# Patient Record
Sex: FEMALE | Race: WHITE | Employment: OTHER | URBAN - METROPOLITAN AREA
[De-identification: names, ages, dates, MRNs, and addresses within clinical notes are randomized per-mention and may not be internally consistent; named-entity substitution may affect disease eponyms.]

---

## 2017-03-02 ENCOUNTER — OP HISTORICAL/CONVERTED ENCOUNTER (OUTPATIENT)
Dept: OTHER | Age: 67
End: 2017-03-02

## 2017-07-17 ENCOUNTER — OP HISTORICAL/CONVERTED ENCOUNTER (OUTPATIENT)
Dept: OTHER | Age: 67
End: 2017-07-17

## 2017-07-31 ENCOUNTER — OP HISTORICAL/CONVERTED ENCOUNTER (OUTPATIENT)
Dept: OTHER | Age: 67
End: 2017-07-31

## 2017-07-31 LAB — MAMMOGRAPHY, EXTERNAL: NORMAL

## 2017-11-20 ENCOUNTER — OP HISTORICAL/CONVERTED ENCOUNTER (OUTPATIENT)
Dept: OTHER | Age: 67
End: 2017-11-20

## 2018-02-28 ENCOUNTER — OP HISTORICAL/CONVERTED ENCOUNTER (OUTPATIENT)
Dept: OTHER | Age: 68
End: 2018-02-28

## 2018-07-02 ENCOUNTER — OP HISTORICAL/CONVERTED ENCOUNTER (OUTPATIENT)
Dept: OTHER | Age: 68
End: 2018-07-02

## 2018-09-01 ENCOUNTER — ED HISTORICAL/CONVERTED ENCOUNTER (OUTPATIENT)
Dept: OTHER | Age: 68
End: 2018-09-01

## 2018-09-18 ENCOUNTER — OP HISTORICAL/CONVERTED ENCOUNTER (OUTPATIENT)
Dept: OTHER | Age: 68
End: 2018-09-18

## 2018-11-06 ENCOUNTER — OP HISTORICAL/CONVERTED ENCOUNTER (OUTPATIENT)
Dept: OTHER | Age: 68
End: 2018-11-06

## 2018-11-23 ENCOUNTER — OP HISTORICAL/CONVERTED ENCOUNTER (OUTPATIENT)
Dept: OTHER | Age: 68
End: 2018-11-23

## 2020-02-18 LAB
A/G RATIO, EXTERNAL: 1.7
ALBUMIN, EXTERNAL: 4.1
ALK PHOS, EXTERNAL: 86
ANION GAP BLD CALC-SCNC: NORMAL MMOL/L
BILI DIRECT, EXTERNAL: NORMAL
BILI TOTAL, EXTERNAL: NORMAL
BUN BLD-MCNC: 12 MG/DL
BUN/CREATININE RATIO, EXTERNAL: 13
CALCIUM, EXTERNAL: 9.6
CALCIUM, ION, EXTERNAL: NORMAL
CHLORIDE, EXTERNAL: 100
CO2, EXTERNAL: 23
CREATININE SER, EXTERNAL: 0.93
EGFR IF AFA, EXTERNAL: 73
EGFR NOT AFA, EXTERNAL: 63
GLOBULIN, EXTERNAL: 2.4
GLUCOSE SER, EXTERNAL: 114
HBA1C MFR BLD HPLC: 8.8 %
POTASSIUM, EXTERNAL: 3.9
PROTEIN TOT, EXTERNAL: 6.5
SGOT (AST), EXTERNAL: 15
SGPT (ALT), EXTERNAL: 18
SODIUM, EXTERNAL: 142

## 2020-02-24 ENCOUNTER — OP HISTORICAL/CONVERTED ENCOUNTER (OUTPATIENT)
Dept: OTHER | Age: 70
End: 2020-02-24

## 2020-07-21 VITALS
HEART RATE: 99 BPM | WEIGHT: 204 LBS | DIASTOLIC BLOOD PRESSURE: 91 MMHG | HEIGHT: 66 IN | OXYGEN SATURATION: 98 % | BODY MASS INDEX: 32.78 KG/M2 | SYSTOLIC BLOOD PRESSURE: 141 MMHG | TEMPERATURE: 98.6 F

## 2020-07-21 PROBLEM — I10 ESSENTIAL HYPERTENSION: Status: ACTIVE | Noted: 2020-07-21

## 2020-07-21 PROBLEM — K76.0 NON-ALCOHOLIC FATTY LIVER DISEASE: Status: ACTIVE | Noted: 2020-07-21

## 2020-07-21 PROBLEM — E10.9 TYPE 1 DIABETES MELLITUS WITHOUT COMPLICATION (HCC): Status: ACTIVE | Noted: 2020-07-21

## 2020-07-21 RX ORDER — LEVOTHYROXINE SODIUM 25 UG/1
TABLET ORAL
COMMUNITY
End: 2021-04-12

## 2020-07-21 RX ORDER — AMITRIPTYLINE HYDROCHLORIDE 25 MG/1
50 TABLET, FILM COATED ORAL
COMMUNITY
End: 2021-01-27

## 2020-07-21 RX ORDER — METFORMIN HYDROCHLORIDE 1000 MG/1
1000 TABLET ORAL 2 TIMES DAILY WITH MEALS
COMMUNITY
End: 2022-03-22 | Stop reason: SDUPTHER

## 2020-07-21 RX ORDER — GLIMEPIRIDE 4 MG/1
TABLET ORAL 2 TIMES DAILY
COMMUNITY
End: 2021-09-24

## 2020-07-21 RX ORDER — BLOOD SUGAR DIAGNOSTIC
STRIP MISCELLANEOUS 3 TIMES DAILY
COMMUNITY
End: 2022-03-22 | Stop reason: SDUPTHER

## 2020-07-21 RX ORDER — ASPIRIN 81 MG/1
TABLET ORAL DAILY
COMMUNITY
End: 2022-03-22 | Stop reason: SDUPTHER

## 2020-07-21 RX ORDER — FAMOTIDINE 20 MG/1
20 TABLET, FILM COATED ORAL 2 TIMES DAILY
COMMUNITY
End: 2022-03-22 | Stop reason: SDUPTHER

## 2020-07-21 RX ORDER — AMLODIPINE BESYLATE 5 MG/1
5 TABLET ORAL DAILY
COMMUNITY
End: 2022-03-22 | Stop reason: SDUPTHER

## 2020-07-24 LAB
ALBUMIN SERPL-MCNC: 4.1 G/DL (ref 3.8–4.8)
ALBUMIN/GLOB SERPL: 1.6 {RATIO} (ref 1.2–2.2)
ALP SERPL-CCNC: 86 IU/L (ref 39–117)
ALT SERPL-CCNC: 11 IU/L (ref 0–32)
APPEARANCE UR: ABNORMAL
AST SERPL-CCNC: 10 IU/L (ref 0–40)
BACTERIA #/AREA URNS HPF: ABNORMAL /[HPF]
BACTERIA UR CULT: ABNORMAL
BASOPHILS # BLD AUTO: 0 X10E3/UL (ref 0–0.2)
BASOPHILS NFR BLD AUTO: 0 %
BILIRUB SERPL-MCNC: 0.2 MG/DL (ref 0–1.2)
BILIRUB UR QL STRIP: NEGATIVE
BUN SERPL-MCNC: 18 MG/DL (ref 8–27)
BUN/CREAT SERPL: 18 (ref 12–28)
CALCIUM SERPL-MCNC: 9.6 MG/DL (ref 8.7–10.3)
CASTS URNS QL MICRO: ABNORMAL /LPF
CHLORIDE SERPL-SCNC: 103 MMOL/L (ref 96–106)
CHOLEST SERPL-MCNC: 196 MG/DL (ref 100–199)
CO2 SERPL-SCNC: 20 MMOL/L (ref 20–29)
COLOR UR: ABNORMAL
CREAT SERPL-MCNC: 1.01 MG/DL (ref 0.57–1)
EOSINOPHIL # BLD AUTO: 0.2 X10E3/UL (ref 0–0.4)
EOSINOPHIL NFR BLD AUTO: 2 %
EPI CELLS #/AREA URNS HPF: ABNORMAL /HPF (ref 0–10)
ERYTHROCYTE [DISTWIDTH] IN BLOOD BY AUTOMATED COUNT: 14.4 % (ref 11.7–15.4)
GLOBULIN SER CALC-MCNC: 2.5 G/DL (ref 1.5–4.5)
GLUCOSE SERPL-MCNC: 129 MG/DL (ref 65–99)
GLUCOSE UR QL: NEGATIVE
HBA1C MFR BLD: 7.6 % (ref 4.8–5.6)
HCT VFR BLD AUTO: 34.9 % (ref 34–46.6)
HDLC SERPL-MCNC: 54 MG/DL
HGB BLD-MCNC: 11.2 G/DL (ref 11.1–15.9)
HGB UR QL STRIP: ABNORMAL
IMM GRANULOCYTES # BLD AUTO: 0.1 X10E3/UL (ref 0–0.1)
IMM GRANULOCYTES NFR BLD AUTO: 1 %
KETONES UR QL STRIP: NEGATIVE
LDLC SERPL CALC-MCNC: 105 MG/DL (ref 0–99)
LEUKOCYTE ESTERASE UR QL STRIP: ABNORMAL
LYMPHOCYTES # BLD AUTO: 2.9 X10E3/UL (ref 0.7–3.1)
LYMPHOCYTES NFR BLD AUTO: 29 %
MCH RBC QN AUTO: 26.4 PG (ref 26.6–33)
MCHC RBC AUTO-ENTMCNC: 32.1 G/DL (ref 31.5–35.7)
MCV RBC AUTO: 82 FL (ref 79–97)
MICRO URNS: ABNORMAL
MONOCYTES # BLD AUTO: 0.6 X10E3/UL (ref 0.1–0.9)
MONOCYTES NFR BLD AUTO: 6 %
MUCOUS THREADS URNS QL MICRO: PRESENT
NEUTROPHILS # BLD AUTO: 6.3 X10E3/UL (ref 1.4–7)
NEUTROPHILS NFR BLD AUTO: 62 %
NITRITE UR QL STRIP: POSITIVE
PH UR STRIP: 6 [PH] (ref 5–7.5)
PLATELET # BLD AUTO: 295 X10E3/UL (ref 150–450)
POTASSIUM SERPL-SCNC: 4.3 MMOL/L (ref 3.5–5.2)
PROT SERPL-MCNC: 6.6 G/DL (ref 6–8.5)
PROT UR QL STRIP: ABNORMAL
RBC # BLD AUTO: 4.24 X10E6/UL (ref 3.77–5.28)
RBC #/AREA URNS HPF: >30 /HPF (ref 0–2)
SODIUM SERPL-SCNC: 140 MMOL/L (ref 134–144)
SP GR UR: 1.02 (ref 1–1.03)
TRIGL SERPL-MCNC: 183 MG/DL (ref 0–149)
URINALYSIS REFLEX, 377202: ABNORMAL
UROBILINOGEN UR STRIP-MCNC: 1 MG/DL (ref 0.2–1)
VLDLC SERPL CALC-MCNC: 37 MG/DL (ref 5–40)
WBC # BLD AUTO: 10 X10E3/UL (ref 3.4–10.8)
WBC #/AREA URNS HPF: >30 /HPF (ref 0–5)

## 2020-07-27 NOTE — PROGRESS NOTES
Looks like uti, ciprofloxacin 500 mg BI (I may have to send it in if you cannot do order) .  A1c is stable liver and kidneys are normal

## 2020-07-28 DIAGNOSIS — N30.00 ACUTE CYSTITIS WITHOUT HEMATURIA: Primary | ICD-10-CM

## 2020-07-28 RX ORDER — CIPROFLOXACIN 500 MG/1
500 TABLET ORAL 2 TIMES DAILY
Qty: 14 TAB | Refills: 0 | Status: SHIPPED | OUTPATIENT
Start: 2020-07-28 | End: 2020-08-04

## 2020-07-29 ENCOUNTER — TELEPHONE (OUTPATIENT)
Dept: FAMILY MEDICINE CLINIC | Age: 70
End: 2020-07-29

## 2020-07-29 NOTE — TELEPHONE ENCOUNTER
----- Message from Richard Rodriguez MD sent at 7/28/2020  9:02 AM EDT -----  Regarding: FW: Virtual Visit  Rebecca So  Let liss know I am sending in med for UTI and if not better we can check urine and culture.    ----- Message -----  From: Fercho Gallo  Sent: 7/28/2020   8:48 AM EDT  To: Richard Rodriguez MD  Subject: RE: Virtual Visit                                It seems to take a while for these messages to send or the other person to receive them. I get like 2 or 3 from you at once [facility]. But I talked to , she stated she just needs meds for a UTI sent to [address] [facility] in [facility] and if you want her to do a urine she can. Or she said you can just call her.  ----- Message -----  From: Noah Spicer MD  Sent: 7/28/2020   8:35 AM EDT  To: Diane Tenorio  Subject: RE: Virtual Visit                                [facility], she was not able to allow access to her camera for some reason> I did not get to do visit. Please call her and let her know we need access to her camera.  If she needs labs I can go ahead and order them and refill meds if needed but my [address] [facility] not checked in apparently.   ----- Message -----  From: Corrina Ramirezer: 7/28/2020   7:46 AM EDT  To: Richard Rodriguez MD  Subject: Virtual Visit                                    Patient Fara Chung is checked in, requesting link be sent to [phone]

## 2020-08-10 ENCOUNTER — TELEPHONE (OUTPATIENT)
Dept: FAMILY MEDICINE CLINIC | Age: 70
End: 2020-08-10

## 2020-08-10 DIAGNOSIS — R31.0 GROSS HEMATURIA: Primary | ICD-10-CM

## 2020-08-10 NOTE — TELEPHONE ENCOUNTER
Pts  left message. States that pt has finished the antibiotics you sent in. States that she is still having blood clots in urine. Wanting to know what to do?

## 2020-08-20 ENCOUNTER — OP HISTORICAL/CONVERTED ENCOUNTER (OUTPATIENT)
Dept: OTHER | Age: 70
End: 2020-08-20

## 2020-11-04 DIAGNOSIS — R31.0 GROSS HEMATURIA: ICD-10-CM

## 2021-01-27 RX ORDER — AMITRIPTYLINE HYDROCHLORIDE 25 MG/1
TABLET, FILM COATED ORAL
Qty: 180 TAB | Refills: 0 | Status: SHIPPED | OUTPATIENT
Start: 2021-01-27 | End: 2021-08-16

## 2021-04-12 RX ORDER — LEVOTHYROXINE SODIUM 25 UG/1
TABLET ORAL
Qty: 30 TAB | Refills: 0 | Status: SHIPPED | OUTPATIENT
Start: 2021-04-12 | End: 2022-03-22 | Stop reason: ALTCHOICE

## 2021-06-09 ENCOUNTER — HOSPITAL ENCOUNTER (EMERGENCY)
Age: 71
Discharge: HOME | End: 2021-06-09
Attending: EMERGENCY MEDICINE
Payer: MEDICARE

## 2021-06-09 ENCOUNTER — APPOINTMENT (OUTPATIENT)
Dept: GENERAL RADIOLOGY | Age: 71
End: 2021-06-09
Attending: EMERGENCY MEDICINE
Payer: MEDICARE

## 2021-06-09 VITALS
WEIGHT: 208 LBS | HEART RATE: 95 BPM | TEMPERATURE: 98.2 F | OXYGEN SATURATION: 99 % | SYSTOLIC BLOOD PRESSURE: 157 MMHG | HEIGHT: 66 IN | DIASTOLIC BLOOD PRESSURE: 72 MMHG | BODY MASS INDEX: 33.43 KG/M2 | RESPIRATION RATE: 16 BRPM

## 2021-06-09 DIAGNOSIS — M23.41 LOOSE BODY OF RIGHT KNEE: ICD-10-CM

## 2021-06-09 DIAGNOSIS — M25.561 ACUTE PAIN OF RIGHT KNEE: Primary | ICD-10-CM

## 2021-06-09 DIAGNOSIS — M19.90 ARTHRITIS: ICD-10-CM

## 2021-06-09 PROCEDURE — 74011250636 HC RX REV CODE- 250/636: Performed by: EMERGENCY MEDICINE

## 2021-06-09 PROCEDURE — 96372 THER/PROPH/DIAG INJ SC/IM: CPT

## 2021-06-09 PROCEDURE — 99282 EMERGENCY DEPT VISIT SF MDM: CPT

## 2021-06-09 PROCEDURE — 73562 X-RAY EXAM OF KNEE 3: CPT

## 2021-06-09 RX ORDER — KETOROLAC TROMETHAMINE 30 MG/ML
30 INJECTION, SOLUTION INTRAMUSCULAR; INTRAVENOUS
Status: COMPLETED | OUTPATIENT
Start: 2021-06-09 | End: 2021-06-09

## 2021-06-09 RX ORDER — NAPROXEN 500 MG/1
500 TABLET ORAL 2 TIMES DAILY WITH MEALS
Qty: 20 TABLET | Refills: 0 | Status: CANCELLED | OUTPATIENT
Start: 2021-06-09

## 2021-06-09 RX ADMIN — KETOROLAC TROMETHAMINE 30 MG: 30 INJECTION, SOLUTION INTRAMUSCULAR; INTRAVENOUS at 13:22

## 2021-06-09 NOTE — ED PROVIDER NOTES
EMERGENCY DEPARTMENT HISTORY AND PHYSICAL EXAM      Date: 6/9/2021  Patient Name: Anastasiya Smith    History of Presenting Illness     Chief Complaint   Patient presents with    Knee Pain       History Provided By: Patient    HPI: Anastasiya Smith, 72 y.o. female presents to the ED with cc of   Patient complain of right knee pain started last week worsened today unable to walk without a cane history of arthritis in the past denies any fever nausea vomiting abdominal pain or chest pain, pain gets worse with movement and weightbearing   Moderate severity, no known exacerbating or relieving factors, no other associated signs and symptoms. There are no other complaints, changes, or physical findings at this time. PCP: Anita Moseley DO    No current facility-administered medications on file prior to encounter. Current Outpatient Medications on File Prior to Encounter   Medication Sig Dispense Refill    Euthyrox 25 mcg tablet Take 1 tablet by mouth once daily 30 Tab 0    amitriptyline (ELAVIL) 25 mg tablet TAKE 1 TABLET BY MOUTH IN THE EVENING FOR 5 DAYS THEN  TAKE  2  TABLETS  DAILY  THEREAFTER 180 Tab 0    glucose blood VI test strips (Accu-Chek Adeline Plus test strp) strip by Does Not Apply route three (3) times daily.  amLODIPine (NORVASC) 5 mg tablet Take 5 mg by mouth daily.  aspirin delayed-release 81 mg tablet Take  by mouth daily.  glimepiride (AMARYL) 4 mg tablet Take  by mouth two (2) times a day.  metFORMIN (GLUCOPHAGE) 1,000 mg tablet Take 1,000 mg by mouth two (2) times daily (with meals).  metoprolol succinate 50 mg CSpX Take  by mouth.  insulin NPH/insulin regular (NovoLIN 70/30 U-100 Insulin) 100 unit/mL (70-30) injection by SubCUTAneous route. 22 units in the AM and 42 units at PM      famotidine (Pepcid) 20 mg tablet Take 20 mg by mouth two (2) times a day.          Past History     Past Medical History:  Past Medical History:   Diagnosis Date    [facility]  Essential hypertension 7/79/2439    Non-alcoholic fatty liver disease 7/21/2020       Past Surgical History:  Past Surgical History:   Procedure Laterality Date    HX CATARACT REMOVAL Left 07/23/2019    HX CHOLECYSTECTOMY      HX COLONOSCOPY  2002    HX HYSTERECTOMY      partial  20 yrs ago    HX TUBAL LIGATION         Family History:  Family History   Problem Relation Age of Onset    Diabetes Mother     Diabetes Maternal Grandmother     Other Maternal Grandmother         CHF    Diabetes Paternal Grandmother        Social History:  Social History     Tobacco Use    Smoking status: Former Smoker    Smokeless tobacco: Never Used   Substance Use Topics    Alcohol use: Not Currently    Drug use: Not on file       Allergies: Allergies   Allergen Reactions    Adhesive Unknown (comments)    Penicillins Other (comments)    Sulfa (Sulfonamide Antibiotics) Unknown (comments)         Review of Systems   Review of Systems   Constitutional: Negative. HENT: Negative for congestion, facial swelling, rhinorrhea and sore throat. Eyes: Negative. Negative for photophobia and pain. Respiratory: Negative for cough, shortness of breath and wheezing. Cardiovascular: Negative. Negative for chest pain. Gastrointestinal: Negative for abdominal distention and abdominal pain. Genitourinary: Negative. Musculoskeletal: Positive for joint swelling. Allergic/Immunologic: Negative for immunocompromised state. Neurological: Negative. Negative for syncope and weakness. Hematological: Negative. Psychiatric/Behavioral: Negative. Physical Exam   Physical Exam  Vitals and nursing note reviewed. Constitutional:       Appearance: Normal appearance. She is normal weight. HENT:      Head: Normocephalic and atraumatic. Jaw: There is normal jaw occlusion.       Right Ear: Tympanic membrane and external ear normal.      Left Ear: Tympanic membrane and external ear normal.      Nose: Nose normal. Mouth/Throat:      Mouth: Mucous membranes are moist.      Pharynx: Oropharynx is clear. Eyes:      General: Lids are normal.         Right eye: No foreign body or hordeolum. Left eye: No foreign body or hordeolum. Neck:      Thyroid: No thyroid mass. Vascular: No carotid bruit. Trachea: No tracheal tenderness. Cardiovascular:      Rate and Rhythm: Normal rate and regular rhythm. Pulses: Normal pulses. Heart sounds: Normal heart sounds. Pulmonary:      Effort: Pulmonary effort is normal.      Breath sounds: Normal breath sounds. Abdominal:      General: Abdomen is flat. Bowel sounds are normal.      Palpations: Abdomen is soft. There is no mass. Tenderness: There is no abdominal tenderness. There is no right CVA tenderness, left CVA tenderness, guarding or rebound. Hernia: No hernia is present. Musculoskeletal:         General: Normal range of motion. Cervical back: Normal range of motion and neck supple. No rigidity. No muscular tenderness. Normal range of motion. Legs:       Comments: Swelling tenderness decreased range of motion due to pain   Skin:     General: Skin is warm. Findings: No lesion or rash. Neurological:      General: No focal deficit present. Mental Status: She is alert and oriented to person, place, and time. GCS: GCS eye subscore is 4. GCS verbal subscore is 5. GCS motor subscore is 6. Sensory: Sensation is intact. Motor: Motor function is intact. Deep Tendon Reflexes: Reflexes are normal and symmetric. Psychiatric:         Attention and Perception: Attention and perception normal.         Mood and Affect: Mood is anxious. Diagnostic Study Results     Labs -   No results found for this or any previous visit (from the past 12 hour(s)).     Labs reviewed by me    Radiologic Studies -   XR KNEE RT 3 V   Final Result        CT Results  (Last 48 hours)    None        CXR Results  (Last 48 hours) None            Medical Decision Making     I am the first provider for this patient. I reviewed the vital signs, available nursing notes, past medical history, past surgical history, family history and social history. RADIOLOGY report and LABS reviewed by me    Vital Signs-Reviewed the patient's vital signs. Patient Vitals for the past 12 hrs:   Temp Pulse Resp BP SpO2   06/09/21 1136 98.2 °F (36.8 °C) 95 16 (!) 157/72 99 %       EKG interpretation: (Preliminary)      Records Reviewed: Nurse's note. Provider Notes (Medical Decision Making):    Patient presents with DIFF DX : Knee pain, arthritis, loose body any      ED Course:   Initial assessment performed. The patients presenting problems have been discussed, and they are in agreement with the care plan formulated and outlined with them. I have encouraged them to ask questions as they arise throughout their visit. TREATMENT RESPONSE -Stable          Maritza Ontiveros MD      Disposition:  Discharged   Diagnostic tests were reviewed and questions answered. Diagnosis, care plan and treatment options were discussed. The patient understand instructions and will follow up as directed. Condition stable    Admitting Provider:  No admitting provider for patient encounter. Consulting Provider:  No ref. provider found       DISCHARGE PLAN:  1. Current Discharge Medication List        2. Follow-up Information     Follow up With Specialties Details Why Contact Info    Brandan Bowen 23  [address] [facility]      Albino Yost MD Orthopedic Surgery In 2 days  [facility] [address] [place] [address] Way [address]  [phone]          3. Return to ED if worse     Diagnosis     Clinical Impression:     ICD-10-CM ICD-9-CM    1. Acute pain of right knee  M25.561 719.46    2. Arthritis  M19.90 716.90    3.  Loose body of right knee  M23.41 717.6 Attestations:    Margarito Boswell MD    Please note that this dictation was completed with [email or web address], the computer voice recognition software. Quite often unanticipated grammatical, syntax, homophones, and other interpretive errors are inadvertently transcribed by the computer software. Please disregard these errors. Please excuse any errors that have escaped final proofreading. Thank you.

## 2021-06-09 NOTE — DISCHARGE INSTRUCTIONS
Thank you! Thank you for allowing me to care for you in the emergency department. I sincerely hope that you are satisfied with your visit today. It is my goal to provide you with excellent care. Below you will find a list of your labs and imaging from your visit today. Should you have any questions regarding these results please do not hesitate to call the emergency department. Labs -   No results found for this or any previous visit (from the past 12 hour(s)). Radiologic Studies -   XR KNEE RT 3 V   Final Result        CT Results  (Last 48 hours)      None          CXR Results  (Last 48 hours)      None               If you feel that you have not received excellent quality care or timely care, please ask to speak to the nurse manager. Please choose us in the future for your continued health care needs. ------------------------------------------------------------------------------------------------------------  The exam and treatment you received in the Emergency Department were for an urgent problem and are not intended as complete care. It is important that you follow-up with a doctor, nurse practitioner, or physician assistant to:  (1) confirm your diagnosis,  (2) re-evaluation of changes in your illness and treatment, and  (3) for ongoing care. If your symptoms become worse or you do not improve as expected and you are unable to reach your usual health care provider, you should return to the Emergency Department. We are available 24 hours a day. Please take your discharge instructions with you when you go to your follow-up appointment. If you have any problem arranging a follow-up appointment, contact the Emergency Department immediately. If a prescription has been provided, please have it filled as soon as possible to prevent a delay in treatment. Read the entire medication instruction sheet provided to you by the pharmacy.  If you have any questions or reservations about taking the medication due to side effects or interactions with other medications, please call your primary care physician or contact the ER to speak with the charge nurse. Make an appointment with your family doctor or the physician you were referred to for follow-up of this visit as instructed on your discharge paperwork, as this is a mandatory follow-up. Return to the ER if you are unable to be seen or if you are unable to be seen in a timely manner. If you have any problem arranging the follow-up visit, contact the Emergency Department immediately.

## 2021-08-16 RX ORDER — AMITRIPTYLINE HYDROCHLORIDE 25 MG/1
TABLET, FILM COATED ORAL
Qty: 180 TABLET | Refills: 0 | Status: SHIPPED | OUTPATIENT
Start: 2021-08-16 | End: 2022-03-22 | Stop reason: DRUGHIGH

## 2021-09-24 RX ORDER — GLIMEPIRIDE 4 MG/1
TABLET ORAL
Qty: 180 TABLET | Refills: 0 | Status: SHIPPED | OUTPATIENT
Start: 2021-09-24 | End: 2022-03-22 | Stop reason: DRUGHIGH

## 2022-03-17 ENCOUNTER — HOSPITAL ENCOUNTER (EMERGENCY)
Age: 72
Discharge: HOME | End: 2022-03-17
Attending: STUDENT IN AN ORGANIZED HEALTH CARE EDUCATION/TRAINING PROGRAM
Payer: MEDICARE

## 2022-03-17 VITALS
HEART RATE: 98 BPM | DIASTOLIC BLOOD PRESSURE: 78 MMHG | BODY MASS INDEX: 25.71 KG/M2 | SYSTOLIC BLOOD PRESSURE: 145 MMHG | TEMPERATURE: 98.4 F | OXYGEN SATURATION: 97 % | WEIGHT: 160 LBS | HEIGHT: 66 IN | RESPIRATION RATE: 18 BRPM

## 2022-03-17 DIAGNOSIS — R73.9 HYPERGLYCEMIA: Primary | ICD-10-CM

## 2022-03-17 LAB
ALBUMIN SERPL-MCNC: 3.4 G/DL (ref 3.5–5)
ALBUMIN/GLOB SERPL: 0.9 {RATIO} (ref 1.1–2.2)
ALP SERPL-CCNC: 119 U/L (ref 45–117)
ALT SERPL-CCNC: 49 U/L (ref 12–78)
ANION GAP SERPL CALC-SCNC: 9 MMOL/L (ref 5–15)
APPEARANCE UR: CLEAR
AST SERPL W P-5'-P-CCNC: 20 U/L (ref 15–37)
BACTERIA URNS QL MICRO: NEGATIVE /HPF
BASOPHILS # BLD: 0 K/UL (ref 0–0.1)
BASOPHILS NFR BLD: 1 % (ref 0–1)
BILIRUB SERPL-MCNC: 0.5 MG/DL (ref 0.2–1)
BILIRUB UR QL: NEGATIVE
BUN SERPL-MCNC: 20 MG/DL (ref 6–20)
BUN/CREAT SERPL: 16 (ref 12–20)
CA-I BLD-MCNC: 9.4 MG/DL (ref 8.5–10.1)
CHLORIDE SERPL-SCNC: 97 MMOL/L (ref 97–108)
CO2 SERPL-SCNC: 25 MMOL/L (ref 21–32)
COLOR UR: ABNORMAL
CREAT SERPL-MCNC: 1.23 MG/DL (ref 0.55–1.02)
DIFFERENTIAL METHOD BLD: ABNORMAL
EOSINOPHIL # BLD: 0.2 K/UL (ref 0–0.4)
EOSINOPHIL NFR BLD: 2 % (ref 0–7)
ERYTHROCYTE [DISTWIDTH] IN BLOOD BY AUTOMATED COUNT: 14.6 % (ref 11.5–14.5)
GLOBULIN SER CALC-MCNC: 4 G/DL (ref 2–4)
GLUCOSE BLD STRIP.AUTO-MCNC: 361 MG/DL (ref 65–117)
GLUCOSE BLD STRIP.AUTO-MCNC: 527 MG/DL (ref 65–117)
GLUCOSE SERPL-MCNC: 550 MG/DL (ref 65–100)
GLUCOSE UR STRIP.AUTO-MCNC: >300 MG/DL
HCT VFR BLD AUTO: 44.7 % (ref 35–47)
HGB BLD-MCNC: 14 G/DL (ref 11.5–16)
HGB UR QL STRIP: ABNORMAL
IMM GRANULOCYTES # BLD AUTO: 0.1 K/UL (ref 0–0.04)
IMM GRANULOCYTES NFR BLD AUTO: 1 % (ref 0–0.5)
KETONES UR QL STRIP.AUTO: 80 MG/DL
LEUKOCYTE ESTERASE UR QL STRIP.AUTO: NEGATIVE
LYMPHOCYTES # BLD: 1.6 K/UL (ref 0.8–3.5)
LYMPHOCYTES NFR BLD: 22 % (ref 12–49)
MCH RBC QN AUTO: 25.2 PG (ref 26–34)
MCHC RBC AUTO-ENTMCNC: 31.3 G/DL (ref 30–36.5)
MCV RBC AUTO: 80.5 FL (ref 80–99)
MONOCYTES # BLD: 0.5 K/UL (ref 0–1)
MONOCYTES NFR BLD: 7 % (ref 5–13)
MUCOUS THREADS URNS QL MICRO: ABNORMAL /LPF
NEUTS SEG # BLD: 4.9 K/UL (ref 1.8–8)
NEUTS SEG NFR BLD: 67 % (ref 32–75)
NITRITE UR QL STRIP.AUTO: NEGATIVE
NRBC # BLD: 0 K/UL (ref 0–0.01)
NRBC BLD-RTO: 0 PER 100 WBC
PERFORMED BY, TECHID: ABNORMAL
PERFORMED BY, TECHID: ABNORMAL
PH UR STRIP: 5 [PH] (ref 5–8)
PLATELET # BLD AUTO: 236 K/UL (ref 150–400)
PMV BLD AUTO: 9.3 FL (ref 8.9–12.9)
POTASSIUM SERPL-SCNC: 4.4 MMOL/L (ref 3.5–5.1)
PROT SERPL-MCNC: 7.4 G/DL (ref 6.4–8.2)
PROT UR STRIP-MCNC: 100 MG/DL
RBC # BLD AUTO: 5.55 M/UL (ref 3.8–5.2)
RBC #/AREA URNS HPF: ABNORMAL /HPF (ref 0–5)
SODIUM SERPL-SCNC: 131 MMOL/L (ref 136–145)
SP GR UR REFRACTOMETRY: 1.02 (ref 1–1.03)
UA: UC IF INDICATED,UAUC: ABNORMAL
UROBILINOGEN UR QL STRIP.AUTO: 0.1 EU/DL (ref 0.1–1)
WBC # BLD AUTO: 7.3 K/UL (ref 3.6–11)
WBC URNS QL MICRO: ABNORMAL /HPF (ref 0–4)

## 2022-03-17 PROCEDURE — 80053 COMPREHEN METABOLIC PANEL: CPT

## 2022-03-17 PROCEDURE — 74011636637 HC RX REV CODE- 636/637: Performed by: STUDENT IN AN ORGANIZED HEALTH CARE EDUCATION/TRAINING PROGRAM

## 2022-03-17 PROCEDURE — 85025 COMPLETE CBC W/AUTO DIFF WBC: CPT

## 2022-03-17 PROCEDURE — 36415 COLL VENOUS BLD VENIPUNCTURE: CPT

## 2022-03-17 PROCEDURE — 96374 THER/PROPH/DIAG INJ IV PUSH: CPT

## 2022-03-17 PROCEDURE — 82962 GLUCOSE BLOOD TEST: CPT

## 2022-03-17 PROCEDURE — 99284 EMERGENCY DEPT VISIT MOD MDM: CPT

## 2022-03-17 PROCEDURE — 74011250636 HC RX REV CODE- 250/636: Performed by: STUDENT IN AN ORGANIZED HEALTH CARE EDUCATION/TRAINING PROGRAM

## 2022-03-17 PROCEDURE — 81001 URINALYSIS AUTO W/SCOPE: CPT

## 2022-03-17 RX ADMIN — INSULIN HUMAN 6 UNITS: 100 INJECTION, SOLUTION PARENTERAL at 17:38

## 2022-03-17 RX ADMIN — SODIUM CHLORIDE 1000 ML: 9 INJECTION, SOLUTION INTRAVENOUS at 17:08

## 2022-03-17 NOTE — ED PROVIDER NOTES
EMERGENCY DEPARTMENT HISTORY AND PHYSICAL EXAM      Date: 3/17/2022  Patient Name: Chitra Oliveira    History of Presenting Illness     Chief Complaint   Patient presents with    Abdominal Pain       History Provided By: Patient and triage nurse    HPI: Chitra Oliveira, 80 y.o. female with a past medical history significant diabetes, hypertension and stroke presents to the ED with cc of abdominal pain, thirst.  Patient presented from urgent care center for evaluation of abdominal pain high blood sugar. Patient has a history of diabetes, prior stroke, expressive aphasia, difficult to clearly understand patient. There are no other complaints, changes, or physical findings at this time. PCP: None    Current Outpatient Medications   Medication Sig Dispense Refill    glimepiride (AMARYL) 4 mg tablet Take 1 tablet by mouth twice daily 180 Tablet 0    amitriptyline (ELAVIL) 25 mg tablet TAKE 1 TABLET BY MOUTH ONCE DAILY IN THE EVENING FOR 5 DAYS, THEN 2 ONCE DAILY THEREAFTER 180 Tablet 0    Euthyrox 25 mcg tablet Take 1 tablet by mouth once daily 30 Tab 0    glucose blood VI test strips (Accu-Chek Adeline Plus test strp) strip by Does Not Apply route three (3) times daily.  amLODIPine (NORVASC) 5 mg tablet Take 5 mg by mouth daily.  aspirin delayed-release 81 mg tablet Take  by mouth daily.  metFORMIN (GLUCOPHAGE) 1,000 mg tablet Take 1,000 mg by mouth two (2) times daily (with meals).  metoprolol succinate 50 mg CSpX Take  by mouth.  insulin NPH/insulin regular (NovoLIN 70/30 U-100 Insulin) 100 unit/mL (70-30) injection by SubCUTAneous route. 22 units in the AM and 42 units at PM      famotidine (Pepcid) 20 mg tablet Take 20 mg by mouth two (2) times a day.          Past History     Past Medical History:  Past Medical History:   Diagnosis Date    Diabetes ([address]     Essential hypertension 2/13/1438    Non-alcoholic fatty liver disease 7/21/2020       Past Surgical History:  Past Surgical History:   Procedure Laterality Date    HX CATARACT REMOVAL Left 07/23/2019    HX CHOLECYSTECTOMY      HX COLONOSCOPY  2002    HX HYSTERECTOMY      partial  20 yrs ago    HX TUBAL LIGATION         Family History:  Family History   Problem Relation Age of Onset    Diabetes Mother     Diabetes Maternal Grandmother     Other Maternal Grandmother         CHF    Diabetes Paternal Grandmother        Social History:  Social History     Tobacco Use    Smoking status: Former Smoker    Smokeless tobacco: Never Used   Substance Use Topics    Alcohol use: Not Currently    Drug use: Not on file       Allergies: Allergies   Allergen Reactions    Adhesive Unknown (comments)    Penicillins Other (comments)    Sulfa (Sulfonamide Antibiotics) Unknown (comments)         Review of Systems     Review of Systems   Unable to perform ROS: Other   aphasia    Physical Exam     Physical Exam  Vitals and nursing note reviewed. Constitutional:       General: She is not in acute distress. Appearance: Normal appearance. She is normal weight. She is not ill-appearing. HENT:      Head: Normocephalic and atraumatic. Nose: Nose normal.      Mouth/Throat:      Mouth: Mucous membranes are moist.   Eyes:      Extraocular Movements: Extraocular movements intact. Pupils: Pupils are equal, round, and reactive to light. Cardiovascular:      Rate and Rhythm: Normal rate and regular rhythm. Pulses: Normal pulses. Pulmonary:      Effort: Pulmonary effort is normal.   Abdominal:      General: Abdomen is flat. Bowel sounds are normal.      Palpations: Abdomen is soft. Tenderness: There is no abdominal tenderness. There is no rebound. Musculoskeletal:         General: No tenderness. Normal range of motion. Cervical back: Normal range of motion and neck supple. No muscular tenderness. Skin:     General: Skin is warm and dry. Neurological:      General: No focal deficit present.       Mental Status: She is alert.      Cranial Nerves: No cranial nerve deficit. Sensory: No sensory deficit. Motor: No weakness. Diagnostic Study Results   No results found for this or any previous visit (from the past 24 hour(s)). Labs -     No results found for this or any previous visit (from the past 12 hour(s)). Radiologic Studies -   [unfilled]  CT Results  (Last 48 hours)    None        CXR Results  (Last 48 hours)    None          Medical Decision Making and ED Course   I am the first provider for this patient. I reviewed the vital signs, available nursing notes, past medical history, past surgical history, family history and social history. Vital Signs-Reviewed the patient's vital signs. No data found. EKG interpretation: (Preliminary)  . Records Reviewed: Nursing Notes and Old Medical Records    The patient presents with abdominal pain with a differential diagnosis of abdominal pain, appendicitis, cholecystitis, gastritis, gastroenteritis, GERD and hyperglycemia      Provider Notes (Medical Decision Making):     MDM   10year-old female, presents emergency department for high blood sugar, abdominal pain at urgent care today, fingerstick was noted to be 500. Physical exam shows well-appearing female no distress mild abdominal tenderness palpation no rebound or guarding    Basic lab work drawn, shows hyperglycemia 550 anion gap 9, normal bicarb normal potassium. UA does not show any signs of UTI. Patient given IV fluids, insulin, glucose trending down. ED Course:   Initial assessment performed. The patients presenting problems have been discussed, and they are in agreement with the care plan formulated and outlined with them. I have encouraged them to ask questions as they arise throughout their visit.     ED Course as of 03/17/22 2124   Thu Mar 17, 2022   1944 Discussed with patient's daughter, has reported that over the last several months patient has had a cognitive decline, concern for possible early dementia and/or organic cause. At this time do not see any signs of acute infection, no gross electrolyte abnormalities, patient's blood glucose is trending down. Will discharge patient home, will provide follow-up with local primary care physician and neurologist.  Samy Samaniego daughter to return to patient to emergency department if any worsening confusion weakness dizziness or abdominal pain [PZ]      ED Course User Index  [PZ] Gabbi Bonilla MD         Procedures       Chula Puckett MD  Procedures                   Disposition       Discharged    Remove if not discharged  DISCHARGE PLAN:  1. Current Discharge Medication List      CONTINUE these medications which have NOT CHANGED    Details   glimepiride (AMARYL) 4 mg tablet Take 1 tablet by mouth twice daily  Qty: 180 Tablet, Refills: 0      amitriptyline (ELAVIL) 25 mg tablet TAKE 1 TABLET BY MOUTH ONCE DAILY IN THE EVENING FOR 5 DAYS, THEN 2 ONCE DAILY THEREAFTER  Qty: 180 Tablet, Refills: 0      Euthyrox 25 mcg tablet Take 1 tablet by mouth once daily  Qty: 30 Tab, Refills: 0      glucose blood VI test strips (Accu-Chek Adeline Plus test strp) strip by Does Not Apply route three (3) times daily. amLODIPine (NORVASC) 5 mg tablet Take 5 mg by mouth daily. aspirin delayed-release 81 mg tablet Take  by mouth daily. metFORMIN (GLUCOPHAGE) 1,000 mg tablet Take 1,000 mg by mouth two (2) times daily (with meals). metoprolol succinate 50 mg CSpX Take  by mouth. insulin NPH/insulin regular (NovoLIN 70/30 U-100 Insulin) 100 unit/mL (70-30) injection by SubCUTAneous route. 22 units in the AM and 42 units at PM      famotidine (Pepcid) 20 mg tablet Take 20 mg by mouth two (2) times a day.            2.   Follow-up Information     Follow up With Specialties Details Why Contact Info    Zaheer Hogue MD Internal Medicine In 1 week  [address] [facility] [address]      Kylah Troncoso MD Neurology In 1 week  [address] [facility]  Maikel Melendez [address]   [phone]          3. Return to ED if worse     Diagnosis     Clinical Impression:   1. Hyperglycemia        Attestations:    Michelle Hook MD    Please note that this dictation was completed with [employer], the computer voice recognition software. Quite often unanticipated grammatical, syntax, homophones, and other interpretive errors are inadvertently transcribed by the computer software. Please disregard these errors. Please excuse any errors that have escaped final proofreading. Thank you.

## 2022-03-17 NOTE — ED NOTES
Pt reports she need to urinate. Pt assisted onto bedpan. Pt unable to uriinate. Pt wanting to get off str and walk. Pt encouraged to stay on str for risk of fall.

## 2022-03-17 NOTE — ED NOTES
Bedside shift change report given to Paige Mendez RN  (oncoming nurse) by Gissell Wilkinson RN  (offgoing nurse). Report included the following information SBAR, Kardex, ED Summary, [facility] and Recent Results.

## 2022-03-17 NOTE — ED TRIAGE NOTES
Pt presenting from urgent care c/o abd pain 5/10. PMHx bladder cancer, CVA w/ slurred speech, DM II. Pt endorses polydipsia, polyuria.  in triage.

## 2022-03-19 PROBLEM — I10 ESSENTIAL HYPERTENSION: Status: ACTIVE | Noted: 2020-07-21

## 2022-03-19 PROBLEM — E10.9 TYPE 1 DIABETES MELLITUS WITHOUT COMPLICATION (HCC): Status: ACTIVE | Noted: 2020-07-21

## 2022-03-20 PROBLEM — K76.0 NON-ALCOHOLIC FATTY LIVER DISEASE: Status: ACTIVE | Noted: 2020-07-21

## 2022-03-22 ENCOUNTER — OFFICE VISIT (OUTPATIENT)
Dept: FAMILY MEDICINE CLINIC | Age: 72
End: 2022-03-22
Payer: MEDICARE

## 2022-03-22 VITALS
WEIGHT: 198 LBS | SYSTOLIC BLOOD PRESSURE: 145 MMHG | DIASTOLIC BLOOD PRESSURE: 91 MMHG | RESPIRATION RATE: 18 BRPM | HEART RATE: 115 BPM | TEMPERATURE: 98.3 F | HEIGHT: 66 IN | OXYGEN SATURATION: 97 % | BODY MASS INDEX: 31.82 KG/M2

## 2022-03-22 DIAGNOSIS — E78.00 PURE HYPERCHOLESTEROLEMIA: ICD-10-CM

## 2022-03-22 DIAGNOSIS — Z91.81 AT HIGH RISK FOR FALLS: ICD-10-CM

## 2022-03-22 DIAGNOSIS — F41.8 ANXIETY WITH DEPRESSION: ICD-10-CM

## 2022-03-22 DIAGNOSIS — E55.9 VITAMIN D DEFICIENCY: ICD-10-CM

## 2022-03-22 DIAGNOSIS — E61.1 IRON DEFICIENCY: ICD-10-CM

## 2022-03-22 DIAGNOSIS — I10 ESSENTIAL HYPERTENSION: ICD-10-CM

## 2022-03-22 DIAGNOSIS — K21.9 GASTROESOPHAGEAL REFLUX DISEASE WITHOUT ESOPHAGITIS: ICD-10-CM

## 2022-03-22 DIAGNOSIS — E11.65 TYPE 2 DIABETES MELLITUS WITH HYPERGLYCEMIA, UNSPECIFIED WHETHER LONG TERM INSULIN USE (HCC): Primary | ICD-10-CM

## 2022-03-22 DIAGNOSIS — E03.9 ACQUIRED HYPOTHYROIDISM: ICD-10-CM

## 2022-03-22 PROBLEM — F33.1 MAJOR DEPRESSIVE DISORDER, RECURRENT, MODERATE (HCC): Status: ACTIVE | Noted: 2022-03-22

## 2022-03-22 PROBLEM — F33.9 MAJOR DEPRESSIVE DISORDER, RECURRENT, UNSPECIFIED (HCC): Status: ACTIVE | Noted: 2022-03-22

## 2022-03-22 PROBLEM — E11.9 DIABETES MELLITUS (HCC): Status: ACTIVE | Noted: 2018-08-07

## 2022-03-22 PROBLEM — F33.0 MAJOR DEPRESSIVE DISORDER, RECURRENT, MILD (HCC): Status: ACTIVE | Noted: 2022-03-22

## 2022-03-22 PROCEDURE — 99214 OFFICE O/P EST MOD 30 MIN: CPT | Performed by: NURSE PRACTITIONER

## 2022-03-22 PROCEDURE — 2022F DILAT RTA XM EVC RTNOPTHY: CPT | Performed by: NURSE PRACTITIONER

## 2022-03-22 PROCEDURE — G8432 DEP SCR NOT DOC, RNG: HCPCS | Performed by: NURSE PRACTITIONER

## 2022-03-22 PROCEDURE — G8427 DOCREV CUR MEDS BY ELIG CLIN: HCPCS | Performed by: NURSE PRACTITIONER

## 2022-03-22 PROCEDURE — 3017F COLORECTAL CA SCREEN DOC REV: CPT | Performed by: NURSE PRACTITIONER

## 2022-03-22 PROCEDURE — G8755 DIAS BP > OR = 90: HCPCS | Performed by: NURSE PRACTITIONER

## 2022-03-22 PROCEDURE — 1090F PRES/ABSN URINE INCON ASSESS: CPT | Performed by: NURSE PRACTITIONER

## 2022-03-22 PROCEDURE — G8536 NO DOC ELDER MAL SCRN: HCPCS | Performed by: NURSE PRACTITIONER

## 2022-03-22 PROCEDURE — G8400 PT W/DXA NO RESULTS DOC: HCPCS | Performed by: NURSE PRACTITIONER

## 2022-03-22 PROCEDURE — G8753 SYS BP > OR = 140: HCPCS | Performed by: NURSE PRACTITIONER

## 2022-03-22 PROCEDURE — 3046F HEMOGLOBIN A1C LEVEL >9.0%: CPT | Performed by: NURSE PRACTITIONER

## 2022-03-22 PROCEDURE — G8417 CALC BMI ABV UP PARAM F/U: HCPCS | Performed by: NURSE PRACTITIONER

## 2022-03-22 PROCEDURE — 1101F PT FALLS ASSESS-DOCD LE1/YR: CPT | Performed by: NURSE PRACTITIONER

## 2022-03-22 RX ORDER — LANCING DEVICE/LANCETS
KIT MISCELLANEOUS
Qty: 1 KIT | Refills: 0 | Status: SHIPPED | OUTPATIENT
Start: 2022-03-22 | End: 2022-06-16

## 2022-03-22 RX ORDER — LANCETS
EACH MISCELLANEOUS
Qty: 100 EACH | Refills: 3 | Status: SHIPPED | OUTPATIENT
Start: 2022-03-22 | End: 2022-06-16

## 2022-03-22 RX ORDER — GLIMEPIRIDE 1 MG/1
1 TABLET ORAL
Qty: 90 TABLET | Refills: 1 | Status: SHIPPED | OUTPATIENT
Start: 2022-03-22 | End: 2022-04-12

## 2022-03-22 RX ORDER — ISOPROPYL ALCOHOL 70 ML/100ML
SWAB TOPICAL
Qty: 100 PAD | Refills: 3 | Status: SHIPPED | OUTPATIENT
Start: 2022-03-22

## 2022-03-22 RX ORDER — BLOOD-GLUCOSE METER
EACH MISCELLANEOUS
Qty: 1 EACH | Refills: 0 | Status: SHIPPED | OUTPATIENT
Start: 2022-03-22 | End: 2022-04-05 | Stop reason: ALTCHOICE

## 2022-03-22 RX ORDER — ASPIRIN 81 MG/1
81 TABLET ORAL DAILY
Qty: 90 TABLET | Refills: 3 | Status: SHIPPED | OUTPATIENT
Start: 2022-03-22

## 2022-03-22 RX ORDER — METOPROLOL SUCCINATE 50 MG/1
50 TABLET, EXTENDED RELEASE ORAL DAILY
Qty: 90 TABLET | Refills: 1 | Status: SHIPPED | OUTPATIENT
Start: 2022-03-22 | End: 2022-06-16

## 2022-03-22 RX ORDER — AMLODIPINE BESYLATE 5 MG/1
5 TABLET ORAL DAILY
Qty: 90 TABLET | Refills: 1 | Status: SHIPPED | OUTPATIENT
Start: 2022-03-22 | End: 2022-04-22 | Stop reason: ALTCHOICE

## 2022-03-22 RX ORDER — BLOOD SUGAR DIAGNOSTIC
STRIP MISCELLANEOUS
Qty: 100 STRIP | Refills: 3 | Status: SHIPPED | OUTPATIENT
Start: 2022-03-22 | End: 2022-04-05 | Stop reason: ALTCHOICE

## 2022-03-22 RX ORDER — FAMOTIDINE 20 MG/1
20 TABLET, FILM COATED ORAL 2 TIMES DAILY
Qty: 180 TABLET | Refills: 1 | Status: SHIPPED | OUTPATIENT
Start: 2022-03-22 | End: 2022-04-12

## 2022-03-22 RX ORDER — METFORMIN HYDROCHLORIDE 1000 MG/1
1000 TABLET ORAL 2 TIMES DAILY WITH MEALS
Qty: 180 TABLET | Refills: 1 | Status: SHIPPED | OUTPATIENT
Start: 2022-03-22 | End: 2022-04-22 | Stop reason: ALTCHOICE

## 2022-03-22 RX ORDER — VENLAFAXINE HYDROCHLORIDE 37.5 MG/1
37.5 CAPSULE, EXTENDED RELEASE ORAL DAILY
Qty: 90 CAPSULE | Refills: 1 | Status: SHIPPED | OUTPATIENT
Start: 2022-03-22 | End: 2022-04-22 | Stop reason: ALTCHOICE

## 2022-03-22 NOTE — PATIENT INSTRUCTIONS

## 2022-03-22 NOTE — PROGRESS NOTES
Chief Complaint   Patient presents with    Follow-up     Visit Vitals  BP (!) 145/91 (BP 1 Location: Left upper arm, BP Patient Position: Sitting, BP Cuff Size: Adult)   Pulse (!) 115   Temp 98.3 °F (36.8 °C) (Temporal)   Resp 18   Ht 5' 6\" (1.676 m)   Wt 198 lb (89.8 kg)   SpO2 97%   BMI 31.96 kg/m²     Subjective  Marielnoel Parry is a 80 y.o. female. HPI:  Pt presented for f/u after ER visit on 3/17/2022. Accompanied by daughter. Pt is new to me. Had been under care of Dr. Eric Mcginnis but he has left the practice. Daughter contributed 100% of HPI. Pt needs refills on all of her medication. Review of her record indicated that patient has a history of uncontrolled T2DM, prior CVA (2018), expressive aphasia, HTN, NAFLD, cognitive changes, depression, and hypothyroidism. Pt also had left atrial appendage closure and has an internal cardiac monitor. Her daughter stated that pt has low energy level and her memory is worsening. She had not been checking her BS at home- does not have glucometer or any testing supplies for some reason. HTN- B/P high in office at 145/91. Pt not checking B/P at home so unclear what usual readings are. Hyperlipidemia-  Last lipid panel in record w/ . Currently not taking a statin for unclear reason. T2DM- last A1c in record was July 2020- 7.6. Current med regime is glimeperide and metformin. Needs refills. Had both Covid immunizations and booster.      Patient Active Problem List   Diagnosis Code    Essential hypertension M71    Non-alcoholic fatty liver disease K76.0    Uncontrolled type 2 diabetes mellitus with hyperglycemia ([facility] E11.65    Major depressive disorder, recurrent, unspecified F33.9    At high risk for falls Z91.81    DKA (diabetic ketoacidosis) (HCC) E11.10    Recurrent pain of right knee M25.561    Acquired hypothyroidism E03.9     Past Medical History:   Diagnosis Date    Bladder cancer ([facility] [address]     Essential hypertension 6/67/5193    Non-alcoholic fatty liver disease 7/21/2020     Past Surgical History:   Procedure Laterality Date    HX CATARACT REMOVAL Left 07/23/2019    HX CHOLECYSTECTOMY      HX COLONOSCOPY  2002    HX HYSTERECTOMY      partial  20 yrs ago    HX TUBAL LIGATION       Current Outpatient Medications   Medication Instructions    alcohol swabs (Alcohol Wipes) padm Use to check blood glucose once daily.  amLODIPine (NORVASC) 5 mg, Oral, DAILY    aspirin delayed-release 81 mg, Oral, DAILY    Blood-Glucose Meter (OneTouch Ultra2 Meter) misc Use to check blood glucose levels before meals and at bedtime.  famotidine (PEPCID) 20 mg, Oral, 2 TIMES DAILY    glimepiride (AMARYL) 1 mg, Oral, DAILY BEFORE BREAKFAST    glucose blood VI test strips (OneTouch Ultra Test) strip Use to check blood glucose before meals and at bedtime.  insulin NPH/insulin regular (NovoLIN 70/30 U-100 Insulin) 100 unit/mL (70-30) injection SubCUTAneous, 22 units in the AM and 42 units at PM     lancets (Accu-Chek Multiclix Lancet) misc Use to check blood sugar once daily.  Lancing Device with Lancets (Accu-Chek Multiclix Lancet) kit Use to check blood sugar once daily.     metFORMIN (GLUCOPHAGE) 1,000 mg, Oral, 2 TIMES DAILY WITH MEALS    metoprolol succinate (TOPROL-XL) 50 mg, Oral, DAILY    metoprolol succinate 50 mg CSpX Oral    venlafaxine-SR (EFFEXOR-XR) 37.5 mg, Oral, DAILY     Allergies   Allergen Reactions    Adhesive Unknown (comments)    Penicillins Other (comments)    Sulfa (Sulfonamide Antibiotics) Unknown (comments)     Social History     Tobacco Use    Smoking status: Former Smoker    Smokeless tobacco: Never Used   Vaping Use    Vaping Use: Never used   Substance Use Topics    Alcohol use: Not Currently    Drug use: Never     Family History   Problem Relation Age of Onset    Diabetes Mother     Diabetes Maternal Grandmother     Other Maternal Grandmother         CHF    Diabetes Paternal Grandmother      Review of Systems Constitutional: Positive for malaise/fatigue. Negative for chills and fever. No energy   HENT: Negative for congestion, ear pain and sore throat. Respiratory: Negative for cough, shortness of breath and wheezing. Cardiovascular: Negative for chest pain, palpitations and leg swelling. Gastrointestinal: Positive for heartburn. Negative for abdominal pain, constipation, diarrhea, nausea and vomiting. Genitourinary: Negative for dysuria. Musculoskeletal: Negative for back pain, falls, joint pain, myalgias and neck pain. Neurological: Positive for dizziness, tingling and sensory change. Negative for weakness and headaches. Balance is off. Equilibrium has been off for 2 months and gotten worse. Fingers     Psychiatric/Behavioral: Positive for memory loss. Negative for depression. The patient is not nervous/anxious and does not have insomnia. Objective  Physical Exam  Constitutional:       General: She is not in acute distress. Appearance: Normal appearance. She is obese. HENT:      Head: Normocephalic. Right Ear: External ear normal.      Left Ear: External ear normal.      Nose: Nose normal.   Eyes:      Conjunctiva/sclera: Conjunctivae normal.   Neck:      Vascular: No carotid bruit. Cardiovascular:      Rate and Rhythm: Normal rate and regular rhythm. Heart sounds: Normal heart sounds. No murmur heard. Pulmonary:      Effort: Pulmonary effort is normal. No respiratory distress. Breath sounds: Normal breath sounds. Musculoskeletal:         General: No swelling or tenderness. Cervical back: Neck supple. Right lower leg: No edema. Left lower leg: No edema. Skin:     General: Skin is warm and dry. Coloration: Skin is not jaundiced. Findings: No lesion or rash. Neurological:      Mental Status: She is alert. Mental status is at baseline. Motor: No weakness. Comments: Speech difficult to understand.     Psychiatric: Mood and Affect: Mood normal.         Behavior: Behavior normal.       Assessment & Plan      ICD-10-CM ICD-9-CM    1. Type 2 diabetes mellitus with hyperglycemia, unspecified whether long term insulin use ([facility])  E11.65 250.00 HEMOGLOBIN A1C WITH EAG      MICROALBUMIN, UR, RAND W/ MICROALB/CREAT RATIO      glimepiride (AMARYL) 1 mg tablet      metFORMIN (GLUCOPHAGE) 1,000 mg tablet      aspirin delayed-release 81 mg tablet      Lancing Device with Lancets (Accu-Chek Multiclix Lancet) kit      lancets (Accu-Chek Multiclix Lancet) misc      alcohol swabs (Alcohol Wipes) padm      DISCONTINUED: glucose blood VI test strips (Accu-Chek Adeline Plus test strp) strip      DISCONTINUED: Blood-Glucose Meter (Accu-Chek Adeline Plus Meter) misc   2. Essential hypertension  I10 401.9 metoprolol succinate (TOPROL-XL) 50 mg XL tablet      amLODIPine (NORVASC) 5 mg tablet   3. Pure hypercholesterolemia  E78.00 272.0 LIPID PANEL   4. Acquired hypothyroidism  E03.9 244.9 TSH 3RD GENERATION      T4, FREE   5. Vitamin D deficiency  E55.9 268.9 VITAMIN D, 25 HYDROXY   6. Gastroesophageal reflux disease without esophagitis  K21.9 530.81 famotidine (Pepcid) 20 mg tablet   7. Anxiety with depression  F41.8 300.4 venlafaxine-SR (EFFEXOR-XR) 37.5 mg capsule   8. Iron deficiency  E61.1 280.9 IRON PROFILE      FERRITIN   9. At high risk for falls  Z91.81 V15.88      1. Type 2 diabetes mellitus with hyperglycemia, unspecified whether long term insulin use ([facility] [address]  Appears pt's diabetic status has never been in control since she became a pt at office in 201 based on chart review. There have been issues w/ her insurance paying for medication. There are concerns for non compliance w/ treatment plan. Need baseline labs for current status and then will refer to endo for eval and med management.     - HEMOGLOBIN A1C WITH EAG  - MICROALBUMIN, UR, RAND W/ MICROALB/CREAT RATIO  - glimepiride (AMARYL) 1 mg tablet;  Take 1 Tablet by mouth Daily (before breakfast). Dispense: 90 Tablet; Refill: 1  - metFORMIN (GLUCOPHAGE) 1,000 mg tablet; Take 1 Tablet by mouth two (2) times daily (with meals). Indications: type 2 diabetes mellitus  Dispense: 180 Tablet; Refill: 1  - aspirin delayed-release 81 mg tablet; Take 1 Tablet by mouth daily. Indications: treatment to prevent a heart attack  Dispense: 90 Tablet; Refill: 3  - Lancing Device with Lancets (Accu-Chek Multiclix Lancet) kit; Use to check blood sugar once daily. Dispense: 1 Kit; Refill: 0  - lancets (Accu-Chek Multiclix Lancet) misc; Use to check blood sugar once daily. Dispense: 100 Each; Refill: 3  - alcohol swabs (Alcohol Wipes) padm; Use to check blood glucose once daily. Dispense: 100 Pad; Refill: 3    2. Essential hypertension  B/P elevated in office @ 145/91. Advised to check B/P daily w/ goal < 130/80.    - metoprolol succinate (TOPROL-XL) 50 mg XL tablet; Take 1 Tablet by mouth daily. Indications: high blood pressure  Dispense: 90 Tablet; Refill: 1  - amLODIPine (NORVASC) 5 mg tablet; Take 1 Tablet by mouth daily. Indications: high blood pressure  Dispense: 90 Tablet; Refill: 1    3. Pure hypercholesterolemia  Will need statin added on.  - LIPID PANEL    4. Acquired hypothyroidism    - TSH 3RD GENERATION  - T4, FREE    5. Vitamin D deficiency  Will add on supplement depending on lab results. - VITAMIN D, 25 HYDROXY    6. Gastroesophageal reflux disease without esophagitis  Helpful for symptoms. - famotidine (Pepcid) 20 mg tablet; Take 1 Tablet by mouth two (2) times a day. Indications: gastroesophageal reflux disease  Dispense: 180 Tablet; Refill: 1    7. Anxiety with depression  Unclear status since pt has difficulty articulating   - venlafaxine-SR (EFFEXOR-XR) 37.5 mg capsule; Take 1 Capsule by mouth daily. Indications: anxiousness associated with depression  Dispense: 90 Capsule; Refill: 1    8. Iron deficiency  Will add on supplementation if indicated. - IRON PROFILE  - FERRITIN    9.  At high risk for falls  Written educational material given to pt and daughter. Will need to closely monitor. I have discussed the diagnosis with the patient and the intended plan as seen in the above orders. Pt/caretaker has expressed understanding. Questions were answered concerning future plans. I have discussed medication side effects and warnings as indicated with the patient as well.     Carmen Rueda, HEATH

## 2022-03-24 PROBLEM — F33.0 MAJOR DEPRESSIVE DISORDER, RECURRENT, MILD (HCC): Status: ACTIVE | Noted: 2022-03-22

## 2022-03-24 PROBLEM — Z91.81 AT HIGH RISK FOR FALLS: Status: ACTIVE | Noted: 2022-03-22

## 2022-03-24 PROBLEM — F33.1 MAJOR DEPRESSIVE DISORDER, RECURRENT, MODERATE (HCC): Status: ACTIVE | Noted: 2022-03-22

## 2022-03-24 PROBLEM — F33.9 MAJOR DEPRESSIVE DISORDER, RECURRENT, UNSPECIFIED (HCC): Status: ACTIVE | Noted: 2022-03-22

## 2022-03-24 PROBLEM — E11.9 DIABETES MELLITUS (HCC): Status: ACTIVE | Noted: 2018-08-07

## 2022-03-24 PROBLEM — E11.65 TYPE 2 DIABETES MELLITUS WITH HYPERGLYCEMIA (HCC): Status: ACTIVE | Noted: 2022-03-22

## 2022-03-29 ENCOUNTER — TELEPHONE (OUTPATIENT)
Dept: FAMILY MEDICINE CLINIC | Age: 72
End: 2022-03-29

## 2022-03-29 NOTE — TELEPHONE ENCOUNTER
[facility] sent over request for an alternative for glucose meters. Asking to change to One Touch meter and test strips.

## 2022-04-05 ENCOUNTER — HOSPITAL ENCOUNTER (INPATIENT)
Age: 72
LOS: 6 days | Discharge: NURSING, REHAB OR LONG-TERM CARE | DRG: 871 | End: 2022-04-12
Attending: STUDENT IN AN ORGANIZED HEALTH CARE EDUCATION/TRAINING PROGRAM | Admitting: INTERNAL MEDICINE
Payer: MEDICARE

## 2022-04-05 DIAGNOSIS — B95.7 COAG NEGATIVE STAPHYLOCOCCUS BACTEREMIA: ICD-10-CM

## 2022-04-05 DIAGNOSIS — D72.829 LEUKOCYTOSIS, UNSPECIFIED TYPE: ICD-10-CM

## 2022-04-05 DIAGNOSIS — Z22.322 MRSA CARRIER: ICD-10-CM

## 2022-04-05 DIAGNOSIS — Z79.4 TYPE 2 DIABETES MELLITUS WITH HYPERGLYCEMIA, WITH LONG-TERM CURRENT USE OF INSULIN (HCC): Primary | ICD-10-CM

## 2022-04-05 DIAGNOSIS — N76.6 SKIN ULCER OF LABIA MAJORA: ICD-10-CM

## 2022-04-05 DIAGNOSIS — E10.10 TYPE 1 DIABETES MELLITUS WITH KETOACIDOSIS WITHOUT COMA (HCC): Primary | ICD-10-CM

## 2022-04-05 DIAGNOSIS — E11.10 DIABETIC KETOACIDOSIS WITHOUT COMA ASSOCIATED WITH TYPE 2 DIABETES MELLITUS (HCC): ICD-10-CM

## 2022-04-05 DIAGNOSIS — R41.82 ALTERED MENTAL STATUS, UNSPECIFIED ALTERED MENTAL STATUS TYPE: ICD-10-CM

## 2022-04-05 DIAGNOSIS — N17.9 AKI (ACUTE KIDNEY INJURY) (HCC): ICD-10-CM

## 2022-04-05 DIAGNOSIS — R78.81 COAG NEGATIVE STAPHYLOCOCCUS BACTEREMIA: ICD-10-CM

## 2022-04-05 DIAGNOSIS — E11.65 TYPE 2 DIABETES MELLITUS WITH HYPERGLYCEMIA, WITH LONG-TERM CURRENT USE OF INSULIN (HCC): Primary | ICD-10-CM

## 2022-04-05 LAB
BASE DEFICIT BLDV-SCNC: 18.7 MMOL/L (ref 0–2)
BASOPHILS # BLD: 0 K/UL (ref 0–0.1)
BASOPHILS NFR BLD: 0 % (ref 0–1)
BDY SITE: ABNORMAL
DIFFERENTIAL METHOD BLD: ABNORMAL
EOSINOPHIL # BLD: 0 K/UL (ref 0–0.4)
EOSINOPHIL NFR BLD: 0 % (ref 0–7)
ERYTHROCYTE [DISTWIDTH] IN BLOOD BY AUTOMATED COUNT: 15.2 % (ref 11.5–14.5)
FIO2 ON VENT: 21 %
GLUCOSE BLD STRIP.AUTO-MCNC: >600 MG/DL (ref 65–117)
HCO3 BLDV-SCNC: 11 MMOL/L (ref 22–26)
HCT VFR BLD AUTO: 43.8 % (ref 35–47)
HGB BLD-MCNC: 13.5 G/DL (ref 11.5–16)
IMM GRANULOCYTES # BLD AUTO: 0.3 K/UL (ref 0–0.04)
IMM GRANULOCYTES NFR BLD AUTO: 2 % (ref 0–0.5)
LYMPHOCYTES # BLD: 0.9 K/UL (ref 0.8–3.5)
LYMPHOCYTES NFR BLD: 6 % (ref 12–49)
MCH RBC QN AUTO: 25.9 PG (ref 26–34)
MCHC RBC AUTO-ENTMCNC: 30.8 G/DL (ref 30–36.5)
MCV RBC AUTO: 83.9 FL (ref 80–99)
MONOCYTES # BLD: 0.9 K/UL (ref 0–1)
MONOCYTES NFR BLD: 6 % (ref 5–13)
NEUTS SEG # BLD: 12.5 K/UL (ref 1.8–8)
NEUTS SEG NFR BLD: 86 % (ref 32–75)
NRBC # BLD: 0 K/UL (ref 0–0.01)
NRBC BLD-RTO: 0 PER 100 WBC
PCO2 BLDV: 28.5 MMHG (ref 40–50)
PERFORMED BY, TECHID: ABNORMAL
PH BLDV: 7.12 [PH] (ref 7.31–7.41)
PLATELET # BLD AUTO: 355 K/UL (ref 150–400)
PMV BLD AUTO: 9.8 FL (ref 8.9–12.9)
PO2 BLDV: 61 MMHG (ref 36–42)
RBC # BLD AUTO: 5.22 M/UL (ref 3.8–5.2)
SAO2 % BLDV: 86 % (ref 60–80)
SAO2% DEVICE SAO2% SENSOR NAME: ABNORMAL
SERVICE CMNT-IMP: ABNORMAL
WBC # BLD AUTO: 14.7 K/UL (ref 3.6–11)

## 2022-04-05 PROCEDURE — 36415 COLL VENOUS BLD VENIPUNCTURE: CPT

## 2022-04-05 PROCEDURE — 80053 COMPREHEN METABOLIC PANEL: CPT

## 2022-04-05 PROCEDURE — 85025 COMPLETE CBC W/AUTO DIFF WBC: CPT

## 2022-04-05 PROCEDURE — 83605 ASSAY OF LACTIC ACID: CPT

## 2022-04-05 PROCEDURE — 82962 GLUCOSE BLOOD TEST: CPT

## 2022-04-05 PROCEDURE — 82010 KETONE BODYS QUAN: CPT

## 2022-04-05 PROCEDURE — 99285 EMERGENCY DEPT VISIT HI MDM: CPT

## 2022-04-05 PROCEDURE — 84484 ASSAY OF TROPONIN QUANT: CPT

## 2022-04-05 PROCEDURE — 82803 BLOOD GASES ANY COMBINATION: CPT

## 2022-04-05 RX ORDER — BLOOD SUGAR DIAGNOSTIC
STRIP MISCELLANEOUS
Qty: 120 STRIP | Refills: 11 | Status: SHIPPED | OUTPATIENT
Start: 2022-04-05

## 2022-04-05 RX ORDER — BLOOD-GLUCOSE METER
EACH MISCELLANEOUS
Qty: 1 EACH | Refills: 0 | Status: SHIPPED | OUTPATIENT
Start: 2022-04-05 | End: 2022-06-16

## 2022-04-05 NOTE — PROGRESS NOTES
Pharmacy requested a change in glucometer and test strips that was previously ordered. New orders sent in.

## 2022-04-06 ENCOUNTER — APPOINTMENT (OUTPATIENT)
Dept: GENERAL RADIOLOGY | Age: 72
DRG: 871 | End: 2022-04-06
Attending: NURSE PRACTITIONER
Payer: MEDICARE

## 2022-04-06 ENCOUNTER — APPOINTMENT (OUTPATIENT)
Dept: CT IMAGING | Age: 72
DRG: 871 | End: 2022-04-06
Attending: STUDENT IN AN ORGANIZED HEALTH CARE EDUCATION/TRAINING PROGRAM
Payer: MEDICARE

## 2022-04-06 PROBLEM — E11.10 DKA (DIABETIC KETOACIDOSIS) (HCC): Status: ACTIVE | Noted: 2022-04-06

## 2022-04-06 PROBLEM — M25.561 RECURRENT PAIN OF RIGHT KNEE: Status: ACTIVE | Noted: 2022-04-06

## 2022-04-06 PROBLEM — E03.9 ACQUIRED HYPOTHYROIDISM: Status: ACTIVE | Noted: 2022-04-06

## 2022-04-06 LAB
ADMINISTERED INITIALS, ADMINIT: NORMAL
ALBUMIN SERPL-MCNC: 3.3 G/DL (ref 3.5–5)
ALBUMIN/GLOB SERPL: 1.1 {RATIO} (ref 1.1–2.2)
ALP SERPL-CCNC: 122 U/L (ref 45–117)
ALT SERPL-CCNC: 29 U/L (ref 12–78)
ANION GAP SERPL CALC-SCNC: 10 MMOL/L (ref 5–15)
ANION GAP SERPL CALC-SCNC: 10 MMOL/L (ref 5–15)
ANION GAP SERPL CALC-SCNC: 20 MMOL/L (ref 5–15)
ANION GAP SERPL CALC-SCNC: 24 MMOL/L (ref 5–15)
ANION GAP SERPL CALC-SCNC: 30 MMOL/L (ref 5–15)
ANION GAP SERPL CALC-SCNC: 8 MMOL/L (ref 5–15)
APPEARANCE UR: ABNORMAL
AST SERPL W P-5'-P-CCNC: 11 U/L (ref 15–37)
ATRIAL RATE: 117 BPM
B-OH-BUTYR SERPL-SCNC: >4.42 MMOL/L
BACTERIA URNS QL MICRO: NEGATIVE /HPF
BASE DEFICIT BLDV-SCNC: 15.8 MMOL/L (ref 0–2)
BASOPHILS # BLD: 0.1 K/UL (ref 0–0.1)
BASOPHILS NFR BLD: 0 % (ref 0–1)
BDY SITE: ABNORMAL
BILIRUB SERPL-MCNC: 0.6 MG/DL (ref 0.2–1)
BILIRUB UR QL: NEGATIVE
BUN SERPL-MCNC: 29 MG/DL (ref 6–20)
BUN SERPL-MCNC: 33 MG/DL (ref 6–20)
BUN SERPL-MCNC: 33 MG/DL (ref 6–20)
BUN SERPL-MCNC: 39 MG/DL (ref 6–20)
BUN SERPL-MCNC: 41 MG/DL (ref 6–20)
BUN SERPL-MCNC: 45 MG/DL (ref 6–20)
BUN/CREAT SERPL: 23 (ref 12–20)
BUN/CREAT SERPL: 24 (ref 12–20)
BUN/CREAT SERPL: 25 (ref 12–20)
BUN/CREAT SERPL: 26 (ref 12–20)
BUN/CREAT SERPL: 28 (ref 12–20)
BUN/CREAT SERPL: 28 (ref 12–20)
CA-I BLD-MCNC: 8.1 MG/DL (ref 8.5–10.1)
CA-I BLD-MCNC: 8.2 MG/DL (ref 8.5–10.1)
CA-I BLD-MCNC: 8.4 MG/DL (ref 8.5–10.1)
CA-I BLD-MCNC: 8.5 MG/DL (ref 8.5–10.1)
CA-I BLD-MCNC: 8.8 MG/DL (ref 8.5–10.1)
CA-I BLD-MCNC: 9.2 MG/DL (ref 8.5–10.1)
CALCULATED P AXIS, ECG09: 41 DEGREES
CALCULATED R AXIS, ECG10: -27 DEGREES
CALCULATED T AXIS, ECG11: 76 DEGREES
CHLORIDE SERPL-SCNC: 105 MMOL/L (ref 97–108)
CHLORIDE SERPL-SCNC: 113 MMOL/L (ref 97–108)
CHLORIDE SERPL-SCNC: 119 MMOL/L (ref 97–108)
CHLORIDE SERPL-SCNC: 120 MMOL/L (ref 97–108)
CHLORIDE SERPL-SCNC: 120 MMOL/L (ref 97–108)
CHLORIDE SERPL-SCNC: 93 MMOL/L (ref 97–108)
CO2 SERPL-SCNC: 10 MMOL/L (ref 21–32)
CO2 SERPL-SCNC: 16 MMOL/L (ref 21–32)
CO2 SERPL-SCNC: 17 MMOL/L (ref 21–32)
CO2 SERPL-SCNC: 19 MMOL/L (ref 21–32)
CO2 SERPL-SCNC: 8 MMOL/L (ref 21–32)
CO2 SERPL-SCNC: 9 MMOL/L (ref 21–32)
COLOR UR: ABNORMAL
CREAT SERPL-MCNC: 1.16 MG/DL (ref 0.55–1.02)
CREAT SERPL-MCNC: 1.16 MG/DL (ref 0.55–1.02)
CREAT SERPL-MCNC: 1.42 MG/DL (ref 0.55–1.02)
CREAT SERPL-MCNC: 1.48 MG/DL (ref 0.55–1.02)
CREAT SERPL-MCNC: 1.62 MG/DL (ref 0.55–1.02)
CREAT SERPL-MCNC: 1.76 MG/DL (ref 0.55–1.02)
D50 ADMINISTERED, D50ADM: 0 ML
D50 ORDER, D50ORD: 0 ML
DIAGNOSIS, 93000: NORMAL
DIFFERENTIAL METHOD BLD: ABNORMAL
EOSINOPHIL # BLD: 0 K/UL (ref 0–0.4)
EOSINOPHIL NFR BLD: 0 % (ref 0–7)
ERYTHROCYTE [DISTWIDTH] IN BLOOD BY AUTOMATED COUNT: 15.3 % (ref 11.5–14.5)
EST. AVERAGE GLUCOSE BLD GHB EST-MCNC: 355 MG/DL
FIO2 ON VENT: 21 %
GLOBULIN SER CALC-MCNC: 3.1 G/DL (ref 2–4)
GLSCOM COMMENTS: NORMAL
GLUCOSE BLD STRIP.AUTO-MCNC: 128 MG/DL (ref 65–117)
GLUCOSE BLD STRIP.AUTO-MCNC: 145 MG/DL (ref 65–117)
GLUCOSE BLD STRIP.AUTO-MCNC: 146 MG/DL (ref 65–117)
GLUCOSE BLD STRIP.AUTO-MCNC: 149 MG/DL (ref 65–117)
GLUCOSE BLD STRIP.AUTO-MCNC: 163 MG/DL (ref 65–117)
GLUCOSE BLD STRIP.AUTO-MCNC: 177 MG/DL (ref 65–117)
GLUCOSE BLD STRIP.AUTO-MCNC: 182 MG/DL (ref 65–117)
GLUCOSE BLD STRIP.AUTO-MCNC: 184 MG/DL (ref 65–117)
GLUCOSE BLD STRIP.AUTO-MCNC: 186 MG/DL (ref 65–117)
GLUCOSE BLD STRIP.AUTO-MCNC: 202 MG/DL (ref 65–117)
GLUCOSE BLD STRIP.AUTO-MCNC: 212 MG/DL (ref 65–117)
GLUCOSE BLD STRIP.AUTO-MCNC: 240 MG/DL (ref 65–117)
GLUCOSE BLD STRIP.AUTO-MCNC: 264 MG/DL (ref 65–117)
GLUCOSE BLD STRIP.AUTO-MCNC: 360 MG/DL (ref 65–117)
GLUCOSE BLD STRIP.AUTO-MCNC: 392 MG/DL (ref 65–117)
GLUCOSE BLD STRIP.AUTO-MCNC: 444 MG/DL (ref 65–117)
GLUCOSE BLD STRIP.AUTO-MCNC: 559 MG/DL (ref 65–117)
GLUCOSE BLD STRIP.AUTO-MCNC: 559 MG/DL (ref 65–117)
GLUCOSE BLD STRIP.AUTO-MCNC: >600 MG/DL (ref 65–117)
GLUCOSE BLD STRIP.AUTO-MCNC: >600 MG/DL (ref 65–117)
GLUCOSE SERPL-MCNC: 146 MG/DL (ref 65–100)
GLUCOSE SERPL-MCNC: 183 MG/DL (ref 65–100)
GLUCOSE SERPL-MCNC: 198 MG/DL (ref 65–100)
GLUCOSE SERPL-MCNC: 415 MG/DL (ref 65–100)
GLUCOSE SERPL-MCNC: 583 MG/DL (ref 65–100)
GLUCOSE SERPL-MCNC: 621 MG/DL (ref 65–100)
GLUCOSE SERPL-MCNC: 797 MG/DL (ref 65–100)
GLUCOSE UR STRIP.AUTO-MCNC: >300 MG/DL
GLUCOSE, GLC: 128 MG/DL
GLUCOSE, GLC: 145 MG/DL
GLUCOSE, GLC: 146 MG/DL
GLUCOSE, GLC: 149 MG/DL
GLUCOSE, GLC: 163 MG/DL
GLUCOSE, GLC: 177 MG/DL
GLUCOSE, GLC: 182 MG/DL
GLUCOSE, GLC: 184 MG/DL
GLUCOSE, GLC: 186 MG/DL
GLUCOSE, GLC: 202 MG/DL
GLUCOSE, GLC: 212 MG/DL
GLUCOSE, GLC: 240 MG/DL
GLUCOSE, GLC: 264 MG/DL
GLUCOSE, GLC: 360 MG/DL
GLUCOSE, GLC: 392 MG/DL
GLUCOSE, GLC: 444 MG/DL
GLUCOSE, GLC: 559 MG/DL
GLUCOSE, GLC: 601 MG/DL
GLUCOSE, GLC: 601 MG/DL
HBA1C MFR BLD: 14 % (ref 4–5.6)
HCO3 BLDV-SCNC: 12 MMOL/L (ref 22–26)
HCT VFR BLD AUTO: 40 % (ref 35–47)
HGB BLD-MCNC: 12.6 G/DL (ref 11.5–16)
HGB UR QL STRIP: ABNORMAL
HIGH TARGET, HITG: 180 MG/DL
HIGH TARGET, HITG: 250 MG/DL
IMM GRANULOCYTES # BLD AUTO: 0.4 K/UL (ref 0–0.04)
IMM GRANULOCYTES NFR BLD AUTO: 2 % (ref 0–0.5)
INSULIN ADMINSTERED, INSADM: 1.5 UNITS/HOUR
INSULIN ADMINSTERED, INSADM: 1.9 UNITS/HOUR
INSULIN ADMINSTERED, INSADM: 10.8 UNITS/HOUR
INSULIN ADMINSTERED, INSADM: 10.8 UNITS/HOUR
INSULIN ADMINSTERED, INSADM: 11.5 UNITS/HOUR
INSULIN ADMINSTERED, INSADM: 12.2 UNITS/HOUR
INSULIN ADMINSTERED, INSADM: 13.3 UNITS/HOUR
INSULIN ADMINSTERED, INSADM: 15 UNITS/HOUR
INSULIN ADMINSTERED, INSADM: 15 UNITS/HOUR
INSULIN ADMINSTERED, INSADM: 2.4 UNITS/HOUR
INSULIN ADMINSTERED, INSADM: 2.6 UNITS/HOUR
INSULIN ADMINSTERED, INSADM: 3.3 UNITS/HOUR
INSULIN ADMINSTERED, INSADM: 3.4 UNITS/HOUR
INSULIN ADMINSTERED, INSADM: 3.5 UNITS/HOUR
INSULIN ADMINSTERED, INSADM: 3.5 UNITS/HOUR
INSULIN ADMINSTERED, INSADM: 4 UNITS/HOUR
INSULIN ADMINSTERED, INSADM: 4.3 UNITS/HOUR
INSULIN ADMINSTERED, INSADM: 5 UNITS/HOUR
INSULIN ADMINSTERED, INSADM: 9.1 UNITS/HOUR
INSULIN ORDER, INSORD: 1.5 UNITS/HOUR
INSULIN ORDER, INSORD: 1.9 UNITS/HOUR
INSULIN ORDER, INSORD: 10.8 UNITS/HOUR
INSULIN ORDER, INSORD: 10.8 UNITS/HOUR
INSULIN ORDER, INSORD: 11.5 UNITS/HOUR
INSULIN ORDER, INSORD: 12.2 UNITS/HOUR
INSULIN ORDER, INSORD: 13.3 UNITS/HOUR
INSULIN ORDER, INSORD: 15 UNITS/HOUR
INSULIN ORDER, INSORD: 15 UNITS/HOUR
INSULIN ORDER, INSORD: 2.4 UNITS/HOUR
INSULIN ORDER, INSORD: 2.6 UNITS/HOUR
INSULIN ORDER, INSORD: 3.3 UNITS/HOUR
INSULIN ORDER, INSORD: 3.4 UNITS/HOUR
INSULIN ORDER, INSORD: 3.5 UNITS/HOUR
INSULIN ORDER, INSORD: 3.5 UNITS/HOUR
INSULIN ORDER, INSORD: 4 UNITS/HOUR
INSULIN ORDER, INSORD: 4.3 UNITS/HOUR
INSULIN ORDER, INSORD: 5 UNITS/HOUR
INSULIN ORDER, INSORD: 9.1 UNITS/HOUR
KETONES UR QL STRIP.AUTO: 80 MG/DL
LACTATE SERPL-SCNC: 1.6 MMOL/L (ref 0.4–2)
LEUKOCYTE ESTERASE UR QL STRIP.AUTO: NEGATIVE
LOW TARGET, LOT: 140 MG/DL
LOW TARGET, LOT: 150 MG/DL
LYMPHOCYTES # BLD: 1.4 K/UL (ref 0.8–3.5)
LYMPHOCYTES NFR BLD: 8 % (ref 12–49)
MAGNESIUM SERPL-MCNC: 2.2 MG/DL (ref 1.6–2.4)
MAGNESIUM SERPL-MCNC: 2.5 MG/DL (ref 1.6–2.4)
MCH RBC QN AUTO: 26.1 PG (ref 26–34)
MCHC RBC AUTO-ENTMCNC: 31.5 G/DL (ref 30–36.5)
MCV RBC AUTO: 83 FL (ref 80–99)
MINUTES UNTIL NEXT BG, NBG: 120 MIN
MINUTES UNTIL NEXT BG, NBG: 60 MIN
MONOCYTES # BLD: 1.4 K/UL (ref 0–1)
MONOCYTES NFR BLD: 8 % (ref 5–13)
MRSA DNA SPEC QL NAA+PROBE: DETECTED
MUCOUS THREADS URNS QL MICRO: ABNORMAL /LPF
MULTIPLIER, MUL: 0.02
MULTIPLIER, MUL: 0.03
MULTIPLIER, MUL: 0.04
MULTIPLIER, MUL: 0.04
MULTIPLIER, MUL: 0.05
MULTIPLIER, MUL: 0.05
MULTIPLIER, MUL: 0.06
MULTIPLIER, MUL: 0.06
NEUTS SEG # BLD: 14.1 K/UL (ref 1.8–8)
NEUTS SEG NFR BLD: 82 % (ref 32–75)
NITRITE UR QL STRIP.AUTO: NEGATIVE
NRBC # BLD: 0 K/UL (ref 0–0.01)
NRBC BLD-RTO: 0 PER 100 WBC
ORDER INITIALS, ORDINIT: NORMAL
P-R INTERVAL, ECG05: 144 MS
PCO2 BLDV: 34.6 MMHG (ref 40–50)
PERFORMED BY, TECHID: ABNORMAL
PH BLDV: 7.15 [PH] (ref 7.31–7.41)
PH UR STRIP: 5 [PH] (ref 5–8)
PHOSPHATE SERPL-MCNC: 4.2 MG/DL (ref 2.6–4.7)
PLATELET # BLD AUTO: 332 K/UL (ref 150–400)
PMV BLD AUTO: 9.9 FL (ref 8.9–12.9)
PO2 BLDV: 22 MMHG (ref 36–42)
POTASSIUM SERPL-SCNC: 3.4 MMOL/L (ref 3.5–5.1)
POTASSIUM SERPL-SCNC: 3.6 MMOL/L (ref 3.5–5.1)
POTASSIUM SERPL-SCNC: 3.7 MMOL/L (ref 3.5–5.1)
POTASSIUM SERPL-SCNC: 4 MMOL/L (ref 3.5–5.1)
PROCALCITONIN SERPL-MCNC: 0.28 NG/ML
PROT SERPL-MCNC: 6.4 G/DL (ref 6.4–8.2)
PROT UR STRIP-MCNC: 100 MG/DL
Q-T INTERVAL, ECG07: 370 MS
QRS DURATION, ECG06: 86 MS
QTC CALCULATION (BEZET), ECG08: 516 MS
RBC # BLD AUTO: 4.82 M/UL (ref 3.8–5.2)
RBC #/AREA URNS HPF: ABNORMAL /HPF (ref 0–5)
SAO2 % BLDV: 28 % (ref 60–80)
SAO2% DEVICE SAO2% SENSOR NAME: ABNORMAL
SERVICE CMNT-IMP: ABNORMAL
SODIUM SERPL-SCNC: 132 MMOL/L (ref 136–145)
SODIUM SERPL-SCNC: 137 MMOL/L (ref 136–145)
SODIUM SERPL-SCNC: 143 MMOL/L (ref 136–145)
SODIUM SERPL-SCNC: 146 MMOL/L (ref 136–145)
SODIUM SERPL-SCNC: 146 MMOL/L (ref 136–145)
SODIUM SERPL-SCNC: 147 MMOL/L (ref 136–145)
SP GR UR REFRACTOMETRY: 1.02 (ref 1–1.03)
TROPONIN-HIGH SENSITIVITY: 9 NG/L (ref 0–51)
UA: UC IF INDICATED,UAUC: ABNORMAL
UROBILINOGEN UR QL STRIP.AUTO: 0.1 EU/DL (ref 0.1–1)
VENTRICULAR RATE, ECG03: 117 BPM
WBC # BLD AUTO: 17.3 K/UL (ref 3.6–11)
WBC URNS QL MICRO: ABNORMAL /HPF (ref 0–4)

## 2022-04-06 PROCEDURE — 74011000258 HC RX REV CODE- 258: Performed by: INTERNAL MEDICINE

## 2022-04-06 PROCEDURE — 74011000258 HC RX REV CODE- 258: Performed by: STUDENT IN AN ORGANIZED HEALTH CARE EDUCATION/TRAINING PROGRAM

## 2022-04-06 PROCEDURE — 74011636637 HC RX REV CODE- 636/637: Performed by: NURSE PRACTITIONER

## 2022-04-06 PROCEDURE — 80048 BASIC METABOLIC PNL TOTAL CA: CPT

## 2022-04-06 PROCEDURE — 83036 HEMOGLOBIN GLYCOSYLATED A1C: CPT

## 2022-04-06 PROCEDURE — 71045 X-RAY EXAM CHEST 1 VIEW: CPT

## 2022-04-06 PROCEDURE — 74011000250 HC RX REV CODE- 250: Performed by: HOSPITALIST

## 2022-04-06 PROCEDURE — 82803 BLOOD GASES ANY COMBINATION: CPT

## 2022-04-06 PROCEDURE — 87147 CULTURE TYPE IMMUNOLOGIC: CPT

## 2022-04-06 PROCEDURE — 82962 GLUCOSE BLOOD TEST: CPT

## 2022-04-06 PROCEDURE — 74011250636 HC RX REV CODE- 250/636: Performed by: STUDENT IN AN ORGANIZED HEALTH CARE EDUCATION/TRAINING PROGRAM

## 2022-04-06 PROCEDURE — 74011250636 HC RX REV CODE- 250/636

## 2022-04-06 PROCEDURE — 83735 ASSAY OF MAGNESIUM: CPT

## 2022-04-06 PROCEDURE — 87040 BLOOD CULTURE FOR BACTERIA: CPT

## 2022-04-06 PROCEDURE — 74011636637 HC RX REV CODE- 636/637: Performed by: INTERNAL MEDICINE

## 2022-04-06 PROCEDURE — 84100 ASSAY OF PHOSPHORUS: CPT

## 2022-04-06 PROCEDURE — 70450 CT HEAD/BRAIN W/O DYE: CPT

## 2022-04-06 PROCEDURE — 74011000250 HC RX REV CODE- 250: Performed by: STUDENT IN AN ORGANIZED HEALTH CARE EDUCATION/TRAINING PROGRAM

## 2022-04-06 PROCEDURE — 96365 THER/PROPH/DIAG IV INF INIT: CPT

## 2022-04-06 PROCEDURE — 82947 ASSAY GLUCOSE BLOOD QUANT: CPT

## 2022-04-06 PROCEDURE — 74011250636 HC RX REV CODE- 250/636: Performed by: INTERNAL MEDICINE

## 2022-04-06 PROCEDURE — 74011250636 HC RX REV CODE- 250/636: Performed by: HOSPITALIST

## 2022-04-06 PROCEDURE — 96375 TX/PRO/DX INJ NEW DRUG ADDON: CPT

## 2022-04-06 PROCEDURE — 87205 SMEAR GRAM STAIN: CPT

## 2022-04-06 PROCEDURE — 65610000006 HC RM INTENSIVE CARE

## 2022-04-06 PROCEDURE — 74011250636 HC RX REV CODE- 250/636: Performed by: NURSE PRACTITIONER

## 2022-04-06 PROCEDURE — 74011636637 HC RX REV CODE- 636/637: Performed by: STUDENT IN AN ORGANIZED HEALTH CARE EDUCATION/TRAINING PROGRAM

## 2022-04-06 PROCEDURE — 72192 CT PELVIS W/O DYE: CPT

## 2022-04-06 PROCEDURE — 74011000250 HC RX REV CODE- 250: Performed by: INTERNAL MEDICINE

## 2022-04-06 PROCEDURE — 85025 COMPLETE CBC W/AUTO DIFF WBC: CPT

## 2022-04-06 PROCEDURE — 84145 PROCALCITONIN (PCT): CPT

## 2022-04-06 PROCEDURE — 81001 URINALYSIS AUTO W/SCOPE: CPT

## 2022-04-06 PROCEDURE — 93005 ELECTROCARDIOGRAM TRACING: CPT

## 2022-04-06 PROCEDURE — 87641 MR-STAPH DNA AMP PROBE: CPT

## 2022-04-06 RX ORDER — DEXTROSE MONOHYDRATE AND SODIUM CHLORIDE 5; .45 G/100ML; G/100ML
100 INJECTION, SOLUTION INTRAVENOUS CONTINUOUS
Status: DISCONTINUED | OUTPATIENT
Start: 2022-04-06 | End: 2022-04-08

## 2022-04-06 RX ORDER — LIDOCAINE 4 G/100G
1 PATCH TOPICAL EVERY 24 HOURS
Status: DISCONTINUED | OUTPATIENT
Start: 2022-04-06 | End: 2022-04-13 | Stop reason: HOSPADM

## 2022-04-06 RX ORDER — SODIUM CHLORIDE 0.9 % (FLUSH) 0.9 %
5-40 SYRINGE (ML) INJECTION EVERY 8 HOURS
Status: DISCONTINUED | OUTPATIENT
Start: 2022-04-06 | End: 2022-04-13 | Stop reason: HOSPADM

## 2022-04-06 RX ORDER — FAMOTIDINE 20 MG/1
20 TABLET, FILM COATED ORAL 2 TIMES DAILY
Status: DISCONTINUED | OUTPATIENT
Start: 2022-04-06 | End: 2022-04-06

## 2022-04-06 RX ORDER — ONDANSETRON 4 MG/1
4 TABLET, ORALLY DISINTEGRATING ORAL
Status: DISCONTINUED | OUTPATIENT
Start: 2022-04-06 | End: 2022-04-13 | Stop reason: HOSPADM

## 2022-04-06 RX ORDER — DEXTROSE MONOHYDRATE 100 MG/ML
0-250 INJECTION, SOLUTION INTRAVENOUS AS NEEDED
Status: DISCONTINUED | OUTPATIENT
Start: 2022-04-06 | End: 2022-04-08

## 2022-04-06 RX ORDER — POTASSIUM CHLORIDE 20 MEQ/1
40 TABLET, EXTENDED RELEASE ORAL
Status: DISCONTINUED | OUTPATIENT
Start: 2022-04-06 | End: 2022-04-06

## 2022-04-06 RX ORDER — ACETAMINOPHEN 325 MG/1
650 TABLET ORAL
Status: DISCONTINUED | OUTPATIENT
Start: 2022-04-06 | End: 2022-04-13 | Stop reason: HOSPADM

## 2022-04-06 RX ORDER — SODIUM CHLORIDE AND POTASSIUM CHLORIDE .9; .15 G/100ML; G/100ML
SOLUTION INTRAVENOUS CONTINUOUS
Status: DISCONTINUED | OUTPATIENT
Start: 2022-04-06 | End: 2022-04-06

## 2022-04-06 RX ORDER — ASPIRIN 81 MG/1
81 TABLET ORAL DAILY
Status: DISCONTINUED | OUTPATIENT
Start: 2022-04-06 | End: 2022-04-13 | Stop reason: HOSPADM

## 2022-04-06 RX ORDER — ONDANSETRON 2 MG/ML
4 INJECTION INTRAMUSCULAR; INTRAVENOUS
Status: DISCONTINUED | OUTPATIENT
Start: 2022-04-06 | End: 2022-04-13 | Stop reason: HOSPADM

## 2022-04-06 RX ORDER — PANTOPRAZOLE SODIUM 40 MG/1
40 TABLET, DELAYED RELEASE ORAL
Status: DISCONTINUED | OUTPATIENT
Start: 2022-04-06 | End: 2022-04-13 | Stop reason: HOSPADM

## 2022-04-06 RX ORDER — POTASSIUM CHLORIDE 7.45 MG/ML
10 INJECTION INTRAVENOUS
Status: COMPLETED | OUTPATIENT
Start: 2022-04-06 | End: 2022-04-06

## 2022-04-06 RX ORDER — SODIUM CHLORIDE 0.9 % (FLUSH) 0.9 %
5-40 SYRINGE (ML) INJECTION AS NEEDED
Status: DISCONTINUED | OUTPATIENT
Start: 2022-04-06 | End: 2022-04-13 | Stop reason: HOSPADM

## 2022-04-06 RX ORDER — POLYETHYLENE GLYCOL 3350 17 G/17G
17 POWDER, FOR SOLUTION ORAL DAILY PRN
Status: DISCONTINUED | OUTPATIENT
Start: 2022-04-06 | End: 2022-04-13 | Stop reason: HOSPADM

## 2022-04-06 RX ORDER — VENLAFAXINE HYDROCHLORIDE 37.5 MG/1
37.5 CAPSULE, EXTENDED RELEASE ORAL DAILY
Status: DISCONTINUED | OUTPATIENT
Start: 2022-04-06 | End: 2022-04-13 | Stop reason: HOSPADM

## 2022-04-06 RX ORDER — ENOXAPARIN SODIUM 100 MG/ML
40 INJECTION SUBCUTANEOUS DAILY
Status: DISCONTINUED | OUTPATIENT
Start: 2022-04-06 | End: 2022-04-13 | Stop reason: HOSPADM

## 2022-04-06 RX ORDER — HALOPERIDOL 5 MG/ML
5 INJECTION INTRAMUSCULAR
Status: DISCONTINUED | OUTPATIENT
Start: 2022-04-06 | End: 2022-04-13 | Stop reason: HOSPADM

## 2022-04-06 RX ORDER — CALCIUM GLUCONATE 94 MG/ML
1 INJECTION, SOLUTION INTRAVENOUS
Status: COMPLETED | OUTPATIENT
Start: 2022-04-06 | End: 2022-04-06

## 2022-04-06 RX ORDER — SODIUM BICARBONATE 1 MEQ/ML
50 SYRINGE (ML) INTRAVENOUS ONCE
Status: DISCONTINUED | OUTPATIENT
Start: 2022-04-06 | End: 2022-04-06

## 2022-04-06 RX ORDER — AMLODIPINE BESYLATE 5 MG/1
5 TABLET ORAL DAILY
Status: DISCONTINUED | OUTPATIENT
Start: 2022-04-06 | End: 2022-04-07

## 2022-04-06 RX ORDER — METOPROLOL SUCCINATE 50 MG/1
50 TABLET, EXTENDED RELEASE ORAL DAILY
Status: DISCONTINUED | OUTPATIENT
Start: 2022-04-06 | End: 2022-04-13 | Stop reason: HOSPADM

## 2022-04-06 RX ORDER — MAGNESIUM SULFATE 100 %
4 CRYSTALS MISCELLANEOUS AS NEEDED
Status: DISCONTINUED | OUTPATIENT
Start: 2022-04-06 | End: 2022-04-07 | Stop reason: SDUPTHER

## 2022-04-06 RX ORDER — CLINDAMYCIN PHOSPHATE 600 MG/50ML
600 INJECTION INTRAVENOUS
Status: DISCONTINUED | OUTPATIENT
Start: 2022-04-06 | End: 2022-04-06

## 2022-04-06 RX ORDER — ACETAMINOPHEN 650 MG/1
650 SUPPOSITORY RECTAL
Status: DISCONTINUED | OUTPATIENT
Start: 2022-04-06 | End: 2022-04-13 | Stop reason: HOSPADM

## 2022-04-06 RX ORDER — POTASSIUM CHLORIDE 7.45 MG/ML
INJECTION INTRAVENOUS
Status: COMPLETED
Start: 2022-04-06 | End: 2022-04-06

## 2022-04-06 RX ORDER — FAMOTIDINE 20 MG/1
20 TABLET, FILM COATED ORAL DAILY
Status: DISCONTINUED | OUTPATIENT
Start: 2022-04-06 | End: 2022-04-06

## 2022-04-06 RX ORDER — POTASSIUM CHLORIDE 7.45 MG/ML
10 INJECTION INTRAVENOUS
Status: COMPLETED | OUTPATIENT
Start: 2022-04-06 | End: 2022-04-07

## 2022-04-06 RX ADMIN — POTASSIUM CHLORIDE 10 MEQ: 7.46 INJECTION, SOLUTION INTRAVENOUS at 03:53

## 2022-04-06 RX ADMIN — ENOXAPARIN SODIUM 40 MG: 100 INJECTION SUBCUTANEOUS at 08:14

## 2022-04-06 RX ADMIN — WATER 2 G: 1 INJECTION INTRAMUSCULAR; INTRAVENOUS; SUBCUTANEOUS at 00:22

## 2022-04-06 RX ADMIN — SODIUM CHLORIDE 1000 ML: 9 INJECTION, SOLUTION INTRAVENOUS at 00:09

## 2022-04-06 RX ADMIN — POTASSIUM CHLORIDE 10 MEQ: 7.46 INJECTION, SOLUTION INTRAVENOUS at 12:00

## 2022-04-06 RX ADMIN — VANCOMYCIN HYDROCHLORIDE 1000 MG: 1 INJECTION, POWDER, LYOPHILIZED, FOR SOLUTION INTRAVENOUS at 09:33

## 2022-04-06 RX ADMIN — INSULIN HUMAN 10 UNITS: 100 INJECTION, SOLUTION PARENTERAL at 00:09

## 2022-04-06 RX ADMIN — SODIUM CHLORIDE, PRESERVATIVE FREE 10 ML: 5 INJECTION INTRAVENOUS at 22:00

## 2022-04-06 RX ADMIN — POTASSIUM CHLORIDE AND SODIUM CHLORIDE: 900; 150 INJECTION, SOLUTION INTRAVENOUS at 02:20

## 2022-04-06 RX ADMIN — VANCOMYCIN HYDROCHLORIDE 1000 MG: 1 INJECTION, POWDER, LYOPHILIZED, FOR SOLUTION INTRAVENOUS at 00:28

## 2022-04-06 RX ADMIN — DEXTROSE AND SODIUM CHLORIDE 100 ML/HR: 5; 450 INJECTION, SOLUTION INTRAVENOUS at 13:00

## 2022-04-06 RX ADMIN — SODIUM CHLORIDE, PRESERVATIVE FREE 10 ML: 5 INJECTION INTRAVENOUS at 14:00

## 2022-04-06 RX ADMIN — SODIUM CHLORIDE 10.8 UNITS/HR: 9 INJECTION, SOLUTION INTRAVENOUS at 02:33

## 2022-04-06 RX ADMIN — POTASSIUM CHLORIDE 10 MEQ: 7.46 INJECTION, SOLUTION INTRAVENOUS at 13:25

## 2022-04-06 RX ADMIN — POTASSIUM CHLORIDE AND SODIUM CHLORIDE: 900; 150 INJECTION, SOLUTION INTRAVENOUS at 08:14

## 2022-04-06 RX ADMIN — HALOPERIDOL LACTATE 5 MG: 5 INJECTION, SOLUTION INTRAMUSCULAR at 20:37

## 2022-04-06 RX ADMIN — WATER 1 G: 1 INJECTION INTRAMUSCULAR; INTRAVENOUS; SUBCUTANEOUS at 20:04

## 2022-04-06 RX ADMIN — CALCIUM GLUCONATE 1 G: 98 INJECTION, SOLUTION INTRAVENOUS at 00:41

## 2022-04-06 RX ADMIN — POTASSIUM CHLORIDE 10 MEQ: 7.46 INJECTION, SOLUTION INTRAVENOUS at 02:41

## 2022-04-06 RX ADMIN — SODIUM CHLORIDE 9.1 UNITS/HR: 9 INJECTION, SOLUTION INTRAVENOUS at 11:26

## 2022-04-06 RX ADMIN — DEXTROSE AND SODIUM CHLORIDE 100 ML/HR: 5; 450 INJECTION, SOLUTION INTRAVENOUS at 21:18

## 2022-04-06 RX ADMIN — SODIUM CHLORIDE, PRESERVATIVE FREE 10 ML: 5 INJECTION INTRAVENOUS at 05:04

## 2022-04-06 RX ADMIN — ONDANSETRON 4 MG: 2 INJECTION INTRAMUSCULAR; INTRAVENOUS at 11:58

## 2022-04-06 NOTE — ED NOTES
While this RN on phone with Julian Adame RN in ICU giving report, Dr Mendez Ke to this RN and informs that pt's repeat K is 2.9 and therefore instructs that insulin gtt be paused. Keila Cancino charge RN paused insulin gtt per MD. Julian Adame RN aware. Pt to have CT obtained during transport to ICU.

## 2022-04-06 NOTE — PROGRESS NOTES
Progress Note    Patient: Akbar Sanford MRN: [id number]  SSN: [id number]    YOB: 1950  Age: 80 y.o. Sex: female      Admit Date: 4/5/2022    LOS: 0 days     Subjective:     71F, h/o DMII on inuslin, with acute encephaloapthy s.t DKA in the setting of sepsis    HOSPITAL COURSE:  80 y.o. female with PMH of diabetes on insulin (suspect noncompliance), hypertension and other medical problems as below. She presented to the ED with altered mental status, with blood sugar reportedly over 600 yesterday. No further history was obtained from patient as she appears drowsy and unable to cooperate.      In ED, noted to be tachycardic. WBC 14.7. Urinalysis not indicative of UTI. Chest x-ray unremarkable. CT head showed no acute finding, with remote left frontal infarct. Other lab work indicated of DKA. Review of Systems:  Cannot be assessed due to mental status      Objective:     Vitals:    04/06/22 0414 04/06/22 0500 04/06/22 0600 04/06/22 0728   BP: (!) 166/80 (!) 166/80 (!) 146/81 107/71   Pulse: (!) 123 (!) 120 (!) 123 (!) 118   Resp: 24 18 15 19   Temp:    98.5 °F (36.9 °C)   SpO2:  100% 100% 100%   Weight: 80.8 kg (178 lb 2.1 oz)      Height:            Intake and Output:  Current Shift: No intake/output data recorded. Last three shifts: 04/04 1901 - 04/06 0700  In: 852 [I.V.:852]  Out: -       Physical Exam:   General: Confused, noncooperative. Eye: conjunctivae/corneas clear. PERRL, EOM's intact. Throat and Neck: normal and no erythema or exudates noted. No mass   Lung: clear to auscultation bilaterally  Heart: regular rate and rhythm,   Abdomen: soft. Bowel sounds normal. No masses,  Extremities:  able to move all extremities normal, atraumatic  Skin: Few small scabs noted at left groin. Neurologic: Omitted as patient is unable to cooperate. Psychiatric: Omitted as patient is unable to cooperate.         Lab/Data Review:  Recent Results (from the past 24 hour(s))   CBC WITH AUTOMATED DIFF    Collection Time: 04/05/22 11:29 PM   Result Value Ref Range    WBC 14.7 (H) 3.6 - 11.0 K/uL    RBC 5.22 (H) 3.80 - 5.20 M/uL    HGB 13.5 11.5 - 16.0 g/dL    HCT 43.8 35.0 - 47.0 %    MCV 83.9 80.0 - 99.0 FL    MCH 25.9 (L) 26.0 - 34.0 PG    MCHC 30.8 30.0 - 36.5 g/dL    RDW 15.2 (H) 11.5 - 14.5 %    PLATELET 992 367 - 042 K/uL    MPV 9.8 8.9 - 12.9 FL    NRBC 0.0 0.0  WBC    ABSOLUTE NRBC 0.00 0.00 - 0.01 K/uL    NEUTROPHILS 86 (H) 32 - 75 %    LYMPHOCYTES 6 (L) 12 - 49 %    MONOCYTES 6 5 - 13 %    EOSINOPHILS 0 0 - 7 %    BASOPHILS 0 0 - 1 %    IMMATURE GRANULOCYTES 2 (H) 0 - 0.5 %    ABS. NEUTROPHILS 12.5 (H) 1.8 - 8.0 K/UL    ABS. LYMPHOCYTES 0.9 0.8 - 3.5 K/UL    ABS. MONOCYTES 0.9 0.0 - 1.0 K/UL    ABS. EOSINOPHILS 0.0 0.0 - 0.4 K/UL    ABS. BASOPHILS 0.0 0.0 - 0.1 K/UL    ABS. IMM. GRANS. 0.3 (H) 0.00 - 0.04 K/UL    DF AUTOMATED     METABOLIC PANEL, COMPREHENSIVE    Collection Time: 04/05/22 11:29 PM   Result Value Ref Range    Sodium 132 (L) 136 - 145 mmol/L    Potassium 3.7 3.5 - 5.1 mmol/L    Chloride 93 (L) 97 - 108 mmol/L    CO2 9 (LL) 21 - 32 mmol/L    Anion gap 30 (H) 5 - 15 mmol/L    Glucose 797 (HH) 65 - 100 mg/dL    BUN 45 (H) 6 - 20 mg/dL    Creatinine 1.76 (H) 0.55 - 1.02 mg/dL    BUN/Creatinine ratio 26 (H) 12 - 20      GFR est AA 35 (L) >60 ml/min/1.73m2    GFR est non-AA 29 (L) >60 ml/min/1.73m2    Calcium 9.2 8.5 - 10.1 mg/dL    Bilirubin, total 0.6 0.2 - 1.0 mg/dL    AST (SGOT) 11 (L) 15 - 37 U/L    ALT (SGPT) 29 12 - 78 U/L    Alk.  phosphatase 122 (H) 45 - 117 U/L    Protein, total 6.4 6.4 - 8.2 g/dL    Albumin 3.3 (L) 3.5 - 5.0 g/dL    Globulin 3.1 2.0 - 4.0 g/dL    A-G Ratio 1.1 1.1 - 2.2     BETA-HYDROXYBUTYRATE    Collection Time: 04/05/22 11:29 PM   Result Value Ref Range    B-hydroxybutyrate >4.42 (H) <0.40 mmol/L   LACTIC ACID    Collection Time: 04/05/22 11:29 PM   Result Value Ref Range    Lactic acid 1.6 0.4 - 2.0 mmol/L   VENOUS BLOOD GAS    Collection Time: 04/05/22 11:29 PM   Result Value Ref Range    VENOUS PH 7.12 (LL) 7.31 - 7.41      VENOUS PCO2 28.5 (L) 40 - 50 mmHg    VENOUS PO2 61 (H) 36 - 42 mmHg    VENOUS O2 SATURATION 86 (H) 60 - 80 %    VENOUS BICARBONATE 11 (L) 22 - 26 mmol/L    VENOUS BASE DEFICIT 18.7 (H) 0 - 2 mmol/L    O2 METHOD Room air      FIO2 21.0 %    SITE Right Radial      Critical value read back Gave results to Ella Seth    TROPONIN-HIGH SENSITIVITY    Collection Time: 04/05/22 11:29 PM   Result Value Ref Range    Troponin-High Sensitivity 9 0 - 51 ng/L   GLUCOSE, POC    Collection Time: 04/05/22 11:33 PM   Result Value Ref Range    Glucose (POC) >600 (HH) 65 - 117 mg/dL    Performed by Dedra Holter    URINALYSIS W/ REFLEX CULTURE    Collection Time: 04/06/22 12:05 AM    Specimen: Urine   Result Value Ref Range    Color Yellow/Straw      Appearance Turbid (A) Clear      Specific gravity 1.021 1.003 - 1.030      pH (UA) 5.0 5.0 - 8.0      Protein 100 (A) Negative mg/dL    Glucose >300 (A) Negative mg/dL    Ketone 80 (A) Negative mg/dL    Bilirubin Negative Negative      Blood Small (A) Negative      Urobilinogen 0.1 0.1 - 1.0 EU/dL    Nitrites Negative Negative      Leukocyte Esterase Negative Negative      UA:UC IF INDICATED Culture not indicated by UA result Culture not indicated by UA result      WBC 5-10 0 - 4 /hpf    RBC 0-5 0 - 5 /hpf    Bacteria Negative Negative /hpf    Mucus Trace /lpf   VENOUS BLOOD GAS    Collection Time: 04/06/22  1:30 AM   Result Value Ref Range    VENOUS PH 7.15 (LL) 7.31 - 7.41      VENOUS PCO2 34.6 (L) 40 - 50 mmHg    VENOUS PO2 22 (L) 36 - 42 mmHg    VENOUS O2 SATURATION 28 (L) 60 - 80 %    VENOUS BICARBONATE 12 (L) 22 - 26 mmol/L    VENOUS BASE DEFICIT 15.8 (H) 0 - 2 mmol/L    O2 METHOD Room air      FIO2 21.0 %    SITE Right Radial      Critical value read back Gave copy to Deisy Mclaughlin    GLUCOSE, RANDOM    Collection Time: 04/06/22  1:41 AM   Result Value Ref Range    Glucose 621 (HH) 65 - 100 mg/dL   MAGNESIUM    Collection Time: 04/06/22  1:41 AM   Result Value Ref Range    Magnesium 2.5 (H) 1.6 - 2.4 mg/dL   PHOSPHORUS    Collection Time: 04/06/22  1:41 AM   Result Value Ref Range    Phosphorus 4.2 2.6 - 4.7 mg/dL   HEMOGLOBIN A1C WITH EAG    Collection Time: 04/06/22  1:41 AM   Result Value Ref Range    Hemoglobin A1c 14.0 (H) 4.0 - 5.6 %    Est. average glucose 355 mg/dL   GLUCOSTABILIZER    Collection Time: 04/06/22  2:32 AM   Result Value Ref Range    Glucose 601 mg/dL    Insulin order 10.8 units/hour    Insulin adminstered 10.8 units/hour    Multiplier 0.020     Low target 140 mg/dL    High target 180 mg/dL    D50 order 0.0 ml    D50 administered 0.00 ml    Minutes until next BG 60 min    Order initials EPH     Administered initials AK     GLSCOM Comments     METABOLIC PANEL, BASIC    Collection Time: 04/06/22  3:40 AM   Result Value Ref Range    Sodium 137 136 - 145 mmol/L    Potassium 3.6 3.5 - 5.1 mmol/L    Chloride 105 97 - 108 mmol/L    CO2 8 (LL) 21 - 32 mmol/L    Anion gap 24 (H) 5 - 15 mmol/L    Glucose 583 (H) 65 - 100 mg/dL    BUN 41 (H) 6 - 20 mg/dL    Creatinine 1.48 (H) 0.55 - 1.02 mg/dL    BUN/Creatinine ratio 28 (H) 12 - 20      GFR est AA 42 (L) >60 ml/min/1.73m2    GFR est non-AA 35 (L) >60 ml/min/1.73m2    Calcium 8.5 8.5 - 10.1 mg/dL   CBC WITH AUTOMATED DIFF    Collection Time: 04/06/22  3:40 AM   Result Value Ref Range    WBC 17.3 (H) 3.6 - 11.0 K/uL    RBC 4.82 3.80 - 5.20 M/uL    HGB 12.6 11.5 - 16.0 g/dL    HCT 40.0 35.0 - 47.0 %    MCV 83.0 80.0 - 99.0 FL    MCH 26.1 26.0 - 34.0 PG    MCHC 31.5 30.0 - 36.5 g/dL    RDW 15.3 (H) 11.5 - 14.5 %    PLATELET 582 152 - 075 K/uL    MPV 9.9 8.9 - 12.9 FL    NRBC 0.0 0.0  WBC    ABSOLUTE NRBC 0.00 0.00 - 0.01 K/uL    NEUTROPHILS 82 (H) 32 - 75 %    LYMPHOCYTES 8 (L) 12 - 49 %    MONOCYTES 8 5 - 13 %    EOSINOPHILS 0 0 - 7 %    BASOPHILS 0 0 - 1 %    IMMATURE GRANULOCYTES 2 (H) 0 - 0.5 %    ABS.  NEUTROPHILS 14.1 (H) 1.8 - 8.0 K/UL    ABS. LYMPHOCYTES 1.4 0.8 - 3.5 K/UL    ABS. MONOCYTES 1.4 (H) 0.0 - 1.0 K/UL    ABS. EOSINOPHILS 0.0 0.0 - 0.4 K/UL    ABS. BASOPHILS 0.1 0.0 - 0.1 K/UL    ABS. IMM.  GRANS. 0.4 (H) 0.00 - 0.04 K/UL    DF AUTOMATED     MAGNESIUM    Collection Time: 04/06/22  3:40 AM   Result Value Ref Range    Magnesium 2.2 1.6 - 2.4 mg/dL   PROCALCITONIN    Collection Time: 04/06/22  3:40 AM   Result Value Ref Range    Procalcitonin 0.28 (H) 0 ng/mL   GLUCOSE, POC    Collection Time: 04/06/22  3:44 AM   Result Value Ref Range    Glucose (POC) 559 (H) 65 - 117 mg/dL    Performed by Sharon JOHNSTON)    MRSA SCREEN - PCR (NASAL)    Collection Time: 04/06/22  4:11 AM   Result Value Ref Range    MRSA by PCR, Nasal DETECTED (A) Not Detected     GLUCOSE, POC    Collection Time: 04/06/22  5:23 AM   Result Value Ref Range    Glucose (POC) >600 (HH) 65 - 117 mg/dL    Performed by Roger Townsend    GLUCOSE, POC    Collection Time: 04/06/22  5:26 AM   Result Value Ref Range    Glucose (POC) >600 (HH) 65 - 117 mg/dL    Performed by Tara Roman    Collection Time: 04/06/22  5:28 AM   Result Value Ref Range    Glucose 601 mg/dL    Insulin order 10.8 units/hour    Insulin adminstered 10.8 units/hour    Multiplier 0.020     Low target 150 mg/dL    High target 250 mg/dL    D50 order 0.0 ml    D50 administered 0.00 ml    Minutes until next BG 60 min    Order initials herbert     Administered initials herbert     GLSCOM Comments     GLUCOSE, POC    Collection Time: 04/06/22  6:29 AM   Result Value Ref Range    Glucose (POC) 559 (H) 65 - 117 mg/dL    Performed by Sharon JOHNSTON)    GLUCOSTABILIZER    Collection Time: 04/06/22  6:31 AM   Result Value Ref Range    Glucose 559 mg/dL    Insulin order 15.0 units/hour    Insulin adminstered 15.0 units/hour    Multiplier 0.030     Low target 150 mg/dL    High target 250 mg/dL    D50 order 0.0 ml    D50 administered 0.00 ml    Minutes until next BG 60 min    Order initials herbert     Administered initials herbert     GLSCOM Comments     GLUCOSE, POC    Collection Time: 04/06/22  7:33 AM   Result Value Ref Range    Glucose (POC) 444 (H) 65 - 117 mg/dL    Performed by Lindsey Nation    Collection Time: 04/06/22  7:33 AM   Result Value Ref Range    Glucose 444 mg/dL    Insulin order 11.5 units/hour    Insulin adminstered 11.5 units/hour    Multiplier 0.030     Low target 150 mg/dL    High target 250 mg/dL    D50 order 0.0 ml    D50 administered 0.00 ml    Minutes until next BG 60 min    Order initials CF     Administered initials CF     GLSCOM Comments     METABOLIC PANEL, BASIC    Collection Time: 04/06/22  8:00 AM   Result Value Ref Range    Sodium 143 136 - 145 mmol/L    Potassium 3.4 (L) 3.5 - 5.1 mmol/L    Chloride 113 (H) 97 - 108 mmol/L    CO2 10 (LL) 21 - 32 mmol/L    Anion gap 20 (H) 5 - 15 mmol/L    Glucose 415 (H) 65 - 100 mg/dL    BUN 39 (H) 6 - 20 mg/dL    Creatinine 1.62 (H) 0.55 - 1.02 mg/dL    BUN/Creatinine ratio 24 (H) 12 - 20      GFR est AA 38 (L) >60 ml/min/1.73m2    GFR est non-AA 31 (L) >60 ml/min/1.73m2    Calcium 8.8 8.5 - 10.1 mg/dL   GLUCOSE, POC    Collection Time: 04/06/22  8:21 AM   Result Value Ref Range    Glucose (POC) 392 (H) 65 - 117 mg/dL    Performed by Aga Herrera    GLUCOSTABILIZER    Collection Time: 04/06/22  8:24 AM   Result Value Ref Range    Glucose 392 mg/dL    Insulin order 13.3 units/hour    Insulin adminstered 13.3 units/hour    Multiplier 0.040     Low target 150 mg/dL    High target 250 mg/dL    D50 order 0.0 ml    D50 administered 0.00 ml    Minutes until next BG 60 min    Order initials CF     Administered initials CF     GLSCOM Comments     GLUCOSE, POC    Collection Time: 04/06/22  9:27 AM   Result Value Ref Range    Glucose (POC) 360 (H) 65 - 117 mg/dL    Performed by Lindsey Nation    Collection Time: 04/06/22  9:28 AM   Result Value Ref Range    Glucose 360 mg/dL    Insulin order 15.0 units/hour    Insulin adminstered 15.0 units/hour    Multiplier 0.050     Low target 150 mg/dL    High target 250 mg/dL    D50 order 0.0 ml    D50 administered 0.00 ml    Minutes until next BG 60 min    Order initials CF     Administered initials CF     GLSCOM Comments     GLUCOSE, POC    Collection Time: 04/06/22 10:29 AM   Result Value Ref Range    Glucose (POC) 264 (H) 65 - 117 mg/dL    Performed by Miguel Cheryl    Collection Time: 04/06/22 10:33 AM   Result Value Ref Range    Glucose 264 mg/dL    Insulin order 12.2 units/hour    Insulin adminstered 12.2 units/hour    Multiplier 0.060     Low target 150 mg/dL    High target 250 mg/dL    D50 order 0.0 ml    D50 administered 0.00 ml    Minutes until next BG 60 min    Order initials CF     Administered initials CF     GLSCOM Comments     GLUCOSE, POC    Collection Time: 04/06/22 11:23 AM   Result Value Ref Range    Glucose (POC) 212 (H) 65 - 117 mg/dL    Performed by Miguel Luna    Collection Time: 04/06/22 11:25 AM   Result Value Ref Range    Glucose 212 mg/dL    Insulin order 9.1 units/hour    Insulin adminstered 9.1 units/hour    Multiplier 0.060     Low target 150 mg/dL    High target 250 mg/dL    D50 order 0.0 ml    D50 administered 0.00 ml    Minutes until next BG 60 min    Order initials CF     Administered initials CF     GLSCOM Comments     METABOLIC PANEL, BASIC    Collection Time: 04/06/22 11:45 AM   Result Value Ref Range    Sodium 146 (H) 136 - 145 mmol/L    Potassium 3.7 3.5 - 5.1 mmol/L    Chloride 120 (H) 97 - 108 mmol/L    CO2 16 (L) 21 - 32 mmol/L    Anion gap 10 5 - 15 mmol/L    Glucose 146 (H) 65 - 100 mg/dL    BUN 33 (H) 6 - 20 mg/dL    Creatinine 1.42 (H) 0.55 - 1.02 mg/dL    BUN/Creatinine ratio 23 (H) 12 - 20      GFR est AA 44 (L) >60 ml/min/1.73m2    GFR est non-AA 37 (L) >60 ml/min/1.73m2    Calcium 8.4 (L) 8.5 - 10.1 mg/dL   GLUCOSE, POC    Collection Time: 04/06/22 12:24 PM   Result Value Ref Range    Glucose (POC) 149 (H) 65 - 117 mg/dL    Performed by Wilian Ramsay    Collection Time: 04/06/22 12:33 PM   Result Value Ref Range    Glucose 149 mg/dL    Insulin order 4.3 units/hour    Insulin adminstered 4.3 units/hour    Multiplier 0.048     Low target 150 mg/dL    High target 250 mg/dL    D50 order 0.0 ml    D50 administered 0.00 ml    Minutes until next BG 60 min    Order initials CF     Administered initials CF     GLSCOM Comments           Assessment and plan:        (1) Acute encephalopathy : GCS 13 s/t sepsis/ metabolic    (2) DKA: insulin gtt, Gap/ bicab at almost standard level. In 8 hour should be giving something to eat    (3) increased anion gap metabolic acidosis: s/t #2    (3) sepsis: CT pelvix- non-specific induration. MRSA nasal swab positive, awit BCx continue anc until [facility] negative for MRSA    (4) HTN: on amlodipine    (5) RENZO on CKDIIIb: continue IVF    DVT ppx: heparin subQ    DISPO: pt once stab;le.  Likely need HH    Signed By: Martha Duffy MD     April 6, 2022

## 2022-04-06 NOTE — H&P
History and Physical    Patient: Isaías Estrada MRN: [id number]  SSN: [id number]    YOB: 1950  Age: 80 y.o. Sex: female      Subjective:      Isaías Estrada is a 80 y.o. female with PMH of diabetes on insulin (suspect noncompliance), hypertension and other medical problems as below. She presented to the ED with altered mental status, with blood sugar reportedly over 600 yesterday. No further history was obtained from patient as she appears drowsy and unable to cooperate. In ED, noted to be tachycardic. WBC 14.7. Urinalysis not indicative of UTI. Chest x-ray unremarkable. CT head showed no acute finding, with remote left frontal infarct. Other lab work indicated of DKA. Past Medical History:   Diagnosis Date    [facility] ([facility] [address] [facility]     Diabetes ([facility] [address] [facility]     Essential hypertension 4/99/0964    Non-alcoholic fatty liver disease 7/21/2020    Thyroid disease      Past Surgical History:   Procedure Laterality Date    HX CATARACT REMOVAL Left 07/23/2019    HX CHOLECYSTECTOMY      HX COLONOSCOPY  2002    HX HYSTERECTOMY      partial  20 yrs ago    HX TUBAL LIGATION        Family History   Problem Relation Age of Onset    Diabetes Mother     Diabetes Maternal Grandmother     Other Maternal Grandmother         CHF    Diabetes Paternal Grandmother      Social History     Tobacco Use    Smoking status: Former Smoker    Smokeless tobacco: Never Used   Substance Use Topics    Alcohol use: Not Currently      Prior to Admission medications    Medication Sig Start Date End Date Taking? Authorizing Provider   glucose blood VI test strips (OneTouch Ultra Test) strip Use to check blood glucose before meals and at bedtime. 4/5/22   Loraine Johnson NP   Blood-Glucose Meter (OneTouch Ultra2 Meter) misc Use to check blood glucose levels before meals and at bedtime. 4/5/22   Loraine Johnson NP   glimepiride (AMARYL) 1 mg tablet Take 1 Tablet by mouth Daily (before breakfast).  3/22/22   Loraine Johnson NP metoprolol succinate (TOPROL-XL) 50 mg XL tablet Take 1 Tablet by mouth daily. Indications: high blood pressure 3/22/22   Kristi Cole NP   amLODIPine (NORVASC) 5 mg tablet Take 1 Tablet by mouth daily. Indications: high blood pressure 3/22/22   Kristi Cole NP   famotidine (Pepcid) 20 mg tablet Take 1 Tablet by mouth two (2) times a day. Indications: gastroesophageal reflux disease 3/22/22   Kristi Cole NP   metFORMIN (GLUCOPHAGE) 1,000 mg tablet Take 1 Tablet by mouth two (2) times daily (with meals). Indications: type 2 diabetes mellitus 3/22/22   Kristi Cole NP   aspirin delayed-release 81 mg tablet Take 1 Tablet by mouth daily. Indications: treatment to prevent a heart attack 3/22/22   Kristi Cole NP   Lancing Device with Lancets (Accu-Chek Multiclix Lancet) kit Use to check blood sugar once daily. 3/22/22   Kristi Cole NP   lancets (Accu-Chek Multiclix Lancet) misc Use to check blood sugar once daily. 3/22/22   Kristi Cole NP   alcohol swabs (Alcohol Wipes) padm Use to check blood glucose once daily. 3/22/22   Kristi Cole NP   venlafaxine-SR [facility]..) 37.5 mg capsule Take 1 Capsule by mouth daily. Indications: anxiousness associated with depression 3/22/22   Kristi Cole NP   metoprolol succinate 50 mg CSpX Take  by mouth. Provider, Historical   insulin NPH/insulin regular (NovoLIN 70/30 U-100 Insulin) 100 unit/mL (70-30) injection by SubCUTAneous route. 22 units in the AM and 42 units at PM    Provider, Historical        Allergies   Allergen Reactions    Adhesive Unknown (comments)    Penicillins Other (comments)    Sulfa (Sulfonamide Antibiotics) Unknown (comments)       Review of Systems:   Omitted as patient is unable to cooperate due to altered mental status.         Objective:     Vitals:    04/06/22 0055 04/06/22 0114 04/06/22 0131 04/06/22 0235   BP: (!) 158/73 (!) 171/83 (!) 166/83 (!) 115/96   Pulse: (!) 119 (!) 117 (!) 115 (!) 119   Resp: 22 19 18 18   Temp:       SpO2: 98% 98% 96% 97%   Weight:       Height:            Physical Exam:   General: Confused, noncooperative. Eye: conjunctivae/corneas clear. PERRL, EOM's intact. Throat and Neck: normal and no erythema or exudates noted. No mass   Lung: clear to auscultation bilaterally  Heart: regular rate and rhythm,   Abdomen: soft. Bowel sounds normal. No masses,  Extremities:  able to move all extremities normal, atraumatic  Skin: Few small scabs noted at left groin. Neurologic: Omitted as patient is unable to cooperate. Psychiatric: Omitted as patient is unable to cooperate. Recent Results (from the past 24 hour(s))   CBC WITH AUTOMATED DIFF    Collection Time: 04/05/22 11:29 PM   Result Value Ref Range    WBC 14.7 (H) 3.6 - 11.0 K/uL    RBC 5.22 (H) 3.80 - 5.20 M/uL    HGB 13.5 11.5 - 16.0 g/dL    HCT 43.8 35.0 - 47.0 %    MCV 83.9 80.0 - 99.0 FL    MCH 25.9 (L) 26.0 - 34.0 PG    MCHC 30.8 30.0 - 36.5 g/dL    RDW 15.2 (H) 11.5 - 14.5 %    PLATELET 039 234 - 172 K/uL    MPV 9.8 8.9 - 12.9 FL    NRBC 0.0 0.0  WBC    ABSOLUTE NRBC 0.00 0.00 - 0.01 K/uL    NEUTROPHILS 86 (H) 32 - 75 %    LYMPHOCYTES 6 (L) 12 - 49 %    MONOCYTES 6 5 - 13 %    EOSINOPHILS 0 0 - 7 %    BASOPHILS 0 0 - 1 %    IMMATURE GRANULOCYTES 2 (H) 0 - 0.5 %    ABS. NEUTROPHILS 12.5 (H) 1.8 - 8.0 K/UL    ABS. LYMPHOCYTES 0.9 0.8 - 3.5 K/UL    ABS. MONOCYTES 0.9 0.0 - 1.0 K/UL    ABS. EOSINOPHILS 0.0 0.0 - 0.4 K/UL    ABS. BASOPHILS 0.0 0.0 - 0.1 K/UL    ABS. IMM.  GRANS. 0.3 (H) 0.00 - 0.04 K/UL    DF AUTOMATED     METABOLIC PANEL, COMPREHENSIVE    Collection Time: 04/05/22 11:29 PM   Result Value Ref Range    Sodium 132 (L) 136 - 145 mmol/L    Potassium 3.7 3.5 - 5.1 mmol/L    Chloride 93 (L) 97 - 108 mmol/L    CO2 9 (LL) 21 - 32 mmol/L    Anion gap 30 (H) 5 - 15 mmol/L    Glucose 797 (HH) 65 - 100 mg/dL    BUN 45 (H) 6 - 20 mg/dL    Creatinine 1.76 (H) 0.55 - 1.02 mg/dL    BUN/Creatinine ratio 26 (H) 12 - 20      GFR est AA 35 (L) >60 ml/min/1.73m2    GFR est non-AA 29 (L) >60 ml/min/1.73m2    Calcium 9.2 8.5 - 10.1 mg/dL    Bilirubin, total 0.6 0.2 - 1.0 mg/dL    AST (SGOT) 11 (L) 15 - 37 U/L    ALT (SGPT) 29 12 - 78 U/L    Alk.  phosphatase 122 (H) 45 - 117 U/L    Protein, total 6.4 6.4 - 8.2 g/dL    Albumin 3.3 (L) 3.5 - 5.0 g/dL    Globulin 3.1 2.0 - 4.0 g/dL    A-G Ratio 1.1 1.1 - 2.2     LACTIC ACID    Collection Time: 04/05/22 11:29 PM   Result Value Ref Range    Lactic acid 1.6 0.4 - 2.0 mmol/L   VENOUS BLOOD GAS    Collection Time: 04/05/22 11:29 PM   Result Value Ref Range    VENOUS PH 7.12 (LL) 7.31 - 7.41      VENOUS PCO2 28.5 (L) 40 - 50 mmHg    VENOUS PO2 61 (H) 36 - 42 mmHg    VENOUS O2 SATURATION 86 (H) 60 - 80 %    VENOUS BICARBONATE 11 (L) 22 - 26 mmol/L    VENOUS BASE DEFICIT 18.7 (H) 0 - 2 mmol/L    O2 METHOD Room air      FIO2 21.0 %    SITE Right Radial      Critical value read back Gave results to Anusha Morley    TROPONIN-HIGH SENSITIVITY    Collection Time: 04/05/22 11:29 PM   Result Value Ref Range    Troponin-High Sensitivity 9 0 - 51 ng/L   GLUCOSE, POC    Collection Time: 04/05/22 11:33 PM   Result Value Ref Range    Glucose (POC) >600 (HH) 65 - 117 mg/dL    Performed by Homero Mejia    URINALYSIS W/ REFLEX CULTURE    Collection Time: 04/06/22 12:05 AM    Specimen: Urine   Result Value Ref Range    Color Yellow/Straw      Appearance Turbid (A) Clear      Specific gravity 1.021 1.003 - 1.030      pH (UA) 5.0 5.0 - 8.0      Protein 100 (A) Negative mg/dL    Glucose >300 (A) Negative mg/dL    Ketone 80 (A) Negative mg/dL    Bilirubin Negative Negative      Blood Small (A) Negative      Urobilinogen 0.1 0.1 - 1.0 EU/dL    Nitrites Negative Negative      Leukocyte Esterase Negative Negative      UA:UC IF INDICATED Culture not indicated by UA result Culture not indicated by UA result      WBC 5-10 0 - 4 /hpf    RBC 0-5 0 - 5 /hpf    Bacteria Negative Negative /hpf    Mucus Trace /lpf   VENOUS BLOOD GAS    Collection Time: 04/06/22  1:30 AM Result Value Ref Range    VENOUS PH 7.15 (LL) 7.31 - 7.41      VENOUS PCO2 34.6 (L) 40 - 50 mmHg    VENOUS PO2 22 (L) 36 - 42 mmHg    VENOUS O2 SATURATION 28 (L) 60 - 80 %    VENOUS BICARBONATE 12 (L) 22 - 26 mmol/L    VENOUS BASE DEFICIT 15.8 (H) 0 - 2 mmol/L    O2 METHOD Room air      FIO2 21.0 %    SITE Right Radial      Critical value read back Gave copy to Deisy Mclaughlin    GLUCOSTABILIZER    Collection Time: 04/06/22  2:32 AM   Result Value Ref Range    Glucose 601 mg/dL    Insulin order 10.8 units/hour    Insulin adminstered 10.8 units/hour    Multiplier 0.020     Low target 140 mg/dL    High target 180 mg/dL    D50 order 0.0 ml    D50 administered 0.00 ml    Minutes until next BG 60 min    Order initials EPH     Administered initials AK     GLSCOM Comments         XR Results (maximum last 3): Results from Hospital Encounter encounter on 04/05/22    XR CHEST PORT    Narrative  HISTORY:  Eval for Infiltrate    TECHNIQUE:  XR CHEST PORT    COMPARISON: March 2, 2017  LIMITATIONS: None    TUBES/LINES: Stimulator device projected at the level of the left heart    LUNG PARENCHYMA: Normal  TRACHEA/BRONCHI: Normal  PULMONARY VESSELS: Normal  PLEURA: Normal  HEART: Normal  AORTIC SHADOW:Normal.  MEDIASTINUM: Normal  BONE/SOFT TISSUES: No acute abnormality. OTHER: None    Impression  No acute cardiopulmonary abnormality. .      Results from Abstract encounter on 08/25/21    XR RIBS LT UNI 2 V      XR KNEE RT MIN 4 V      CT Results (maximum last 3): Results from Abstract encounter on 08/25/21    CT HEAD WO CONT      CTA NECK      CTA HEAD      MRI Results (maximum last 3): Results from Abstract encounter on 08/25/21    MRI BRAIN WO CONT      Nuclear Medicine Results (maximum last 3): No results found for this or any previous visit. US Results (maximum last 3):   Results from Abstract encounter on 08/25/21    US HEAD NECK SOFT TISSUE      US DUPLEX CAROTID BILATERAL      Results from Orders Only encounter on 11/04/20    US RETROPERITONEUM COMP      Assessment and plan:   # DKA  - Insulin ggt per protocol  - Fluid: NS with K.  - BNP q4h and POC glucose every hour.   - Potassium 3.7. repletion ordered. - Possible infection: cellulitis on left groin.  - NPO for now. Consider advance diet after BS controlled. #Sepsis/ SIRS  -Positive for SIRS, unclear source of infection. Possibly secondary to DKA  -Chest x-ray unremarkable; UA not suggestive of UTI. -CT pelvis pending  -Blood culture and wound culture ordered.  -Check procalcitonin  -Broad-spectrum antibiotics: Vancomycin and cefepime. #Altered mental status  -Likely secondary to metabolic causes from above. -Management as above, monitor for now. #Hypertension  - Continue home medications. # Diabetes  - need education after improved mentation. # CKD stage IIIb (eGRR 30-45)   - monitor for now.        Signed By: Alise Sims MD     April 6, 2022

## 2022-04-06 NOTE — PROGRESS NOTES
04/06/22 0015  IP CONSULT TO PHARMACY - VANCOMYCIN DOSING  (VANCOMYCIN (ADD ON IF MRSA SUSPECTED))  ONE TIME     Complete     Comments: Pharmacy to dose vancomycin after once dose.    Ordering Provider: Ella Seth NP   Authorizing Provider: Ella Seth NP   Question Answer Comment   Reason for Consult: Pharmacy to dose vancomycin    Antibiotic Indications Skin and Soft Tissue Infection            Vancomycin 1000mg ordered by ED provider - pharmacy to follow

## 2022-04-06 NOTE — ED TRIAGE NOTES
Patient arrives for altered mental status  reports that her glucose was 600 yesterday and he didn't think anything of it unknown last dose of insulin glucose read high on EMS glucometer

## 2022-04-06 NOTE — PROGRESS NOTES
Patient is a high fall risk. Reason for Admission:  DKA                     RUR Score:    14% Low                 Plan for utilizing home health:          PCP: First and Last name:  HEATH Ceballos   Name of Practice:    Are you a current patient: Yes/No: Yes   Approximate date of last visit: March 22, 2022   Can you participate in a virtual visit with your PCP:                     Current Advanced Directive/Advance Care Plan: Full Code      Healthcare Decision Maker:     Spouse Maria Alejandra Killian, @ [phone] is the primary decision maker. Click here to complete [address] including selection of the Healthcare Decision Maker Relationship (ie \"Primary\")                             Transition of Care Plan:                      Spoke w/spouse and informed \"she falls a lot. Just yesterday she fell four time, and two of the times she feel out of her rocking chair. \"  Spouse further voiced concern regarding \"forgetfulness. \"  Patient and spouse reside in a single level home w/a ramp to enter the home. PCP is HEATH Ceballos, @ [phone]. Last seen in PCP office on March 22, 2022. PCP gave patient instructions on March 22, 2022 regarding falling. Telephonic conversation w/PCP on 4/5/2022. [facility]) to p/u all medications. Independent w/all ADL's & IADL's and is the primary caregiver to her . No home O2. DME consists of cane/walker. No prior HH, SNF, and or IRF. Discussed w/spouse orders for PT/OT will be placed for eval regarding discharge disposition. Spouse gave verbal consent for referral to be sent to [facility], if SNF is the recommendation. No referrals sent on patient's behalf, as of yet. Care Management Interventions  PCP Verified by CM: Yes  Last Visit to PCP: 03/22/22  Mode of Transport at Discharge:  Other (see comment) ([place] or family)  Transition of Care Consult (CM Consult): Discharge Planning  Discharge Durable Medical Equipment: No (No home O2.   DME consists of cane/walker.)  Support Systems: Spouse/Significant Other,Child(chapis)  Confirm Follow Up Transport: Other (see comment) (Margot Ortega or family)  Discharge Location  Patient Expects to be Discharged to[de-identified] [address] [facility]

## 2022-04-06 NOTE — PROGRESS NOTES
Spiritual Care Assessment/Progress Note  [facility]      NAME: Jorge Salinas      MRN: [id number]  AGE: 80 y.o. SEX: female  Scientology Affiliation: Orthodox   Language: English     4/6/2022     Total Time (in minutes): 25     Spiritual Assessment begun in [facility] through conversation with:         [x]Patient        [] Family    [] Friend(s)        Reason for Consult: Initial visit     Spiritual beliefs: (Please include comment if needed)     [] Identifies with a soni tradition:         [] Supported by a soni community:            [] Claims no spiritual orientation:           [] Seeking spiritual identity:                [] Adheres to an individual form of spirituality:           [x] Not able to assess: Asleep                         Identified resources for coping:      [] Prayer                               [] Music                  [] Guided Imagery     [] Family/friends                 [] Pet visits     [] Devotional reading                         [x] Unknown     [] Other:                                              Interventions offered during this visit: (See comments for more details)    Patient Interventions: Other (comment),Normalization of emotional/spiritual concerns (Ministry of presence)           Plan of Care:     [x] Support spiritual and/or cultural needs    [] Support AMD and/or advance care planning process      [] Support grieving process   [] Coordinate Rites and/or Rituals    [] Coordination with community clergy   [] No spiritual needs identified at this time   [] Detailed Plan of Care below (See Comments)  [] Make referral to Music Therapy  [] Make referral to Pet Therapy     [] Make referral to Addiction services  [] Make referral to [facility]  [] Make referral to Spiritual Care Partner  [] No future visits requested        [x] Contact Spiritual Care for further referrals     Comments:  was rounding in the ICU, reviewed chart.  At this time patient was asleep.  did not wake patient. No family was present at this time. Advised nurse to contact [facility] for any further referrals.     [address], [facility]    Please [facility]  in order to get in touch with  for any Spiritual Care Needs   [phone]   OR   Reach out to us on [employer] at [facility].

## 2022-04-06 NOTE — ED NOTES
TRANSFER - OUT REPORT:    Verbal report given to Arely Yung on Gene Alvarado  being transferred to (unit) for routine progression of care       Report consisted of patients Situation, Background, Assessment and   Recommendations(SBAR). Information from the following report(s) SBAR, ED Summary, [facility] and Cardiac Rhythm ST was reviewed with the receiving nurse. Lines:   Peripheral IV 04/06/22 Left Antecubital (Active)   Site Assessment Clean, dry, & intact 04/06/22 0009   Phlebitis Assessment 0 04/06/22 0009   Infiltration Assessment 0 04/06/22 0009       Peripheral IV 04/06/22 Left;Posterior Wrist (Active)        Opportunity for questions and clarification was provided.       Patient transported with:   Monitor  Registered Nurse   Personal belongings

## 2022-04-06 NOTE — ED PROVIDER NOTES
EMERGENCY DEPARTMENT HISTORY AND PHYSICAL EXAM      Date: 4/5/2022  Patient Name: Ayla Vance    History of Presenting Illness     Chief Complaint   Patient presents with    High Blood Sugar    Altered mental status       History Provided By: EMS    HPI: Ayla Vance, 80 y.o. female with a past medical history significant diabetes, hypertension, obesity and stroke presents to the ED with cc of hyperglycemia and altered mental status. Spouse called EMS due to altered mental status and reported that yesterday patient's blood sugar was over 600 but \"did not think anything of it\" and that she does not take insulin regularly in fact they do not know the last time she took insulin because they do not trust doctors patient arrives altered and is also noted to have a history of aphasia. She is awake and alert however she is not cooperative and has to be redirected frequently. No evidence of fever, tachycardia or acute distress or illness. Accu-Chek on arrival was greater than 600. There are no other complaints, changes, or physical findings at this time. PCP: Doretha Simms, DO    No current facility-administered medications on file prior to encounter. Current Outpatient Medications on File Prior to Encounter   Medication Sig Dispense Refill    glucose blood VI test strips (OneTouch Ultra Test) strip Use to check blood glucose before meals and at bedtime. 120 Strip 11    Blood-Glucose Meter (OneTouch Ultra2 Meter) misc Use to check blood glucose levels before meals and at bedtime. 1 Each 0    glimepiride (AMARYL) 1 mg tablet Take 1 Tablet by mouth Daily (before breakfast). 90 Tablet 1    metoprolol succinate (TOPROL-XL) 50 mg XL tablet Take 1 Tablet by mouth daily. Indications: high blood pressure 90 Tablet 1    amLODIPine (NORVASC) 5 mg tablet Take 1 Tablet by mouth daily. Indications: high blood pressure 90 Tablet 1    famotidine (Pepcid) 20 mg tablet Take 1 Tablet by mouth two (2) times a day.  Indications: gastroesophageal reflux disease 180 Tablet 1    metFORMIN (GLUCOPHAGE) 1,000 mg tablet Take 1 Tablet by mouth two (2) times daily (with meals). Indications: type 2 diabetes mellitus 180 Tablet 1    aspirin delayed-release 81 mg tablet Take 1 Tablet by mouth daily. Indications: treatment to prevent a heart attack 90 Tablet 3    Lancing Device with Lancets (Accu-Chek Multiclix Lancet) kit Use to check blood sugar once daily. 1 Kit 0    lancets (Accu-Chek Multiclix Lancet) misc Use to check blood sugar once daily. 100 Each 3    alcohol swabs (Alcohol Wipes) padm Use to check blood glucose once daily. 100 Pad 3    venlafaxine-SR (EFFEXOR-XR) 37.5 mg capsule Take 1 Capsule by mouth daily. Indications: anxiousness associated with depression 90 Capsule 1    metoprolol succinate 50 mg CSpX Take  by mouth.  insulin NPH/insulin regular (NovoLIN 70/30 U-100 Insulin) 100 unit/mL (70-30) injection by SubCUTAneous route. 22 units in the AM and 42 units at PM         Past History     Past Medical History:  Past Medical History:   Diagnosis Date    Bladder cancer ([facility] [address]     Diabetes ([facility] [address]     Essential hypertension 4/11/6801    Non-alcoholic fatty liver disease 7/21/2020    Thyroid disease        Past Surgical History:  Past Surgical History:   Procedure Laterality Date    HX CATARACT REMOVAL Left 07/23/2019    HX CHOLECYSTECTOMY      HX COLONOSCOPY  2002    HX HYSTERECTOMY      partial  20 yrs ago    HX TUBAL LIGATION         Family History:  Family History   Problem Relation Age of Onset    Diabetes Mother     Diabetes Maternal Grandmother     Other Maternal Grandmother         CHF    Diabetes Paternal Grandmother        Social History:  Social History     Tobacco Use    Smoking status: Former Smoker    Smokeless tobacco: Never Used   Vaping Use    Vaping Use: Never used   Substance Use Topics    Alcohol use: Not Currently    Drug use: Never       Allergies:   Allergies   Allergen Reactions    Adhesive Unknown (comments)    Penicillins Other (comments)    Sulfa (Sulfonamide Antibiotics) Unknown (comments)         Review of Systems     Review of Systems   Unable to perform ROS: Mental status change   Aphasia history    Physical Exam     Physical Exam  Vitals and nursing note reviewed. Constitutional:       General: She is not in acute distress. Appearance: She is obese. She is ill-appearing. She is not toxic-appearing or diaphoretic. HENT:      Head: Normocephalic and atraumatic. Mouth/Throat:      Mouth: Mucous membranes are moist.   Cardiovascular:      Rate and Rhythm: Normal rate. Heart sounds: Normal heart sounds. Pulmonary:      Effort: Pulmonary effort is normal. No respiratory distress. Breath sounds: Normal breath sounds. Abdominal:      General: There is no distension. Palpations: Abdomen is soft. Musculoskeletal:         General: Normal range of motion. Cervical back: Normal range of motion. Feet:    Skin:     General: Skin is warm and dry. Capillary Refill: Capillary refill takes less than 2 seconds. Findings: No rash. Neurological:      Mental Status: She is alert. She is disoriented and confused. Cranial Nerves: No facial asymmetry.            Lab and Diagnostic Study Results     Labs -     Recent Results (from the past 12 hour(s))   CBC WITH AUTOMATED DIFF    Collection Time: 04/05/22 11:29 PM   Result Value Ref Range    WBC 14.7 (H) 3.6 - 11.0 K/uL    RBC 5.22 (H) 3.80 - 5.20 M/uL    HGB 13.5 11.5 - 16.0 g/dL    HCT 43.8 35.0 - 47.0 %    MCV 83.9 80.0 - 99.0 FL    MCH 25.9 (L) 26.0 - 34.0 PG    MCHC 30.8 30.0 - 36.5 g/dL    RDW 15.2 (H) 11.5 - 14.5 %    PLATELET 032 647 - 668 K/uL    MPV 9.8 8.9 - 12.9 FL    NRBC 0.0 0.0  WBC    ABSOLUTE NRBC 0.00 0.00 - 0.01 K/uL    NEUTROPHILS 86 (H) 32 - 75 %    LYMPHOCYTES 6 (L) 12 - 49 %    MONOCYTES 6 5 - 13 %    EOSINOPHILS 0 0 - 7 %    BASOPHILS 0 0 - 1 %    IMMATURE GRANULOCYTES 2 (H) 0 - 0.5 %    ABS. NEUTROPHILS 12.5 (H) 1.8 - 8.0 K/UL    ABS. LYMPHOCYTES 0.9 0.8 - 3.5 K/UL    ABS. MONOCYTES 0.9 0.0 - 1.0 K/UL    ABS. EOSINOPHILS 0.0 0.0 - 0.4 K/UL    ABS. BASOPHILS 0.0 0.0 - 0.1 K/UL    ABS. IMM. GRANS. 0.3 (H) 0.00 - 0.04 K/UL    DF AUTOMATED     METABOLIC PANEL, COMPREHENSIVE    Collection Time: 04/05/22 11:29 PM   Result Value Ref Range    Sodium 132 (L) 136 - 145 mmol/L    Potassium 3.7 3.5 - 5.1 mmol/L    Chloride 93 (L) 97 - 108 mmol/L    CO2 9 (LL) 21 - 32 mmol/L    Anion gap 30 (H) 5 - 15 mmol/L    Glucose 797 (HH) 65 - 100 mg/dL    BUN 45 (H) 6 - 20 mg/dL    Creatinine 1.76 (H) 0.55 - 1.02 mg/dL    BUN/Creatinine ratio 26 (H) 12 - 20      GFR est AA 35 (L) >60 ml/min/1.73m2    GFR est non-AA 29 (L) >60 ml/min/1.73m2    Calcium 9.2 8.5 - 10.1 mg/dL    Bilirubin, total 0.6 0.2 - 1.0 mg/dL    AST (SGOT) 11 (L) 15 - 37 U/L    ALT (SGPT) 29 12 - 78 U/L    Alk.  phosphatase 122 (H) 45 - 117 U/L    Protein, total 6.4 6.4 - 8.2 g/dL    Albumin 3.3 (L) 3.5 - 5.0 g/dL    Globulin 3.1 2.0 - 4.0 g/dL    A-G Ratio 1.1 1.1 - 2.2     LACTIC ACID    Collection Time: 04/05/22 11:29 PM   Result Value Ref Range    Lactic acid 1.6 0.4 - 2.0 mmol/L   VENOUS BLOOD GAS    Collection Time: 04/05/22 11:29 PM   Result Value Ref Range    VENOUS PH 7.12 (LL) 7.31 - 7.41      VENOUS PCO2 28.5 (L) 40 - 50 mmHg    VENOUS PO2 61 (H) 36 - 42 mmHg    VENOUS O2 SATURATION 86 (H) 60 - 80 %    VENOUS BICARBONATE 11 (L) 22 - 26 mmol/L    VENOUS BASE DEFICIT 18.7 (H) 0 - 2 mmol/L    O2 METHOD Room air      FIO2 21.0 %    SITE Right Radial      Critical value read back Gave results to Anusha Morley    TROPONIN-HIGH SENSITIVITY    Collection Time: 04/05/22 11:29 PM   Result Value Ref Range    Troponin-High Sensitivity 9 0 - 51 ng/L   GLUCOSE, POC    Collection Time: 04/05/22 11:33 PM   Result Value Ref Range    Glucose (POC) >600 () 65 - 117 mg/dL    Performed by Marivel  CULTURE    Collection Time: 04/06/22 12:05 AM    Specimen: Urine   Result Value Ref Range    Color Yellow/Straw      Appearance Turbid (A) Clear      Specific gravity 1.021 1.003 - 1.030      pH (UA) 5.0 5.0 - 8.0      Protein 100 (A) Negative mg/dL    Glucose >300 (A) Negative mg/dL    Ketone 80 (A) Negative mg/dL    Bilirubin Negative Negative      Blood Small (A) Negative      Urobilinogen 0.1 0.1 - 1.0 EU/dL    Nitrites Negative Negative      Leukocyte Esterase Negative Negative      UA:UC IF INDICATED Culture not indicated by UA result Culture not indicated by UA result      WBC 5-10 0 - 4 /hpf    RBC 0-5 0 - 5 /hpf    Bacteria Negative Negative /hpf    Mucus Trace /lpf   VENOUS BLOOD GAS    Collection Time: 04/06/22  1:30 AM   Result Value Ref Range    VENOUS PH 7.15 (LL) 7.31 - 7.41      VENOUS PCO2 34.6 (L) 40 - 50 mmHg    VENOUS PO2 22 (L) 36 - 42 mmHg    VENOUS O2 SATURATION 28 (L) 60 - 80 %    VENOUS BICARBONATE 12 (L) 22 - 26 mmol/L    VENOUS BASE DEFICIT 15.8 (H) 0 - 2 mmol/L    O2 METHOD Room air      FIO2 21.0 %    SITE Right Radial      Critical value read back Gave copy to Deisy Mclaughlin    GLUCOSTABILIZER    Collection Time: 04/06/22  2:32 AM   Result Value Ref Range    Glucose 601 mg/dL    Insulin order 10.8 units/hour    Insulin adminstered 10.8 units/hour    Multiplier 0.020     Low target 140 mg/dL    High target 180 mg/dL    D50 order 0.0 ml    D50 administered 0.00 ml    Minutes until next BG 60 min    Order initials EPH     Administered initials AK     GLSCOM Comments         Radiologic Studies -   @lastxrresult@  CT Results  (Last 48 hours)    None        CXR Results  (Last 48 hours)               04/06/22 0017  XR CHEST PORT Final result    Impression:  No acute cardiopulmonary abnormality. .        Narrative:  HISTORY:  Eval for Infiltrate       TECHNIQUE:  XR CHEST PORT       COMPARISON: March 2, 2017   LIMITATIONS: None       TUBES/LINES: Stimulator device projected at the level of the left heart       LUNG PARENCHYMA: Normal   TRACHEA/BRONCHI: Normal   PULMONARY VESSELS: Normal   PLEURA: Normal   HEART: Normal   AORTIC SHADOW:Normal.     MEDIASTINUM: Normal   BONE/SOFT TISSUES: No acute abnormality. OTHER: None                   Medical Decision Making   - I am the first provider for this patient. - I reviewed the vital signs, available nursing notes, past medical history, past surgical history, family history and social history. - Initial assessment performed. The patients presenting problems have been discussed, and they are in agreement with the care plan formulated and outlined with them. I have encouraged them to ask questions as they arise throughout their visit. Vital Signs-Reviewed the patient's vital signs. Patient Vitals for the past 12 hrs:   Temp Pulse Resp BP SpO2   04/06/22 0235 -- (!) 119 18 (!) 115/96 97 %   04/06/22 0131 -- (!) 115 18 (!) 166/83 96 %   04/06/22 0114 -- (!) 117 19 (!) 171/83 98 %   04/06/22 0055 -- (!) 119 22 (!) 158/73 98 %   04/05/22 2340 99.1 °F (37.3 °C) (!) 117 23 (!) 153/79 98 %       Records Reviewed: Nursing Notes, Old Medical Records, Previous electrocardiograms, Ambulance Run Sheet, Previous Radiology Studies and Previous Laboratory Studies    The patient presents with altered mental status with a differential diagnosis of  ETOH intoxication, chronic dementia, CVA/TIA, encephalopathy, hepatic encephalopathy, hypernatremia, hyponatremia, insulin reaction, UTI, volume depletion and DKA, hyperglycemia, electrolyte derangement. ED Course as of 04/06/22 0242   Wed Apr 06, 2022   0001 Sepsis criteria: Obvious site of infection in abdominal abscess, AMS, tachycardia 117, tachypnea, leukocytosis 14.7, temp 99.1 rectally.  Remaining labs pending [KR]   0008 VENOUS BLOOD GAS(!!):    VENOUS PH 7.12(!!)   VENOUS PCO2 28.5(!)   VENOUS PO2 61(!)   VENOUS O2 SATURATION 86(!)   VENOUS BICARBONATE 11(!)   VENOUS BASE DEFICIT 18.7(!)   O2 METHOD Room air   FIO2 21.0   SITE Right Radial   Critical value read back Gave results to Niko Brown [KR]   9003 I assumed care of this patient from the nurse practitioner. Briefly, patient with DKA likely secondary to medications noncompliance. She does have soft tissue infection in the genital area. Work-up so far revealed leukocytosis with DKA. Pending chemistry, lactate, and urinalysis. [AA]   0125 Potassium is 3.7. Patient does have an RENZO. I will start the patient on insulin infusion as well as intravenous fluid with potassium replacement for management of her DKA. Patient will be admitted to the ICU for further acute care. [AA]   0239 VENOUS PH(!!): 7.15  Note on the blood gas that the potassium 3.3. Will hold off on insulin infusion and replace her potassium before resuming her insulin. [AA]      ED Course User Index  [AA] Gabrielle Flynn MD  [KR] Niko Brown, NP     and Critical Care    and Critical Care  CRITICAL CARE NOTE :  2:45 AM      Amount of Critical Care Time: 44 minutes    Details of critical care provision is documented above. A general summary is listed below:    IMPENDING DETERIORATION -CNS and Metabolic  ASSOCIATED RISK FACTORS - Metabolic changes and Dehydration  MANAGEMENT- Bedside Assessment and Supervision of Care  INTERPRETATION -  Blood Gases, ECG, Blood Pressure and Labs  INTERVENTIONS - Metobolic interventions  CASE REVIEW - Hospitalist/Intensivist and Nursing  TREATMENT RESPONSE -Stable  PERFORMED BY - Self    NOTES   :  During this entire length of time I was immediately available to the patient . Montana Espinoza MD      Disposition     Disposition: Condition critical  Admitted to ICU Medical ICU the case was discussed with the admitting physician Dr. Anika Gambino    Admitted      Diagnosis     Clinical Impression:   1. Type 1 diabetes mellitus with ketoacidosis without coma ([facility] [address]    2. RENZO (acute kidney injury) ([facility] [address]    3.  Altered mental status, unspecified altered mental status type    4. Leukocytosis, unspecified type        Attestations: Morgan Mederos MD    Please note that this dictation was completed with Muse[employer], the computer voice recognition software. Quite often unanticipated grammatical, syntax, homophones, and other interpretive errors are inadvertently transcribed by the computer software. Please disregard these errors. Please excuse any errors that have escaped final proofreading. Thank you.

## 2022-04-06 NOTE — ED NOTES
While assessing patient, this RN notes reddened area to pt's L side of pubis mons with what appears to be a purulent drainage. Provider aware, to bedside, inspected this area and believes it to be cellulitic. Wound cultures ordered.

## 2022-04-06 NOTE — PROGRESS NOTES
14: 30PM Outbound call to Yanet Landeros  [facility] @ [phone]. Spoke to pharmacy tech and inquired about last medications picked up. Pharmacy tech informed writer of the following:    -last picked up on 22 Mar 22 Glimepiride 1mg co-pay $10.00.  -last picked up on 22 Mar 22 Metformin co-pay $10.00.    -ready for  Test Strips. Covered by insurance and $0 co-pay to patient.     -kit is currently being processed for insurance to cover. Cash price for kit is $30.00    -patient didn't  Glimepiride 4mg on 22 Mar 22.            Sonaj Fuentes, MSW

## 2022-04-06 NOTE — PROGRESS NOTES
Vancomycin Dosing Consult  Day #1 of vancomycin therapy  Consult ordered by Dr. Cat Forde for this 80 y.o. female for indication of sepsis.   Antibiotic regimen: Vancomycin + cefepime    Temp (24hrs), Av.6 °F (37 °C), Min:98.3 °F (36.8 °C), Max:99.1 °F (37.3 °C)    Recent Labs     22  0800 22  0340 22  2329   WBC  --  17.3* 14.7*   CREA 1.62* 1.48* 1.76*   BUN 39* 41* 45*     Est CrCl: ~35 ml/min  Concomitant nephrotoxic drugs: None    Cultures:    Wound: Pending   Blood: Pending    MRSA Swab: Detected     Target range: AUC/VIDA 400-600    Assessment/Plan:   Leukocytosis, procal moderately elevated  SCr trending down, CKD; will use AUC-based dosing for now  1000 mg received in ED, will give another 1000 mg now to complete a loading dose, and follow with 1000 mg q24h for a predicted AUC of 462  Will check a level prior to dose  @ 1000  Antimicrobial stop date TBD

## 2022-04-06 NOTE — PROGRESS NOTES
Comprehensive Nutrition Assessment    Type and Reason for Visit: Initial,Positive nutrition screen (MST 2)    Nutrition Recommendations/Plan:   Initiate PO diet once medically able, goal of 4 CHO choices  Consider SLP eval if AMS continues    RD to f/u for ONS as appropriate      Nutrition Assessment:  Admitted for DKA. Currently in ICU on insulin ggt, +SIRS however unclear source, pending BC and WC. Pt not appropriate for interview at this time d/t AMS. Currently NPO, plan to advance once BG more controlled. AMS may be barrier to intake, unsure baseline mental status however noted hx CVA. May require SLP eval. Will continue to follow. Labs: K+ 3.4, BUN 39, Cr 1.62, BG currently 212, >600 at admit,AST 11, Alk Phos 122, (4/6) A1c 14.0%. Meds: IVF with KCl, insulin drip, KCL, Vancomycin, venlafaxine. Malnutrition Assessment:  Malnutrition Status: At risk for malnutrition (specify) (concerning wt loss, recent CVA, poor DM management)    Context:  Chronic illness     Findings of the 6 clinical characteristics of malnutrition:   Energy Intake:  Unable to assess  Weight Loss:  No significant weight loss (13.5kg x10 months (14%, not significant, however still concerning))     Body Fat Loss:  No significant body fat loss,     Muscle Mass Loss:  No significant muscle mass loss,    Fluid Accumulation:  No significant fluid accumulation,        Estimated Daily Nutrient Needs:  Energy (kcal): 2020-2424kcals (25-30kcals/kg); Weight Used for Energy Requirements: Current  Protein (g): 81-97g (1-1.2g/kg); Weight Used for Protein Requirements: Current  Fluid (ml/day): 2020-2424ml; Method Used for Fluid Requirements: 1 ml/kcal      Nutrition Related Findings:  NFPE with no acute findings. Unknown hx dysphagia, noted hx CVA with aphasia. Emesis in ED, none since. Last BM pta. No edema.       Wounds:      (Cellulitis- groin)       Current Nutrition Therapies:  DIET NPO    Anthropometric Measures:  · Height:  5' 5.98\" (167.6 cm)  · Current Body Wt:  80.8 kg (178 lb 2.1 oz) (4/6)   · Admission Body Wt:  178 lb 2.1 oz (4/6)    · Usual Body Wt:   (adali)     · Ideal Body Wt:  130 lbs:  137 %   · BMI Category: Overweight (BMI 25.0-29. 9)     Wt Readings from Last 10 Encounters:   04/06/22 80.8 kg (178 lb 2.1 oz)   03/22/22 89.8 kg (198 lb)   03/17/22 72.6 kg (160 lb)   06/09/21 94.3 kg (208 lb)   02/17/20 92.5 kg (204 lb)   Wt loss of 13.5kg x10 months (14%, not significant, however still concerning). Nutrition Diagnosis:   · Altered nutrition-related lab values related to endocrine dysfunction (T1 vs T2 DM) as evidenced by lab values (BG >600 at admit, HgbA1c 14%)      Nutrition Interventions:   Food and/or Nutrient Delivery: Start oral diet  Nutrition Education and Counseling: Education needed (DM management)  Coordination of Nutrition Care: Continue to monitor while inpatient,Swallow evaluation    Goals:  Initiate means of nutrition capable of meeting >/=75% of EENs within 4 days, Meet >/=50% of EENs in >5 days, Wt maintenance within +/-0.5kg in >5 days       Nutrition Monitoring and Evaluation:   Behavioral-Environmental Outcomes: None identified  Food/Nutrient Intake Outcomes: Diet advancement/tolerance,Food and nutrient intake  Physical Signs/Symptoms Outcomes: Weight,Skin,Biochemical data,Meal time behavior    Discharge Planning:     Too soon to determine,Recommend pursue outpatient diabetes education     Electronically signed by Lakeisha Ball on 4/6/2022 at 11:51 AM    Contact: Ext [phone], or via [employer]

## 2022-04-07 LAB
ADMINISTERED INITIALS, ADMINIT: NORMAL
AMMONIA PLAS-SCNC: <10 UMOL/L
ANION GAP SERPL CALC-SCNC: 14 MMOL/L (ref 5–15)
ANION GAP SERPL CALC-SCNC: 14 MMOL/L (ref 5–15)
ANION GAP SERPL CALC-SCNC: 6 MMOL/L (ref 5–15)
ANION GAP SERPL CALC-SCNC: 7 MMOL/L (ref 5–15)
ANION GAP SERPL CALC-SCNC: 9 MMOL/L (ref 5–15)
ANION GAP SERPL CALC-SCNC: 9 MMOL/L (ref 5–15)
BACTERIA SPEC CULT: NORMAL
BACTERIA SPEC CULT: NORMAL
BASOPHILS # BLD: 0 K/UL (ref 0–0.1)
BASOPHILS NFR BLD: 0 % (ref 0–1)
BUN SERPL-MCNC: 15 MG/DL (ref 6–20)
BUN SERPL-MCNC: 19 MG/DL (ref 6–20)
BUN SERPL-MCNC: 21 MG/DL (ref 6–20)
BUN SERPL-MCNC: 23 MG/DL (ref 6–20)
BUN SERPL-MCNC: 24 MG/DL (ref 6–20)
BUN SERPL-MCNC: 24 MG/DL (ref 6–20)
BUN/CREAT SERPL: 18 (ref 12–20)
BUN/CREAT SERPL: 18 (ref 12–20)
BUN/CREAT SERPL: 19 (ref 12–20)
BUN/CREAT SERPL: 19 (ref 12–20)
BUN/CREAT SERPL: 21 (ref 12–20)
BUN/CREAT SERPL: 21 (ref 12–20)
CA-I BLD-MCNC: 8.4 MG/DL (ref 8.5–10.1)
CA-I BLD-MCNC: 8.5 MG/DL (ref 8.5–10.1)
CA-I BLD-MCNC: 8.6 MG/DL (ref 8.5–10.1)
CA-I BLD-MCNC: 9.1 MG/DL (ref 8.5–10.1)
CHLORIDE SERPL-SCNC: 113 MMOL/L (ref 97–108)
CHLORIDE SERPL-SCNC: 115 MMOL/L (ref 97–108)
CHLORIDE SERPL-SCNC: 115 MMOL/L (ref 97–108)
CHLORIDE SERPL-SCNC: 116 MMOL/L (ref 97–108)
CHLORIDE SERPL-SCNC: 117 MMOL/L (ref 97–108)
CHLORIDE SERPL-SCNC: 117 MMOL/L (ref 97–108)
CO2 SERPL-SCNC: 15 MMOL/L (ref 21–32)
CO2 SERPL-SCNC: 17 MMOL/L (ref 21–32)
CO2 SERPL-SCNC: 21 MMOL/L (ref 21–32)
CO2 SERPL-SCNC: 21 MMOL/L (ref 21–32)
CREAT SERPL-MCNC: 0.85 MG/DL (ref 0.55–1.02)
CREAT SERPL-MCNC: 1.03 MG/DL (ref 0.55–1.02)
CREAT SERPL-MCNC: 1.09 MG/DL (ref 0.55–1.02)
CREAT SERPL-MCNC: 1.16 MG/DL (ref 0.55–1.02)
CREAT SERPL-MCNC: 1.17 MG/DL (ref 0.55–1.02)
CREAT SERPL-MCNC: 1.19 MG/DL (ref 0.55–1.02)
D50 ADMINISTERED, D50ADM: 0 ML
D50 ORDER, D50ORD: 0 ML
DATE LAST DOSE: NORMAL
DIFFERENTIAL METHOD BLD: ABNORMAL
EOSINOPHIL # BLD: 0 K/UL (ref 0–0.4)
EOSINOPHIL NFR BLD: 0 % (ref 0–7)
ERYTHROCYTE [DISTWIDTH] IN BLOOD BY AUTOMATED COUNT: 15.6 % (ref 11.5–14.5)
GLSCOM COMMENTS: NORMAL
GLUCOSE BLD STRIP.AUTO-MCNC: 103 MG/DL (ref 65–117)
GLUCOSE BLD STRIP.AUTO-MCNC: 104 MG/DL (ref 65–117)
GLUCOSE BLD STRIP.AUTO-MCNC: 105 MG/DL (ref 65–117)
GLUCOSE BLD STRIP.AUTO-MCNC: 110 MG/DL (ref 65–117)
GLUCOSE BLD STRIP.AUTO-MCNC: 110 MG/DL (ref 65–117)
GLUCOSE BLD STRIP.AUTO-MCNC: 136 MG/DL (ref 65–117)
GLUCOSE BLD STRIP.AUTO-MCNC: 146 MG/DL (ref 65–117)
GLUCOSE BLD STRIP.AUTO-MCNC: 159 MG/DL (ref 65–117)
GLUCOSE BLD STRIP.AUTO-MCNC: 165 MG/DL (ref 65–117)
GLUCOSE BLD STRIP.AUTO-MCNC: 170 MG/DL (ref 65–117)
GLUCOSE BLD STRIP.AUTO-MCNC: 188 MG/DL (ref 65–117)
GLUCOSE BLD STRIP.AUTO-MCNC: 194 MG/DL (ref 65–117)
GLUCOSE BLD STRIP.AUTO-MCNC: 208 MG/DL (ref 65–117)
GLUCOSE BLD STRIP.AUTO-MCNC: 218 MG/DL (ref 65–117)
GLUCOSE BLD STRIP.AUTO-MCNC: 226 MG/DL (ref 65–117)
GLUCOSE BLD STRIP.AUTO-MCNC: 252 MG/DL (ref 65–117)
GLUCOSE BLD STRIP.AUTO-MCNC: 306 MG/DL (ref 65–117)
GLUCOSE BLD STRIP.AUTO-MCNC: 331 MG/DL (ref 65–117)
GLUCOSE BLD STRIP.AUTO-MCNC: 368 MG/DL (ref 65–117)
GLUCOSE BLD STRIP.AUTO-MCNC: 402 MG/DL (ref 65–117)
GLUCOSE SERPL-MCNC: 157 MG/DL (ref 65–100)
GLUCOSE SERPL-MCNC: 180 MG/DL (ref 65–100)
GLUCOSE SERPL-MCNC: 225 MG/DL (ref 65–100)
GLUCOSE SERPL-MCNC: 269 MG/DL (ref 65–100)
GLUCOSE SERPL-MCNC: 342 MG/DL (ref 65–100)
GLUCOSE SERPL-MCNC: 416 MG/DL (ref 65–100)
GLUCOSE, GLC: 103 MG/DL
GLUCOSE, GLC: 104 MG/DL
GLUCOSE, GLC: 105 MG/DL
GLUCOSE, GLC: 110 MG/DL
GLUCOSE, GLC: 110 MG/DL
GLUCOSE, GLC: 136 MG/DL
GLUCOSE, GLC: 146 MG/DL
GLUCOSE, GLC: 159 MG/DL
GLUCOSE, GLC: 165 MG/DL
GLUCOSE, GLC: 170 MG/DL
GLUCOSE, GLC: 188 MG/DL
GLUCOSE, GLC: 194 MG/DL
GLUCOSE, GLC: 208 MG/DL
GLUCOSE, GLC: 218 MG/DL
GLUCOSE, GLC: 226 MG/DL
GLUCOSE, GLC: 252 MG/DL
GLUCOSE, GLC: 306 MG/DL
GLUCOSE, GLC: 331 MG/DL
GLUCOSE, GLC: 368 MG/DL
GLUCOSE, GLC: 402 MG/DL
GRAM STN SPEC: NORMAL
HCT VFR BLD AUTO: 39.6 % (ref 35–47)
HGB BLD-MCNC: 12.6 G/DL (ref 11.5–16)
HIGH TARGET, HITG: 250 MG/DL
IMM GRANULOCYTES # BLD AUTO: 0.2 K/UL (ref 0–0.04)
IMM GRANULOCYTES NFR BLD AUTO: 2 % (ref 0–0.5)
INSULIN ADMINSTERED, INSADM: 0 UNITS/HOUR
INSULIN ADMINSTERED, INSADM: 0.4 UNITS/HOUR
INSULIN ADMINSTERED, INSADM: 2.3 UNITS/HOUR
INSULIN ADMINSTERED, INSADM: 2.7 UNITS/HOUR
INSULIN ADMINSTERED, INSADM: 3 UNITS/HOUR
INSULIN ADMINSTERED, INSADM: 3.2 UNITS/HOUR
INSULIN ADMINSTERED, INSADM: 3.3 UNITS/HOUR
INSULIN ADMINSTERED, INSADM: 3.4 UNITS/HOUR
INSULIN ADMINSTERED, INSADM: 3.8 UNITS/HOUR
INSULIN ADMINSTERED, INSADM: 6.7 UNITS/HOUR
INSULIN ADMINSTERED, INSADM: 6.8 UNITS/HOUR
INSULIN ADMINSTERED, INSADM: 7.4 UNITS/HOUR
INSULIN ADMINSTERED, INSADM: 7.9 UNITS/HOUR
INSULIN ADMINSTERED, INSADM: 8.3 UNITS/HOUR
INSULIN ADMINSTERED, INSADM: 9.2 UNITS/HOUR
INSULIN ADMINSTERED, INSADM: 9.6 UNITS/HOUR
INSULIN ADMINSTERED, INSADM: 9.8 UNITS/HOUR
INSULIN ORDER, INSORD: 0 UNITS/HOUR
INSULIN ORDER, INSORD: 0.4 UNITS/HOUR
INSULIN ORDER, INSORD: 2.3 UNITS/HOUR
INSULIN ORDER, INSORD: 2.7 UNITS/HOUR
INSULIN ORDER, INSORD: 3 UNITS/HOUR
INSULIN ORDER, INSORD: 3.2 UNITS/HOUR
INSULIN ORDER, INSORD: 3.3 UNITS/HOUR
INSULIN ORDER, INSORD: 3.4 UNITS/HOUR
INSULIN ORDER, INSORD: 3.8 UNITS/HOUR
INSULIN ORDER, INSORD: 6.7 UNITS/HOUR
INSULIN ORDER, INSORD: 6.8 UNITS/HOUR
INSULIN ORDER, INSORD: 7.4 UNITS/HOUR
INSULIN ORDER, INSORD: 7.9 UNITS/HOUR
INSULIN ORDER, INSORD: 8.3 UNITS/HOUR
INSULIN ORDER, INSORD: 9.2 UNITS/HOUR
INSULIN ORDER, INSORD: 9.6 UNITS/HOUR
INSULIN ORDER, INSORD: 9.8 UNITS/HOUR
LOW TARGET, LOT: 150 MG/DL
LYMPHOCYTES # BLD: 0.7 K/UL (ref 0.8–3.5)
LYMPHOCYTES NFR BLD: 7 % (ref 12–49)
MCH RBC QN AUTO: 25.9 PG (ref 26–34)
MCHC RBC AUTO-ENTMCNC: 31.8 G/DL (ref 30–36.5)
MCV RBC AUTO: 81.3 FL (ref 80–99)
MINUTES UNTIL NEXT BG, NBG: 60 MIN
MONOCYTES # BLD: 0.7 K/UL (ref 0–1)
MONOCYTES NFR BLD: 8 % (ref 5–13)
MULTIPLIER, MUL: 0
MULTIPLIER, MUL: 0.01
MULTIPLIER, MUL: 0.01
MULTIPLIER, MUL: 0.02
MULTIPLIER, MUL: 0.03
MULTIPLIER, MUL: 0.04
MULTIPLIER, MUL: 0.04
MULTIPLIER, MUL: 0.05
NEUTS SEG # BLD: 7.7 K/UL (ref 1.8–8)
NEUTS SEG NFR BLD: 83 % (ref 32–75)
NRBC # BLD: 0 K/UL (ref 0–0.01)
NRBC BLD-RTO: 0 PER 100 WBC
ORDER INITIALS, ORDINIT: NORMAL
PERFORMED BY, TECHID: ABNORMAL
PERFORMED BY, TECHID: NORMAL
PLATELET # BLD AUTO: 235 K/UL (ref 150–400)
PMV BLD AUTO: 9 FL (ref 8.9–12.9)
POTASSIUM SERPL-SCNC: 3 MMOL/L (ref 3.5–5.1)
POTASSIUM SERPL-SCNC: 3.2 MMOL/L (ref 3.5–5.1)
POTASSIUM SERPL-SCNC: 3.3 MMOL/L (ref 3.5–5.1)
POTASSIUM SERPL-SCNC: 3.5 MMOL/L (ref 3.5–5.1)
POTASSIUM SERPL-SCNC: 3.6 MMOL/L (ref 3.5–5.1)
POTASSIUM SERPL-SCNC: 3.8 MMOL/L (ref 3.5–5.1)
RBC # BLD AUTO: 4.87 M/UL (ref 3.8–5.2)
REPORTED DOSE,DOSE: NORMAL UNITS
SODIUM SERPL-SCNC: 142 MMOL/L (ref 136–145)
SODIUM SERPL-SCNC: 143 MMOL/L (ref 136–145)
SODIUM SERPL-SCNC: 145 MMOL/L (ref 136–145)
SODIUM SERPL-SCNC: 146 MMOL/L (ref 136–145)
SPECIAL REQUESTS,SREQ: NORMAL
VANCOMYCIN SERPL-MCNC: 8.8 UG/ML
WBC # BLD AUTO: 9.3 K/UL (ref 3.6–11)

## 2022-04-07 PROCEDURE — 82962 GLUCOSE BLOOD TEST: CPT

## 2022-04-07 PROCEDURE — 80202 ASSAY OF VANCOMYCIN: CPT

## 2022-04-07 PROCEDURE — 80048 BASIC METABOLIC PNL TOTAL CA: CPT

## 2022-04-07 PROCEDURE — 74011000250 HC RX REV CODE- 250: Performed by: INTERNAL MEDICINE

## 2022-04-07 PROCEDURE — 36415 COLL VENOUS BLD VENIPUNCTURE: CPT

## 2022-04-07 PROCEDURE — 74011000250 HC RX REV CODE- 250: Performed by: HOSPITALIST

## 2022-04-07 PROCEDURE — 74011636637 HC RX REV CODE- 636/637: Performed by: INTERNAL MEDICINE

## 2022-04-07 PROCEDURE — 74011250636 HC RX REV CODE- 250/636: Performed by: INTERNAL MEDICINE

## 2022-04-07 PROCEDURE — 74011250637 HC RX REV CODE- 250/637: Performed by: HOSPITALIST

## 2022-04-07 PROCEDURE — 85025 COMPLETE CBC W/AUTO DIFF WBC: CPT

## 2022-04-07 PROCEDURE — 65610000006 HC RM INTENSIVE CARE

## 2022-04-07 PROCEDURE — 82140 ASSAY OF AMMONIA: CPT

## 2022-04-07 PROCEDURE — 74011636637 HC RX REV CODE- 636/637: Performed by: HOSPITALIST

## 2022-04-07 PROCEDURE — 74011000258 HC RX REV CODE- 258: Performed by: INTERNAL MEDICINE

## 2022-04-07 PROCEDURE — 74011250636 HC RX REV CODE- 250/636: Performed by: HOSPITALIST

## 2022-04-07 RX ORDER — INSULIN GLARGINE 100 [IU]/ML
20 INJECTION, SOLUTION SUBCUTANEOUS DAILY
Status: DISCONTINUED | OUTPATIENT
Start: 2022-04-07 | End: 2022-04-08

## 2022-04-07 RX ORDER — INSULIN LISPRO 100 [IU]/ML
INJECTION, SOLUTION INTRAVENOUS; SUBCUTANEOUS EVERY 4 HOURS
Status: DISCONTINUED | OUTPATIENT
Start: 2022-04-07 | End: 2022-04-08

## 2022-04-07 RX ORDER — POTASSIUM CHLORIDE 7.45 MG/ML
10 INJECTION INTRAVENOUS
Status: COMPLETED | OUTPATIENT
Start: 2022-04-07 | End: 2022-04-08

## 2022-04-07 RX ORDER — LABETALOL HCL 20 MG/4 ML
10 SYRINGE (ML) INTRAVENOUS
Status: DISCONTINUED | OUTPATIENT
Start: 2022-04-07 | End: 2022-04-13 | Stop reason: HOSPADM

## 2022-04-07 RX ORDER — AMLODIPINE BESYLATE 5 MG/1
10 TABLET ORAL DAILY
Status: DISCONTINUED | OUTPATIENT
Start: 2022-04-08 | End: 2022-04-13 | Stop reason: HOSPADM

## 2022-04-07 RX ORDER — MAGNESIUM SULFATE 100 %
4 CRYSTALS MISCELLANEOUS AS NEEDED
Status: DISCONTINUED | OUTPATIENT
Start: 2022-04-07 | End: 2022-04-08 | Stop reason: SDUPTHER

## 2022-04-07 RX ORDER — HYDRALAZINE HYDROCHLORIDE 50 MG/1
100 TABLET, FILM COATED ORAL 3 TIMES DAILY
Status: DISCONTINUED | OUTPATIENT
Start: 2022-04-07 | End: 2022-04-13 | Stop reason: HOSPADM

## 2022-04-07 RX ORDER — INSULIN LISPRO 100 [IU]/ML
INJECTION, SOLUTION INTRAVENOUS; SUBCUTANEOUS EVERY 4 HOURS
Status: DISCONTINUED | OUTPATIENT
Start: 2022-04-07 | End: 2022-04-07

## 2022-04-07 RX ORDER — MAGNESIUM SULFATE HEPTAHYDRATE 40 MG/ML
2 INJECTION, SOLUTION INTRAVENOUS ONCE
Status: COMPLETED | OUTPATIENT
Start: 2022-04-07 | End: 2022-04-08

## 2022-04-07 RX ADMIN — MAGNESIUM SULFATE HEPTAHYDRATE 2 G: 40 INJECTION, SOLUTION INTRAVENOUS at 10:52

## 2022-04-07 RX ADMIN — SODIUM CHLORIDE, PRESERVATIVE FREE 10 ML: 5 INJECTION INTRAVENOUS at 22:00

## 2022-04-07 RX ADMIN — VANCOMYCIN HYDROCHLORIDE 1000 MG: 1 INJECTION, POWDER, LYOPHILIZED, FOR SOLUTION INTRAVENOUS at 09:09

## 2022-04-07 RX ADMIN — SODIUM CHLORIDE, PRESERVATIVE FREE 10 ML: 5 INJECTION INTRAVENOUS at 06:57

## 2022-04-07 RX ADMIN — POTASSIUM CHLORIDE 10 MEQ: 7.46 INJECTION, SOLUTION INTRAVENOUS at 22:00

## 2022-04-07 RX ADMIN — WATER 20 MG: 1 INJECTION INTRAMUSCULAR; INTRAVENOUS; SUBCUTANEOUS at 09:43

## 2022-04-07 RX ADMIN — DEXTROSE AND SODIUM CHLORIDE 100 ML/HR: 5; 450 INJECTION, SOLUTION INTRAVENOUS at 10:01

## 2022-04-07 RX ADMIN — SODIUM CHLORIDE, PRESERVATIVE FREE 10 ML: 5 INJECTION INTRAVENOUS at 17:34

## 2022-04-07 RX ADMIN — POTASSIUM CHLORIDE 10 MEQ: 7.46 INJECTION, SOLUTION INTRAVENOUS at 17:58

## 2022-04-07 RX ADMIN — INSULIN GLARGINE 20 UNITS: 100 INJECTION, SOLUTION SUBCUTANEOUS at 21:06

## 2022-04-07 RX ADMIN — WATER 1 G: 1 INJECTION INTRAMUSCULAR; INTRAVENOUS; SUBCUTANEOUS at 20:57

## 2022-04-07 RX ADMIN — INSULIN LISPRO 3 UNITS: 100 INJECTION, SOLUTION INTRAVENOUS; SUBCUTANEOUS at 20:54

## 2022-04-07 RX ADMIN — HYDRALAZINE HYDROCHLORIDE 100 MG: 50 TABLET, FILM COATED ORAL at 22:00

## 2022-04-07 RX ADMIN — ENOXAPARIN SODIUM 40 MG: 100 INJECTION SUBCUTANEOUS at 09:08

## 2022-04-07 RX ADMIN — POTASSIUM CHLORIDE 10 MEQ: 7.46 INJECTION, SOLUTION INTRAVENOUS at 09:43

## 2022-04-07 RX ADMIN — SODIUM CHLORIDE 6.7 UNITS/HR: 9 INJECTION, SOLUTION INTRAVENOUS at 13:04

## 2022-04-07 RX ADMIN — SODIUM CHLORIDE 9.6 UNITS/HR: 9 INJECTION, SOLUTION INTRAVENOUS at 09:53

## 2022-04-07 RX ADMIN — POTASSIUM CHLORIDE 10 MEQ: 7.46 INJECTION, SOLUTION INTRAVENOUS at 21:00

## 2022-04-07 RX ADMIN — POTASSIUM CHLORIDE 10 MEQ: 7.46 INJECTION, SOLUTION INTRAVENOUS at 12:40

## 2022-04-07 NOTE — PROGRESS NOTES
Progress Note    Patient: Santana Prescott MRN: [id number]  SSN: [id number]    YOB: 1950  Age: 80 y.o. Sex: female      Admit Date: 4/5/2022    LOS: 1 day     Subjective:     71F, h/o DMII on inuslin, with acute encephaloapthy s.t DKA in the setting of sepsis    HOSPITAL COURSE:  80 y.o. female with PMH of diabetes on insulin (suspect noncompliance), hypertension and other medical problems as below. She presented to the ED with altered mental status, with blood sugar reportedly over 600 yesterday. No further history was obtained from patient as she appears drowsy and unable to cooperate.   [address]:   In ED, noted to be tachycardic. WBC 14.7. Urinalysis not indicative of UTI. Chest x-ray unremarkable. CT head showed no acute finding, with remote left frontal infarct. Other lab work indicated of DKA. Review of Systems:  Cannot be assessed due to mental status      Objective:     Vitals:    04/07/22 0400 04/07/22 0506 04/07/22 0700 04/07/22 0800   BP: (!) 170/87 (!) 154/84     Pulse: (!) 120 (!) 116     Resp: 20 19     Temp:   98.6 °F (37 °C)    SpO2: 97% 97%  96%   Weight:       Height:            Intake and Output:  Current Shift: 04/07 0701 - 04/07 1900  In: 350 [I.V.:350]  Out: -   Last three shifts: 04/05 1901 - 04/07 0700  In: 2652 [I.V.:2652]  Out: -       Physical Exam:   General: Confused, noncooperative. Eye: conjunctivae/corneas clear. PERRL, EOM's intact. Throat and Neck: normal and no erythema or exudates noted. No mass   Lung: clear to auscultation bilaterally  Heart: regular rate and rhythm,   Abdomen: soft. Bowel sounds normal. No masses,  Extremities:  able to move all extremities normal, atraumatic  Skin: Few small scabs noted at left groin. Neurologic: Omitted as patient is unable to cooperate. Psychiatric: Omitted as patient is unable to cooperate.         Lab/Data Review:  Recent Results (from the past 24 hour(s))   GLUCOSTABILIZER    Collection Time: 04/06/22 10:33 AM   Result Value Ref Range    Glucose 264 mg/dL    Insulin order 12.2 units/hour    Insulin adminstered 12.2 units/hour    Multiplier 0.060     Low target 150 mg/dL    High target 250 mg/dL    D50 order 0.0 ml    D50 administered 0.00 ml    Minutes until next BG 60 min    Order initials CF     Administered initials CF     GLSCOM Comments     GLUCOSE, POC    Collection Time: 04/06/22 11:23 AM   Result Value Ref Range    Glucose (POC) 212 (H) 65 - 117 mg/dL    Performed by Adebayo Lamar    Collection Time: 04/06/22 11:25 AM   Result Value Ref Range    Glucose 212 mg/dL    Insulin order 9.1 units/hour    Insulin adminstered 9.1 units/hour    Multiplier 0.060     Low target 150 mg/dL    High target 250 mg/dL    D50 order 0.0 ml    D50 administered 0.00 ml    Minutes until next BG 60 min    Order initials CF     Administered initials CF     GLSCOM Comments     METABOLIC PANEL, BASIC    Collection Time: 04/06/22 11:45 AM   Result Value Ref Range    Sodium 146 (H) 136 - 145 mmol/L    Potassium 3.7 3.5 - 5.1 mmol/L    Chloride 120 (H) 97 - 108 mmol/L    CO2 16 (L) 21 - 32 mmol/L    Anion gap 10 5 - 15 mmol/L    Glucose 146 (H) 65 - 100 mg/dL    BUN 33 (H) 6 - 20 mg/dL    Creatinine 1.42 (H) 0.55 - 1.02 mg/dL    BUN/Creatinine ratio 23 (H) 12 - 20      GFR est AA 44 (L) >60 ml/min/1.73m2    GFR est non-AA 37 (L) >60 ml/min/1.73m2    Calcium 8.4 (L) 8.5 - 10.1 mg/dL   GLUCOSE, POC    Collection Time: 04/06/22 12:24 PM   Result Value Ref Range    Glucose (POC) 149 (H) 65 - 117 mg/dL    Performed by Adebayo Lamar    Collection Time: 04/06/22 12:33 PM   Result Value Ref Range    Glucose 149 mg/dL    Insulin order 4.3 units/hour    Insulin adminstered 4.3 units/hour    Multiplier 0.048     Low target 150 mg/dL    High target 250 mg/dL    D50 order 0.0 ml    D50 administered 0.00 ml    Minutes until next BG 60 min    Order initials CF     Administered initials CF GLSCOM Comments     GLUCOSE, POC    Collection Time: 04/06/22  1:27 PM   Result Value Ref Range    Glucose (POC) 128 (H) 65 - 117 mg/dL    Performed by Mina Bailey    Collection Time: 04/06/22  1:28 PM   Result Value Ref Range    Glucose 128 mg/dL    Insulin order 2.6 units/hour    Insulin adminstered 2.6 units/hour    Multiplier 0.038     Low target 150 mg/dL    High target 250 mg/dL    D50 order 0.0 ml    D50 administered 0.00 ml    Minutes until next BG 60 min    Order initials CF     Administered initials CF     GLSCOM Comments     GLUCOSE, POC    Collection Time: 04/06/22  2:26 PM   Result Value Ref Range    Glucose (POC) 145 (H) 65 - 117 mg/dL    Performed by Mina Bailey    Collection Time: 04/06/22  2:26 PM   Result Value Ref Range    Glucose 145 mg/dL    Insulin order 2.4 units/hour    Insulin adminstered 2.4 units/hour    Multiplier 0.028     Low target 150 mg/dL    High target 250 mg/dL    D50 order 0.0 ml    D50 administered 0.00 ml    Minutes until next BG 60 min    Order initials CF     Administered initials Cf     GLSCOM Comments     METABOLIC PANEL, BASIC    Collection Time: 04/06/22  3:00 PM   Result Value Ref Range    Sodium 147 (H) 136 - 145 mmol/L    Potassium 4.0 3.5 - 5.1 mmol/L    Chloride 120 (H) 97 - 108 mmol/L    CO2 17 (L) 21 - 32 mmol/L    Anion gap 10 5 - 15 mmol/L    Glucose 198 (H) 65 - 100 mg/dL    BUN 33 (H) 6 - 20 mg/dL    Creatinine 1.16 (H) 0.55 - 1.02 mg/dL    BUN/Creatinine ratio 28 (H) 12 - 20      GFR est AA 56 (L) >60 ml/min/1.73m2    GFR est non-AA 46 (L) >60 ml/min/1.73m2    Calcium 8.1 (L) 8.5 - 10.1 mg/dL   GLUCOSE, POC    Collection Time: 04/06/22  3:22 PM   Result Value Ref Range    Glucose (POC) 182 (H) 65 - 117 mg/dL    Performed by Mina Bailey    Collection Time: 04/06/22  3:23 PM   Result Value Ref Range    Glucose 182 mg/dL    Insulin order 3.4 units/hour    Insulin adminstered 3.4 units/hour Multiplier 0.028     Low target 150 mg/dL    High target 250 mg/dL    D50 order 0.0 ml    D50 administered 0.00 ml    Minutes until next BG 60 min    Order initials CF     Administered initials CF     GLSCOM Comments     GLUCOSE, POC    Collection Time: 04/06/22  4:20 PM   Result Value Ref Range    Glucose (POC) 202 (H) 65 - 117 mg/dL    Performed by Jrbashir Robledo    GLUCOSTABILIZER    Collection Time: 04/06/22  4:21 PM   Result Value Ref Range    Glucose 202 mg/dL    Insulin order 4.0 units/hour    Insulin adminstered 4.0 units/hour    Multiplier 0.028     Low target 150 mg/dL    High target 250 mg/dL    D50 order 0.0 ml    D50 administered 0.00 ml    Minutes until next BG 60 min    Order initials CF     Administered initials CF     GLSCOM Comments     GLUCOSE, POC    Collection Time: 04/06/22  5:23 PM   Result Value Ref Range    Glucose (POC) 186 (H) 65 - 117 mg/dL    Performed by Earl Fields    Collection Time: 04/06/22  5:26 PM   Result Value Ref Range    Glucose 186 mg/dL    Insulin order 3.5 units/hour    Insulin adminstered 3.5 units/hour    Multiplier 0.028     Low target 150 mg/dL    High target 250 mg/dL    D50 order 0.0 ml    D50 administered 0.00 ml    Minutes until next BG 60 min    Order initials CF     Administered initials CF     GLSCOM Comments     GLUCOSE, POC    Collection Time: 04/06/22  6:22 PM   Result Value Ref Range    Glucose (POC) 240 (H) 65 - 117 mg/dL    Performed by Earl Fields    Collection Time: 04/06/22  6:22 PM   Result Value Ref Range    Glucose 240 mg/dL    Insulin order 5.0 units/hour    Insulin adminstered 5.0 units/hour    Multiplier 0.028     Low target 150 mg/dL    High target 250 mg/dL    D50 order 0.0 ml    D50 administered 0.00 ml    Minutes until next BG 60 min    Order initials CF     Administered initials CF     GLSCOM Comments     GLUCOSE, POC    Collection Time: 04/06/22  7:25 PM   Result Value Ref Range    Glucose (POC) 177 (H) 65 - 117 mg/dL    Performed by Loren Salas    Collection Time: 04/06/22  7:26 PM   Result Value Ref Range    Glucose 177 mg/dL    Insulin order 3.3 units/hour    Insulin adminstered 3.3 units/hour    Multiplier 0.028     Low target 150 mg/dL    High target 250 mg/dL    D50 order 0.0 ml    D50 administered 0.00 ml    Minutes until next BG 60 min    Order initials tj     Administered initials bong     GLSCOM Comments     METABOLIC PANEL, BASIC    Collection Time: 04/06/22  8:10 PM   Result Value Ref Range    Sodium 146 (H) 136 - 145 mmol/L    Potassium 3.7 3.5 - 5.1 mmol/L    Chloride 119 (H) 97 - 108 mmol/L    CO2 19 (L) 21 - 32 mmol/L    Anion gap 8 5 - 15 mmol/L    Glucose 183 (H) 65 - 100 mg/dL    BUN 29 (H) 6 - 20 mg/dL    Creatinine 1.16 (H) 0.55 - 1.02 mg/dL    BUN/Creatinine ratio 25 (H) 12 - 20      GFR est AA 56 (L) >60 ml/min/1.73m2    GFR est non-AA 46 (L) >60 ml/min/1.73m2    Calcium 8.2 (L) 8.5 - 10.1 mg/dL   GLUCOSE, POC    Collection Time: 04/06/22  8:28 PM   Result Value Ref Range    Glucose (POC) 184 (H) 65 - 117 mg/dL    Performed by Loren Salas    Collection Time: 04/06/22  8:29 PM   Result Value Ref Range    Glucose 184 mg/dL    Insulin order 3.5 units/hour    Insulin adminstered 3.5 units/hour    Multiplier 0.028     Low target 150 mg/dL    High target 250 mg/dL    D50 order 0.0 ml    D50 administered 0.00 ml    Minutes until next  min    Order initials tj     Administered initials tj     GLSCOM Comments MINDY Paul    GLUCOSE, POC    Collection Time: 04/06/22 10:31 PM   Result Value Ref Range    Glucose (POC) 146 (H) 65 - 117 mg/dL    Performed by Loren Salas    Collection Time: 04/06/22 10:32 PM   Result Value Ref Range    Glucose 146 mg/dL    Insulin order 1.5 units/hour    Insulin adminstered 1.5 units/hour    Multiplier 0.018     Low target 150 mg/dL    High target 250 mg/dL    D50 order 0.0 ml    D50 administered 0.00 ml    Minutes until next BG 60 min    Order initials tj     Administered initials tj     GLSCOM Comments     GLUCOSE, POC    Collection Time: 04/06/22 11:34 PM   Result Value Ref Range    Glucose (POC) 163 (H) 65 - 117 mg/dL    Performed by MeUndies    Collection Time: 04/06/22 11:35 PM   Result Value Ref Range    Glucose 163 mg/dL    Insulin order 1.9 units/hour    Insulin adminstered 1.9 units/hour    Multiplier 0.018     Low target 150 mg/dL    High target 250 mg/dL    D50 order 0.0 ml    D50 administered 0.00 ml    Minutes until next BG 60 min    Order initials tj     Administered initials tj     GLSCOM Comments     GLUCOSE, POC    Collection Time: 04/07/22 12:48 AM   Result Value Ref Range    Glucose (POC) 104 65 - 117 mg/dL    Performed by MeUndies    Collection Time: 04/07/22 12:49 AM   Result Value Ref Range    Glucose 104 mg/dL    Insulin order 0.4 units/hour    Insulin adminstered 0.4 units/hour    Multiplier 0.008     Low target 150 mg/dL    High target 250 mg/dL    D50 order 0.0 ml    D50 administered 0.00 ml    Minutes until next BG 60 min    Order initials TJ     Administered initials TJ     GLSCOM Comments     METABOLIC PANEL, BASIC    Collection Time: 04/07/22 12:50 AM   Result Value Ref Range    Sodium 142 136 - 145 mmol/L    Potassium 3.6 3.5 - 5.1 mmol/L    Chloride 116 (H) 97 - 108 mmol/L    CO2 17 (L) 21 - 32 mmol/L    Anion gap 9 5 - 15 mmol/L    Glucose 269 (H) 65 - 100 mg/dL    BUN 24 (H) 6 - 20 mg/dL    Creatinine 1.17 (H) 0.55 - 1.02 mg/dL    BUN/Creatinine ratio 21 (H) 12 - 20      GFR est AA 55 (L) >60 ml/min/1.73m2    GFR est non-AA 46 (L) >60 ml/min/1.73m2    Calcium 8.6 8.5 - 10.1 mg/dL   GLUCOSE, POC    Collection Time: 04/07/22  1:40 AM   Result Value Ref Range    Glucose (POC) 110 65 - 117 mg/dL    Performed by MeUndies    Collection Time: 04/07/22  1:41 AM   Result Value Ref Range Glucose 110 mg/dL    Insulin order 0.0 units/hour    Insulin adminstered 0.0 units/hour    Multiplier 0.000     Low target 150 mg/dL    High target 250 mg/dL    D50 order 0.0 ml    D50 administered 0.00 ml    Minutes until next BG 60 min    Order initials tj     Administered initials tj     GLSCOM Comments     GLUCOSE, POC    Collection Time: 04/07/22  2:32 AM   Result Value Ref Range    Glucose (POC) 105 65 - 117 mg/dL    Performed by Samuel Kwong    Collection Time: 04/07/22  2:33 AM   Result Value Ref Range    Glucose 105 mg/dL    Insulin order 0.0 units/hour    Insulin adminstered 0.0 units/hour    Multiplier 0.000     Low target 150 mg/dL    High target 250 mg/dL    D50 order 0.0 ml    D50 administered 0.00 ml    Minutes until next BG 60 min    Order initials tj     Administered initials bong     GLSCOM Comments     GLUCOSE, POC    Collection Time: 04/07/22  3:32 AM   Result Value Ref Range    Glucose (POC) 103 65 - 117 mg/dL    Performed by Samuel Kwong    Collection Time: 04/07/22  3:33 AM   Result Value Ref Range    Glucose 103 mg/dL    Insulin order 0.0 units/hour    Insulin adminstered 0.0 units/hour    Multiplier 0.000     Low target 150 mg/dL    High target 250 mg/dL    D50 order 0.0 ml    D50 administered 0.00 ml    Minutes until next BG 60 min    Order initials tj     Administered initials bong     GLSCOM Comments     METABOLIC PANEL, BASIC    Collection Time: 04/07/22  4:30 AM   Result Value Ref Range    Sodium 142 136 - 145 mmol/L    Potassium 3.5 3.5 - 5.1 mmol/L    Chloride 113 (H) 97 - 108 mmol/L    CO2 15 (LL) 21 - 32 mmol/L    Anion gap 14 5 - 15 mmol/L    Glucose 416 (H) 65 - 100 mg/dL    BUN 23 (H) 6 - 20 mg/dL    Creatinine 1.19 (H) 0.55 - 1.02 mg/dL    BUN/Creatinine ratio 19 12 - 20      GFR est AA 54 (L) >60 ml/min/1.73m2    GFR est non-AA 45 (L) >60 ml/min/1.73m2    Calcium 8.6 8.5 - 10.1 mg/dL   CBC WITH AUTOMATED DIFF    Collection Time: 04/07/22  4:30 AM   Result Value Ref Range    WBC 9.3 3.6 - 11.0 K/uL    RBC 4.87 3.80 - 5.20 M/uL    HGB 12.6 11.5 - 16.0 g/dL    HCT 39.6 35.0 - 47.0 %    MCV 81.3 80.0 - 99.0 FL    MCH 25.9 (L) 26.0 - 34.0 PG    MCHC 31.8 30.0 - 36.5 g/dL    RDW 15.6 (H) 11.5 - 14.5 %    PLATELET 005 440 - 546 K/uL    MPV 9.0 8.9 - 12.9 FL    NRBC 0.0 0.0  WBC    ABSOLUTE NRBC 0.00 0.00 - 0.01 K/uL    NEUTROPHILS 83 (H) 32 - 75 %    LYMPHOCYTES 7 (L) 12 - 49 %    MONOCYTES 8 5 - 13 %    EOSINOPHILS 0 0 - 7 %    BASOPHILS 0 0 - 1 %    IMMATURE GRANULOCYTES 2 (H) 0 - 0.5 %    ABS. NEUTROPHILS 7.7 1.8 - 8.0 K/UL    ABS. LYMPHOCYTES 0.7 (L) 0.8 - 3.5 K/UL    ABS. MONOCYTES 0.7 0.0 - 1.0 K/UL    ABS. EOSINOPHILS 0.0 0.0 - 0.4 K/UL    ABS. BASOPHILS 0.0 0.0 - 0.1 K/UL    ABS. IMM.  GRANS. 0.2 (H) 0.00 - 0.04 K/UL    DF AUTOMATED     GLUCOSE, POC    Collection Time: 04/07/22  4:38 AM   Result Value Ref Range    Glucose (POC) 110 65 - 117 mg/dL    Performed by Jaxon Patiño    Collection Time: 04/07/22  4:39 AM   Result Value Ref Range    Glucose 110 mg/dL    Insulin order 0.0 units/hour    Insulin adminstered 0.0 units/hour    Multiplier 0.000     Low target 150 mg/dL    High target 250 mg/dL    D50 order 0.0 ml    D50 administered 0.00 ml    Minutes until next BG 60 min    Order initials tj     Administered initials tj     GLSCOM Comments     AMMONIA    Collection Time: 04/07/22  4:40 AM   Result Value Ref Range    Ammonia, plasma <10 <32 umol/L   GLUCOSE, POC    Collection Time: 04/07/22  5:46 AM   Result Value Ref Range    Glucose (POC) 331 (H) 65 - 117 mg/dL    Performed by Jaxon Patiño    Collection Time: 04/07/22  5:56 AM   Result Value Ref Range    Glucose 331 mg/dL    Insulin order 2.7 units/hour    Insulin adminstered 2.7 units/hour    Multiplier 0.010     Low target 150 mg/dL    High target 250 mg/dL    D50 order 0.0 ml    D50 administered 0.00 ml    Minutes until next BG 60 min    Order initials tj     Administered initials tj     GLSCOM Comments     GLUCOSE, POC    Collection Time: 04/07/22  6:47 AM   Result Value Ref Range    Glucose (POC) 402 (H) 65 - 117 mg/dL    Performed by Walter Guerra    Collection Time: 04/07/22  6:49 AM   Result Value Ref Range    Glucose 402 mg/dL    Insulin order 6.8 units/hour    Insulin adminstered 6.8 units/hour    Multiplier 0.020     Low target 150 mg/dL    High target 250 mg/dL    D50 order 0.0 ml    D50 administered 0.00 ml    Minutes until next BG 60 min    Order initials tj     Administered initials tj     GLSCOM Comments     GLUCOSE, POC    Collection Time: 04/07/22  7:44 AM   Result Value Ref Range    Glucose (POC) 368 (H) 65 - 117 mg/dL    Performed by Lillie Porter    Collection Time: 04/07/22  7:50 AM   Result Value Ref Range    Glucose 368 mg/dL    Insulin order 9.2 units/hour    Insulin adminstered 9.2 units/hour    Multiplier 0.030     Low target 150 mg/dL    High target 250 mg/dL    D50 order 0.0 ml    D50 administered 0.00 ml    Minutes until next BG 60 min    Order initials RR     Administered initials RR     GLSCOM Comments     METABOLIC PANEL, BASIC    Collection Time: 04/07/22  8:30 AM   Result Value Ref Range    Sodium 146 (H) 136 - 145 mmol/L    Potassium 3.3 (L) 3.5 - 5.1 mmol/L    Chloride 115 (H) 97 - 108 mmol/L    CO2 17 (L) 21 - 32 mmol/L    Anion gap 14 5 - 15 mmol/L    Glucose 342 (H) 65 - 100 mg/dL    BUN 24 (H) 6 - 20 mg/dL    Creatinine 1.16 (H) 0.55 - 1.02 mg/dL    BUN/Creatinine ratio 21 (H) 12 - 20      GFR est AA 56 (L) >60 ml/min/1.73m2    GFR est non-AA 46 (L) >60 ml/min/1.73m2    Calcium 8.4 (L) 8.5 - 10.1 mg/dL   VANCOMYCIN, RANDOM    Collection Time: 04/07/22  8:30 AM   Result Value Ref Range    Vancomycin, random 8.8 ug/mL    Reported dose date Not provided      Reported dose: Not provided Units   GLUCOSE, POC    Collection Time: 04/07/22  8:49 AM   Result Value Ref Range Glucose (POC) 306 (H) 65 - 117 mg/dL    Performed by Vahna    Collection Time: 04/07/22  8:55 AM   Result Value Ref Range    Glucose 306 mg/dL    Insulin order 9.8 units/hour    Insulin adminstered 9.8 units/hour    Multiplier 0.040     Low target 150 mg/dL    High target 250 mg/dL    D50 order 0.0 ml    D50 administered 0.00 ml    Minutes until next BG 60 min    Order initials RR     Administered initials RR     GLSCOM Comments     GLUCOSE, POC    Collection Time: 04/07/22  9:52 AM   Result Value Ref Range    Glucose (POC) 252 (H) 65 - 117 mg/dL    Performed by Vahna    Collection Time: 04/07/22  9:53 AM   Result Value Ref Range    Glucose 252 mg/dL    Insulin order 9.6 units/hour    Insulin adminstered 9.6 units/hour    Multiplier 0.050     Low target 150 mg/dL    High target 250 mg/dL    D50 order 0.0 ml    D50 administered 0.00 ml    Minutes until next BG 60 min    Order initials RR     Administered initials RR     GLSCOM Comments           Assessment and plan:        (1) Acute encephalopathy : GCS 13 s/t sepsis/ metabolic    (2) DKA: insulin gtt, Gap/ bicab at almost standard level. In 8 hour should be giving something to eat    (3) increased anion gap metabolic acidosis: s/t #2    (3) sepsis: CT pelvix- non-specific induration. MRSA nasal swab positive, awit BCx continue anc until [address] negative for MRSA    (4) HTN: on amlodipine    (5) RENZO on CKDIIIb: continue IVF    DVT ppx: heparin subQ    DISPO: pt once stab;le.  Likely need HH    Signed By: Darin Renteria MD     April 7, 2022

## 2022-04-07 NOTE — PROGRESS NOTES
Vancomycin Dosing Consult  Day #2 of vancomycin therapy  Consult ordered by Dr. Alfonso Ponce for this 80 y.o. female for indication of sepsis. Antibiotic regimen: Vancomycin + cefepime    Temp (24hrs), Av.5 °F (36.9 °C), Min:98.4 °F (36.9 °C), Max:98.6 °F (37 °C)    Recent Labs     22  0830 22  0430 22  0050 22  0800 22  0340 22  2329 22  2329   WBC  --  9.3  --   --  17.3*  --  14.7*   CREA 1.16* 1.19* 1.17*   < > 1.48*   < > 1.76*   BUN 24* 23* 24*   < > 41*   < > 45*    < > = values in this interval not displayed.      Est CrCl: ~35 ml/min  Concomitant nephrotoxic drugs: None    Cultures:    Wound: Heavy beta-hemolytic group B Streptococcus, heavy CoNS   Blood: Pending    MRSA Swab: Detected     Target range: AUC/VIDA 400-600    Recent level history:  Date/Time Dose & Interval Measured Level (mcg/mL) Associated AUC/VIDA    @ 0830 1000 mg q24h 8.8 374        Assessment/Plan:   Afebrile, leukocytosis improved, procal moderately elevated  SCr improved  Extrapolated AUC of 374 is subtherapeutic, will increase to 1250 mg q24h for a predicted AUC of 460  Will check a level prior to dose  @ 1000  Antimicrobial stop date TBD

## 2022-04-08 LAB
ANION GAP SERPL CALC-SCNC: 8 MMOL/L (ref 5–15)
BACTERIA SPEC CULT: ABNORMAL
BACTERIA SPEC CULT: ABNORMAL
BASOPHILS # BLD: 0 K/UL (ref 0–0.1)
BASOPHILS NFR BLD: 0 % (ref 0–1)
BUN SERPL-MCNC: 17 MG/DL (ref 6–20)
BUN/CREAT SERPL: 19 (ref 12–20)
CA-I BLD-MCNC: 8.6 MG/DL (ref 8.5–10.1)
CHLORIDE SERPL-SCNC: 115 MMOL/L (ref 97–108)
CO2 SERPL-SCNC: 19 MMOL/L (ref 21–32)
CREAT SERPL-MCNC: 0.91 MG/DL (ref 0.55–1.02)
DIFFERENTIAL METHOD BLD: ABNORMAL
EOSINOPHIL # BLD: 0 K/UL (ref 0–0.4)
EOSINOPHIL NFR BLD: 0 % (ref 0–7)
ERYTHROCYTE [DISTWIDTH] IN BLOOD BY AUTOMATED COUNT: 15.6 % (ref 11.5–14.5)
GLUCOSE BLD STRIP.AUTO-MCNC: 183 MG/DL (ref 65–117)
GLUCOSE BLD STRIP.AUTO-MCNC: 193 MG/DL (ref 65–117)
GLUCOSE BLD STRIP.AUTO-MCNC: 196 MG/DL (ref 65–117)
GLUCOSE BLD STRIP.AUTO-MCNC: 202 MG/DL (ref 65–117)
GLUCOSE BLD STRIP.AUTO-MCNC: 209 MG/DL (ref 65–117)
GLUCOSE BLD STRIP.AUTO-MCNC: 227 MG/DL (ref 65–117)
GLUCOSE BLD STRIP.AUTO-MCNC: 255 MG/DL (ref 65–117)
GLUCOSE BLD STRIP.AUTO-MCNC: NORMAL MG/DL (ref 65–117)
GLUCOSE SERPL-MCNC: 221 MG/DL (ref 65–100)
HCT VFR BLD AUTO: 38.4 % (ref 35–47)
HGB BLD-MCNC: 12.7 G/DL (ref 11.5–16)
IMM GRANULOCYTES # BLD AUTO: 0.2 K/UL (ref 0–0.04)
IMM GRANULOCYTES NFR BLD AUTO: 2 % (ref 0–0.5)
LYMPHOCYTES # BLD: 1 K/UL (ref 0.8–3.5)
LYMPHOCYTES NFR BLD: 8 % (ref 12–49)
MCH RBC QN AUTO: 26.2 PG (ref 26–34)
MCHC RBC AUTO-ENTMCNC: 33.1 G/DL (ref 30–36.5)
MCV RBC AUTO: 79.2 FL (ref 80–99)
MONOCYTES # BLD: 0.9 K/UL (ref 0–1)
MONOCYTES NFR BLD: 8 % (ref 5–13)
NEUTS SEG # BLD: 9.3 K/UL (ref 1.8–8)
NEUTS SEG NFR BLD: 82 % (ref 32–75)
NRBC # BLD: 0 K/UL (ref 0–0.01)
NRBC BLD-RTO: 0 PER 100 WBC
PERFORMED BY, TECHID: ABNORMAL
PERFORMED BY, TECHID: NORMAL
PLATELET # BLD AUTO: 244 K/UL (ref 150–400)
PMV BLD AUTO: 9.2 FL (ref 8.9–12.9)
POTASSIUM SERPL-SCNC: 3.7 MMOL/L (ref 3.5–5.1)
RBC # BLD AUTO: 4.85 M/UL (ref 3.8–5.2)
SODIUM SERPL-SCNC: 142 MMOL/L (ref 136–145)
SPECIAL REQUESTS,SREQ: ABNORMAL
WBC # BLD AUTO: 11.4 K/UL (ref 3.6–11)

## 2022-04-08 PROCEDURE — 74011250636 HC RX REV CODE- 250/636: Performed by: INTERNAL MEDICINE

## 2022-04-08 PROCEDURE — 92610 EVALUATE SWALLOWING FUNCTION: CPT

## 2022-04-08 PROCEDURE — 36415 COLL VENOUS BLD VENIPUNCTURE: CPT

## 2022-04-08 PROCEDURE — 74011250637 HC RX REV CODE- 250/637: Performed by: INTERNAL MEDICINE

## 2022-04-08 PROCEDURE — 74011250637 HC RX REV CODE- 250/637: Performed by: HOSPITALIST

## 2022-04-08 PROCEDURE — 87205 SMEAR GRAM STAIN: CPT

## 2022-04-08 PROCEDURE — 74011000250 HC RX REV CODE- 250: Performed by: INTERNAL MEDICINE

## 2022-04-08 PROCEDURE — 80048 BASIC METABOLIC PNL TOTAL CA: CPT

## 2022-04-08 PROCEDURE — 65610000006 HC RM INTENSIVE CARE

## 2022-04-08 PROCEDURE — 74011636637 HC RX REV CODE- 636/637: Performed by: HOSPITALIST

## 2022-04-08 PROCEDURE — 85025 COMPLETE CBC W/AUTO DIFF WBC: CPT

## 2022-04-08 PROCEDURE — 87147 CULTURE TYPE IMMUNOLOGIC: CPT

## 2022-04-08 PROCEDURE — 82962 GLUCOSE BLOOD TEST: CPT

## 2022-04-08 PROCEDURE — 99222 1ST HOSP IP/OBS MODERATE 55: CPT | Performed by: INTERNAL MEDICINE

## 2022-04-08 PROCEDURE — 74011000250 HC RX REV CODE- 250: Performed by: HOSPITALIST

## 2022-04-08 PROCEDURE — 74011250636 HC RX REV CODE- 250/636: Performed by: HOSPITALIST

## 2022-04-08 RX ORDER — INSULIN GLARGINE 100 [IU]/ML
42 INJECTION, SOLUTION SUBCUTANEOUS
Status: DISCONTINUED | OUTPATIENT
Start: 2022-04-08 | End: 2022-04-13 | Stop reason: HOSPADM

## 2022-04-08 RX ORDER — INSULIN GLARGINE 100 [IU]/ML
22 INJECTION, SOLUTION SUBCUTANEOUS DAILY
Status: DISCONTINUED | OUTPATIENT
Start: 2022-04-08 | End: 2022-04-13 | Stop reason: HOSPADM

## 2022-04-08 RX ORDER — MAGNESIUM SULFATE 100 %
4 CRYSTALS MISCELLANEOUS AS NEEDED
Status: DISCONTINUED | OUTPATIENT
Start: 2022-04-08 | End: 2022-04-13 | Stop reason: HOSPADM

## 2022-04-08 RX ORDER — MUPIROCIN 20 MG/G
OINTMENT TOPICAL 2 TIMES DAILY
Status: DISCONTINUED | OUTPATIENT
Start: 2022-04-08 | End: 2022-04-13 | Stop reason: HOSPADM

## 2022-04-08 RX ORDER — INSULIN LISPRO 100 [IU]/ML
INJECTION, SOLUTION INTRAVENOUS; SUBCUTANEOUS
Status: DISCONTINUED | OUTPATIENT
Start: 2022-04-08 | End: 2022-04-13 | Stop reason: HOSPADM

## 2022-04-08 RX ORDER — SODIUM CHLORIDE 9 MG/ML
75 INJECTION, SOLUTION INTRAVENOUS CONTINUOUS
Status: DISCONTINUED | OUTPATIENT
Start: 2022-04-08 | End: 2022-04-13 | Stop reason: HOSPADM

## 2022-04-08 RX ORDER — DEXTROSE MONOHYDRATE 100 MG/ML
250 INJECTION, SOLUTION INTRAVENOUS ONCE
Status: DISPENSED | OUTPATIENT
Start: 2022-04-08 | End: 2022-04-08

## 2022-04-08 RX ADMIN — INSULIN LISPRO 4 UNITS: 100 INJECTION, SOLUTION INTRAVENOUS; SUBCUTANEOUS at 15:32

## 2022-04-08 RX ADMIN — HYDRALAZINE HYDROCHLORIDE 100 MG: 50 TABLET, FILM COATED ORAL at 15:32

## 2022-04-08 RX ADMIN — INSULIN GLARGINE 22 UNITS: 100 INJECTION, SOLUTION SUBCUTANEOUS at 08:30

## 2022-04-08 RX ADMIN — SODIUM CHLORIDE, PRESERVATIVE FREE 10 ML: 5 INJECTION INTRAVENOUS at 15:33

## 2022-04-08 RX ADMIN — INSULIN LISPRO 3 UNITS: 100 INJECTION, SOLUTION INTRAVENOUS; SUBCUTANEOUS at 04:15

## 2022-04-08 RX ADMIN — INSULIN GLARGINE 42 UNITS: 100 INJECTION, SOLUTION SUBCUTANEOUS at 20:36

## 2022-04-08 RX ADMIN — VANCOMYCIN HYDROCHLORIDE 1250 MG: 1 INJECTION, POWDER, LYOPHILIZED, FOR SOLUTION INTRAVENOUS at 11:41

## 2022-04-08 RX ADMIN — SODIUM CHLORIDE, PRESERVATIVE FREE 10 ML: 5 INJECTION INTRAVENOUS at 06:00

## 2022-04-08 RX ADMIN — INSULIN LISPRO 3 UNITS: 100 INJECTION, SOLUTION INTRAVENOUS; SUBCUTANEOUS at 00:33

## 2022-04-08 RX ADMIN — POTASSIUM CHLORIDE 10 MEQ: 7.46 INJECTION, SOLUTION INTRAVENOUS at 00:33

## 2022-04-08 RX ADMIN — ENOXAPARIN SODIUM 40 MG: 100 INJECTION SUBCUTANEOUS at 08:13

## 2022-04-08 RX ADMIN — INSULIN LISPRO 4 UNITS: 100 INJECTION, SOLUTION INTRAVENOUS; SUBCUTANEOUS at 20:42

## 2022-04-08 RX ADMIN — SODIUM CHLORIDE, PRESERVATIVE FREE 10 ML: 5 INJECTION INTRAVENOUS at 22:00

## 2022-04-08 RX ADMIN — MUPIROCIN: 20 OINTMENT TOPICAL at 20:43

## 2022-04-08 RX ADMIN — WATER 1 G: 1 INJECTION INTRAMUSCULAR; INTRAVENOUS; SUBCUTANEOUS at 15:44

## 2022-04-08 RX ADMIN — SODIUM CHLORIDE 75 ML/HR: 9 INJECTION, SOLUTION INTRAVENOUS at 08:14

## 2022-04-08 RX ADMIN — HYDRALAZINE HYDROCHLORIDE 100 MG: 50 TABLET, FILM COATED ORAL at 20:36

## 2022-04-08 RX ADMIN — SODIUM CHLORIDE 75 ML/HR: 9 INJECTION, SOLUTION INTRAVENOUS at 21:18

## 2022-04-08 RX ADMIN — INSULIN LISPRO 4 UNITS: 100 INJECTION, SOLUTION INTRAVENOUS; SUBCUTANEOUS at 11:49

## 2022-04-08 NOTE — PROGRESS NOTES
Progress Note    Patient: Jessica Irene MRN: [id number]  SSN: [id number]    YOB: 1950  Age: 80 y.o. Sex: female      Admit Date: 4/5/2022    LOS: 2 days     Subjective:     71F, h/o DMII on inuslin, with acute encephaloapthy s.t DKA in the setting of sepsis    HOSPITAL COURSE:  80 y.o. female with PMH of diabetes on insulin (suspect noncompliance), hypertension and other medical problems as below. She presented to the ED with altered mental status, with blood sugar reportedly over 600 yesterday. No further history was obtained from patient as she appears drowsy and unable to cooperate.   [address]:   In ED, noted to be tachycardic. WBC 14.7. Urinalysis not indicative of UTI. Chest x-ray unremarkable. CT head showed no acute finding, with remote left frontal infarct. Other lab work indicated of DKA. On 4/7 evening her DKA resolved and was switched to lantus. Continue cefepime and vanc until cx negative source is SSTI on left groin        Review of Systems:  Cannot be assessed due to mental status      Objective:     Vitals:    04/08/22 1300 04/08/22 1400 04/08/22 1500 04/08/22 1600   BP: (!) 161/76 (!) 169/85 (!) 156/88 (!) 147/70   Pulse: (!) 105 (!) 105 (!) 103 (!) 106   Resp: 18 17 15 19   Temp:   98.3 °F (36.8 °C)    SpO2: 99% 97% 96% 95%   Weight:       Height:            Intake and Output:  Current Shift: 04/08 0701 - 04/08 1900  In: -   Out: 150 [Urine:150]  Last three shifts: 04/06 1901 - 04/08 0700  In: 3708.6 [I.V.:3708.6]  Out: 350 [Urine:350]      Physical Exam:   General: Confused, noncooperative. Eye: conjunctivae/corneas clear. PERRL, EOM's intact. Throat and Neck: normal and no erythema or exudates noted. No mass   Lung: clear to auscultation bilaterally  Heart: regular rate and rhythm,   Abdomen: soft. Bowel sounds normal. No masses,  Extremities:  able to move all extremities normal, atraumatic  Skin: Few small scabs noted at left groin.    Neurologic: Omitted as patient is unable to cooperate. Psychiatric: Omitted as patient is unable to cooperate.         Lab/Data Review:  Recent Results (from the past 24 hour(s))   GLUCOSE, POC    Collection Time: 04/07/22  6:31 PM   Result Value Ref Range    Glucose (POC) 165 (H) 65 - 117 mg/dL    Performed by Tawanna De La Rosa ([facility])    GLUCOSTABILIZER    Collection Time: 04/07/22  6:31 PM   Result Value Ref Range    Glucose 165 mg/dL    Insulin order 3.2 units/hour    Insulin adminstered 3.2 units/hour    Multiplier 0.030     Low target 150 mg/dL    High target 250 mg/dL    D50 order 0.0 ml    D50 administered 0.00 ml    Minutes until next BG 60 min    Order initials RR     Administered initials RR     GLSCOM Comments     GLUCOSE, POC    Collection Time: 04/07/22  7:36 PM   Result Value Ref Range    Glucose (POC) 170 (H) 65 - 117 mg/dL    Performed by Jaxon Patiño    Collection Time: 04/07/22  7:41 PM   Result Value Ref Range    Glucose 170 mg/dL    Insulin order 3.3 units/hour    Insulin adminstered 3.3 units/hour    Multiplier 0.030     Low target 150 mg/dL    High target 250 mg/dL    D50 order 0.0 ml    D50 administered 0.00 ml    Minutes until next BG 60 min    Order initials sf     Administered initials sf     GLSCOM Comments     GLUCOSE, POC    Collection Time: 04/07/22  8:44 PM   Result Value Ref Range    Glucose (POC) 188 (H) 65 - 117 mg/dL    Performed by Jaxon Patiño    Collection Time: 04/07/22  8:46 PM   Result Value Ref Range    Glucose 188 mg/dL    Insulin order 3.8 units/hour    Insulin adminstered 3.8 units/hour    Multiplier 0.030     Low target 150 mg/dL    High target 250 mg/dL    D50 order 0.0 ml    D50 administered 0.00 ml    Minutes until next BG 60 min    Order initials sf     Administered initials sf     GLSCOM Comments     METABOLIC PANEL, BASIC    Collection Time: 04/07/22  9:40 PM   Result Value Ref Range    Sodium 142 136 - 145 mmol/L    Potassium 3.8 3.5 - 5.1 mmol/L    Chloride 115 (H) 97 - 108 mmol/L    CO2 21 21 - 32 mmol/L    Anion gap 6 5 - 15 mmol/L    Glucose 180 (H) 65 - 100 mg/dL    BUN 15 6 - 20 mg/dL    Creatinine 0.85 0.55 - 1.02 mg/dL    BUN/Creatinine ratio 18 12 - 20      GFR est AA >60 >60 ml/min/1.73m2    GFR est non-AA >60 >60 ml/min/1.73m2    Calcium 8.6 8.5 - 10.1 mg/dL   GLUCOSE, POC    Collection Time: 04/08/22 12:13 AM   Result Value Ref Range    Glucose (POC) 183 (H) 65 - 117 mg/dL    Performed by Kit Southward    GLUCOSE, POC    Collection Time: 04/08/22  3:37 AM   Result Value Ref Range    Glucose (POC) 193 (H) 65 - 117 mg/dL    Performed by Kit Southward    METABOLIC PANEL, BASIC    Collection Time: 04/08/22  3:45 AM   Result Value Ref Range    Sodium 142 136 - 145 mmol/L    Potassium 3.7 3.5 - 5.1 mmol/L    Chloride 115 (H) 97 - 108 mmol/L    CO2 19 (L) 21 - 32 mmol/L    Anion gap 8 5 - 15 mmol/L    Glucose 221 (H) 65 - 100 mg/dL    BUN 17 6 - 20 mg/dL    Creatinine 0.91 0.55 - 1.02 mg/dL    BUN/Creatinine ratio 19 12 - 20      GFR est AA >60 >60 ml/min/1.73m2    GFR est non-AA >60 >60 ml/min/1.73m2    Calcium 8.6 8.5 - 10.1 mg/dL   CBC WITH AUTOMATED DIFF    Collection Time: 04/08/22  3:45 AM   Result Value Ref Range    WBC 11.4 (H) 3.6 - 11.0 K/uL    RBC 4.85 3.80 - 5.20 M/uL    HGB 12.7 11.5 - 16.0 g/dL    HCT 38.4 35.0 - 47.0 %    MCV 79.2 (L) 80.0 - 99.0 FL    MCH 26.2 26.0 - 34.0 PG    MCHC 33.1 30.0 - 36.5 g/dL    RDW 15.6 (H) 11.5 - 14.5 %    PLATELET 395 115 - 625 K/uL    MPV 9.2 8.9 - 12.9 FL    NRBC 0.0 0.0  WBC    ABSOLUTE NRBC 0.00 0.00 - 0.01 K/uL    NEUTROPHILS 82 (H) 32 - 75 %    LYMPHOCYTES 8 (L) 12 - 49 %    MONOCYTES 8 5 - 13 %    EOSINOPHILS 0 0 - 7 %    BASOPHILS 0 0 - 1 %    IMMATURE GRANULOCYTES 2 (H) 0 - 0.5 %    ABS. NEUTROPHILS 9.3 (H) 1.8 - 8.0 K/UL    ABS. LYMPHOCYTES 1.0 0.8 - 3.5 K/UL    ABS. MONOCYTES 0.9 0.0 - 1.0 K/UL    ABS. EOSINOPHILS 0.0 0.0 - 0.4 K/UL    ABS.  BASOPHILS 0.0 0.0 - 0.1 K/UL ABS. IMM. GRANS. 0.2 (H) 0.00 - 0.04 K/UL    DF AUTOMATED     GLUCOSE, POC    Collection Time: 04/08/22  7:58 AM   Result Value Ref Range    Glucose (POC) 227 (H) 65 - 117 mg/dL    Performed by Lonnell Severance, POC    Collection Time: 04/08/22 11:20 AM   Result Value Ref Range    Glucose (POC) 209 (H) 65 - 117 mg/dL    Performed by Lonnell Severance, POC    Collection Time: 04/08/22 11:43 AM   Result Value Ref Range    Glucose (POC) 202 (H) 65 - 117 mg/dL    Performed by Elian Martínez ([facility])    GLUCOSE, POC    Collection Time: 04/08/22  3:04 PM   Result Value Ref Range    Glucose (POC) 196 (H) 65 - 117 mg/dL    Performed by Jillian Mercado and plan:        (1) Acute encephalopathy : GCS 13 s/t sepsis/ metabolic    (2) DKA: resolved, on lantus 22 in am 43 in PM.     (3) increased anion gap metabolic acidosis: s/t #2    (3) sepsis: CT pelvix- non-specific induration.  MRSA nasal swab positive, awit BCx continue anc until [address] negative for MRSA    (4) HTN: on amlodipine    (5) RENZO on CKDIIIb: continue IVF    DVT ppx: heparin subQ    DISPO:transfer to floor tomorrow, and then PT HH/ SNF once bcx negative    Signed By: Mina Barnes MD     April 8, 2022

## 2022-04-08 NOTE — PROGRESS NOTES
Comprehensive Nutrition Assessment    Type and Reason for Visit: NPO/clear liquid    Nutrition Recommendations/Plan:   Initiate PO diet, goal of 4 CHO choices  Advance diet texture/consistency per SLP     RD to f/u for ONS as appropriate    Nutrition Assessment:  Admitted for DKA. Currently in ICU on insulin ggt, +SIRS however unclear source, pending BC and WC. Pt not appropriate for interview d/t AMS. Per MD, plan to start PO diet today and labs normalized, SLP eval for texture. RD to f/u for intakes and need for ONS. AMS continues, ? BL mental status. Labs: -227 (>600 at admit), (4/6) A1c 14.0%. Meds: IVF, Cefepime, haldol, insulin, Vancomycin, venlafaxine. Malnutrition Assessment:  Malnutrition Status: At risk for malnutrition (specify) (concerning wt loss, recent CVA, poor DM management)    Context:  Chronic illness         Estimated Daily Nutrient Needs:  Energy (kcal): 2020-2424kcals (25-30kcals/kg); Weight Used for Energy Requirements: Current  Protein (g): 81-97g (1-1.2g/kg); Weight Used for Protein Requirements: Current  Fluid (ml/day): 2020-2424ml; Method Used for Fluid Requirements: 1 ml/kcal      Nutrition Related Findings:  NFPE with no acute findings. SLP following for dysphagia, pt requires MM5/Mildly thick liquids. Noted hx CVA with aphasia. No N/V, last BM pta (3d ago), consider initiating bowel regimen if no BM s/p PO initiation. No edema. Wounds:     (Cellulitis- groin)       Current Nutrition Therapies:  ADULT DIET Dysphagia - Minced & Moist; 4 carb choices (60 gm/meal); Mildly Thick (Nectar)    Anthropometric Measures:  · Height:  5' 5.98\" (167.6 cm)  · Current Body Wt:  80.8 kg (178 lb 2.1 oz) (4/6)   · Admission Body Wt:  178 lb 2.1 oz (4/6)    · Usual Body Wt:   (adali)     · Ideal Body Wt:  130 lbs:  137 %   · BMI Category: Overweight (BMI 25.0-29. 9)       Nutrition Diagnosis:   · Altered nutrition-related lab values related to endocrine dysfunction (T1 vs T2 DM) as evidenced by lab values (BG >600 at admit, HgbA1c 14%)      Nutrition Interventions:   Food and/or Nutrient Delivery: Start oral diet  Nutrition Education and Counseling: Education initiated (DM management, however ? BL mental status and appropriatness of edu)  Coordination of Nutrition Care: Continue to monitor while inpatient,Speech therapy,Feeding assistance/environmental change    Goals:  Initiate means of nutrition capable of meeting >/=75% of EENs within 4 days, Meet >/=50% of EENs in >5 days, Wt maintenance within +/-0.5kg in >5 days       Nutrition Monitoring and Evaluation:   Behavioral-Environmental Outcomes: None identified  Food/Nutrient Intake Outcomes: Diet advancement/tolerance,Food and nutrient intake  Physical Signs/Symptoms Outcomes: Weight,Biochemical data,Chewing or swallowing,Meal time behavior    Discharge Planning:     Too soon to determine     Electronically signed by [employer] on 4/8/2022 at 11:44 AM    Contact: Ext [phone], or via sifonr

## 2022-04-08 NOTE — PROGRESS NOTES
Problem: Dysphagia (Adult)  Goal: *Acute Goals and Plan of Care (Insert Text)  Description: Speech Therapy Goals  Initiated 4/8/2022  -Patient will participate in modified barium swallow study within 7 day(s), pending pt's progress. [ ] Not met  [ ]  MET   [ ] Progressing  [ ] Carolina Alejandre  -Patient will tolerate minced and moist diet with mildly/nectar thick liquids without signs/symptoms of aspiration given minimal cues within 7 day(s). [ ] Not met  [ ]  MET   [ ] Progressing  [ ] Carolina Alejandre  -Patient will demonstrate understanding of swallow safety precautions and aspiration precautions, diet recs with minimal cues within 7 day(s). [ ] Not met  [ ]  MET   [ ] Progressing  [ ] Discontinue    Outcome: Not Met   SPEECH [address] EVALUATION  Patient: Ariella Lopez [de-identified]80 y.o. female)  Date: 4/8/2022  Primary Diagnosis: DKA (diabetic ketoacidosis) ([address] [E11.10]        Precautions: aspiration, 1:1 sitter present      ASSESSMENT :  Based on the objective data described below, the patient presents with mild-mod oropharyngeal dysphagia with aspiration concerns with thin. Pt seen for bedside sw evaluation. Pt with persistent AMS reported despite resolution of DKA per nsg. Pt seen in ICU with 1:1 sitter present. Pt is alert and oriented to self only. Oral motor function WNL, symmetrical, mild vocal hoarseness and pt is edentulous. Pt given trials of thin via cup/straw, mildly thick via straw, puree, hard solids and moistened solids. Oral phase with mild delay in a-p transit and delayed oral clearance. Reduced efficacy and efficiency of mastication. Pt able to clear oral cavity. Pharyngeal phase with persistent delay, mild weakness to palpation. Throat clear with dry solids, persistent cough suspicious of aspiration with thin via straw. Pt tolerates remainder of trials without overt s/s aspiration. Pt currently on RA with 02 sats 98%.     Patient will benefit from skilled intervention to address the above impairments. Patients rehabilitation potential is considered to be Fair     PLAN :  Recommendations and Planned Interventions: At this time, recommend initiate minced and moist 5 diet with MILDLY THICK liquids with 1:1 assistance with ALL PO intake, STRICT aspiration and GERD precautions, monitor pt closely for s/s aspiration, meds crushed if able in puree, FEED ONLY IF AWAKE AND ALERT. Nsg reports will address diet recs with MD.   Cont SLP tx for dysphagia, complete cog-ling assessment if indicated pending pt's progress, MBS if/as indicated if s/s aspiration persist.   Frequency/Duration: Patient will be followed by speech-language pathology daily to address goals. Discharge Recommendations: cont SLP tx at this time. SUBJECTIVE:   Patient alert, agreeable, confused.     OBJECTIVE:     Past Medical History:   Diagnosis Date    Bladder [facility] [address] [address]     Essential hypertension 2/53/7122    Non-alcoholic fatty liver disease 7/21/2020     Past Surgical History:   Procedure Laterality Date    HX CATARACT REMOVAL Left 07/23/2019    HX CHOLECYSTECTOMY      HX COLONOSCOPY  2002    HX HYSTERECTOMY      partial  20 yrs ago    HX TUBAL LIGATION       Prior Level of Function/Home Situation:   Home Situation  Home Environment: Independent living  One/Two Story Residence: One story  Living Alone: No  Support Systems: Spouse/Significant Other,Child(chapis)  Patient Expects to be Discharged to[de-identified] Home  Current DME Used/Available at Home: None  Diet prior to admission: regular/thin per pt   Current Diet:  NPO   Cognitive and Communication Status:  Neurologic State: Alert,Confused  Orientation Level: Disoriented to place,Disoriented to situation,Disoriented to time,Oriented to person  Cognition: No command following,Poor safety awareness,Decreased attention/concentration,Impaired decision making      Pain:  Pain Scale 1: FLACC  Pain Intensity 1: 0       After treatment:   Patient left in no apparent distress in bed, Call bell within reach, Nursing notified, and 1:1 sitter present    COMMUNICATION/EDUCATION:   Patient was educated regarding purpose of SLP assessment, POC, diet recs and sw safety precautions. Patient demonstrated Ashley Europe understanding as evidenced by verbal responsiveness, AMS. The patient's plan of care including recommendations, planned interventions, and recommended diet changes were discussed with: Registered nurse. Patient/family have participated as able in goal setting and plan of care. Patient/family agree to work toward stated goals and plan of care.     Thank you for this referral.  Yusuf Fields M.S. CCC-SLP  Time Calculation: 12 mins

## 2022-04-08 NOTE — CONSULTS
Infectious Disease Consult Note    Reason for Consult:  Sepsis  Date of Consultation: April 8, 2022  Date of Admission: 4/5/2022  Referring Physician: Hospitalist     HPI: 67-y.o WF admitted on 4/6 with DKA, ID consulted for right groin ulcer and MRSA colonization. Pt was confused during my assessment, history obtained from chart documentations and ICU staff. Her medical history significant for bladder CA, HTN, nonalcoholic liver steatosis, and DM. She has been afebrile for the past 48 hours but tachycardic and hypertensive. Today's labs show WBC of 11.4, mildly elevated from 9.3 on 4/7. Staph species was isolated from blood culture collected on 04/06. Coag neg staph was isolated from her right groin wound. She is on Vanc and Cefepime. ICU staff deny acute events today. Review of Systems: Unobtainable, confused     Past Medical History:  Past Medical History:   Diagnosis Date    Bl[facility] [address] [address]     Essential hypertension 4/16/3508    Non-alcoholic fatty liver disease 7/21/2020       Past surgical history   Past Surgical History:   Procedure Laterality Date    HX CATARACT REMOVAL Left 07/23/2019    HX CHOLECYSTECTOMY      HX COLONOSCOPY  2002    HX HYSTERECTOMY      partial  20 yrs ago    HX TUBAL LIGATION          Social History   Social History     Tobacco Use    Smoking status: Former Smoker    Smokeless tobacco: Never Used   Vaping Use    Vaping Use: Never used   Substance Use Topics    Alcohol use: Not Currently    Drug use: Never        Family history   Family History   Problem Relation Age of Onset    Diabetes Mother     Diabetes Maternal Grandmother     Other Maternal Grandmother         CHF    Diabetes Paternal Grandmother           Allergies:   Allergies   Allergen Reactions    Adhesive Unknown (comments)    Penicillins Other (comments)    Sulfa (Sulfonamide Antibiotics) Unknown (comments)         Medications:  No current facility-administered medications on file prior to encounter. Current Outpatient Medications on File Prior to Encounter   Medication Sig Dispense Refill    glucose blood VI test strips (OneTouch Ultra Test) strip Use to check blood glucose before meals and at bedtime. 120 Strip 11    Blood-Glucose Meter (OneTouch Ultra2 Meter) misc Use to check blood glucose levels before meals and at bedtime. 1 Each 0    glimepiride (AMARYL) 1 mg tablet Take 1 Tablet by mouth Daily (before breakfast). 90 Tablet 1    metoprolol succinate (TOPROL-XL) 50 mg XL tablet Take 1 Tablet by mouth daily. Indications: high blood pressure 90 Tablet 1    amLODIPine (NORVASC) 5 mg tablet Take 1 Tablet by mouth daily. Indications: high blood pressure 90 Tablet 1    famotidine (Pepcid) 20 mg tablet Take 1 Tablet by mouth two (2) times a day. Indications: gastroesophageal reflux disease 180 Tablet 1    metFORMIN (GLUCOPHAGE) 1,000 mg tablet Take 1 Tablet by mouth two (2) times daily (with meals). Indications: type 2 diabetes mellitus 180 Tablet 1    aspirin delayed-release 81 mg tablet Take 1 Tablet by mouth daily. Indications: treatment to prevent a heart attack 90 Tablet 3    Lancing Device with Lancets (Accu-Chek Multiclix Lancet) kit Use to check blood sugar once daily. 1 Kit 0    lancets (Accu-Chek Multiclix Lancet) misc Use to check blood sugar once daily. 100 Each 3    alcohol swabs (Alcohol Wipes) padm Use to check blood glucose once daily. 100 Pad 3    venlafaxine-SR (EFFEXOR-XR) 37.5 mg capsule Take 1 Capsule by mouth daily. Indications: anxiousness associated with depression 90 Capsule 1    metoprolol succinate 50 mg CSpX Take  by mouth.  insulin NPH/insulin regular (NovoLIN 70/30 U-100 Insulin) 100 unit/mL (70-30) injection by SubCUTAneous route.  22 units in the AM and 42 units at PM             Physical Exam:    Vitals:   Patient Vitals for the past 24 hrs:   Temp Pulse Resp BP SpO2   04/08/22 1100 98.5 °F (36.9 °C) (!) 115 20 (!) 145/80 98 %   04/08/22 1000 -- (!) 105 14 112/66 96 %   04/08/22 0900 -- (!) 110 17 (!) 166/74 96 %   04/08/22 0800 -- (!) 110 18 (!) 159/87 98 %   04/08/22 0700 98.6 °F (37 °C) (!) 111 19 (!) 155/82 97 %   04/08/22 0600 -- (!) 113 20 (!) 156/72 95 %   04/08/22 0500 -- (!) 117 21 (!) 147/73 95 %   04/08/22 0400 -- (!) 113 21 (!) 156/73 99 %   04/08/22 0300 -- (!) 113 22 (!) 156/77 98 %   04/08/22 0200 -- (!) 115 22 (!) 140/74 98 %   04/08/22 0100 -- (!) 114 21 (!) 116/93 98 %   04/08/22 0000 -- (!) 113 23 (!) 142/67 99 %   04/07/22 2300 -- (!) 111 21 (!) 153/80 100 %   04/07/22 2200 -- (!) 108 21 (!) 161/75 99 %   04/07/22 2100 -- (!) 107 20 (!) 165/79 99 %   04/07/22 2000 -- (!) 106 20 (!) 155/78 100 %   04/07/22 1900 -- (!) 103 18 (!) 152/67 99 %   04/07/22 1800 -- (!) 102 18 129/86 100 %   04/07/22 1700 -- (!) 103 19 (!) 119/59 100 %   04/07/22 1600 -- (!) 106 17 124/65 100 %   04/07/22 1500 98.2 °F (36.8 °C) (!) 107 16 (!) 116/103 100 %   ·   · GEN: NAD  · HEENT: NCAT, PERRLA  · HEART: S1, S2+, RRR, No murmur   · Lungs: CTA B/l   · Abdomen: soft, ND, NT, +BS   · Genitourinary: no genital discharge, no godoy  · Extremities: no edema  · Skin: no rash      Labs:   Recent Labs     04/08/22  0345 04/07/22  0430 04/06/22  0340   WBC 11.4* 9.3 17.3*   HGB 12.7 12.6 12.6   HCT 38.4 39.6 40.0    235 332     Recent Labs     04/08/22  0345 04/07/22  2140 04/07/22  1645   BUN 17 15 19   CREA 0.91 0.85 1.03*       Microbiology Data:  - Right groin wound Cx 04/06: Coag neg staph   - Blood Cx 04/06: Coag neg staff    Imaging:   CXR 04/06:  No acute cardiopulmonary abnormality. CT of abdo/pel 04/06: Nonspecific skin thickening/induration in the region of clinical  concern without an obvious drainable fluid collection or soft tissue gas. Some  soft tissue infection or bruising could have this appearance. Assessment / Plan:     1.  Left labia ulcer, purulent drainage, no fluctuance or induration       Coag neg staph isolated from Cx, Cx repeated today No soft tissue gas or focal collection on CT abdo/pel 04/056      WBC of 11.4, afebrile, not on pressor support       Continue on Cefepime and Vanc. Routine labs in AM     2. Coag neg staph bacteremia: likely skin contaminant       Repeat Bcx, continue on vanc for now     3. DKA, BS mgt per primary     4. MRSA colonization: Start on Mupirocin     5.  PCN allergy, tolerating Cefepime     Christa Horner MD     4/8/2022

## 2022-04-08 NOTE — PROGRESS NOTES
When medically stable patient will require PT/OT recs for discharge recommendation.          JOSE Plascencia

## 2022-04-09 LAB
BACTERIA SPEC CULT: NORMAL
BACTERIA SPEC CULT: NORMAL
GLUCOSE BLD STRIP.AUTO-MCNC: 160 MG/DL (ref 65–117)
GLUCOSE BLD STRIP.AUTO-MCNC: 210 MG/DL (ref 65–117)
GLUCOSE BLD STRIP.AUTO-MCNC: 281 MG/DL (ref 65–117)
GLUCOSE BLD STRIP.AUTO-MCNC: 289 MG/DL (ref 65–117)
GRAM STN SPEC: NORMAL
GRAM STN SPEC: NORMAL
PERFORMED BY, TECHID: ABNORMAL
SPECIAL REQUESTS,SREQ: NORMAL
VANCOMYCIN SERPL-MCNC: 10.4 UG/ML

## 2022-04-09 PROCEDURE — 74011250637 HC RX REV CODE- 250/637: Performed by: HOSPITALIST

## 2022-04-09 PROCEDURE — 74011250637 HC RX REV CODE- 250/637: Performed by: INTERNAL MEDICINE

## 2022-04-09 PROCEDURE — 74011000250 HC RX REV CODE- 250: Performed by: INTERNAL MEDICINE

## 2022-04-09 PROCEDURE — 36415 COLL VENOUS BLD VENIPUNCTURE: CPT

## 2022-04-09 PROCEDURE — 80202 ASSAY OF VANCOMYCIN: CPT

## 2022-04-09 PROCEDURE — 82962 GLUCOSE BLOOD TEST: CPT

## 2022-04-09 PROCEDURE — 74011250636 HC RX REV CODE- 250/636: Performed by: INTERNAL MEDICINE

## 2022-04-09 PROCEDURE — 74011000250 HC RX REV CODE- 250: Performed by: HOSPITALIST

## 2022-04-09 PROCEDURE — 92526 ORAL FUNCTION THERAPY: CPT

## 2022-04-09 PROCEDURE — 74011250636 HC RX REV CODE- 250/636: Performed by: HOSPITALIST

## 2022-04-09 PROCEDURE — 99232 SBSQ HOSP IP/OBS MODERATE 35: CPT | Performed by: INTERNAL MEDICINE

## 2022-04-09 PROCEDURE — 74011636637 HC RX REV CODE- 636/637: Performed by: HOSPITALIST

## 2022-04-09 PROCEDURE — 65270000029 HC RM PRIVATE

## 2022-04-09 RX ADMIN — SODIUM CHLORIDE, PRESERVATIVE FREE 10 ML: 5 INJECTION INTRAVENOUS at 15:26

## 2022-04-09 RX ADMIN — ASPIRIN 81 MG: 81 TABLET, COATED ORAL at 09:02

## 2022-04-09 RX ADMIN — SODIUM CHLORIDE, PRESERVATIVE FREE 10 ML: 5 INJECTION INTRAVENOUS at 06:00

## 2022-04-09 RX ADMIN — VANCOMYCIN HYDROCHLORIDE 750 MG: 750 INJECTION, POWDER, LYOPHILIZED, FOR SOLUTION INTRAVENOUS at 22:08

## 2022-04-09 RX ADMIN — WATER 1 G: 1 INJECTION INTRAMUSCULAR; INTRAVENOUS; SUBCUTANEOUS at 00:15

## 2022-04-09 RX ADMIN — INSULIN LISPRO 7 UNITS: 100 INJECTION, SOLUTION INTRAVENOUS; SUBCUTANEOUS at 12:19

## 2022-04-09 RX ADMIN — ENOXAPARIN SODIUM 40 MG: 100 INJECTION SUBCUTANEOUS at 09:02

## 2022-04-09 RX ADMIN — MUPIROCIN: 20 OINTMENT TOPICAL at 22:08

## 2022-04-09 RX ADMIN — WATER 1 G: 1 INJECTION INTRAMUSCULAR; INTRAVENOUS; SUBCUTANEOUS at 17:22

## 2022-04-09 RX ADMIN — VANCOMYCIN HYDROCHLORIDE 1250 MG: 1 INJECTION, POWDER, LYOPHILIZED, FOR SOLUTION INTRAVENOUS at 12:19

## 2022-04-09 RX ADMIN — INSULIN GLARGINE 42 UNITS: 100 INJECTION, SOLUTION SUBCUTANEOUS at 22:00

## 2022-04-09 RX ADMIN — INSULIN LISPRO 7 UNITS: 100 INJECTION, SOLUTION INTRAVENOUS; SUBCUTANEOUS at 09:01

## 2022-04-09 RX ADMIN — HYDRALAZINE HYDROCHLORIDE 100 MG: 50 TABLET, FILM COATED ORAL at 17:23

## 2022-04-09 RX ADMIN — PANTOPRAZOLE SODIUM 40 MG: 40 TABLET, DELAYED RELEASE ORAL at 09:09

## 2022-04-09 RX ADMIN — ACETAMINOPHEN 650 MG: 325 TABLET ORAL at 20:06

## 2022-04-09 RX ADMIN — HALOPERIDOL LACTATE 5 MG: 5 INJECTION, SOLUTION INTRAMUSCULAR at 00:52

## 2022-04-09 RX ADMIN — HYDRALAZINE HYDROCHLORIDE 100 MG: 50 TABLET, FILM COATED ORAL at 09:02

## 2022-04-09 RX ADMIN — SODIUM CHLORIDE 75 ML/HR: 9 INJECTION, SOLUTION INTRAVENOUS at 09:04

## 2022-04-09 RX ADMIN — VENLAFAXINE HYDROCHLORIDE 37.5 MG: 37.5 CAPSULE, EXTENDED RELEASE ORAL at 09:02

## 2022-04-09 RX ADMIN — WATER 1 G: 1 INJECTION INTRAMUSCULAR; INTRAVENOUS; SUBCUTANEOUS at 09:01

## 2022-04-09 RX ADMIN — SODIUM CHLORIDE 75 ML/HR: 9 INJECTION, SOLUTION INTRAVENOUS at 22:07

## 2022-04-09 RX ADMIN — INSULIN GLARGINE 22 UNITS: 100 INJECTION, SOLUTION SUBCUTANEOUS at 09:01

## 2022-04-09 RX ADMIN — MUPIROCIN: 20 OINTMENT TOPICAL at 09:02

## 2022-04-09 RX ADMIN — ACETAMINOPHEN 650 MG: 325 TABLET ORAL at 15:26

## 2022-04-09 RX ADMIN — SODIUM CHLORIDE, PRESERVATIVE FREE 10 ML: 5 INJECTION INTRAVENOUS at 22:46

## 2022-04-09 RX ADMIN — AMLODIPINE BESYLATE 10 MG: 5 TABLET ORAL at 09:02

## 2022-04-09 RX ADMIN — INSULIN LISPRO 4 UNITS: 100 INJECTION, SOLUTION INTRAVENOUS; SUBCUTANEOUS at 17:24

## 2022-04-09 RX ADMIN — METOPROLOL SUCCINATE 50 MG: 50 TABLET, FILM COATED, EXTENDED RELEASE ORAL at 09:02

## 2022-04-09 NOTE — PROGRESS NOTES
Infectious Disease Progress Note           Subjective:   Pt seen and examined at bedside, Stable, more alert and following commands, no acute events since last seen  Objective:   Physical Exam:     Visit Vitals  BP (!) 151/83   Pulse (!) 108   Temp 98.5 °F (36.9 °C)   Resp 14   Ht 5' 5.98\" (1.676 m)   Wt 183 lb 6.8 oz (83.2 kg)   SpO2 98%   BMI 29.62 kg/m²      O2 Device: None (Room air)    Temp (24hrs), Av.3 °F (36.8 °C), Min:97.9 °F (36.6 °C), Max:98.5 °F (36.9 °C)    No intake/output data recorded.     1901 -  0700  In: -   Out: 850 [Urine:850]    General: NAD, alert, following commands   HEENT: MARGARETH, Moist mucosa   Lungs: CTA b/l, decreased at the bases, no wheeze/rhonchi   Heart: S1S2+, RRR, no murmur  Abdo: Soft, NT, ND, +BS   Exts: Trace edema, + pulses b/l   Skin: healing left labia/groin ulcer, minimal drainage, induration or fluctuance     Data Review:       Recent Days:  Recent Labs     22  0345 22  0430   WBC 11.4* 9.3   HGB 12.7 12.6   HCT 38.4 39.6    235     Recent Labs     22  0345 22  2140 22  1645   BUN 17 15 19   CREA 0.91 0.85 1.03*       No results found for: CRPQN, CRP, CRPM       Microbiology     Results     Procedure Component Value Units Date/Time    CULTURE, Renee Landsman STAIN [[id number]] Collected: 22 1030    Order Status: Completed Specimen: Wound from Swab Updated: 22 2344     Special Requests: No Special Requests        GRAM STAIN Rare WBCs seen         No organisms seen        Culture result: PENDING    MRSA SCREEN - PCR (NASAL) [[id number]]  (Abnormal) Collected: 22 0411    Order Status: Completed Specimen: Swab Updated: 22 0545     MRSA by PCR, Nasal DETECTED        Comment: CALLED TO AND READ BACK BY Efe Doran RN AT 6187 [place] [facility] [place]       CULTURE, BLOOD, PAIRED [[id number]]  (Abnormal) Collected: 22 0005    Order Status: Completed Specimen: Blood Updated: 22 1227     Special Requests: No Special Requests        Culture result:       Staphylococcus species, coagulase negative growing in 2 of 4 bottles drawn No Site Indicated plates held 3 days. Call microbiology lab if sensitivities are needed. (NOTE) CALLED [facility] [facility] IN 2 OF 4 BOTTLES TO GIOVANI COTTRLEL MT AT 1110 ON 4.7.22 RB    CULTURE, Bhumi Casillas STAIN [[id number]] Collected: 04/06/22 0000    Order Status: Completed Specimen: Wound Drainage Updated: 04/08/22 0933     Special Requests: No Special Requests        GRAM STAIN 2+ WBCs seen         2+ Gram Negative Rods               1+ Gram Positive Cocci IN CHAINS            1+ Yeast        Culture result:       Heavy Streptococci, beta hemolyticgroup B Penicillin and ampicillin are drugs of choice for treatment of Beta-hemolytic streptococcal infections. Susceptibility testing of Penicillins and Beta-Lactams approved by the FDA for treatment of Beta-hemolytic streptococcal infections need not be performed roUTInely, because nonsusceptible isolates are extremely rare. CLSI 2012                  Heavy Staphylococcus species, coagulase negative                   Diagnostics   CXR Results  (Last 48 hours)    None             Assessment/Plan    1. Left labia/groin ulcer, purulent drainage, no fluctuance or induration       Coag neg staph isolated from Cx, Repeat wound Cx from 04/08 is pending       Afebrile, routine labs not done, WBC was 11.4 on 04/08      Continue on Cefepime and Vanc, routine labs in the morning       Agree w murailal by wound care RN for wound care recs      2. Coag neg staph bacteremia: likely skin contaminant       Repeat Bcx if febrile, continue on Vanc for now      3. DKA, BS mgt per primary      4.  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

## 2022-04-09 NOTE — PROGRESS NOTES
TRANSFER - OUT REPORT:    Verbal report given to Mira(name) on Mariel Bansal  being transferred to [facility] (unit) for routine progression of care       Report consisted of patients Situation, Background, Assessment and   Recommendations(SBAR). Information from the following report(s) SBAR, Intake/Output, MAR, Recent Results and Cardiac Rhythm Sinus Rhythm was reviewed with the receiving nurse. Opportunity for questions and clarification was provided.       Patient transported with:   Registered Nurse  Tech

## 2022-04-09 NOTE — PROGRESS NOTES
Vancomycin Dosing Consult  Day #4 of vancomycin therapy  Consult ordered by Dr. Cass Wilson for this 80 y.o. female for indication of SSTI/bacteremia. Antibiotic regimen: Vancomycin + cefepime      Temp (24hrs), Av.2 °F (36.8 °C), Min:97.8 °F (36.6 °C), Max:98.5 °F (36.9 °C)    Recent Labs     22  0345 22  2140 22  1645 22  0830 22  0430   WBC 11.4*  --   --   --  9.3   CREA 0.91 0.85 1.03*   < > 1.19*   BUN 17 15 19   < > 23*    < > = values in this interval not displayed. Estimated Creatinine Clearance: 62.6 mL/min (based on SCr of 0.91 mg/dL). ml/min  Concomitant nephrotoxic drugs: None    Cultures:   /6 Wound: Heavy beta-hemolytic group B Streptococcus, heavy CoNS  / Blood x2: CoNS    MRSA Swab: Detected     Target range: AUC/VIDA 400-600    Recent level history:  Date/Time Dose & Interval Measured Level (mcg/mL) Associated AUC/VIDA   2022 10:43 AM    1250mg IV q24h 10.4 379        Assessment/Plan:   Increase Vancomycin 750mg IV q12h.   Antimicrobial stop date TBD

## 2022-04-09 NOTE — PROGRESS NOTES
Problem: Falls - Risk of  Goal: *Absence of Falls  Description: Document Chapis Benedictdaniel Fall Risk and appropriate interventions in the flowsheet. Outcome: Progressing Towards Goal  Note: Fall Risk Interventions:  Mobility Interventions: Bed/chair exit alarm    Mentation Interventions: Adequate sleep, hydration, pain control,Bed/chair exit alarm,Door open when patient unattended,More frequent rounding,Reorient patient,Family/sitter at bedside    Medication Interventions: Bed/chair exit alarm    Elimination Interventions: Bed/chair exit alarm,Call light in reach,Patient to call for help with toileting needs,Toileting schedule/hourly rounds    History of Falls Interventions: Bed/chair exit alarm,Door open when patient unattended,Room close to nurse's station         Problem: Patient Education: Go to Patient Education Activity  Goal: Patient/Family Education  Outcome: Progressing Towards Goal     Problem: Pressure Injury - Risk of  Goal: *Prevention of pressure injury  Description: Document Rene Scale and appropriate interventions in the flowsheet. Outcome: Progressing Towards Goal  Note: Pressure Injury Interventions:  Sensory Interventions: Assess changes in LOC,Assess need for specialty bed,Float heels,Keep linens dry and wrinkle-free,Minimize linen layers,Turn and reposition approx.  every two hours (pillows and wedges if needed)    Moisture Interventions: Absorbent underpads,Apply protective barrier, creams and emollients,Internal/External urinary devices,Minimize layers    Activity Interventions: Pressure redistribution bed/mattress(bed type)    Mobility Interventions: HOB 30 degrees or less,Pressure redistribution bed/mattress (bed type)    Nutrition Interventions: Document food/fluid/supplement intake    Friction and Shear Interventions: Apply protective barrier, creams and emollients,HOB 30 degrees or less,Minimize layers                Problem: Patient Education: Go to Patient Education Activity  Goal: Patient/Family Education  Outcome: Progressing Towards Goal

## 2022-04-09 NOTE — WOUND CARE
IP WOUND CONSULT    Ritu Bryant  MEDICAL RECORD NUMBER:  [id number]  AGE: 80 y.o. GENDER: female  : 1950  TODAY'S DATE:  2022    GENERAL     [] Follow-up   [x] New Consult    Ritu Bryant is a 80 y.o. female referred by:   [x] Physician  [] Nursing  [] Other:         PAST MEDICAL HISTORY    Past Medical History:   Diagnosis Date    Bladder cancer ([facility] [address]     Essential hypertension     Non-alcoholic fatty liver disease 2020        PAST SURGICAL HISTORY    Past Surgical History:   Procedure Laterality Date    HX CATARACT REMOVAL Left 2019    HX CHOLECYSTECTOMY      HX COLONOSCOPY      HX HYSTERECTOMY      partial  20 yrs ago    HX TUBAL LIGATION         FAMILY HISTORY    Family History   Problem Relation Age of Onset    Diabetes Mother     Diabetes Maternal Grandmother     Other Maternal Grandmother         CHF    Diabetes Paternal Grandmother          ALLERGIES    Allergies   Allergen Reactions    Adhesive Unknown (comments)    Penicillins Other (comments)    Sulfa (Sulfonamide Antibiotics) Unknown (comments)       MEDICATIONS    No current facility-administered medications on file prior to encounter. Current Outpatient Medications on File Prior to Encounter   Medication Sig Dispense Refill    glucose blood VI test strips (OneTouch Ultra Test) strip Use to check blood glucose before meals and at bedtime. 120 Strip 11    Blood-Glucose Meter (OneTouch Ultra2 Meter) misc Use to check blood glucose levels before meals and at bedtime. 1 Each 0    glimepiride (AMARYL) 1 mg tablet Take 1 Tablet by mouth Daily (before breakfast). 90 Tablet 1    metoprolol succinate (TOPROL-XL) 50 mg XL tablet Take 1 Tablet by mouth daily. Indications: high blood pressure 90 Tablet 1    amLODIPine (NORVASC) 5 mg tablet Take 1 Tablet by mouth daily. Indications: high blood pressure 90 Tablet 1    famotidine (Pepcid) 20 mg tablet Take 1 Tablet by mouth two (2) times a day.  Indications: gastroesophageal reflux disease 180 Tablet 1    metFORMIN (GLUCOPHAGE) 1,000 mg tablet Take 1 Tablet by mouth two (2) times daily (with meals). Indications: type 2 diabetes mellitus 180 Tablet 1    aspirin delayed-release 81 mg tablet Take 1 Tablet by mouth daily. Indications: treatment to prevent a heart attack 90 Tablet 3    Lancing Device with Lancets (Accu-Chek Multiclix Lancet) kit Use to check blood sugar once daily. 1 Kit 0    lancets (Accu-Chek Multiclix Lancet) misc Use to check blood sugar once daily. 100 Each 3    alcohol swabs (Alcohol Wipes) padm Use to check blood glucose once daily. 100 Pad 3    venlafaxine-SR (EFFEXOR-XR) 37.5 mg capsule Take 1 Capsule by mouth daily. Indications: anxiousness associated with depression 90 Capsule 1    metoprolol succinate 50 mg CSpX Take  by mouth.  insulin NPH/insulin regular (NovoLIN 70/30 U-100 Insulin) 100 unit/mL (70-30) injection by SubCUTAneous route.  22 units in the AM and 42 units at PM           [unfilled]  Visit Vitals  /72 (BP 1 Location: Right upper arm, BP Patient Position: At rest)   Pulse 98   Temp 97.8 °F (36.6 °C)   Resp 17   Ht 5' 5.98\" (1.676 m)   Wt 83.2 kg (183 lb 6.8 oz)   SpO2 97%   BMI 29.62 kg/m²       ASSESSMENT     Wound Identification & Type: Ulceration to left groin, etiology unclear   Dressing change: Yes, see flow chart  Verbal consent for picture: Yes    Contributing Factors: anticoagulation therapy, diabetes, poor glucose control, decreased mobility, shear force and incontinence of urine    Wound Groin Left wound with purulent drainage 04/05/22 (Active)   Wound Image   04/09/22 1632   Wound Etiology Other (Comment) 04/09/22 1632   Dressing Status New dressing applied 04/09/22 1632   Cleansed Cleansed with saline 04/09/22 1632   Dressing/Treatment ABD pad;Alginate with Ag;Tape/Soft cloth adhesive tape 04/09/22 1632   Dressing Change Due 04/10/22 04/09/22 1632   Wound Length (cm) 2 cm 04/09/22 1632   Wound Width (cm) 0.4 cm 04/09/22 1632   Wound Depth (cm) 0.5 cm 04/09/22 1632   Wound Surface Area (cm^2) 0.8 cm^2 04/09/22 1632   Wound Volume (cm^3) 0.4 cm^3 04/09/22 1632   Wound Assessment [place]/red;Slough 04/09/22 1632   Drainage Amount Scant 04/09/22 1632   Drainage Description Serosanguinous 04/09/22 1632   Wound Odor None 04/09/22 1632   Keyla-Wound/Incision Assessment Blanchable erythema 04/09/22 1632   Edges Defined edges 04/09/22 1632   Wound Thickness Description Full thickness 04/09/22 1632   Number of days: 4          PLAN     Skin Care & Pressure Relief Recommendations  Minimize layers of linen  Turn/reposition approximately every 2 hours  Pillow wedges  Manage incontinence   Promote continence; Skin Protective lotion/cream to buttocks and sacrum daily and as needed with incontinence care  Offload heels pillows    Rene 13  Blood Glucose: 289 on 4/9/22                             Albumin: 3.3 on 4/5/22  WBCs: 11.4 on 4/8/22    Support Surface: Progressa    Physician/Provider notified:   Recommendations: Ulceration to left groin area of unclear etiology. No obvious fluid collection per CT. Does not feel particularly indurated, slight erythema noted. Slough cleaned away with NS and cotton swab. Wound bed is pink/red with a depth of approximately 0.5 cm. Pack daily with Iodoform gauze, see dressing order. Cecelia Natali mole / nevi or skin tag noted at left labia with irregular border. May benefit from follow up with Dermatologist to elevate for possible melanoma due to irregular shape and tag like appearance. Apply Optifoam Border Sacral foam dressing daily to redistribute pressure from bony prominence and prevent pressure and friction injury to skin. Apply Optifoam Border Heel foam to both elbows and heels to redistribute pressure from bony prominence and prevent pressure and friction injury to skin. Maintain the PureWick to manage  incontinence.   Use foam wedge to turn q2h at 30 degree angle or more to offload sacrum. Float heels with 2-3 pillows while in bed for offloading of the heels. Maintain HOB at 30 degrees or less, if not contraindicated, to reduce pressure to buttocks and sacrum. Raise foot of bed to help prevent friction and shear injury from sliding down in the bed. Re-consult [facility] if skin condition changes.        Discharge Wound Care Needs: TBD    Teaching completed with:   [] Patient           [] Family member       [] Caregiver          [] Nursing  [] Other    Patient/Caregiver Teaching:  Level of patient/caregiver understanding able to:   [] Indicates understanding       [] Needs reinforcement  [] Unsuccessful      [] Verbal Understanding  [] Demonstrated understanding       [] No evidence of learning  [] Refused teaching         [] N/A       Electronically signed by Clemente Tabor RN on 4/9/2022 at 4:49 PM

## 2022-04-09 NOTE — PROGRESS NOTES
Problem: Dysphagia (Adult)  Goal: *Acute Goals and Plan of Care (Insert Text)  Description: Speech Therapy Goals  Initiated 4/8/2022  -Patient will participate in modified barium swallow study within 7 day(s), pending pt's progress. [ ] Not met  [ ]  MET   [ ] Progressing  [ ] Luba Erb  -Patient will tolerate minced and moist diet with thin liquids without signs/symptoms of aspiration given minimal cues within 7 day(s). [ ] Not met  [ ]  MET   [ ] Progressing  [ ] Luba Hunter  -Patient will demonstrate understanding of swallow safety precautions and aspiration precautions, diet recs with minimal cues within 7 day(s). [ ] Not met  [ ]  MET   [ ] Progressing  [ ] Discontinue    Outcome: [facility] TREATMENT  Patient: Daphney Upton [de-identified]80 y.o. female)  Date: 4/9/2022  Diagnosis: DKA (diabetic ketoacidosis) ([facility] [address] [E11.10] <principal problem not specified>       Precautions: Aspiration    ASSESSMENT:  Patient positioned upright, alert, and cooperative with nurse 1:1 present. Patient noted to have mittens on both hands. Current diet: minced & moist with mildly thick liquids. Patient was fed breakfast and accepted 25% of breakfast. Patient stated she doesn't care for the food. Patient declined solid trials, but agreed to thin ice water. Oral motor function noted to be within functional limits, symmetrical, mild vocal hoarseness and edentulous. Pt given trials of thin via straw. Oral phase: noted single straw sips, with occasional two consecutive swallows; good AP transit. Pharyngeal phase: good HLE via digital palpation with  with timely swallow; maintained dry vocal quality with no s/sx aspiration. Pt currently on RA with 02 sats 98%-99%.      PLAN:  Recommendations and Planned Interventions:  Continue minced and moist diet and upgrade liquids to thin; staff to provide meal assistance; GERD/aspiration precautions  Patient continues to benefit from skilled intervention to address the above impairments. Continue treatment per established plan of care. Discharge Recommendations: To Be Determined     SUBJECTIVE:   Patient stated this takes so much better. OBJECTIVE:   Cognitive and Communication Status:  Neurologic State: Alert,Confused  Orientation Level: Oriented to person,Disoriented to place,Disoriented to situation,Disoriented to time  Cognition: Follows commands            Pain:  Pain Scale 1: FLACC          After treatment:   Patient left in no apparent distress in bed, Call bell within reach, and Nursing notified    COMMUNICATION/EDUCATION:   Patient was educated regarding her deficit(s) of dysphagia, diet recs and compensatory strategies. She demonstrated Good understanding as evidenced by verbal responses. The patient's plan of care including recommendations, planned interventions, and recommended diet changes were discussed with: Registered nurse and Physician.      Vero Howe  Time Calculation: 12 mins

## 2022-04-09 NOTE — PROGRESS NOTES
Progress Note    Patient: Priyanka Pizarro MRN: [id number]  SSN: [id number]    YOB: 1950  Age: 80 y.o. Sex: female      Admit Date: 4/5/2022    LOS: 3 days     Subjective:     71F, h/o DMII on inuslin, with acute encephaloapthy s.t DKA in the setting of sepsis    HOSPITAL COURSE:  80 y.o. female with PMH of diabetes on insulin (suspect noncompliance), hypertension and other medical problems as below. She presented to the ED with altered mental status, with blood sugar reportedly over 600 yesterday. No further history was obtained from patient as she appears drowsy and unable to cooperate.   [address]:   In ED, noted to be tachycardic. WBC 14.7. Urinalysis not indicative of UTI. Chest x-ray unremarkable. CT head showed no acute finding, with remote left frontal infarct. Other lab work indicated of DKA. On 4/7 evening her DKA resolved and was switched to lantus. Continue cefepime and vanc until cx negative source is SSTI on left groin. Now transferred to floor PT OT and speech        Review of Systems:  Cannot be assessed due to mental status      Objective:     Vitals:    04/09/22 1000 04/09/22 1100 04/09/22 1200 04/09/22 1500   BP: (!) 140/65 106/72 135/72    Pulse: (!) 111 (!) 110 98    Resp: 15 18 17    Temp:  98.4 °F (36.9 °C)  97.8 °F (36.6 °C)   SpO2: 97% 97% 97%    Weight:       Height:            Intake and Output:  Current Shift: 04/09 0701 - 04/09 1900  In: 480 [P.O.:480]  Out: -   Last three shifts: 04/07 1901 - 04/09 0700  In: -   Out: 850 [Urine:850]      Physical Exam:   General: Confused, noncooperative. Eye: conjunctivae/corneas clear. PERRL, EOM's intact. Throat and Neck: normal and no erythema or exudates noted. No mass   Lung: clear to auscultation bilaterally  Heart: regular rate and rhythm,   Abdomen: soft. Bowel sounds normal. No masses,  Extremities:  able to move all extremities normal, atraumatic  Skin: Few small scabs noted at left groin.    Neurologic: Omitted as patient is unable to cooperate. Psychiatric: Omitted as patient is unable to cooperate. Lab/Data Review:  Recent Results (from the past 24 hour(s))   GLUCOSE, POC    Collection Time: 04/08/22  8:33 PM   Result Value Ref Range    Glucose (POC) 255 (H) 65 - 117 mg/dL    Performed by Yamilet Mcdonald    GLUCOSE, POC    Collection Time: 04/09/22  7:17 AM   Result Value Ref Range    Glucose (POC) 281 (H) 65 - 117 mg/dL    Performed by Emma Silver, RANDOM    Collection Time: 04/09/22 10:43 AM   Result Value Ref Range    Vancomycin, random 10.4 ug/mL   GLUCOSE, POC    Collection Time: 04/09/22 11:18 AM   Result Value Ref Range    Glucose (POC) 289 (H) 65 - 117 mg/dL    Performed by [address] [place] [address] Dr Bush, POC    Collection Time: 04/09/22  3:22 PM   Result Value Ref Range    Glucose (POC) 210 (H) 65 - 117 mg/dL    Performed by Kristyn Gallagher          Assessment and plan:        (1) Acute encephalopathy : GCS 13 s/t sepsis/ metabolic    (2) DKA: resolved, on lantus 22 in am 42 in PM.     (3) increased anion gap metabolic acidosis: s/t #2    (3) sepsis: CT pelvix- non-specific induration.  MRSA nasal swab positive, awit BCx continue anc until [address] negative for MRSA    (4) HTN: on amlodipine    (5) RENZO on CKDIIIb: continue IVF    DVT ppx: heparin subQ    DISPO:transfer to floor tnow, and then PT HH/ SNF once bcx negative    Signed By: Bella Louis MD     April 9, 2022

## 2022-04-10 LAB
ALBUMIN SERPL-MCNC: 2.2 G/DL (ref 3.5–5)
ALBUMIN/GLOB SERPL: 0.8 {RATIO} (ref 1.1–2.2)
ALP SERPL-CCNC: 84 U/L (ref 45–117)
ALT SERPL-CCNC: 22 U/L (ref 12–78)
ANION GAP SERPL CALC-SCNC: 6 MMOL/L (ref 5–15)
AST SERPL W P-5'-P-CCNC: 15 U/L (ref 15–37)
BASOPHILS # BLD: 0 K/UL (ref 0–0.1)
BASOPHILS NFR BLD: 0 % (ref 0–1)
BILIRUB SERPL-MCNC: 0.3 MG/DL (ref 0.2–1)
BUN SERPL-MCNC: 21 MG/DL (ref 6–20)
BUN/CREAT SERPL: 28 (ref 12–20)
CA-I BLD-MCNC: 8.1 MG/DL (ref 8.5–10.1)
CHLORIDE SERPL-SCNC: 113 MMOL/L (ref 97–108)
CO2 SERPL-SCNC: 21 MMOL/L (ref 21–32)
CREAT SERPL-MCNC: 0.76 MG/DL (ref 0.55–1.02)
CRP SERPL-MCNC: 3.64 MG/DL (ref 0–0.6)
DIFFERENTIAL METHOD BLD: ABNORMAL
EOSINOPHIL # BLD: 0.1 K/UL (ref 0–0.4)
EOSINOPHIL NFR BLD: 1 % (ref 0–7)
ERYTHROCYTE [DISTWIDTH] IN BLOOD BY AUTOMATED COUNT: 15.5 % (ref 11.5–14.5)
GLOBULIN SER CALC-MCNC: 2.9 G/DL (ref 2–4)
GLUCOSE BLD STRIP.AUTO-MCNC: 68 MG/DL (ref 65–117)
GLUCOSE BLD STRIP.AUTO-MCNC: 75 MG/DL (ref 65–117)
GLUCOSE BLD STRIP.AUTO-MCNC: 91 MG/DL (ref 65–117)
GLUCOSE BLD STRIP.AUTO-MCNC: 95 MG/DL (ref 65–117)
GLUCOSE BLD STRIP.AUTO-MCNC: 96 MG/DL (ref 65–117)
GLUCOSE SERPL-MCNC: 95 MG/DL (ref 65–100)
HCT VFR BLD AUTO: 37.5 % (ref 35–47)
HGB BLD-MCNC: 12 G/DL (ref 11.5–16)
IMM GRANULOCYTES # BLD AUTO: 0 K/UL
IMM GRANULOCYTES NFR BLD AUTO: 0 %
LYMPHOCYTES # BLD: 1.7 K/UL (ref 0.8–3.5)
LYMPHOCYTES NFR BLD: 15 % (ref 12–49)
MCH RBC QN AUTO: 25.3 PG (ref 26–34)
MCHC RBC AUTO-ENTMCNC: 32 G/DL (ref 30–36.5)
MCV RBC AUTO: 79.1 FL (ref 80–99)
MONOCYTES # BLD: 0.6 K/UL (ref 0–1)
MONOCYTES NFR BLD: 5 % (ref 5–13)
NEUTS BAND NFR BLD MANUAL: 4 % (ref 0–6)
NEUTS SEG # BLD: 9 K/UL (ref 1.8–8)
NEUTS SEG NFR BLD: 75 % (ref 32–75)
NRBC # BLD: 0 K/UL (ref 0–0.01)
NRBC BLD-RTO: 0 PER 100 WBC
PERFORMED BY, TECHID: NORMAL
PLATELET # BLD AUTO: 243 K/UL (ref 150–400)
PMV BLD AUTO: 9.4 FL (ref 8.9–12.9)
POTASSIUM SERPL-SCNC: 3 MMOL/L (ref 3.5–5.1)
PROCALCITONIN SERPL-MCNC: 0.3 NG/ML
PROT SERPL-MCNC: 5.1 G/DL (ref 6.4–8.2)
RBC # BLD AUTO: 4.74 M/UL (ref 3.8–5.2)
RBC MORPH BLD: ABNORMAL
SODIUM SERPL-SCNC: 140 MMOL/L (ref 136–145)
WBC # BLD AUTO: 11.4 K/UL (ref 3.6–11)

## 2022-04-10 PROCEDURE — 86140 C-REACTIVE PROTEIN: CPT

## 2022-04-10 PROCEDURE — 99232 SBSQ HOSP IP/OBS MODERATE 35: CPT | Performed by: INTERNAL MEDICINE

## 2022-04-10 PROCEDURE — 80053 COMPREHEN METABOLIC PANEL: CPT

## 2022-04-10 PROCEDURE — 74011000250 HC RX REV CODE- 250: Performed by: HOSPITALIST

## 2022-04-10 PROCEDURE — 82962 GLUCOSE BLOOD TEST: CPT

## 2022-04-10 PROCEDURE — 74011250637 HC RX REV CODE- 250/637: Performed by: HOSPITALIST

## 2022-04-10 PROCEDURE — 74011250637 HC RX REV CODE- 250/637: Performed by: INTERNAL MEDICINE

## 2022-04-10 PROCEDURE — 74011250636 HC RX REV CODE- 250/636: Performed by: INTERNAL MEDICINE

## 2022-04-10 PROCEDURE — 85025 COMPLETE CBC W/AUTO DIFF WBC: CPT

## 2022-04-10 PROCEDURE — 74011000250 HC RX REV CODE- 250: Performed by: INTERNAL MEDICINE

## 2022-04-10 PROCEDURE — 36415 COLL VENOUS BLD VENIPUNCTURE: CPT

## 2022-04-10 PROCEDURE — 65270000029 HC RM PRIVATE

## 2022-04-10 PROCEDURE — 74011250636 HC RX REV CODE- 250/636: Performed by: HOSPITALIST

## 2022-04-10 PROCEDURE — 84145 PROCALCITONIN (PCT): CPT

## 2022-04-10 RX ORDER — FLUCONAZOLE 100 MG/1
100 TABLET ORAL DAILY
Status: DISCONTINUED | OUTPATIENT
Start: 2022-04-11 | End: 2022-04-13 | Stop reason: HOSPADM

## 2022-04-10 RX ORDER — POTASSIUM CHLORIDE 20 MEQ/1
40 TABLET, EXTENDED RELEASE ORAL
Status: COMPLETED | OUTPATIENT
Start: 2022-04-10 | End: 2022-04-10

## 2022-04-10 RX ADMIN — ONDANSETRON 4 MG: 2 INJECTION INTRAMUSCULAR; INTRAVENOUS at 00:44

## 2022-04-10 RX ADMIN — HYDRALAZINE HYDROCHLORIDE 100 MG: 50 TABLET, FILM COATED ORAL at 16:47

## 2022-04-10 RX ADMIN — VANCOMYCIN HYDROCHLORIDE 750 MG: 750 INJECTION, POWDER, LYOPHILIZED, FOR SOLUTION INTRAVENOUS at 09:18

## 2022-04-10 RX ADMIN — ONDANSETRON 4 MG: 2 INJECTION INTRAMUSCULAR; INTRAVENOUS at 22:29

## 2022-04-10 RX ADMIN — METOPROLOL SUCCINATE 50 MG: 50 TABLET, FILM COATED, EXTENDED RELEASE ORAL at 12:13

## 2022-04-10 RX ADMIN — AMLODIPINE BESYLATE 10 MG: 5 TABLET ORAL at 12:14

## 2022-04-10 RX ADMIN — WATER 1 G: 1 INJECTION INTRAMUSCULAR; INTRAVENOUS; SUBCUTANEOUS at 00:44

## 2022-04-10 RX ADMIN — WATER 1 G: 1 INJECTION INTRAMUSCULAR; INTRAVENOUS; SUBCUTANEOUS at 09:19

## 2022-04-10 RX ADMIN — ENOXAPARIN SODIUM 40 MG: 100 INJECTION SUBCUTANEOUS at 09:19

## 2022-04-10 RX ADMIN — WATER 1 G: 1 INJECTION INTRAMUSCULAR; INTRAVENOUS; SUBCUTANEOUS at 16:47

## 2022-04-10 RX ADMIN — ONDANSETRON 4 MG: 2 INJECTION INTRAMUSCULAR; INTRAVENOUS at 09:19

## 2022-04-10 RX ADMIN — MUPIROCIN: 20 OINTMENT TOPICAL at 09:19

## 2022-04-10 RX ADMIN — ASPIRIN 81 MG: 81 TABLET, COATED ORAL at 12:14

## 2022-04-10 RX ADMIN — VENLAFAXINE HYDROCHLORIDE 37.5 MG: 37.5 CAPSULE, EXTENDED RELEASE ORAL at 12:14

## 2022-04-10 RX ADMIN — HALOPERIDOL LACTATE 5 MG: 5 INJECTION, SOLUTION INTRAMUSCULAR at 01:48

## 2022-04-10 RX ADMIN — POTASSIUM CHLORIDE 40 MEQ: 1500 TABLET, EXTENDED RELEASE ORAL at 13:03

## 2022-04-10 NOTE — PROGRESS NOTES
Infectious Disease Progress Note           Subjective:   Pt seen and examined at bedside. Stable, denies new complaints, transferred out of the ICU today. No acute events since last seen   Objective:   Physical Exam:     Visit Vitals  /64 (BP 1 Location: Right upper arm, BP Patient Position: At rest)   Pulse 92   Temp 98.5 °F (36.9 °C)   Resp 18   Ht 5' 5.98\" (1.676 m)   Wt 183 lb 10.3 oz (83.3 kg)   SpO2 97%   BMI 29.66 kg/m²      O2 Device: None (Room air)    Temp (24hrs), Av °F (36.7 °C), Min:96.9 °F (36.1 °C), Max:98.9 °F (37.2 °C)    04/10 0701 - 04/10 1900  In: 960 [P.O.:160; I.V.:800]  Out: -    1901 - 04/10 0700  In: 2965 [P.O.:480; I.V.:2485]  Out: 600 [Urine:600]    General: NAD, alert, following commands   HEENT: MARGARETH, Moist mucosa   Lungs: CTA b/l, decreased at the bases, no wheeze/rhonchi   Heart: S1S2+, RRR, no murmur  Abdo: Soft, NT, ND, +BS   Exts: Trace edema, + pulses b/l   Skin: healing left labia/groin ulcer, minimal drainage, induration or fluctuance     Data Review:       Recent Days:  Recent Labs     04/10/22  0637 22  0345   WBC 11.4* 11.4*   HGB 12.0 12.7   HCT 37.5 38.4    244     Recent Labs     04/10/22  0637 22  0345 22  2140   BUN 21* 17 15   CREA 0.76 0.91 0.85       Lab Results   Component Value Date/Time    C-Reactive protein 3.64 (H) 04/10/2022 06:37 AM          Microbiology     Results     Procedure Component Value Units Date/Time    CULTURE, Aaron Carlisleummer STAIN [[id number]] Collected: 22 1030    Order Status: Completed Specimen: Wound from Swab Updated: 04/10/22 1253     Special Requests: No Special Requests        GRAM STAIN Rare WBCs seen         No organisms seen        Culture result: Light Yeast               Rare Streptococci, beta hemolyticgroup B Penicillin and ampicillin are drugs of choice for treatment of Beta-hemolytic streptococcal infections.  Susceptibility testing of Penicillins and Beta-Lactams approved by the FDA for treatment of Beta-hemolytic streptococcal infections need not be performed roUTInely, because nonsusceptible isolates are extremely rare. CLSI 2012          MRSA SCREEN - PCR (NASAL) [[id number]]  (Abnormal) Collected: 04/06/22 0411    Order Status: Completed Specimen: Swab Updated: 04/06/22 0545     MRSA by PCR, Nasal DETECTED        Comment: CALLED TO AND READ BACK BY Poppy Orlando RN AT [id number] [place] [facility] [place]       CULTURE, BLOOD, PAIRED [[id number]]  (Abnormal) Collected: 04/06/22 0005    Order Status: Completed Specimen: Blood Updated: 04/08/22 1227     Special Requests: No Special Requests        Culture result:       Staphylococcus species, coagulase negative growing in 2 of 4 bottles drawn No Site Indicated plates held 3 days. Call microbiology lab if sensitivities are needed. (NOTE) CALLED [facility] [facility] IN 2 OF 4 BOTTLES TO GIOVANI COTTRELL [place] AT 1110 ON 4.7.22 RB    CULTURE, Mabeline Louis STAIN [[id number]] Collected: 04/06/22 0000    Order Status: Completed Specimen: Wound Drainage Updated: 04/08/22 0933     Special Requests: No Special Requests        GRAM STAIN 2+ WBCs seen         2+ Gram Negative Rods               1+ Gram Positive Cocci IN CHAINS            1+ Yeast        Culture result:       Heavy Streptococci, beta hemolyticgroup B Penicillin and ampicillin are drugs of choice for treatment of Beta-hemolytic streptococcal infections. Susceptibility testing of Penicillins and Beta-Lactams approved by the FDA for treatment of Beta-hemolytic streptococcal infections need not be performed roUTInely, because nonsusceptible isolates are extremely rare. CLSI 2012                  Heavy Staphylococcus species, coagulase negative                   Diagnostics   CXR Results  (Last 48 hours)    None             Assessment/Plan    1.  Left labia/groin ulcer, decreased drainage, no evidence of tissue necrosis or fluctuance        Coag neg staph isolated from Cx, Yeast, GNR and GBS isolated from repeat Cx        Afebrile. WBC Stable at 11.4, hemodynamically stable off pressor support        Will d/c Vanc, continue on Cefepime, add Fluconazole for yeast coverage        Routine labs in the morning. Continue routine wound care        I do not anticipate pt needing IV antibiotics upon d/c      2. Coag neg staph bacteremia: likely skin contaminant       S/p 5 days of Vanc, discontinued      3. DKA, blood BS better controlled      4.  MRSA colonization: On day # 3 of mupirocin     Trevin Becker MD    4/10/2022

## 2022-04-10 NOTE — PROGRESS NOTES
Problem: Falls - Risk of  Goal: *Absence of Falls  Description: Document Clifford Casey Fall Risk and appropriate interventions in the flowsheet. Outcome: Progressing Towards Goal  Note: Fall Risk Interventions:  Mobility Interventions: Bed/chair exit alarm,Patient to call before getting OOB,Strengthening exercises (ROM-active/passive)    Mentation Interventions: Bed/chair exit alarm,Adequate sleep, hydration, pain control,More frequent rounding    Medication Interventions: Evaluate medications/consider consulting pharmacy,Bed/chair exit alarm    Elimination Interventions: Call light in reach,Bed/chair exit alarm    History of Falls Interventions: Bed/chair exit alarm,Door open when patient unattended,Room close to nurse's station         Problem: Patient Education: Go to Patient Education Activity  Goal: Patient/Family Education  Outcome: Progressing Towards Goal     Problem: Patient Education: Go to Patient Education Activity  Goal: Patient/Family Education  Outcome: Progressing Towards Goal     Problem: Risk for Spread of Infection  Goal: Prevent transmission of infectious organism to others  Description: Prevent the transmission of infectious organisms to other patients, staff members, and visitors. Outcome: Progressing Towards Goal     Problem: Patient Education:  Go to Education Activity  Goal: Patient/Family Education  Outcome: Progressing Towards Goal     Problem: Diabetes Self-Management  Goal: *Disease process and treatment process  Description: Define diabetes and identify own type of diabetes; list 3 options for treating diabetes. Outcome: Progressing Towards Goal  Goal: *Incorporating nutritional management into lifestyle  Description: Describe effect of type, amount and timing of food on blood glucose; list 3 methods for planning meals. Outcome: Progressing Towards Goal  Goal: *Incorporating physical activity into lifestyle  Description: State effect of exercise on blood glucose levels.   Outcome: Progressing Towards Goal  Goal: *Developing strategies to promote health/change behavior  Description: Define the ABC's of diabetes; identify appropriate screenings, schedule and personal plan for screenings. Outcome: Progressing Towards Goal  Goal: *Using medications safely  Description: State effect of diabetes medications on diabetes; name diabetes medication taking, action and side effects. Outcome: Progressing Towards Goal  Goal: *Monitoring blood glucose, interpreting and using results  Description: Identify recommended blood glucose targets  and personal targets. Outcome: Progressing Towards Goal  Goal: *Prevention, detection, treatment of acute complications  Description: List symptoms of hyper- and hypoglycemia; describe how to treat low blood sugar and actions for lowering  high blood glucose level. Outcome: Progressing Towards Goal  Goal: *Prevention, detection and treatment of chronic complications  Description: Define the natural course of diabetes and describe the relationship of blood glucose levels to long term complications of diabetes.   Outcome: Progressing Towards Goal  Goal: *Developing strategies to address psychosocial issues  Description: Describe feelings about living with diabetes; identify support needed and support network  Outcome: Progressing Towards Goal  Goal: *Insulin pump training  Outcome: Progressing Towards Goal  Goal: *Sick day guidelines  Outcome: Progressing Towards Goal  Goal: *Patient Specific Goal (EDIT GOAL, INSERT TEXT)  Outcome: Progressing Towards Goal     Problem: Patient Education: Go to Patient Education Activity  Goal: Patient/Family Education  Outcome: Progressing Towards Goal     Problem: Patient Education: Go to Patient Education Activity  Goal: Patient/Family Education  Outcome: Progressing Towards Goal

## 2022-04-10 NOTE — PROGRESS NOTES
Progress Note    Patient: Santana Prescott MRN: [id number]  SSN: [id number]    YOB: 1950  Age: 80 y.o. Sex: female      Admit Date: 4/5/2022    LOS: 4 days     Subjective:     71F, h/o DMII on inuslin, with acute encephaloapthy s.t DKA in the setting of sepsis    HOSPITAL COURSE:  80 y.o. female with PMH of diabetes on insulin (suspect noncompliance), hypertension and other medical problems as below. She presented to the ED with altered mental status, with blood sugar reportedly over 600 yesterday. No further history was obtained from patient as she appears drowsy and unable to cooperate.   [address]:   In ED, noted to be tachycardic. WBC 14.7. Urinalysis not indicative of UTI. Chest x-ray unremarkable. CT head showed no acute finding, with remote left frontal infarct. Other lab work indicated of DKA. On 4/7 evening her DKA resolved and was switched to lantus. Continue cefepime and vanc until cx negative source is SSTI on left groin. Now transferred to floor PT OT and speech. We plan to dc her tomorrow- by tomorrow Bcx finalized and PT evaluated. Review of Systems:  Cannot be assessed due to mental status      Objective:     Vitals:    04/10/22 0149 04/10/22 0247 04/10/22 0731 04/10/22 1138   BP: 126/63 132/65 125/65 136/64   Pulse: 66 80 94 92   Resp: 16 16 16 18   Temp:  96.9 °F (36.1 °C) 98.9 °F (37.2 °C) 98.5 °F (36.9 °C)   SpO2: 100% 99% 97% 97%   Weight:       Height:            Intake and Output:  Current Shift: 04/10 0701 - 04/10 1900  In: 960 [P.O.:160; I.V.:800]  Out: -   Last three shifts: 04/08 1901 - 04/10 0700  In: 2965 [P.O.:480; I.V.:2485]  Out: 600 [Urine:600]      Physical Exam:   General: Confused, noncooperative. Eye: conjunctivae/corneas clear. PERRL, EOM's intact. Throat and Neck: normal and no erythema or exudates noted. No mass   Lung: clear to auscultation bilaterally  Heart: regular rate and rhythm,   Abdomen: soft.  Bowel sounds normal. No masses,  Extremities:  able to move all extremities normal, atraumatic  Skin: Few small scabs noted at left groin. Neurologic: Omitted as patient is unable to cooperate. Psychiatric: Omitted as patient is unable to cooperate. Lab/Data Review:  Recent Results (from the past 24 hour(s))   GLUCOSE, POC    Collection Time: 04/09/22  3:22 PM   Result Value Ref Range    Glucose (POC) 210 (H) 65 - 117 mg/dL    Performed by Cleveland Sherman    GLUCOSE, POC    Collection Time: 04/09/22  7:37 PM   Result Value Ref Range    Glucose (POC) 160 (H) 65 - 117 mg/dL    Performed by MONTY DOWELL    CBC WITH AUTOMATED DIFF    Collection Time: 04/10/22  6:37 AM   Result Value Ref Range    WBC 11.4 (H) 3.6 - 11.0 K/uL    RBC 4.74 3.80 - 5.20 M/uL    HGB 12.0 11.5 - 16.0 g/dL    HCT 37.5 35.0 - 47.0 %    MCV 79.1 (L) 80.0 - 99.0 FL    MCH 25.3 (L) 26.0 - 34.0 PG    MCHC 32.0 30.0 - 36.5 g/dL    RDW 15.5 (H) 11.5 - 14.5 %    PLATELET 093 359 - 670 K/uL    MPV 9.4 8.9 - 12.9 FL    NRBC 0.0 0.0  WBC    ABSOLUTE NRBC 0.00 0.00 - 0.01 K/uL    NEUTROPHILS 75 32 - 75 %    BAND NEUTROPHILS 4 0 - 6 %    LYMPHOCYTES 15 12 - 49 %    MONOCYTES 5 5 - 13 %    EOSINOPHILS 1 0 - 7 %    BASOPHILS 0 0 - 1 %    IMMATURE GRANULOCYTES 0 %    ABS. NEUTROPHILS 9.0 (H) 1.8 - 8.0 K/UL    ABS. LYMPHOCYTES 1.7 0.8 - 3.5 K/UL    ABS. MONOCYTES 0.6 0.0 - 1.0 K/UL    ABS. EOSINOPHILS 0.1 0.0 - 0.4 K/UL    ABS. BASOPHILS 0.0 0.0 - 0.1 K/UL    ABS. IMM.  GRANS. 0.0 K/UL    DF Smear Scanned      RBC COMMENTS Normocytic, Normochromic     METABOLIC PANEL, COMPREHENSIVE    Collection Time: 04/10/22  6:37 AM   Result Value Ref Range    Sodium 140 136 - 145 mmol/L    Potassium 3.0 (L) 3.5 - 5.1 mmol/L    Chloride 113 (H) 97 - 108 mmol/L    CO2 21 21 - 32 mmol/L    Anion gap 6 5 - 15 mmol/L    Glucose 95 65 - 100 mg/dL    BUN 21 (H) 6 - 20 mg/dL    Creatinine 0.76 0.55 - 1.02 mg/dL    BUN/Creatinine ratio 28 (H) 12 - 20      GFR est AA >60 >60 ml/min/1.73m2 GFR est non-AA >60 >60 ml/min/1.73m2    Calcium 8.1 (L) 8.5 - 10.1 mg/dL    Bilirubin, total 0.3 0.2 - 1.0 mg/dL    AST (SGOT) 15 15 - 37 U/L    ALT (SGPT) 22 12 - 78 U/L    Alk. phosphatase 84 45 - 117 U/L    Protein, total 5.1 (L) 6.4 - 8.2 g/dL    Albumin 2.2 (L) 3.5 - 5.0 g/dL    Globulin 2.9 2.0 - 4.0 g/dL    A-G Ratio 0.8 (L) 1.1 - 2.2     C REACTIVE PROTEIN, QT    Collection Time: 04/10/22  6:37 AM   Result Value Ref Range    C-Reactive protein 3.64 (H) 0.00 - 0.60 mg/dL   PROCALCITONIN    Collection Time: 04/10/22  6:37 AM   Result Value Ref Range    Procalcitonin 0.30 (H) 0 ng/mL   GLUCOSE, POC    Collection Time: 04/10/22  7:37 AM   Result Value Ref Range    Glucose (POC) 95 65 - 117 mg/dL    Performed by Ira Huber    GLUCOSE, POC    Collection Time: 04/10/22 11:35 AM   Result Value Ref Range    Glucose (POC) 75 65 - 117 mg/dL    Performed by Niru Malhotra and plan:        (1) Acute encephalopathy : GCS 13 s/t sepsis/ metabolic    (2) DKA: resolved, on lantus 22 in am 43 in PM.     (3) increased anion gap metabolic acidosis: s/t #2    (3) sepsis: CT pelvix- non-specific induration. MRSA nasal swab positive, awit BCx continue anc until [address] negative for MRSA    (4) HTN: on amlodipine    (5) RENZO on CKDIIIb: continue IVF    DVT ppx: heparin subQ    DISPO:transfer to floor tomorrow.      Signed By: Peggy Galindo MD     April 10, 2022

## 2022-04-11 PROBLEM — M25.561 RECURRENT PAIN OF RIGHT KNEE: Status: ACTIVE | Noted: 2022-04-06

## 2022-04-11 PROBLEM — E03.9 ACQUIRED HYPOTHYROIDISM: Status: ACTIVE | Noted: 2022-04-06

## 2022-04-11 PROBLEM — E11.65 UNCONTROLLED TYPE 2 DIABETES MELLITUS WITH HYPERGLYCEMIA (HCC): Status: ACTIVE | Noted: 2022-03-22

## 2022-04-11 LAB
CREAT SERPL-MCNC: 0.85 MG/DL (ref 0.55–1.02)
GLUCOSE BLD STRIP.AUTO-MCNC: 110 MG/DL (ref 65–117)
GLUCOSE BLD STRIP.AUTO-MCNC: 118 MG/DL (ref 65–117)
GLUCOSE BLD STRIP.AUTO-MCNC: 134 MG/DL (ref 65–117)
GLUCOSE BLD STRIP.AUTO-MCNC: 39 MG/DL (ref 65–117)
GLUCOSE BLD STRIP.AUTO-MCNC: 79 MG/DL (ref 65–117)
GLUCOSE BLD STRIP.AUTO-MCNC: 81 MG/DL (ref 65–117)
PERFORMED BY, TECHID: ABNORMAL
PERFORMED BY, TECHID: NORMAL

## 2022-04-11 PROCEDURE — 74011000250 HC RX REV CODE- 250: Performed by: INTERNAL MEDICINE

## 2022-04-11 PROCEDURE — 97165 OT EVAL LOW COMPLEX 30 MIN: CPT

## 2022-04-11 PROCEDURE — 82565 ASSAY OF CREATININE: CPT

## 2022-04-11 PROCEDURE — 74011250636 HC RX REV CODE- 250/636: Performed by: HOSPITALIST

## 2022-04-11 PROCEDURE — 74011250637 HC RX REV CODE- 250/637: Performed by: INTERNAL MEDICINE

## 2022-04-11 PROCEDURE — 74011250636 HC RX REV CODE- 250/636: Performed by: INTERNAL MEDICINE

## 2022-04-11 PROCEDURE — 97530 THERAPEUTIC ACTIVITIES: CPT

## 2022-04-11 PROCEDURE — 65270000029 HC RM PRIVATE

## 2022-04-11 PROCEDURE — 51798 US URINE CAPACITY MEASURE: CPT

## 2022-04-11 PROCEDURE — 74011250637 HC RX REV CODE- 250/637: Performed by: HOSPITALIST

## 2022-04-11 PROCEDURE — 99232 SBSQ HOSP IP/OBS MODERATE 35: CPT | Performed by: INTERNAL MEDICINE

## 2022-04-11 PROCEDURE — 36415 COLL VENOUS BLD VENIPUNCTURE: CPT

## 2022-04-11 PROCEDURE — 74011000250 HC RX REV CODE- 250: Performed by: HOSPITALIST

## 2022-04-11 PROCEDURE — 97161 PT EVAL LOW COMPLEX 20 MIN: CPT

## 2022-04-11 PROCEDURE — 82962 GLUCOSE BLOOD TEST: CPT

## 2022-04-11 RX ORDER — DOXYCYCLINE 100 MG/1
100 CAPSULE ORAL 2 TIMES DAILY
Qty: 20 CAPSULE | Refills: 0 | Status: SHIPPED | OUTPATIENT
Start: 2022-04-11 | End: 2022-04-22 | Stop reason: ALTCHOICE

## 2022-04-11 RX ORDER — PANTOPRAZOLE SODIUM 40 MG/1
40 TABLET, DELAYED RELEASE ORAL DAILY
Qty: 30 TABLET | Refills: 2 | Status: SHIPPED | OUTPATIENT
Start: 2022-04-11 | End: 2022-04-22 | Stop reason: SDUPTHER

## 2022-04-11 RX ORDER — FLUCONAZOLE 100 MG/1
100 TABLET ORAL DAILY
Qty: 7 TABLET | Refills: 0 | Status: SHIPPED | OUTPATIENT
Start: 2022-04-11 | End: 2022-04-18

## 2022-04-11 RX ADMIN — FLUCONAZOLE 100 MG: 100 TABLET ORAL at 09:16

## 2022-04-11 RX ADMIN — AMLODIPINE BESYLATE 10 MG: 5 TABLET ORAL at 09:14

## 2022-04-11 RX ADMIN — SODIUM CHLORIDE, PRESERVATIVE FREE 10 ML: 5 INJECTION INTRAVENOUS at 06:32

## 2022-04-11 RX ADMIN — HYDRALAZINE HYDROCHLORIDE 100 MG: 50 TABLET, FILM COATED ORAL at 09:16

## 2022-04-11 RX ADMIN — WATER 1 G: 1 INJECTION INTRAMUSCULAR; INTRAVENOUS; SUBCUTANEOUS at 16:45

## 2022-04-11 RX ADMIN — SODIUM CHLORIDE 75 ML/HR: 9 INJECTION, SOLUTION INTRAVENOUS at 22:28

## 2022-04-11 RX ADMIN — MUPIROCIN: 20 OINTMENT TOPICAL at 00:16

## 2022-04-11 RX ADMIN — SODIUM CHLORIDE 75 ML/HR: 9 INJECTION, SOLUTION INTRAVENOUS at 06:28

## 2022-04-11 RX ADMIN — WATER 1 G: 1 INJECTION INTRAMUSCULAR; INTRAVENOUS; SUBCUTANEOUS at 08:56

## 2022-04-11 RX ADMIN — MUPIROCIN: 20 OINTMENT TOPICAL at 22:25

## 2022-04-11 RX ADMIN — ASPIRIN 81 MG: 81 TABLET, COATED ORAL at 09:16

## 2022-04-11 RX ADMIN — WATER 1 G: 1 INJECTION INTRAMUSCULAR; INTRAVENOUS; SUBCUTANEOUS at 00:20

## 2022-04-11 RX ADMIN — PANTOPRAZOLE SODIUM 40 MG: 40 TABLET, DELAYED RELEASE ORAL at 09:24

## 2022-04-11 RX ADMIN — HYDRALAZINE HYDROCHLORIDE 100 MG: 50 TABLET, FILM COATED ORAL at 22:25

## 2022-04-11 RX ADMIN — SODIUM CHLORIDE, PRESERVATIVE FREE 10 ML: 5 INJECTION INTRAVENOUS at 13:36

## 2022-04-11 RX ADMIN — METOPROLOL SUCCINATE 50 MG: 50 TABLET, FILM COATED, EXTENDED RELEASE ORAL at 09:16

## 2022-04-11 RX ADMIN — ENOXAPARIN SODIUM 40 MG: 100 INJECTION SUBCUTANEOUS at 09:11

## 2022-04-11 RX ADMIN — SODIUM CHLORIDE, PRESERVATIVE FREE 10 ML: 5 INJECTION INTRAVENOUS at 22:28

## 2022-04-11 RX ADMIN — ONDANSETRON 4 MG: 4 TABLET, ORALLY DISINTEGRATING ORAL at 11:08

## 2022-04-11 RX ADMIN — HYDRALAZINE HYDROCHLORIDE 100 MG: 50 TABLET, FILM COATED ORAL at 16:45

## 2022-04-11 RX ADMIN — MUPIROCIN: 20 OINTMENT TOPICAL at 09:13

## 2022-04-11 RX ADMIN — VENLAFAXINE HYDROCHLORIDE 37.5 MG: 37.5 CAPSULE, EXTENDED RELEASE ORAL at 09:14

## 2022-04-11 NOTE — PROGRESS NOTES
Nutrition Assessment     Type and Reason for Visit: Reassess (interim)    Nutrition Recommendations/Plan:   Continue w/ current diet   Start Low mi/high protein ensure x2/day    Monitor and Record wts, po/supplement intakes & BMs in I/Os    Nutrition Assessment:  Admitted for DKA. Previously in ICU, transferred to floor. Diet advanced to minced & Moist (4/9), patient reports that appetite has been good although po intake has been 25-50%. Noted no complications w/ current diet. Patient agrees to have ensure @B/D but only in chocolate. Will add ONs. Labs: K (3.0), BUN (21), Pro(5.1), alb (2.2). Meds: IVF, amlodipine, lovenox, lantus, Humalog, PPI. Malnutrition Assessment:  Malnutrition Status: At risk for malnutrition (specify) (concerning wt loss, recent CVA, poor DM management)     Estimated Daily Nutrient Needs:  Energy (kcal):  2020-2424kcals (25-30kcals/kg)  Protein (g):  81-97g (1-1.2g/kg)       Fluid (ml/day):  2020-2424ml    Nutrition Related Findings:  NFPE with no acute findings. SLP following for dysphagia, pt requires MM5/Mildly thick liquids. No reported n/v/c/d nor edema.       Current Nutrition Therapies:  ADULT DIET Dysphagia - Minced & Moist; 4 carb choices (60 gm/meal)    Anthropometric Measures:  · Height:  5' 5.98\" (167.6 cm)  · Current Body Wt:  83.3 kg (183 lb 10.3 oz)  · BMI: 29.7    Nutrition Diagnosis:   · Inadequate oral intake related to inadequate protein-energy intake as evidenced by intake 26-50%      Nutrition Intervention:  Food and/or Nutrient Delivery: Continue current diet,Start oral nutrition supplement  Nutrition Education and Counseling: Education not indicated  Coordination of Nutrition Care: Speech therapy,Continue to monitor while inpatient,Feeding assistance/environmental change    Goals:  Initiate means of nutrition capable of meeting >/=75% of EENs within 4 days, Meet >/=50% of EENs in >5 days, Wt maintenance within +/-0.5kg in >5 days       Nutrition Monitoring and Evaluation:   Behavioral-Environmental Outcomes: None identified  Food/Nutrient Intake Outcomes: Diet advancement/tolerance,Food and nutrient intake,Supplement intake  Physical Signs/Symptoms Outcomes: Weight,Biochemical data,Chewing or swallowing,Meal time behavior    Discharge Planning:    Continue oral nutrition supplement,Continue current diet     Electronically signed by Kenyatta Inman on 4/11/2022 at 2:42 PM    Contact Number: [phone]

## 2022-04-11 NOTE — PROGRESS NOTES
The patient is restless with confusion, removing two IVs this shift. IV Replaced. Patient reoriented. Bed alarm on and all fall precautions in place.

## 2022-04-11 NOTE — PROGRESS NOTES
Problem: Falls - Risk of  Goal: *Absence of Falls  Description: Document Jose Wright Fall Risk and appropriate interventions in the flowsheet. Outcome: Progressing Towards Goal  Note: Fall Risk Interventions:  Mobility Interventions: Bed/chair exit alarm    Mentation Interventions: Bed/chair exit alarm    Medication Interventions: Bed/chair exit alarm    Elimination Interventions: Call light in reach    History of Falls Interventions: Bed/chair exit alarm         Problem: Patient Education: Go to Patient Education Activity  Goal: Patient/Family Education  Outcome: Progressing Towards Goal     Problem: Pressure Injury - Risk of  Goal: *Prevention of pressure injury  Description: Document Rene Scale and appropriate interventions in the flowsheet. Outcome: Progressing Towards Goal  Note: Pressure Injury Interventions:  Sensory Interventions: Assess changes in LOC    Moisture Interventions: Absorbent underpads    Activity Interventions: Increase time out of bed    Mobility Interventions: HOB 30 degrees or less    Nutrition Interventions: Document food/fluid/supplement intake    Friction and Shear Interventions: Minimize layers                Problem: Patient Education: Go to Patient Education Activity  Goal: Patient/Family Education  Outcome: Progressing Towards Goal     Problem: Risk for Spread of Infection  Goal: Prevent transmission of infectious organism to others  Description: Prevent the transmission of infectious organisms to other patients, staff members, and visitors. Outcome: Progressing Towards Goal     Problem: Patient Education:  Go to Education Activity  Goal: Patient/Family Education  Outcome: Progressing Towards Goal     Problem: Diabetes Self-Management  Goal: *Disease process and treatment process  Description: Define diabetes and identify own type of diabetes; list 3 options for treating diabetes.   Outcome: Progressing Towards Goal  Goal: *Incorporating nutritional management into lifestyle  Description: Describe effect of type, amount and timing of food on blood glucose; list 3 methods for planning meals. Outcome: Progressing Towards Goal  Goal: *Incorporating physical activity into lifestyle  Description: State effect of exercise on blood glucose levels. Outcome: Progressing Towards Goal  Goal: *Developing strategies to promote health/change behavior  Description: Define the ABC's of diabetes; identify appropriate screenings, schedule and personal plan for screenings. Outcome: Progressing Towards Goal  Goal: *Using medications safely  Description: State effect of diabetes medications on diabetes; name diabetes medication taking, action and side effects. Outcome: Progressing Towards Goal  Goal: *Monitoring blood glucose, interpreting and using results  Description: Identify recommended blood glucose targets  and personal targets. Outcome: Progressing Towards Goal  Goal: *Prevention, detection, treatment of acute complications  Description: List symptoms of hyper- and hypoglycemia; describe how to treat low blood sugar and actions for lowering  high blood glucose level. Outcome: Progressing Towards Goal  Goal: *Prevention, detection and treatment of chronic complications  Description: Define the natural course of diabetes and describe the relationship of blood glucose levels to long term complications of diabetes.   Outcome: Progressing Towards Goal  Goal: *Developing strategies to address psychosocial issues  Description: Describe feelings about living with diabetes; identify support needed and support network  Outcome: Progressing Towards Goal  Goal: *Insulin pump training  Outcome: Progressing Towards Goal  Goal: *Sick day guidelines  Outcome: Progressing Towards Goal  Goal: *Patient Specific Goal (EDIT GOAL, INSERT TEXT)  Outcome: Progressing Towards Goal     Problem: Patient Education: Go to Patient Education Activity  Goal: Patient/Family Education  Outcome: Progressing Towards Goal     Problem: Patient Education: Go to Patient Education Activity  Goal: Patient/Family Education  Outcome: Progressing Towards Goal

## 2022-04-11 NOTE — PROGRESS NOTES
Infectious Disease Progress Note           Subjective:   Assessed pt at bedside, doing well, denies new complaints. No acute events since last seen. Pt states she will like to be discharged home today   Objective:   Physical Exam:     Visit Vitals  /64 (BP 1 Location: Right upper arm, BP Patient Position: Lying;Semi fowlers)   Pulse 85   Temp 99 °F (37.2 °C)   Resp 16   Ht 5' 5.98\" (1.676 m)   Wt 183 lb 10.3 oz (83.3 kg)   SpO2 98%   BMI 29.66 kg/m²      O2 Device: None (Room air)    Temp (24hrs), Av.3 °F (36.8 °C), Min:97.5 °F (36.4 °C), Max:99 °F (37.2 °C)    701 - 1900  In: -   Out: 300 [Urine:300]   1901 - 700  In: 960 [P.O.:160; I.V.:800]  Out: -     General: NAD, alert, following commands   HEENT: MARGARETH, Moist mucosa   Lungs: CTA b/l, decreased at the bases, no wheeze/rhonchi   Heart: S1S2+, RRR, no murmur  Abdo: Soft, NT, ND, +BS   Exts: Trace edema, + pulses b/l   Skin: healing left labia/groin ulcer, minimal drainage, induration or fluctuance     Data Review:       Recent Days:  Recent Labs     04/10/22  0637   WBC 11.4*   HGB 12.0   HCT 37.5        Recent Labs     22  0646 04/10/22  0637   BUN  --  21*   CREA 0.85 0.76       Lab Results   Component Value Date/Time    C-Reactive protein 3.64 (H) 04/10/2022 06:37 AM          Microbiology     Results     Procedure Component Value Units Date/Time    CULTURE, Ashlie Lombardo STAIN [[id number]] Collected: 22 1030    Order Status: Completed Specimen: Wound from Swab Updated: 04/10/22 3039     Special Requests: No Special Requests        GRAM STAIN Rare WBCs seen         No organisms seen        Culture result: Light Yeast               Rare Streptococci, beta hemolyticgroup B Penicillin and ampicillin are drugs of choice for treatment of Beta-hemolytic streptococcal infections.  Susceptibility testing of Penicillins and Beta-Lactams approved by the FDA for treatment of Beta-hemolytic streptococcal infections need not be performed roUTInely, because nonsusceptible isolates are extremely rare. CLSI 2012          MRSA SCREEN - PCR (NASAL) [[id number]]  (Abnormal) Collected: 04/06/22 0411    Order Status: Completed Specimen: Swab Updated: 04/06/22 0545     MRSA by PCR, Nasal DETECTED        Comment: CALLED TO AND READ BACK BY Angel Wilde RN AT 5261 BY [facility]       CULTURE, BLOOD, PAIRED [[id number]]  (Abnormal) Collected: 04/06/22 0005    Order Status: Completed Specimen: Blood Updated: 04/08/22 1227     Special Requests: No Special Requests        Culture result:       Staphylococcus species, coagulase negative growing in 2 of 4 bottles drawn No Site Indicated plates held 3 days. Call microbiology lab if sensitivities are needed. (NOTE) CALLED [facility] IN 2 OF 4 BOTTLES TO GIOVANI COTTRELL MT AT 1110 ON 4.7.22 RB    CULTURE, Cleavon Schofield STAIN [[id number]] Collected: 04/06/22 0000    Order Status: Completed Specimen: Wound Drainage Updated: 04/08/22 0933     Special Requests: No Special Requests        GRAM STAIN 2+ WBCs seen         2+ Gram Negative Rods               1+ Gram Positive Cocci IN CHAINS            1+ Yeast        Culture result:       Heavy Streptococci, beta hemolyticgroup B Penicillin and ampicillin are drugs of choice for treatment of Beta-hemolytic streptococcal infections. Susceptibility testing of Penicillins and Beta-Lactams approved by the FDA for treatment of Beta-hemolytic streptococcal infections need not be performed roUTInely, because nonsusceptible isolates are extremely rare. CLSI 2012                  Heavy Staphylococcus species, coagulase negative                   Diagnostics   CXR Results  (Last 48 hours)    None             Assessment/Plan    1.  Left labia/groin ulcer, decreased drainage, no evidence of tissue necrosis or fluctuance        Coag neg staph isolated from Cx, Yeast, GNR and GBS isolated from repeat Cx        Healing ulcer, minimal drainage, no evidence of tissue necrosis, no tunneling        Continue on Zosyn while hospitalized, may transition to Doxycycline x 10 more days upon d/c, also recommend a 7 day course of Fluconazole for yeast coverage       Will follow in 2 wks post d/c         2. Coag neg staph bacteremia: likely skin contaminant, no need for directed therapy      3. DKA, blood BS better controlled, counseled on glycemic control upon d/c      4.  MRSA colonization: On day # 4 of mupirocin     Dispo: Cleared for d/c from ID stand point, discussed w Primary, Dr Vanessa Gaines MD    4/11/2022

## 2022-04-11 NOTE — DISCHARGE INSTRUCTIONS
INSTRUCTIONS ON DISCHARGE    (1) please take the following antibiotics:             FLUCONAZOLE 100mg daily for 7 days             DOXYCYCLINE 100mg twice a day for 10 days    (2) please continue to take PROTONIX 40 mg daily for reflux    (3) please follow-up with PCP in 1week, she may change your PROTONIX to FAMOTIDINE

## 2022-04-11 NOTE — ROUTINE PROCESS
Bedside and Verbal shift change report given to [id number] [place] [address] (oncoming nurse) by John Bishop RN (offgoing nurse). Report included the following information SBAR.

## 2022-04-11 NOTE — PROGRESS NOTES
OCCUPATIONAL THERAPY EVALUATION  Patient: Kristin Mccabe (10 y.o. female)  Date: 4/11/2022  Primary Diagnosis: DKA (diabetic ketoacidosis) ([facility] [address] [E11.10]        Precautions: fall risk       ASSESSMENT  Pt is a 81 y/o F with PMH of DM, HTN, and stroke presenting to [facility] with cc AMS and blood sugar >600, admitted 4/5/22 and being treated for DKA. CT head showed no acute finding with remote left frontal infarct. Pt received semi-supine in bed upon arrival, AXO to person and place (re-oriented to time) and agreeable to OT/PT evaluations. Per pt report, pt lives with spouse in a one-story home with ramped entrance, was IND with ADLs and ambulatory without AD at [facility]. Pt currently denies any fall hx, however per CM notes spouse reports pt \"falls a lot. \" DME: SPC, RW    Based on current observations, pt presents with deficits in generalized strength/AROM, bed mobility, dynamic sitting balance, static/dynamic standing balance, functional activity tolerance, cognition/confusion, and decreased safety awareness impacting overall performance of ADLs and functional transfers/mobility. Pt currently requires SBA rolling, min A HHA supine>sit and CGA sit><stand transfers to/from EOB and commode with gt belt donned, and cues for hand placement/safety with use of grab bar. Pt noted to be incontinent of urine upon standing and required min A for changing of clean gown, min A donning clean socks, OT assisted with LB bathing with bathing wipes. Pt completed anterior pericare s/p setup while seated and performed hand washing with CGA for balance while standing sink side. Pt left resting comfortably in recliner chair with call bell/needs in reach at end of evaluation today. Overall, pt tolerates session fair; she would benefit from continued skilled OT services to address current impairments and improve IND and safety with self cares and functional transfers/mobility.  Current OT d/c recommendation HHOT with 24/7 family care once medically appropriate; if 24/7 family care unable to be provided, would recommend SNF. Other factors to consider for discharge: family/social support, DME, time since onset, severity of deficits, decline from functional baseline     Patient will benefit from skilled therapy intervention to address the above noted impairments. PLAN :  Recommendations and Planned Interventions: self care training, functional mobility training, therapeutic exercise, balance training, therapeutic activities, cognitive retraining, endurance activities, neuromuscular re-education, patient education, home safety training and family training/education    Frequency/Duration: Patient will be followed by occupational therapy:  3-5x/week to address goals. Recommendation for discharge: (in order for the patient to meet his/her long term goals)  HHOT with 24/7 family care vs. SNF    This discharge recommendation:  Has been made in collaboration with the attending provider and/or case management    IF patient discharges home will need the following DME: patient owns DME required for discharge       SUBJECTIVE:   Patient stated I am ready to go home.     OBJECTIVE DATA SUMMARY:   HISTORY:   Past Medical History:   Diagnosis Date    Bladder cancer ([facility] [address]     Essential hypertension 6/41/0730    Non-alcoholic fatty liver disease 7/21/2020     Past Surgical History:   Procedure Laterality Date    HX CATARACT REMOVAL Left 07/23/2019    HX CHOLECYSTECTOMY      HX COLONOSCOPY  2002    HX HYSTERECTOMY      partial  20 yrs ago    HX TUBAL LIGATION         Expanded or extensive additional review of patient history:     Home Situation  Home Environment: Private residence  Wheelchair Ramp: Yes  One/Two Story Residence: One story  Living Alone: No  Support Systems: Spouse/Significant Other,Child(chapis)  Patient Expects to be Discharged to[de-identified] Skilled nursing facility  Current DME Used/Available at Home: Walker, rolling,Cane, straight      EXAMINATION OF PERFORMANCE DEFICITS:  Cognitive/Behavioral Status:  Neurologic State: Alert;Confused  Orientation Level: Oriented to person;Oriented to place; Disoriented to time  Cognition: Follows commands;Poor safety awareness               Hearing: Auditory  Auditory Impairment: None    Range of Motion:  AROM: Generally decreased, functional                         Strength:  Strength: Generally decreased, functional                Coordination:  Coordination: Generally decreased, functional  Fine Motor Skills-Upper: Left Intact; Right Intact    Gross Motor Skills-Upper: Left Intact; Right Intact      Balance:  Sitting: Impaired; Without support  Sitting - Static: Good (unsupported)  Sitting - Dynamic: Fair (occasional)  Standing: Impaired; Without support  Standing - Static: Fair;Occasional  Standing - Dynamic : Fair;Constant support    Functional Mobility and Transfers for ADLs:  Bed Mobility:  Rolling: Stand-by assistance  Supine to Sit: Minimum assistance  Scooting: Stand-by assistance    Transfers:  Sit to Stand: Contact guard assistance  Stand to Sit: Contact guard assistance  Bed to Chair: Contact guard assistance  Bathroom Mobility: Contact guard assistance  Toilet Transfer : Contact guard assistance      ADL Intervention and task modifications:       Grooming  Grooming Assistance: Contact guard assistance  Position Performed: Standing  Washing Hands: Contact guard assistance              Upper Body [address] Gown: Minimum  assistance    Lower Body Dressing Assistance  Socks: Minimum assistance  Position Performed: Long sitting on bed; Other (comment) (seated on commode)    Toileting  Toileting Assistance: Set-up; Stand-by assistance  Bladder Hygiene: Set-up; Stand-by assistance (Seated on commode)  Cues: Verbal cues provided  Adaptive Equipment: Grab bars         Therapeutic Exercise:  Pt would benefit from UE HEP to improve overall UE AROM/strength and can be further educated in next treatment session. Functional Measure:     [employer] AM-PACTM \"6 Clicks\"                                                       Daily Activity Inpatient Short Form  How much help from another person does the patient currently need. .. Total; A Lot A Little None   1. Putting on and taking off regular lower body clothing? []  1 []  2 [x]  3 []  4   2. Bathing (including washing, rinsing, drying)? []  1 []  2 [x]  3 []  4   3. Toileting, which includes using toilet, bedpan or urinal? [] 1 []  2 [x]  3 []  4   4. Putting on and taking off regular upper body clothing? []  1 []  2 [x]  3 []  4   5. Taking care of personal grooming such as brushing teeth? []  1 []  2 [x]  3 []  4   6. Eating meals? []  1 []  2 []  3 [x]  4   © , Trustees of [facility] [employer] [employer], under license to [employer]. All rights reserved     Score:      Interpretation of Tool:  Represents clinically-significant functional categories (i.e. Activities of daily living). Percentage of Impairment CH    0%   CI    1-19% CJ    20-39% CK    40-59% CL    60-79% CM    80-99% CN     100%   AMPA  Score 6-24 24 23 20-22 15-19 10-14 7-9 6         Occupational Therapy Evaluation Charge Determination   History Examination Decision-Making   LOW Complexity : Brief history review  LOW Complexity : 1-3 performance deficits relating to physical, cognitive , or psychosocial skils that result in activity limitations and / or participation restrictions  MEDIUM Complexity : Patient may present with comorbidities that affect occupational performnce.  Miniml to moderate modification of tasks or assistance (eg, physical or verbal ) with assesment(s) is necessary to enable patient to complete evaluation       Based on the above components, the patient evaluation is determined to be of the following complexity level: LOW   Pain Ratin/10    Activity Tolerance:   Fair    After treatment patient left in no apparent distress:    Sitting in chair, Heels elevated for pressure relief and Call bell within reach    COMMUNICATION/EDUCATION:   The patients plan of care was discussed with: Physical therapist, Registered nurse and Case management. Patient/family have participated as able in goal setting and plan of care. and Patient/family agree to work toward stated goals and plan of care. This patients plan of care is appropriate for delegation to [facility].     OT/PT sessions occurred together for increased patient and clinician safety    Thank you for this referral.  Winsome Wesley  Time Calculation: 25 mins   Problem: Self Care Deficits Care Plan (Adult)  Goal: *Acute Goals and Plan of Care (Insert Text)  Description: Pt will be IND sup <> sit in prep for EOB ADLs  Pt will be Mod I grooming standing sink side LRAD  Pt will be IND UB dressing sitting EOB/long sit   Pt will be IND LE dressing sitting EOB/long sit  Pt will be Mod I sit <>  prep for toileting LRAD  Pt will be Mod I toileting/toilet transfer/cloth mgmt LRAD  Pt will be IND following UE HEP in prep for self care tasks      Outcome: Not Met

## 2022-04-11 NOTE — PROGRESS NOTES
4230: Chart reviewed. Per MD notes, patient on IV ABX. PT/OT evals pending. CM will continue to follow patient and recs of medical team.    Current Dispo: Pending therapy recs    1400: Discharge summary noted. Order pending. OT recs noted. CM met with patient sitting in recliner for DCP. CM shared therapy recs for [facility] with [facility] care or SNF. Patient states she wants to go home and has had [facility] in the past. She does not recall the company name. Patient gave consent for CM to call her . ZACHARY telephoned patient's spouse, Neha Thurman [phone]. VM received. Message left. 1540: At this writing, ZACHARY has not received a return call from patient's spouse. Nurse aware.

## 2022-04-11 NOTE — DISCHARGE SUMMARY
Physician Discharge Summary     Patient ID:    Uvrashi Vásquez  [id number]  6 y.o.  1950    Admit date: 4/5/2022    Discharge date : 4/11/2022    Chronic Diagnoses:    Problem List as of 4/11/2022 Date Reviewed: 3/22/2022          Codes Class Noted - Resolved    DKA (diabetic ketoacidosis) ([facility] ICD-10-CM: E11.10  ICD-9-CM: 250.12  4/6/2022 - Present        Recurrent pain of right knee ICD-10-CM: M25.561  ICD-9-CM: 719.46  4/6/2022 - Present        Acquired hypothyroidism ICD-10-CM: E03.9  ICD-9-CM: 244.9  4/6/2022 - Present        Uncontrolled type 2 diabetes mellitus with hyperglycemia ([facility] ICD-10-CM: E11.65  ICD-9-CM: 250.02  3/22/2022 - Present        Major depressive disorder, recurrent, unspecified ICD-10-CM: F33.9  ICD-9-CM: 296.30  3/22/2022 - Present        At high risk for falls ICD-10-CM: Z91.81  ICD-9-CM: V15.88  3/22/2022 - Present        Essential hypertension ICD-10-CM: I10  ICD-9-CM: 401.9  7/21/2020 - Present        Non-alcoholic fatty liver disease ICD-10-CM: K76.0  ICD-9-CM: 571.8  7/21/2020 - Present          22    Final Diagnoses:   DKA (diabetic ketoacidosis) ([facility] [E11.10]    Reason for Hospitalization:  (1) Acute encephalopathy : GCS 13 s/t sepsis/ metabolic     (2) DKA: resolved,      (3) increased anion gap metabolic acidosis: s/t #2     (3) sepsis: CT pelvix- non-specific induration. MRSA nasal swab positive, awit BCx continue anc until [address] negative for MRSA     (4) HTN: on amlodipine     (5) RENZO on CKDIIIb: continue IVF      Hospital Course:   71F, h/o DMII on inuslin, with acute encephaloapthy s.t DKA in the setting of sepsis     HPI  80 y.o. female with PMH of diabetes on insulin (suspect noncompliance), hypertension and other medical problems as below.  She presented to the ED with altered mental status, with blood sugar reportedly over 600 yesterday.   [address]:   In ED, noted to be tachycardic.  WBC 14.7.  Urinalysis not indicative of UTI.  Chest x-ray unremarkable.  CT head showed no acute finding, with remote left frontal infarct.  Other lab work indicated of DKA. On 4/7 evening her DKA resolved and was switched to lantus. Continue cefepime and vanc until cx negative source is SSTI on left groin. ID consulted. She was then planned for discharge as follows. INSTRUCTIONS ON DISCHARGE    (1) please take the following antibiotics:             FLUCONAZOLE 100mg daily for 7 days             DOXYCYCLINE 100mg twice a day for 10 days    (2) please continue to take PROTONIX 40 mg daily for reflux    (3) please follow-up with PCP in 1week, she may change your PROTONIX to FAMOTIDINE        Discharge Medications:   Current Discharge Medication List      START taking these medications    Details   fluconazole (DIFLUCAN) 100 mg tablet Take 1 Tablet by mouth daily for 7 days. Qty: 7 Tablet, Refills: 0  Start date: 4/11/2022, End date: 4/18/2022      doxycycline (MONODOX) 100 mg capsule Take 1 Capsule by mouth two (2) times a day for 10 days. Qty: 20 Capsule, Refills: 0  Start date: 4/11/2022, End date: 4/21/2022      pantoprazole (Protonix) 40 mg tablet Take 1 Tablet by mouth daily for 30 days. Qty: 30 Tablet, Refills: 2  Start date: 4/11/2022, End date: 5/11/2022         CONTINUE these medications which have NOT CHANGED    Details   glucose blood VI test strips (OneTouch Ultra Test) strip Use to check blood glucose before meals and at bedtime. Qty: 120 Strip, Refills: 11  Start date: 4/5/2022    Associated Diagnoses: Type 2 diabetes mellitus with hyperglycemia, with long-term current use of insulin ([facility])      Blood-Glucose Meter (OneTouch Ultra2 Meter) misc Use to check blood glucose levels before meals and at bedtime. Qty: 1 Each, Refills: 0  Start date: 4/5/2022    Associated Diagnoses: Type 2 diabetes mellitus with hyperglycemia, with long-term current use of insulin ([facility])      metoprolol succinate (TOPROL-XL) 50 mg XL tablet Take 1 Tablet by mouth daily. Indications: high blood pressure  Qty: 90 Tablet, Refills: 1    Associated Diagnoses: Essential hypertension      amLODIPine (NORVASC) 5 mg tablet Take 1 Tablet by mouth daily. Indications: high blood pressure  Qty: 90 Tablet, Refills: 1    Associated Diagnoses: Essential hypertension      metFORMIN (GLUCOPHAGE) 1,000 mg tablet Take 1 Tablet by mouth two (2) times daily (with meals). Indications: type 2 diabetes mellitus  Qty: 180 Tablet, Refills: 1    Associated Diagnoses: Type 2 diabetes mellitus with hyperglycemia, unspecified whether long term insulin use (H[facility])      aspirin delayed-release 81 mg tablet Take 1 Tablet by mouth daily. Indications: treatment to prevent a heart attack  Qty: 90 Tablet, Refills: 3    Associated Diagnoses: Type 2 diabetes mellitus with hyperglycemia, unspecified whether long term insulin use ([facility])      Lancing Device with Lancets (Accu-Chek Multiclix Lancet) kit Use to check blood sugar once daily. Qty: 1 Kit, Refills: 0    Associated Diagnoses: Type 2 diabetes mellitus with hyperglycemia, unspecified whether long term insulin use ([facility])      lancets (Accu-Chek Multiclix Lancet) misc Use to check blood sugar once daily. Qty: 100 Each, Refills: 3    Associated Diagnoses: Type 2 diabetes mellitus with hyperglycemia, unspecified whether long term insulin use (HCC)      alcohol swabs (Alcohol Wipes) padm Use to check blood glucose once daily. Qty: 100 Pad, Refills: 3    Associated Diagnoses: Type 2 diabetes mellitus with hyperglycemia, unspecified whether long term insulin use (HCC)      venlafaxine-SR (EFFEXOR-XR) 37.5 mg capsule Take 1 Capsule by mouth daily. Indications: anxiousness associated with depression  Qty: 90 Capsule, Refills: 1    Associated Diagnoses: Anxiety with depression      metoprolol succinate 50 mg CSpX Take  by mouth. insulin NPH/insulin regular (NovoLIN 70/30 U-100 Insulin) 100 unit/mL (70-30) injection by SubCUTAneous route.  22 units in the AM and 42 units at PM         STOP taking these medications       glimepiride (AMARYL) 1 mg tablet Comments:   Reason for Stopping:         famotidine (Pepcid) 20 mg tablet Comments:   Reason for Stopping: Follow up Care:    1. Kayleigh Hill NP in 1-2 weeks. Please call to set up an appointment shortly after discharge. Diet:  diabetic    Disposition:  SNF/ HH    Advanced Directive:   FULL x   DNR      Discharge Exam:  Physical Exam:   General: alert, and having a converstion  Eye: conjunctivae/corneas clear. PERRL, EOM's intact. Throat and Neck: normal and no erythema or exudates noted. No mass   Lung: clear to auscultation bilaterally  Heart: regular rate and rhythm,   Abdomen: soft. Bowel sounds normal. No masses,  Extremities:  able to move all extremities normal, atraumatic  Skin: Few small scabs noted at left groin.   Neurologic: Omitted as patient is unable to cooperate.    Psychiatric: Omitted as patient is unable to cooperate    CONSULTATIONS:ID    Significant Diagnostic Studies:     Radiologic Studies -   Results from Hospital Encounter encounter on 04/05/22    XR CHEST PORT    Narrative  HISTORY:  Eval for Infiltrate    TECHNIQUE:  XR CHEST PORT    COMPARISON: March 2, 2017  LIMITATIONS: None    TUBES/LINES: Stimulator device projected at the level of the left heart    LUNG PARENCHYMA: Normal  TRACHEA/BRONCHI: Normal  PULMONARY VESSELS: Normal  PLEURA: Normal  HEART: Normal  AORTIC SHADOW:Normal.  MEDIASTINUM: Normal  BONE/SOFT TISSUES: No acute abnormality. OTHER: None    Impression  No acute cardiopulmonary abnormality. .     CT Results  (Last 48 hours)    None              Discharge time spent 35 minutes    Signed:  Mike Dumont MD  4/11/2022  9:48 AM

## 2022-04-11 NOTE — PROGRESS NOTES
PHYSICAL THERAPY EVALUATION  Patient: Jessica Irene (65 y.o. female)  Date: 4/11/2022  Primary Diagnosis: DKA (diabetic ketoacidosis) ([facility] [address] [E11.10]        Precautions: falls, contact       ASSESSMENT  Pt is a 79 yo female admitted on 4/5/2022 for AMS and blood sugar >600; pt currently being treated for DKA. CT head showed no acute finding with remote left frontal infarct. PMH: DM, HTN, and stroke. Pt semi-supine in bed upon arrival, AXO to person and place (re-oriented to time) and agreeable to PT/OT evaluations. Per pt report, pt lives with spouse in a one-story home with ramped entrance, was IND with ADLs and ambulatory without AD at [facility]. Pt currently denies any fall hx, however per CM notes spouse reports pt \"falls a lot. \" DME: SPC, RW. Based on the objective data described below, the patient presents with generalized weakness, impaired functional mobility, impaired amb, impaired balance, and decreased activity tolerance, poor safety awareness, and poor insight into deficits. Pt required SBA for bed mobility, CGA supine <> sit, CGA sit <> stand transfers. Pt amb 15 feet x 2 with gt belt, no AD, and CGA; demonstrating slow, steady, step to gt pattern with no LOB or knee buckling noted. Pt did fair with session today with incontinence of urine noted upon standing and amb to bathroom, PT/OT assisted with room, linen and sock change for pt. Pt will benefit from continued skilled PT to address above deficits and return to PLOF. Current PT DC recommendation SNF due to increased need for assistance and poor insight into deficits, however pt declining SNF at this time, if DC home will require HHPT and 24/7 care.      Current Level of Function Impacting Discharge (mobility/balance): SBA to CGA    Other factors to consider for discharge: severity of deficits      PLAN :  Recommendations and Planned Interventions: bed mobility training, transfer training, gait training, therapeutic exercises, neuromuscular re-education, patient and family training/education and therapeutic activities      Frequency/Duration: Patient will be followed by physical therapy:  3-5x/week to address goals. Recommendation for discharge: (in order for the patient to meet his/her long term goals)  Reji Leal    This discharge recommendation:  Has been made in collaboration with the attending provider and/or case management    IF patient discharges home will need the following DME: none         SUBJECTIVE:   Patient stated [place] just want to go home, my  is there with me.     OBJECTIVE DATA SUMMARY:   HISTORY:    Past Medical History:   Diagnosis Date    Bladder cancer ([facility] [address]     Essential hypertension 8/80/3282    Non-alcoholic fatty liver disease 7/21/2020     Past Surgical History:   Procedure Laterality Date    HX CATARACT REMOVAL Left 07/23/2019    HX CHOLECYSTECTOMY      HX COLONOSCOPY  2002    HX HYSTERECTOMY      partial  20 yrs ago    HX TUBAL LIGATION         Home Situation  Home Environment: Private residence  Wheelchair Ramp: Yes  One/Two Story Residence: One story  Living Alone: No  Support Systems: Spouse/Significant Other,Child(chapis)  Patient Expects to be Discharged to[de-identified] Skilled nursing facility  Current DME Used/Available at Home: Alline Lowe, rolling,Cane, straight    EXAMINATION/PRESENTATION/DECISION MAKING:   Critical Behavior:  Neurologic State: Alert,Confused  Orientation Level: Oriented to person,Oriented to place,Disoriented to time  Cognition: Follows commands,Poor safety awareness     Hearing:   Auditory  Auditory Impairment: None  Skin:  Intact where visible  Edema: none noted   Range Of Motion:  AROM: Generally decreased, functional                       Strength:    Strength: Generally decreased, functional                    Tone & Sensation:                                  Coordination:  Coordination: Generally decreased, functional  Vision:      Functional Mobility:  Bed Mobility:  Rolling: Stand-by assistance  Supine to Sit: Minimum assistance     Scooting: Stand-by assistance  Transfers:  Sit to Stand: Contact guard assistance  Stand to Sit: Contact guard assistance        Bed to Chair: Contact guard assistance              Balance:   Sitting: Impaired; Without support  Sitting - Static: Good (unsupported)  Sitting - Dynamic: Fair (occasional)  Standing: Impaired; Without support  Standing - Static: Fair;Occasional  Standing - Dynamic : Fair;Constant support  Ambulation/Gait Training:  Distance (ft): 30 Feet (ft)  Assistive Device: Gait belt  Ambulation - Level of Assistance: Contact guard assistance     Gait Description (WDL): Exceptions to WDL           Base of Support: Widened     Speed/Jackie: Slow;Shuffled      Functional Measure:  [facility] [address] [address] AM-PAC 6 Clicks         Basic Mobility Inpatient Short Form  How much difficulty does the patient currently have. .. Unable A Lot A Little None   1. Turning over in bed (including adjusting bedclothes, sheets and blankets)? [] 1   [] 2   [x] 3   [] 4   2. Sitting down on and standing up from a chair with arms ( e.g., wheelchair, bedside commode, etc.)   [] 1   [] 2   [x] 3   [] 4   3. Moving from lying on back to sitting on the side of the bed? [] 1   [] 2   [x] 3   [] 4          How much help from another person does the patient currently need. .. Total A Lot A Little None   4. Moving to and from a bed to a chair (including a wheelchair)? [] 1   [] 2   [x] 3   [] 4   5. Need to walk in hospital room? [] 1   [] 2   [x] 3   [] 4   6. Climbing 3-5 steps with a railing? [] 1   [] 2   [x] 3   [] 4   © 2007, Trustees of [facility] [address] [address], under license to [employer]. All rights reserved     Score:  Initial: 18/24 Most Recent: X (Date: 4/11/22 )   Interpretation of Tool:  Represents activities that are increasingly more difficult (i.e. Bed mobility, Transfers, Gait).   Score 24 23 22-20 19-15 14-10 9-7 6   Modifier CH CI CJ CK CL CM CN Physical Therapy Evaluation Charge Determination   History Examination Presentation Decision-Making   HIGH Complexity :3+ comorbidities / personal factors will impact the outcome/ POC  HIGH Complexity : 4+ Standardized tests and measures addressing body structure, function, activity limitation and / or participation in recreation  LOW Complexity : Stable, uncomplicated  Other outcome measures ampac 6  mod      Based on the above components, the patient evaluation is determined to be of the following complexity level: LOW     Pain Ratin/10 reported    Activity Tolerance:   Fair and requires rest breaks    After treatment patient left in no apparent distress:   Sitting in chair and Call bell within reach and nsg updated. GOALS:    Problem: Mobility Impaired (Adult and Pediatric)  Goal: *Acute Goals and Plan of Care (Insert Text)  Description: Pt will be I with LE HEP in 7 days. Pt will perform bed mobility with mod I in 7 days. Pt will perform transfers with mod I in 7 days. Pt will amb  feet with LRAD safely with mod I in 7 days. Outcome: Not Met       COMMUNICATION/EDUCATION:   The patients plan of care was discussed with: Occupational therapist, Registered nurse, and Case management. Fall prevention education was provided and the patient/caregiver indicated understanding., Patient/family have participated as able in goal setting and plan of care. , and Patient/family agree to work toward stated goals and plan of care. PT/OT sessions occurred together for increased safety of pt and clinician.        Thank you for this referral.  Amelia Rothman, PT, DPT   Time Calculation: 21 mins

## 2022-04-12 VITALS
HEIGHT: 66 IN | HEART RATE: 79 BPM | OXYGEN SATURATION: 98 % | TEMPERATURE: 97.8 F | WEIGHT: 183.64 LBS | RESPIRATION RATE: 16 BRPM | DIASTOLIC BLOOD PRESSURE: 62 MMHG | SYSTOLIC BLOOD PRESSURE: 115 MMHG | BODY MASS INDEX: 29.51 KG/M2

## 2022-04-12 LAB
GLUCOSE BLD STRIP.AUTO-MCNC: 138 MG/DL (ref 65–117)
GLUCOSE BLD STRIP.AUTO-MCNC: 217 MG/DL (ref 65–117)
GLUCOSE BLD STRIP.AUTO-MCNC: 323 MG/DL (ref 65–117)
GLUCOSE BLD STRIP.AUTO-MCNC: 403 MG/DL (ref 65–117)
PERFORMED BY, TECHID: ABNORMAL

## 2022-04-12 PROCEDURE — 74011250636 HC RX REV CODE- 250/636: Performed by: INTERNAL MEDICINE

## 2022-04-12 PROCEDURE — 74011000250 HC RX REV CODE- 250: Performed by: HOSPITALIST

## 2022-04-12 PROCEDURE — 74011250637 HC RX REV CODE- 250/637: Performed by: INTERNAL MEDICINE

## 2022-04-12 PROCEDURE — 92526 ORAL FUNCTION THERAPY: CPT

## 2022-04-12 PROCEDURE — 74011250637 HC RX REV CODE- 250/637: Performed by: HOSPITALIST

## 2022-04-12 PROCEDURE — 74011000250 HC RX REV CODE- 250: Performed by: INTERNAL MEDICINE

## 2022-04-12 PROCEDURE — 97116 GAIT TRAINING THERAPY: CPT

## 2022-04-12 PROCEDURE — 82962 GLUCOSE BLOOD TEST: CPT

## 2022-04-12 PROCEDURE — 99232 SBSQ HOSP IP/OBS MODERATE 35: CPT | Performed by: INTERNAL MEDICINE

## 2022-04-12 PROCEDURE — 97110 THERAPEUTIC EXERCISES: CPT

## 2022-04-12 PROCEDURE — 74011636637 HC RX REV CODE- 636/637: Performed by: HOSPITALIST

## 2022-04-12 RX ORDER — LINEZOLID 600 MG/1
600 TABLET, FILM COATED ORAL EVERY 12 HOURS
Status: DISCONTINUED | OUTPATIENT
Start: 2022-04-12 | End: 2022-04-13 | Stop reason: HOSPADM

## 2022-04-12 RX ADMIN — METOPROLOL SUCCINATE 50 MG: 50 TABLET, FILM COATED, EXTENDED RELEASE ORAL at 09:01

## 2022-04-12 RX ADMIN — INSULIN LISPRO 4 UNITS: 100 INJECTION, SOLUTION INTRAVENOUS; SUBCUTANEOUS at 12:32

## 2022-04-12 RX ADMIN — AMLODIPINE BESYLATE 10 MG: 5 TABLET ORAL at 09:01

## 2022-04-12 RX ADMIN — ACETAMINOPHEN 650 MG: 325 TABLET ORAL at 03:11

## 2022-04-12 RX ADMIN — VENLAFAXINE HYDROCHLORIDE 37.5 MG: 37.5 CAPSULE, EXTENDED RELEASE ORAL at 09:01

## 2022-04-12 RX ADMIN — HYDRALAZINE HYDROCHLORIDE 100 MG: 50 TABLET, FILM COATED ORAL at 16:41

## 2022-04-12 RX ADMIN — ASPIRIN 81 MG: 81 TABLET, COATED ORAL at 09:01

## 2022-04-12 RX ADMIN — WATER 1 G: 1 INJECTION INTRAMUSCULAR; INTRAVENOUS; SUBCUTANEOUS at 09:01

## 2022-04-12 RX ADMIN — FLUCONAZOLE 100 MG: 100 TABLET ORAL at 09:01

## 2022-04-12 RX ADMIN — MUPIROCIN: 20 OINTMENT TOPICAL at 09:01

## 2022-04-12 RX ADMIN — SODIUM CHLORIDE, PRESERVATIVE FREE 10 ML: 5 INJECTION INTRAVENOUS at 14:23

## 2022-04-12 RX ADMIN — PANTOPRAZOLE SODIUM 40 MG: 40 TABLET, DELAYED RELEASE ORAL at 09:09

## 2022-04-12 RX ADMIN — WATER 1 G: 1 INJECTION INTRAMUSCULAR; INTRAVENOUS; SUBCUTANEOUS at 00:55

## 2022-04-12 RX ADMIN — WATER 1 G: 1 INJECTION INTRAMUSCULAR; INTRAVENOUS; SUBCUTANEOUS at 16:41

## 2022-04-12 RX ADMIN — INSULIN LISPRO 10 UNITS: 100 INJECTION, SOLUTION INTRAVENOUS; SUBCUTANEOUS at 17:19

## 2022-04-12 RX ADMIN — ACETAMINOPHEN 650 MG: 325 TABLET ORAL at 13:25

## 2022-04-12 RX ADMIN — ENOXAPARIN SODIUM 40 MG: 100 INJECTION SUBCUTANEOUS at 09:01

## 2022-04-12 RX ADMIN — HYDRALAZINE HYDROCHLORIDE 100 MG: 50 TABLET, FILM COATED ORAL at 09:01

## 2022-04-12 RX ADMIN — SODIUM CHLORIDE, PRESERVATIVE FREE 10 ML: 5 INJECTION INTRAVENOUS at 05:09

## 2022-04-12 NOTE — ROUTINE PROCESS
Name and  confirmed with patient. Vitals at patients baseline. BS elevated at this time, but treating with scheduled insulin. Patient discharging to [facility] room 114B. CM informed patients  of discharge. [facility] set to pick patient up around 1900. IV removed from patient. Belonging packed up and at bedside. Report called to Orly GUILLEN at [facility]. Discharge plan of care/case management plan validated with provider discharge order.

## 2022-04-12 NOTE — PROGRESS NOTES
Problem: Falls - Risk of  Goal: *Absence of Falls  Description: Document Aleksandra Bonds Fall Risk and appropriate interventions in the flowsheet. Outcome: Progressing Towards Goal  Note: Fall Risk Interventions:  Mobility Interventions: PT Consult for assist device competence    Mentation Interventions: Door open when patient unattended,More frequent rounding    Medication Interventions: Bed/chair exit alarm    Elimination Interventions: Call light in reach    History of Falls Interventions: Bed/chair exit alarm,Door open when patient unattended         Problem: Patient Education: Go to Patient Education Activity  Goal: Patient/Family Education  Outcome: Progressing Towards Goal     Problem: Pressure Injury - Risk of  Goal: *Prevention of pressure injury  Description: Document Rene Scale and appropriate interventions in the flowsheet. Outcome: Progressing Towards Goal  Note: Pressure Injury Interventions:  Sensory Interventions: Minimize linen layers    Moisture Interventions: Absorbent underpads    Activity Interventions: Increase time out of bed    Mobility Interventions: PT/OT evaluation    Nutrition Interventions: Document food/fluid/supplement intake    Friction and Shear Interventions: HOB 30 degrees or less                Problem: Patient Education: Go to Patient Education Activity  Goal: Patient/Family Education  Outcome: Progressing Towards Goal     Problem: Risk for Spread of Infection  Goal: Prevent transmission of infectious organism to others  Description: Prevent the transmission of infectious organisms to other patients, staff members, and visitors. Outcome: Progressing Towards Goal     Problem: Patient Education:  Go to Education Activity  Goal: Patient/Family Education  Outcome: Progressing Towards Goal     Problem: Diabetes Self-Management  Goal: *Disease process and treatment process  Description: Define diabetes and identify own type of diabetes; list 3 options for treating diabetes.   Outcome: Progressing Towards Goal  Goal: *Incorporating nutritional management into lifestyle  Description: Describe effect of type, amount and timing of food on blood glucose; list 3 methods for planning meals. Outcome: Progressing Towards Goal  Goal: *Incorporating physical activity into lifestyle  Description: State effect of exercise on blood glucose levels. Outcome: Progressing Towards Goal  Goal: *Developing strategies to promote health/change behavior  Description: Define the ABC's of diabetes; identify appropriate screenings, schedule and personal plan for screenings. Outcome: Progressing Towards Goal  Goal: *Using medications safely  Description: State effect of diabetes medications on diabetes; name diabetes medication taking, action and side effects. Outcome: Progressing Towards Goal  Goal: *Monitoring blood glucose, interpreting and using results  Description: Identify recommended blood glucose targets  and personal targets. Outcome: Progressing Towards Goal  Goal: *Prevention, detection, treatment of acute complications  Description: List symptoms of hyper- and hypoglycemia; describe how to treat low blood sugar and actions for lowering  high blood glucose level. Outcome: Progressing Towards Goal  Goal: *Prevention, detection and treatment of chronic complications  Description: Define the natural course of diabetes and describe the relationship of blood glucose levels to long term complications of diabetes.   Outcome: Progressing Towards Goal  Goal: *Developing strategies to address psychosocial issues  Description: Describe feelings about living with diabetes; identify support needed and support network  Outcome: Progressing Towards Goal  Goal: *Insulin pump training  Outcome: Progressing Towards Goal  Goal: *Sick day guidelines  Outcome: Progressing Towards Goal  Goal: *Patient Specific Goal (EDIT GOAL, INSERT TEXT)  Outcome: Progressing Towards Goal     Problem: Patient Education: Go to Patient Education Activity  Goal: Patient/Family Education  Outcome: Progressing Towards Goal     Problem: Patient Education: Go to Patient Education Activity  Goal: Patient/Family Education  Outcome: Progressing Towards Goal     Problem: Patient Education: Go to Patient Education Activity  Goal: Patient/Family Education  Outcome: Progressing Towards Goal     Problem: Patient Education: Go to Patient Education Activity  Goal: Patient/Family Education  Outcome: Progressing Towards Goal

## 2022-04-12 NOTE — PROGRESS NOTES
Infectious Disease Progress Note           Subjective:   Stable, awaiting SNF placement for d/c, no acute events since last seen. Denies new complaints   Objective:   Physical Exam:     Visit Vitals  /62 (BP 1 Location: Right upper arm, BP Patient Position: At rest)   Pulse 79   Temp 97.8 °F (36.6 °C)   Resp 16   Ht 5' 5.98\" (1.676 m)   Wt 183 lb 10.3 oz (83.3 kg)   SpO2 98%   BMI 29.66 kg/m²      O2 Device: None (Room air)    Temp (24hrs), Av.8 °F (36.6 °C), Min:97.7 °F (36.5 °C), Max:98 °F (36.7 °C)    No intake/output data recorded. 04/10 1901 -  0700  In: -   Out: 300 [Urine:300]    General: NAD, alert, following commands   HEENT: MARGARETH, Moist mucosa   Lungs: CTA b/l, decreased at the bases, no wheeze/rhonchi   Heart: S1S2+, RRR, no murmur  Abdo: Soft, NT, ND, +BS   Exts: Trace edema, + pulses b/l   Skin: healing left labia/groin ulcer, minimal drainage, induration or fluctuance     Data Review:       Recent Days:  Recent Labs     04/10/22  0637   WBC 11.4*   HGB 12.0   HCT 37.5        Recent Labs     22  0646 04/10/22  0637   BUN  --  21*   CREA 0.85 0.76       Lab Results   Component Value Date/Time    C-Reactive protein 3.64 (H) 04/10/2022 06:37 AM          Microbiology     Results     Procedure Component Value Units Date/Time    CULTURE, Lonnie Narvaez STAIN [[id number]] Collected: 22 1030    Order Status: Completed Specimen: Wound from Swab Updated: 04/10/22 1253     Special Requests: No Special Requests        GRAM STAIN Rare WBCs seen         No organisms seen        Culture result: Light Yeast               Rare Streptococci, beta hemolyticgroup B Penicillin and ampicillin are drugs of choice for treatment of Beta-hemolytic streptococcal infections.  Susceptibility testing of Penicillins and Beta-Lactams approved by the FDA for treatment of Beta-hemolytic streptococcal infections need not be performed roUTInely, because nonsusceptible isolates are extremely rare. CLSI 2012          MRSA SCREEN - PCR (NASAL) [[id number]]  (Abnormal) Collected: 04/06/22 0411    Order Status: Completed Specimen: Swab Updated: 04/06/22 0545     MRSA by PCR, Nasal DETECTED        Comment: CALLED TO AND READ BACK BY Hollis Au RN AT [phone] BY [facility] [place] [facility]       CULTURE, BLOOD, PAIRED [[id number]]  (Abnormal) Collected: 04/06/22 0005    Order Status: Completed Specimen: Blood Updated: 04/08/22 1227     Special Requests: No Special Requests        Culture result:       Staphylococcus species, coagulase negative growing in 2 of 4 bottles drawn No Site Indicated plates held 3 days. Call microbiology lab if sensitivities are needed. (NOTE) CALLED [facility] IN 2 OF 4 BOTTLES TO GIOVANI COTTRELL MT AT 1110 ON 4.7.22 RB    CULTURE, Kindra Lagos STAIN [[id number]] Collected: 04/06/22 0000    Order Status: Completed Specimen: Wound Drainage Updated: 04/08/22 0933     Special Requests: No Special Requests        GRAM STAIN 2+ WBCs seen         2+ Gram Negative Rods               1+ Gram Positive Cocci IN CHAINS            1+ Yeast        Culture result:       Heavy Streptococci, beta hemolyticgroup B Penicillin and ampicillin are drugs of choice for treatment of Beta-hemolytic streptococcal infections. Susceptibility testing of Penicillins and Beta-Lactams approved by the FDA for treatment of Beta-hemolytic streptococcal infections need not be performed roUTInely, because nonsusceptible isolates are extremely rare. CLSI 2012                  Heavy Staphylococcus species, coagulase negative                   Diagnostics   CXR Results  (Last 48 hours)    None             Assessment/Plan    1.  Left labia/groin ulcer, decreased drainage, no evidence of tissue necrosis or fluctuance        Coag neg staph isolated from Cx, Yeast, GNR and GBS isolated from repeat Cx        Afebrile, routine labs not done today        D/c Cefepime, start on linezolid for 7 more days, continue on Fluconazole May d/c on Linezolid not Doxycycline as previously indicated        Routine labs in the morning          2. Coag neg staph bacteremia: likely skin contaminant, no need for directed therapy      3. DKA, blood BS better controlled, counseled on glycemic control upon d/c      4.  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

## 2022-04-12 NOTE — PROGRESS NOTES
Problem: Falls - Risk of  Goal: *Absence of Falls  Description: Document Roseanna Samayoa Fall Risk and appropriate interventions in the flowsheet. Outcome: Progressing Towards Goal  Note: Fall Risk Interventions:  Mobility Interventions: Bed/chair exit alarm    Mentation Interventions: Bed/chair exit alarm    Medication Interventions: Bed/chair exit alarm    Elimination Interventions: Call light in reach    History of Falls Interventions: Bed/chair exit alarm         Problem: Pressure Injury - Risk of  Goal: *Prevention of pressure injury  Description: Document Rene Scale and appropriate interventions in the flowsheet.   Outcome: Progressing Towards Goal  Note: Pressure Injury Interventions:  Sensory Interventions: Minimize linen layers    Moisture Interventions: Absorbent underpads    Activity Interventions: Increase time out of bed    Mobility Interventions: HOB 30 degrees or less    Nutrition Interventions: Document food/fluid/supplement intake    Friction and Shear Interventions: HOB 30 degrees or less

## 2022-04-12 NOTE — PROGRESS NOTES
PHYSICAL THERAPY TREATMENT  Patient: Kayla Rolle (79 y.o. female)  Date: 4/12/2022  Diagnosis: DKA (diabetic ketoacidosis) ([facility] [address] [E11.10] <principal problem not specified>       Precautions:    Chart, physical therapy assessment, plan of care and goals were reviewed. ASSESSMENT  Patient continues with skilled PT services and is progressing towards goals. Patient seen ambulating out of bathroom without AD at beginning of session and agreed to therapy session today. Patient was AXO times 3 ( person place situation but not time). Patient ambulated in room at [facility] for 60 feet using furniture surfing to maintain balance. . Patient had no LOB or knee buckling with gait but did have increased lateral sway with ambulation and limited step through gait pattern. Patient denied using a RW at home and stated that she did not want to use one in hospital.  Patient also denied using any AD at home. Patient sat EOB at [facility] for stand> sit and performed seated TE ( see details below). Patient had limited participation with TE needing constant VC to perform entire set of exercises and perform full ROM/ control speed of exercises. patient performed SPT from EOB to bedside chair at [facility]. Patient left seated in chair with call bell within reach and all needs meet. Current Level of Function Impacting Discharge (mobility/balance): general weakness, poor activity tolerance    Other factors to consider for discharge: PLOF, assistance at home, reduced desire to partake in therapy. PMH, level of deficits. PLAN :  Patient continues to benefit from skilled intervention to address the above impairments. Continue treatment per established plan of care. to address goals.     Recommendation for discharge: (in order for the patient to meet his/her long term goals)  Home with [address] [place] [address] and 24/7 care vs SNF depending on how patient progresses    This discharge recommendation:  Has been made in collaboration with the attending provider and/or case management    IF patient discharges home will need the following DME: rolling walker       SUBJECTIVE:   Patient stated Toni Richardson want to go home, I tried of being here.     OBJECTIVE DATA SUMMARY:   Critical Behavior:  Neurologic State: Alert,Confused  Orientation Level: Disoriented to time,Oriented to person,Oriented to place,Oriented to situation  Cognition: Follows commands,Poor safety awareness     Functional Mobility Training:  Transfers:  Sit to Stand: Stand-by assistance  Stand to Sit: Stand-by assistance  Bed to Chair: Stand-by assistance  Balance:  Sitting: Intact; Without support  Sitting - Static: Good (unsupported)  Sitting - Dynamic: Good (unsupported)  Standing: Impaired; Without support  Standing - Static: Fair;Occasional  Standing - Dynamic : Fair;Occasional  Ambulation/Gait Training:  Distance (ft): 60 Feet (ft)  Assistive Device: Gait belt  Ambulation - Level of Assistance: Contact guard assistance  Base of Support: Narrowed  Speed/Jackie: Shuffled; Slow    Therapeutic Exercises:   Patient had reduced participation in TE and needed constant VC to keep performing exercises  1x20 AP  1x20 LAQ  1x20 seated marches  1x20 hip ADD/ABD  Pain Rating:  3/10 in low back from sitting all day     Activity Tolerance:   Fair  Please refer to the flowsheet for vital signs taken during this treatment. After treatment patient left in no apparent distress:   Sitting in chair and Call bell within reach    COMMUNICATION/COLLABORATION:   The patients plan of care was discussed with: Registered nurse. Problem: Mobility Impaired (Adult and Pediatric)  Goal: *Acute Goals and Plan of Care (Insert Text)  Description: Pt will be I with LE HEP in 7 days. Pt will perform bed mobility with mod I in 7 days. Pt will perform transfers with mod I in 7 days. Pt will amb  feet with LRAD safely with mod I in 7 days.        Outcome: Progressing Towards Goal       [place], PTA   Time Calculation: 23 mins

## 2022-04-12 NOTE — PROGRESS NOTES
Progress Note    Patient: Claudia Luna MRN: [id number]  SSN: [id number]    YOB: 1950  Age: 80 y.o. Sex: female      Admit Date: 4/5/2022    LOS: 6 days     Subjective:     71F, h/o DMII on inuslin, with acute encephaloapthy s.t DKA in the setting of sepsis       HOSPITAL COURSE:   In ED, noted to be tachycardic.  WBC 14.7. Urinalysis not indicative of UTI.  Chest x-ray unremarkable.  CT head showed no acute finding, with remote left frontal infarct.  Other lab work indicated of DKA. On 4/7 evening her DKA resolved and was switched to lantus. Continue cefepime and vanc until cx negative source is SSTI on left groin. ID consulted. She was then planned for discharge when SNF available    Dc summary on 4/11    Review of Systems:  Cannot be assessed due to mental status      Objective:     Vitals:    04/11/22 1429 04/11/22 1919 04/12/22 0324 04/12/22 0909   BP: 128/60 122/69 133/67 134/75   Pulse: 81 81 89 64   Resp: 18 19 20 16   Temp: 98.6 °F (37 °C) 97.7 °F (36.5 °C) 98 °F (36.7 °C) 97.7 °F (36.5 °C)   SpO2: 96% 98% 96% 99%   Weight:       Height:            Intake and Output:  Current Shift: No intake/output data recorded. Last three shifts: 04/10 1901 - 04/12 0700  In: -   Out: 300 [Urine:300]      Physical Exam:   General: Confused, noncooperative. Eye: conjunctivae/corneas clear. PERRL, EOM's intact. Throat and Neck: normal and no erythema or exudates noted. No mass   Lung: clear to auscultation bilaterally  Heart: regular rate and rhythm,   Abdomen: soft. Bowel sounds normal. No masses,  Extremities:  able to move all extremities normal, atraumatic  Skin: Few small scabs noted at left groin. Neurologic: Omitted as patient is unable to cooperate. Psychiatric: Omitted as patient is unable to cooperate.         Lab/Data Review:  Recent Results (from the past 24 hour(s))   GLUCOSE, POC    Collection Time: 04/11/22  4:29 PM   Result Value Ref Range    Glucose (POC) 39 (LL) 65 - 117 mg/dL Performed by Marino Cruz    GLUCOSE, POC    Collection Time: 04/11/22  4:31 PM   Result Value Ref Range    Glucose (POC) 118 (H) 65 - 117 mg/dL    Performed by Lenny Rodriguez, POC    Collection Time: 04/11/22  9:01 PM   Result Value Ref Range    Glucose (POC) 110 65 - 117 mg/dL    Performed by [address] [place] [address], POC    Collection Time: 04/12/22  8:21 AM   Result Value Ref Range    Glucose (POC) 138 (H) 65 - 117 mg/dL    Performed by Phyllis Perdomo    GLUCOSE, POC    Collection Time: 04/12/22 12:01 PM   Result Value Ref Range    Glucose (POC) 217 (H) 65 - 117 mg/dL    Performed by Phyllis Perdomo          Assessment and plan:        (1) Acute encephalopathy : GCS 13 s/t sepsis/ metabolic     (2) DKA: resolved,      (3) increased anion gap metabolic acidosis: s/t #2     (3) sepsis: CT pelvix- non-specific induration.  MRSA nasal swab positive, awit BCx continue anc until [address] negative for MRSA     (4) HTN: on amlodipine     (5) RENZO on CKDIIIb: continue IVF    DISPO: SNF, discussed with spouse    Signed By: Gregorio Narvaez MD     April 12, 2022

## 2022-04-12 NOTE — PROGRESS NOTES
Problem: Dysphagia (Adult)  Goal: *Acute Goals and Plan of Care (Insert Text)  Description: Speech Therapy Goals  Initiated 4/8/2022  -Patient will participate in modified barium swallow study within 7 day(s), pending pt's progress. [ ] Not met  [ ]  MET   [ ] Progressing  [x ] Discontinue  -Patient will tolerate minced and moist diet with mildly/nectar thick liquids without signs/symptoms of aspiration given minimal cues within 7 day(s). [ ] Not met  [ x]  MET   [ ] Progressing  [ ] Media Camera  -Patient will demonstrate understanding of swallow safety precautions and aspiration precautions, diet recs with minimal cues within 7 day(s). [ ] Not met  [ ]  MET   [x ] Progressing  [ ] Discontinue    Outcome: Progressing Towards Goal   SPEECH LANGUAGE PATHOLOGY DYSPHAGIA TREATMENT  Patient: Karlo Zuleta [de-identified]80 y.o. female)  Date: 4/12/2022  Diagnosis: DKA (diabetic ketoacidosis) ([facility]) [E11.10] <principal problem not specified>       Precautions: aspiration      ASSESSMENT:  Pt seen for follow-up, upright in chair upon clinician entry. Pt reports no difficulty with swallowing aside from sore throat, pain with swallow. Some altered mentation is noted. Pt given trials of thin via cup/straw, puree and hard solids. Pt is edentulous. Mildly prolonged mastication otherwise oral phase WNL. Pharyngeal phase with min delay, WNL once initiated. Pt tolerates trials without overt s/s aspiration and demonstrates independent single sips. PLAN:  Recommendations and Planned Interventions: At this time, recommend diet upgrade to soft and bite size/thin with aspiration and GERD precautions, meds as tolerated. MD made aware of sore throat reported by pt. Pt is pending possible dc to IRF, recommend complete cog-ling assessment in IRF setting. Patient continues to benefit from skilled intervention to address the above impairments. Continue treatment per established plan of care.   Frequency/Duration: Patient will be followed by speech-language pathology 5 times a week to address goals. Discharge Recommendations:   Pt is pending possible d/c to IRF     SUBJECTIVE:   Patient alert, agreeable, pleasant. OBJECTIVE:     Cognitive and Communication Status:  Neurologic State: Alert,Confused  Orientation Level: Disoriented to time,Oriented to person,Oriented to place  Cognition: Follows commands,Poor safety awareness      Pain:  Pain Scale 1: Numeric (0 - 10)  Pain Intensity 1: 4  Pain Location 1: Head    After treatment:   Patient left in no apparent distress sitting up in chair, Call bell within reach and Nursing notified    COMMUNICATION/EDUCATION:   Patient was educated regarding her deficit(s) of dysphagia, swallow safety precautions, diet recs and POC. She demonstrated Fair understanding as evidenced by verbal responsiveness, needs reinforcement. The patient's plan of care including recommendations, planned interventions, and recommended diet changes were discussed with: Registered nurse and Physician.      Ebonie Castellanos M.S. CCC-SLP  Time Calculation: 10 mins

## 2022-04-12 NOTE — PROGRESS NOTES
[address] [facility] returned a call to daughter Lola Martinez @ [phone] r/t writer unable to reach spouse @ [phone] or [phone]. Per Lola Martinez, patient is the primary cg for spouse and may possibly need IRF/SNF prior to going home. Lola Martinez will f/u with patient and spouse and return call to writer with a final dc dispo plan. Jessica 396 received a return call from Spouse who stated he would like for patient to go to [facility] IRF. He is not able to care for her and he need her to be stronger prior to coming home. Choices discussed, choice letter signed and placed on the chart. Referral sent to [facility] IRF via [facility]. Pola Padron spoke [place] with [facility] who stated patient has been accepted and can have a 6pm pickup. Dr. Garret Mueller informed as well call placed to spouse.     [facility] Address:   [address] [facility] [address], [address] [facility] Phone:   [phone]   Fax:   [phone] [address], [address]

## 2022-04-17 ENCOUNTER — HOSPITAL ENCOUNTER (OUTPATIENT)
Dept: LAB | Age: 72
Discharge: HOME | End: 2022-04-17

## 2022-04-17 LAB — C DIFF TOX GENS STL QL NAA+PROBE: NEGATIVE

## 2022-04-17 PROCEDURE — 87493 C DIFF AMPLIFIED PROBE: CPT

## 2022-04-18 ENCOUNTER — APPOINTMENT (OUTPATIENT)
Dept: CT IMAGING | Age: 72
End: 2022-04-18
Attending: EMERGENCY MEDICINE
Payer: COMMERCIAL

## 2022-04-18 ENCOUNTER — HOSPITAL ENCOUNTER (EMERGENCY)
Age: 72
Discharge: NURSING, REHAB OR LONG-TERM CARE | End: 2022-04-18
Attending: EMERGENCY MEDICINE
Payer: COMMERCIAL

## 2022-04-18 VITALS
HEART RATE: 72 BPM | DIASTOLIC BLOOD PRESSURE: 55 MMHG | TEMPERATURE: 98 F | BODY MASS INDEX: 28.93 KG/M2 | HEIGHT: 66 IN | RESPIRATION RATE: 18 BRPM | OXYGEN SATURATION: 97 % | SYSTOLIC BLOOD PRESSURE: 110 MMHG | WEIGHT: 180 LBS

## 2022-04-18 DIAGNOSIS — S00.93XA CONTUSION OF HEAD, UNSPECIFIED PART OF HEAD, INITIAL ENCOUNTER: Primary | ICD-10-CM

## 2022-04-18 LAB
ALBUMIN SERPL-MCNC: 2.2 G/DL (ref 3.5–5)
ALBUMIN/GLOB SERPL: 0.8 {RATIO} (ref 1.1–2.2)
ALP SERPL-CCNC: 95 U/L (ref 45–117)
ALT SERPL-CCNC: 27 U/L (ref 12–78)
ANION GAP SERPL CALC-SCNC: 7 MMOL/L (ref 5–15)
AST SERPL W P-5'-P-CCNC: 16 U/L (ref 15–37)
BASOPHILS # BLD: 0 K/UL (ref 0–0.1)
BASOPHILS NFR BLD: 0 % (ref 0–1)
BILIRUB SERPL-MCNC: 0.2 MG/DL (ref 0.2–1)
BUN SERPL-MCNC: 14 MG/DL (ref 6–20)
BUN/CREAT SERPL: 15 (ref 12–20)
CA-I BLD-MCNC: 7.9 MG/DL (ref 8.5–10.1)
CHLORIDE SERPL-SCNC: 103 MMOL/L (ref 97–108)
CO2 SERPL-SCNC: 26 MMOL/L (ref 21–32)
CREAT SERPL-MCNC: 0.91 MG/DL (ref 0.55–1.02)
DIFFERENTIAL METHOD BLD: ABNORMAL
EOSINOPHIL # BLD: 0.1 K/UL (ref 0–0.4)
EOSINOPHIL NFR BLD: 2 % (ref 0–7)
ERYTHROCYTE [DISTWIDTH] IN BLOOD BY AUTOMATED COUNT: 15.8 % (ref 11.5–14.5)
GLOBULIN SER CALC-MCNC: 2.6 G/DL (ref 2–4)
GLUCOSE SERPL-MCNC: 95 MG/DL (ref 65–100)
HCT VFR BLD AUTO: 31.3 % (ref 35–47)
HGB BLD-MCNC: 10.2 G/DL (ref 11.5–16)
IMM GRANULOCYTES # BLD AUTO: 0.1 K/UL (ref 0–0.04)
IMM GRANULOCYTES NFR BLD AUTO: 1 % (ref 0–0.5)
LYMPHOCYTES # BLD: 1 K/UL (ref 0.8–3.5)
LYMPHOCYTES NFR BLD: 19 % (ref 12–49)
MCH RBC QN AUTO: 26 PG (ref 26–34)
MCHC RBC AUTO-ENTMCNC: 32.6 G/DL (ref 30–36.5)
MCV RBC AUTO: 79.8 FL (ref 80–99)
MONOCYTES # BLD: 0.3 K/UL (ref 0–1)
MONOCYTES NFR BLD: 6 % (ref 5–13)
NEUTS SEG # BLD: 3.8 K/UL (ref 1.8–8)
NEUTS SEG NFR BLD: 72 % (ref 32–75)
NRBC # BLD: 0 K/UL (ref 0–0.01)
NRBC BLD-RTO: 0 PER 100 WBC
PLATELET # BLD AUTO: 199 K/UL (ref 150–400)
PMV BLD AUTO: 9.6 FL (ref 8.9–12.9)
POTASSIUM SERPL-SCNC: 3 MMOL/L (ref 3.5–5.1)
PROT SERPL-MCNC: 4.8 G/DL (ref 6.4–8.2)
RBC # BLD AUTO: 3.92 M/UL (ref 3.8–5.2)
SODIUM SERPL-SCNC: 136 MMOL/L (ref 136–145)
TROPONIN-HIGH SENSITIVITY: 7 NG/L (ref 0–51)
WBC # BLD AUTO: 5.3 K/UL (ref 3.6–11)

## 2022-04-18 PROCEDURE — 36415 COLL VENOUS BLD VENIPUNCTURE: CPT

## 2022-04-18 PROCEDURE — 70450 CT HEAD/BRAIN W/O DYE: CPT

## 2022-04-18 PROCEDURE — 84484 ASSAY OF TROPONIN QUANT: CPT

## 2022-04-18 PROCEDURE — 74011250637 HC RX REV CODE- 250/637: Performed by: EMERGENCY MEDICINE

## 2022-04-18 PROCEDURE — 80053 COMPREHEN METABOLIC PANEL: CPT

## 2022-04-18 PROCEDURE — 72125 CT NECK SPINE W/O DYE: CPT

## 2022-04-18 PROCEDURE — 85025 COMPLETE CBC W/AUTO DIFF WBC: CPT

## 2022-04-18 PROCEDURE — 99284 EMERGENCY DEPT VISIT MOD MDM: CPT

## 2022-04-18 RX ORDER — BUTALBITAL, ACETAMINOPHEN AND CAFFEINE 50; 325; 40 MG/1; MG/1; MG/1
2 TABLET ORAL
Status: COMPLETED | OUTPATIENT
Start: 2022-04-18 | End: 2022-04-18

## 2022-04-18 RX ADMIN — BUTALBITAL, ACETAMINOPHEN, AND CAFFEINE 2 TABLET: 50; 325; 40 TABLET ORAL at 18:00

## 2022-04-18 NOTE — ED TRIAGE NOTES
Patient  From [facility] was sitting on commode when she fell forward hitting her forehead, patient currently on blood thinners. No loc. Hematoma noted to front of head.

## 2022-04-18 NOTE — ED PROVIDER NOTES
EMERGENCY DEPARTMENT HISTORY AND PHYSICAL EXAM      Date: 4/18/2022  Patient Name: Jhonathan Fernandez    History of Presenting Illness     Chief Complaint   Patient presents with   Turner Martinez Fall       History Provided By: Patient    HPI: Jhonathan Fernandez, 80 y.o. female with a past medical history significant For bladder CA presents to the ED with cc of confusion after sitting on the toilet and hitting her head on the floor. Is unclear the mechanism with which this lady fell. She denies any other fever, chills, nausea, vomiting, chest pain, shortness of breath, rash, diarrhea, night sweats. Patient is asking for something for pain at this point time. She denies any loss of consciousness. There are no other complaints, changes, or physical findings at this time. PCP: Hayde Vance NP    No current facility-administered medications on file prior to encounter. Current Outpatient Medications on File Prior to Encounter   Medication Sig Dispense Refill    fluconazole (DIFLUCAN) 100 mg tablet Take 1 Tablet by mouth daily for 7 days. 7 Tablet 0    doxycycline (MONODOX) 100 mg capsule Take 1 Capsule by mouth two (2) times a day for 10 days. 20 Capsule 0    pantoprazole (Protonix) 40 mg tablet Take 1 Tablet by mouth daily for 30 days. 30 Tablet 2    glucose blood VI test strips (OneTouch Ultra Test) strip Use to check blood glucose before meals and at bedtime. 120 Strip 11    Blood-Glucose Meter (OneTouch Ultra2 Meter) misc Use to check blood glucose levels before meals and at bedtime. 1 Each 0    metoprolol succinate (TOPROL-XL) 50 mg XL tablet Take 1 Tablet by mouth daily. Indications: high blood pressure 90 Tablet 1    amLODIPine (NORVASC) 5 mg tablet Take 1 Tablet by mouth daily. Indications: high blood pressure 90 Tablet 1    metFORMIN (GLUCOPHAGE) 1,000 mg tablet Take 1 Tablet by mouth two (2) times daily (with meals).  Indications: type 2 diabetes mellitus 180 Tablet 1    aspirin delayed-release 81 mg tablet Take 1 Tablet by mouth daily. Indications: treatment to prevent a heart attack 90 Tablet 3    Lancing Device with Lancets (Accu-Chek Multiclix Lancet) kit Use to check blood sugar once daily. 1 Kit 0    lancets (Accu-Chek Multiclix Lancet) misc Use to check blood sugar once daily. 100 Each 3    alcohol swabs (Alcohol Wipes) padm Use to check blood glucose once daily. 100 Pad 3    venlafaxine-SR (EFFEXOR-XR) 37.5 mg capsule Take 1 Capsule by mouth daily. Indications: anxiousness associated with depression 90 Capsule 1    metoprolol succinate 50 mg CSpX Take  by mouth.  insulin NPH/insulin regular (NovoLIN 70/30 U-100 Insulin) 100 unit/mL (70-30) injection by SubCUTAneous route. 22 units in the AM and 42 units at PM         Past History     Past Medical History:  Past Medical History:   Diagnosis Date    Bladder cancer ([facility] [address]     Essential hypertension 8/79/5893    Non-alcoholic fatty liver disease 7/21/2020       Past Surgical History:  Past Surgical History:   Procedure Laterality Date    HX CATARACT REMOVAL Left 07/23/2019    HX CHOLECYSTECTOMY      HX COLONOSCOPY  2002    HX HYSTERECTOMY      partial  20 yrs ago    HX TUBAL LIGATION         Family History:  Family History   Problem Relation Age of Onset    Diabetes Mother     Diabetes Maternal Grandmother     Other Maternal Grandmother         CHF    Diabetes Paternal Grandmother        Social History:  Social History     Tobacco Use    Smoking status: Former Smoker    Smokeless tobacco: Never Used   Vaping Use    Vaping Use: Never used   Substance Use Topics    Alcohol use: Not Currently    Drug use: Never       Allergies: Allergies   Allergen Reactions    Adhesive Unknown (comments)    Penicillins Other (comments)    Sulfa (Sulfonamide Antibiotics) Unknown (comments)         Review of Systems   Review of Systems   Constitutional: Negative. Negative for appetite change, chills, fatigue and fever. HENT: Negative.   Negative for congestion and sinus pain. Eyes: Negative. Negative for pain and visual disturbance. Respiratory: Negative. Negative for chest tightness and shortness of breath. Cardiovascular: Negative. Negative for chest pain. Gastrointestinal: Negative. Negative for abdominal pain, diarrhea, nausea and vomiting. Genitourinary: Negative. Negative for difficulty urinating. No discharge   Musculoskeletal: Negative. Negative for arthralgias. Skin: Negative. Negative for rash. Neurological: Positive for headaches. Negative for weakness. Hematological: Negative. Psychiatric/Behavioral: Negative. Negative for agitation. The patient is not nervous/anxious. All other systems reviewed and are negative. Physical Exam     Physical Exam  Vitals and nursing note reviewed. Constitutional:       General: She is not in acute distress. Appearance: She is well-developed. HENT:      Head: Normocephalic. Comments: Contusion to right anterior forehead     Nose: Nose normal.      Mouth/Throat:      Mouth: Mucous membranes are moist.      Pharynx: Oropharynx is clear. No oropharyngeal exudate. Eyes:      General:         Right eye: No discharge. Left eye: No discharge. Conjunctiva/sclera: Conjunctivae normal.      Pupils: Pupils are equal, round, and reactive to light. Cardiovascular:      Rate and Rhythm: Normal rate and regular rhythm. Chest Wall: PMI is not displaced. No thrill. Heart sounds: Normal heart sounds. No murmur heard. No friction rub. No gallop. Pulmonary:      Effort: Pulmonary effort is normal. No respiratory distress. Breath sounds: Normal breath sounds. No wheezing or rales. Chest:      Chest wall: No tenderness. Abdominal:      General: Bowel sounds are normal. There is no distension. Palpations: Abdomen is soft. There is no mass. Tenderness: There is no abdominal tenderness. There is no guarding or rebound.    Musculoskeletal: General: Normal range of motion. Cervical back: Normal range of motion and neck supple. Lymphadenopathy:      Cervical: No cervical adenopathy. Skin:     General: Skin is warm and dry. Capillary Refill: Capillary refill takes less than 2 seconds. Findings: No erythema or rash. Neurological:      Mental Status: She is alert and oriented to person, place, and time. Cranial Nerves: No cranial nerve deficit. Coordination: Coordination normal.   Psychiatric:         Mood and Affect: Mood normal.         Behavior: Behavior normal.         Lab and Diagnostic Study Results     Labs -   No results found for this or any previous visit (from the past 12 hour(s)). Radiologic Studies -   @lastxrresult@  CT Results  (Last 48 hours)    None        CXR Results  (Last 48 hours)    None            Medical Decision Making   - I am the first provider for this patient. - I reviewed the vital signs, available nursing notes, past medical history, past surgical history, family history and social history. - Initial assessment performed. The patients presenting problems have been discussed, and they are in agreement with the care plan formulated and outlined with them. I have encouraged them to ask questions as they arise throughout their visit. Vital Signs-Reviewed the patient's vital signs. No data found. We will assess with basic labs, UA, head CT. Will treat with analgesic    ED Course:          Provider Notes (Medical Decision Making):   64-year-old female fell and hit her head with no appreciable evidence of subdural.  Her headache is improved  MDM       Procedures   Medical Decision Makingedical Decision Making  Performed by: Antony Stroud MD  PROCEDURES:  Procedures       Disposition   Disposition: Condition stable        DISCHARGE PLAN:  1.    Current Discharge Medication List      CONTINUE these medications which have NOT CHANGED    Details   fluconazole (DIFLUCAN) 100 mg tablet Take 1 Tablet by mouth daily for 7 days. Qty: 7 Tablet, Refills: 0      doxycycline (MONODOX) 100 mg capsule Take 1 Capsule by mouth two (2) times a day for 10 days. Qty: 20 Capsule, Refills: 0      pantoprazole (Protonix) 40 mg tablet Take 1 Tablet by mouth daily for 30 days. Qty: 30 Tablet, Refills: 2      glucose blood VI test strips (OneTouch Ultra Test) strip Use to check blood glucose before meals and at bedtime. Qty: 120 Strip, Refills: 11    Associated Diagnoses: Type 2 diabetes mellitus with hyperglycemia, with long-term current use of insulin ([facility])      Blood-Glucose Meter (OneTouch Ultra2 Meter) misc Use to check blood glucose levels before meals and at bedtime. Qty: 1 Each, Refills: 0    Associated Diagnoses: Type 2 diabetes mellitus with hyperglycemia, with long-term current use of insulin ([facility])      metoprolol succinate (TOPROL-XL) 50 mg XL tablet Take 1 Tablet by mouth daily. Indications: high blood pressure  Qty: 90 Tablet, Refills: 1    Associated Diagnoses: Essential hypertension      amLODIPine (NORVASC) 5 mg tablet Take 1 Tablet by mouth daily. Indications: high blood pressure  Qty: 90 Tablet, Refills: 1    Associated Diagnoses: Essential hypertension      metFORMIN (GLUCOPHAGE) 1,000 mg tablet Take 1 Tablet by mouth two (2) times daily (with meals). Indications: type 2 diabetes mellitus  Qty: 180 Tablet, Refills: 1    Associated Diagnoses: Type 2 diabetes mellitus with hyperglycemia, unspecified whether long term insulin use ([facility])      aspirin delayed-release 81 mg tablet Take 1 Tablet by mouth daily. Indications: treatment to prevent a heart attack  Qty: 90 Tablet, Refills: 3    Associated Diagnoses: Type 2 diabetes mellitus with hyperglycemia, unspecified whether long term insulin use ([facility])      Lancing Device with Lancets (Accu-Chek Multiclix Lancet) kit Use to check blood sugar once daily.   Qty: 1 Kit, Refills: 0    Associated Diagnoses: Type 2 diabetes mellitus with hyperglycemia, unspecified whether long term insulin use (HCC)      lancets (Accu-Chek Multiclix Lancet) misc Use to check blood sugar once daily. Qty: 100 Each, Refills: 3    Associated Diagnoses: Type 2 diabetes mellitus with hyperglycemia, unspecified whether long term insulin use (HCC)      alcohol swabs (Alcohol Wipes) padm Use to check blood glucose once daily. Qty: 100 Pad, Refills: 3    Associated Diagnoses: Type 2 diabetes mellitus with hyperglycemia, unspecified whether long term insulin use (HCC)      venlafaxine-SR (EFFEXOR-XR) 37.5 mg capsule Take 1 Capsule by mouth daily. Indications: anxiousness associated with depression  Qty: 90 Capsule, Refills: 1    Associated Diagnoses: Anxiety with depression      metoprolol succinate 50 mg CSpX Take  by mouth. insulin NPH/insulin regular (NovoLIN 70/30 U-100 Insulin) 100 unit/mL (70-30) injection by SubCUTAneous route. 22 units in the AM and 42 units at PM           2. Follow-up Information    None       3. Return to ED if worse   4. Current Discharge Medication List            Diagnosis     Clinical Impression: No diagnosis found. Attestations:    Angel Caruso MD    Please note that this dictation was completed with [employer], the computer voice recognition software. Quite often unanticipated grammatical, syntax, homophones, and other interpretive errors are inadvertently transcribed by the computer software. Please disregard these errors. Please excuse any errors that have escaped final proofreading. Thank you.

## 2022-04-22 ENCOUNTER — HOSPITAL ENCOUNTER (EMERGENCY)
Age: 72
Discharge: HOME | End: 2022-04-22
Payer: MEDICARE

## 2022-04-22 ENCOUNTER — OFFICE VISIT (OUTPATIENT)
Dept: FAMILY MEDICINE CLINIC | Age: 72
End: 2022-04-22
Payer: MEDICARE

## 2022-04-22 VITALS
DIASTOLIC BLOOD PRESSURE: 71 MMHG | HEIGHT: 66 IN | BODY MASS INDEX: 29.73 KG/M2 | RESPIRATION RATE: 18 BRPM | OXYGEN SATURATION: 96 % | SYSTOLIC BLOOD PRESSURE: 105 MMHG | WEIGHT: 185 LBS | TEMPERATURE: 97.5 F | HEART RATE: 59 BPM

## 2022-04-22 VITALS
TEMPERATURE: 98.1 F | RESPIRATION RATE: 18 BRPM | OXYGEN SATURATION: 97 % | BODY MASS INDEX: 29.86 KG/M2 | DIASTOLIC BLOOD PRESSURE: 104 MMHG | SYSTOLIC BLOOD PRESSURE: 124 MMHG | HEART RATE: 109 BPM | HEIGHT: 66 IN

## 2022-04-22 DIAGNOSIS — K21.9 GASTROESOPHAGEAL REFLUX DISEASE WITHOUT ESOPHAGITIS: ICD-10-CM

## 2022-04-22 DIAGNOSIS — E61.1 IRON DEFICIENCY: ICD-10-CM

## 2022-04-22 DIAGNOSIS — E11.65 UNCONTROLLED TYPE 2 DIABETES MELLITUS WITH HYPERGLYCEMIA (HCC): ICD-10-CM

## 2022-04-22 DIAGNOSIS — Z71.0 DISCUSSION ABOUT ADVANCE CARE PLANNING HELD WITH FAMILY MEMBER: ICD-10-CM

## 2022-04-22 DIAGNOSIS — R41.82 ALTERED MENTAL STATUS, UNSPECIFIED ALTERED MENTAL STATUS TYPE: ICD-10-CM

## 2022-04-22 DIAGNOSIS — T83.021A DISPLACEMENT OF FOLEY CATHETER, INITIAL ENCOUNTER (HCC): Primary | ICD-10-CM

## 2022-04-22 DIAGNOSIS — Z91.81 AT HIGH RISK FOR FALLS: ICD-10-CM

## 2022-04-22 DIAGNOSIS — M54.50 CHRONIC LOW BACK PAIN WITHOUT SCIATICA, UNSPECIFIED BACK PAIN LATERALITY: ICD-10-CM

## 2022-04-22 DIAGNOSIS — E78.00 HYPERCHOLESTEROLEMIA: ICD-10-CM

## 2022-04-22 DIAGNOSIS — N39.0 URINARY TRACT INFECTION ASSOCIATED WITH INDWELLING URETHRAL CATHETER, INITIAL ENCOUNTER (HCC): ICD-10-CM

## 2022-04-22 DIAGNOSIS — C67.9 MALIGNANT NEOPLASM OF URINARY BLADDER, UNSPECIFIED SITE (HCC): ICD-10-CM

## 2022-04-22 DIAGNOSIS — R15.9 INCONTINENCE OF FECES, UNSPECIFIED FECAL INCONTINENCE TYPE: ICD-10-CM

## 2022-04-22 DIAGNOSIS — Z09 HOSPITAL DISCHARGE FOLLOW-UP: Primary | ICD-10-CM

## 2022-04-22 DIAGNOSIS — T83.511A URINARY TRACT INFECTION ASSOCIATED WITH INDWELLING URETHRAL CATHETER, INITIAL ENCOUNTER (HCC): ICD-10-CM

## 2022-04-22 DIAGNOSIS — G89.29 CHRONIC LOW BACK PAIN WITHOUT SCIATICA, UNSPECIFIED BACK PAIN LATERALITY: ICD-10-CM

## 2022-04-22 PROBLEM — Z22.322 MRSA (METHICILLIN RESISTANT STAPH AUREUS) CULTURE POSITIVE: Status: RESOLVED | Noted: 2022-04-22 | Resolved: 2022-04-22

## 2022-04-22 PROBLEM — M47.819 MULTILEVEL SPONDYLOSIS: Status: ACTIVE | Noted: 2022-04-22

## 2022-04-22 PROBLEM — L98.499: Status: ACTIVE | Noted: 2022-04-22

## 2022-04-22 PROBLEM — E11.10 DKA (DIABETIC KETOACIDOSIS) (HCC): Status: RESOLVED | Noted: 2022-04-06 | Resolved: 2022-04-22

## 2022-04-22 PROBLEM — D64.9 ANEMIA: Status: ACTIVE | Noted: 2022-04-22

## 2022-04-22 PROBLEM — R13.10 DYSPHAGIA: Status: ACTIVE | Noted: 2022-04-22

## 2022-04-22 PROBLEM — Z91.89 AT RISK FOR UTI RELATED TO INDWELLING CATHETER: Status: ACTIVE | Noted: 2022-04-22

## 2022-04-22 PROBLEM — R71.8 MICROCYTOSIS: Status: ACTIVE | Noted: 2022-04-22

## 2022-04-22 PROBLEM — L98.499: Status: RESOLVED | Noted: 2022-04-22 | Resolved: 2022-04-22

## 2022-04-22 PROBLEM — Z22.322 MRSA (METHICILLIN RESISTANT STAPH AUREUS) CULTURE POSITIVE: Status: ACTIVE | Noted: 2022-04-22

## 2022-04-22 LAB
APPEARANCE UR: ABNORMAL
BACTERIA URNS QL MICRO: ABNORMAL /HPF
BILIRUB UR QL: NEGATIVE
COLOR UR: ABNORMAL
GLUCOSE UR STRIP.AUTO-MCNC: NEGATIVE MG/DL
HGB UR QL STRIP: ABNORMAL
KETONES UR QL STRIP.AUTO: NEGATIVE MG/DL
LEUKOCYTE ESTERASE UR QL STRIP.AUTO: ABNORMAL
MUCOUS THREADS URNS QL MICRO: ABNORMAL /LPF
NITRITE UR QL STRIP.AUTO: NEGATIVE
PH UR STRIP: 5 [PH] (ref 5–8)
PROT UR STRIP-MCNC: 30 MG/DL
RBC #/AREA URNS HPF: ABNORMAL /HPF (ref 0–5)
SP GR UR REFRACTOMETRY: 1.01 (ref 1–1.03)
UA: UC IF INDICATED,UAUC: ABNORMAL
UROBILINOGEN UR QL STRIP.AUTO: 0.1 EU/DL (ref 0.1–1)
WBC URNS QL MICRO: ABNORMAL /HPF (ref 0–4)

## 2022-04-22 PROCEDURE — 87186 SC STD MICRODIL/AGAR DIL: CPT

## 2022-04-22 PROCEDURE — G9717 DOC PT DX DEP/BP F/U NT REQ: HCPCS | Performed by: NURSE PRACTITIONER

## 2022-04-22 PROCEDURE — G8427 DOCREV CUR MEDS BY ELIG CLIN: HCPCS | Performed by: NURSE PRACTITIONER

## 2022-04-22 PROCEDURE — 1101F PT FALLS ASSESS-DOCD LE1/YR: CPT | Performed by: NURSE PRACTITIONER

## 2022-04-22 PROCEDURE — 87077 CULTURE AEROBIC IDENTIFY: CPT

## 2022-04-22 PROCEDURE — G8754 DIAS BP LESS 90: HCPCS | Performed by: NURSE PRACTITIONER

## 2022-04-22 PROCEDURE — 87086 URINE CULTURE/COLONY COUNT: CPT

## 2022-04-22 PROCEDURE — G8752 SYS BP LESS 140: HCPCS | Performed by: NURSE PRACTITIONER

## 2022-04-22 PROCEDURE — G8536 NO DOC ELDER MAL SCRN: HCPCS | Performed by: NURSE PRACTITIONER

## 2022-04-22 PROCEDURE — 3017F COLORECTAL CA SCREEN DOC REV: CPT | Performed by: NURSE PRACTITIONER

## 2022-04-22 PROCEDURE — 74011250637 HC RX REV CODE- 250/637: Performed by: NURSE PRACTITIONER

## 2022-04-22 PROCEDURE — 99281 EMR DPT VST MAYX REQ PHY/QHP: CPT

## 2022-04-22 PROCEDURE — G8417 CALC BMI ABV UP PARAM F/U: HCPCS | Performed by: NURSE PRACTITIONER

## 2022-04-22 PROCEDURE — 2022F DILAT RTA XM EVC RTNOPTHY: CPT | Performed by: NURSE PRACTITIONER

## 2022-04-22 PROCEDURE — 74011000250 HC RX REV CODE- 250: Performed by: NURSE PRACTITIONER

## 2022-04-22 PROCEDURE — 3046F HEMOGLOBIN A1C LEVEL >9.0%: CPT | Performed by: NURSE PRACTITIONER

## 2022-04-22 PROCEDURE — G8400 PT W/DXA NO RESULTS DOC: HCPCS | Performed by: NURSE PRACTITIONER

## 2022-04-22 PROCEDURE — 81001 URINALYSIS AUTO W/SCOPE: CPT

## 2022-04-22 PROCEDURE — 99283 EMERGENCY DEPT VISIT LOW MDM: CPT

## 2022-04-22 PROCEDURE — 1090F PRES/ABSN URINE INCON ASSESS: CPT | Performed by: NURSE PRACTITIONER

## 2022-04-22 PROCEDURE — 1111F DSCHRG MED/CURRENT MED MERGE: CPT | Performed by: NURSE PRACTITIONER

## 2022-04-22 PROCEDURE — 99215 OFFICE O/P EST HI 40 MIN: CPT | Performed by: NURSE PRACTITIONER

## 2022-04-22 RX ORDER — TAMSULOSIN HYDROCHLORIDE 0.4 MG/1
0.4 CAPSULE ORAL DAILY
COMMUNITY
Start: 2022-04-20

## 2022-04-22 RX ORDER — MIDODRINE HYDROCHLORIDE 5 MG/1
TABLET ORAL
COMMUNITY
Start: 2022-04-20 | End: 2022-04-22 | Stop reason: ALTCHOICE

## 2022-04-22 RX ORDER — CEFUROXIME AXETIL 250 MG/1
500 TABLET ORAL
Status: COMPLETED | OUTPATIENT
Start: 2022-04-22 | End: 2022-04-22

## 2022-04-22 RX ORDER — LISINOPRIL 5 MG/1
5 TABLET ORAL DAILY
Qty: 90 TABLET | Refills: 1 | Status: SHIPPED | OUTPATIENT
Start: 2022-04-22 | End: 2022-06-16

## 2022-04-22 RX ORDER — IBUPROFEN 600 MG/1
600 TABLET ORAL
Status: COMPLETED | OUTPATIENT
Start: 2022-04-22 | End: 2022-04-22

## 2022-04-22 RX ORDER — METFORMIN HYDROCHLORIDE 500 MG/1
TABLET, EXTENDED RELEASE ORAL
Qty: 180 TABLET | Refills: 1 | Status: SHIPPED | OUTPATIENT
Start: 2022-04-22 | End: 2022-08-10 | Stop reason: SDUPTHER

## 2022-04-22 RX ORDER — INSULIN GLARGINE 100 [IU]/ML
12 INJECTION, SOLUTION SUBCUTANEOUS
COMMUNITY
End: 2022-05-11

## 2022-04-22 RX ORDER — LANOLIN ALCOHOL/MO/W.PET/CERES
CREAM (GRAM) TOPICAL
COMMUNITY

## 2022-04-22 RX ORDER — FAMOTIDINE 20 MG/1
20 TABLET, FILM COATED ORAL DAILY
COMMUNITY
Start: 2022-04-20 | End: 2022-04-22 | Stop reason: ALTCHOICE

## 2022-04-22 RX ORDER — ROSUVASTATIN CALCIUM 20 MG/1
20 TABLET, COATED ORAL
Qty: 90 TABLET | Refills: 1 | Status: SHIPPED | OUTPATIENT
Start: 2022-04-22 | End: 2022-06-23 | Stop reason: SDUPTHER

## 2022-04-22 RX ORDER — DULOXETIN HYDROCHLORIDE 30 MG/1
30 CAPSULE, DELAYED RELEASE ORAL DAILY
Qty: 90 CAPSULE | Refills: 1 | Status: SHIPPED | OUTPATIENT
Start: 2022-04-22

## 2022-04-22 RX ORDER — PANTOPRAZOLE SODIUM 40 MG/1
TABLET, DELAYED RELEASE ORAL
Qty: 90 TABLET | Refills: 1 | Status: SHIPPED | OUTPATIENT
Start: 2022-04-22

## 2022-04-22 RX ORDER — LIDOCAINE 4 G/100G
1 PATCH TOPICAL
Status: DISCONTINUED | OUTPATIENT
Start: 2022-04-22 | End: 2022-04-22 | Stop reason: HOSPADM

## 2022-04-22 RX ADMIN — IBUPROFEN 600 MG: 600 TABLET ORAL at 01:54

## 2022-04-22 RX ADMIN — CEFUROXIME AXETIL 500 MG: 250 TABLET, FILM COATED ORAL at 03:45

## 2022-04-22 NOTE — ED TRIAGE NOTES
04/20/2022 d/c from [facility] with [facility] cath for retention, catheter got pulled out tonight at home. Pt is also having some watery diarrhea that started today, pt is also having back pain that is chronic.

## 2022-04-22 NOTE — PROGRESS NOTES
1. \"Have you been to the ER, urgent care clinic since your last visit? Hospitalized since your last visit? Yes last night    2. \"Have you seen or consulted any other health care providers outside of the [address] [place] [address] since your last visit? \" yes    3. For patients aged 39-70: Has the patient had a colonoscopy / FIT/ Cologuard? Cant recall     If the patient is female:    4. For patients aged 41-77: Has the patient had a mammogram within the past 2 years? Cant recall       5. For patients aged 21-65: Has the patient had a pap smear?  Doesn't do them         Santana Prescott is a 80 y.o. female is coming in for with the following:     Chief Complaint   Patient presents with   [facility] Follow Up          With the following vitals:    Visit Vitals  BP (!) 124/104 (BP 1 Location: Right upper arm, BP Patient Position: Sitting, BP Cuff Size: Adult)   Pulse (!) 109   Temp 98.1 °F (36.7 °C) (Temporal)   Resp 18   Ht 5' 6\" (1.676 m)   SpO2 97%   BMI 29.86 kg/m²

## 2022-04-22 NOTE — PROGRESS NOTES
Chief Complaint   Patient presents with   [facility] Follow Up     Visit Vitals  BP (!) 124/104 (BP 1 Location: Right upper arm, BP Patient Position: Sitting, BP Cuff Size: Adult)   Pulse (!) 109   Temp 98.1 °F (36.7 °C) (Temporal)   Resp 18   Ht 5' 6\" (1.676 m)   SpO2 97%   BMI 29.86 kg/m²     Subjective  Mariel Farah is a 80 y.o. female. HPI:    Pt presented for follow up after hospital discharge. She was at [facility] from 4/5/2022-4/11/2022. Was admitted for acute encephalopathy, DKA w/ BS over 700, sepsis, HTN, and RENZO. Since then she has had 2 ER visits. Accompanied by her daughter and also a daughter that lives out of state was on telephone during the encounter. Daughter's contributed 100 % of HPI. Needs many medication refills. Per daughter and pt, her BS was 168 mg/dL this am. Last A1c was 14.0 % vs 7.6 % on 7/21/2021. While in hospital, pt was found to have a L labial ulcer which was colonized w MRSA. Seen by ID Dr. Mata Bernard and she was on IV antibiotic in hospital and sent home on fluconazole 100 mg daily x 7 days and doxy 100 mg 2 x day x 10 days. Wound now healed per daughter. Medical hx significant for uncontrolled T2DM, prior CVA (2018), expressive aphasia, HTN, NAFLD, cognitive changes, depression, and hypothyroidism. Pt also had left atrial appendage closure and has an internal cardiac monitor. She was transferred to [facility] after hospitalization where she had a fall on 4/18/2022 supposedly while she was sitting on toilet and fell forward and hit her forehead. She did not have LOC- sustained a contusion to R forehead. She was transported to [facility] ER for eval. Concerns as pt on anticoagulant medication. Head CT did not show evidence of subdural hematoma. According to daughter, pt was supposed to stay in [facility] for 3 weeks but instead was discharged after 3 days. Daughter stated that pt developed edematous feet and had to have a godoy cath placed when she got to Rehab. Initially daughter stated she did not know why pt was discharged but then pt stated she wanted to go home but there were some issues w/ her care. Apparently her pills were found on the floor. Pt was also seen in [facility] ER in early am of today 4/22/2022 for displacement of godoy cath and also UTI associated w/ indwelling cath. Continues to have low back pain. Urine for culture was sent. According to daughter pt pulled out the catheter. It was replaced. Pt's daughter stated that she was told by ER provider at last ER visit for pt to only take 5 medications until she was seen for appt w/ PCP and then PCP would evaluate medications and decide what she should be on. Daughter stated that [facility] came out to do intake on pt and services will start  in a few days- SN, PT/OT, [facility]. Daughter is overwhelmed and stressed by her mother's care and current cognitive status. She stated that she does not know anything about taking care of people. Pt's  also has challenging heealth issues and is also receiving [facility] services for bilateral wounds on both legs. 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 He however does NOT currently have any cognitive issues. Daughter stated that there is no one designated to have power of  for health care.  does not seem able to manage it. Daughters will look into the process. Daughter stated that until a few months ago pt was driving and was independent. Daughter stated that she did not get any help from hospital regarding resources in the community. Daughter stated that pt has too many assets to qualify for help through [facility] such as an aide to help w/ personal care, household chores, and making food for pt. However, out of state daughter stated that a neighbor will be coming by from [phone] to help w/ pt care M-F. Of note, pt was dxed w/ bladder cancer around 4/28/20220. Current urologist is Suzy Keys @ [facility]. She was referred to him for more specialized treatment. Original urologist was Dr. Dolores Dotson from same office. Next appt 4/28/2022. Since surgery, pt has had to have \"scrapings\" of bladder. Getting injections for the cancer- last injection was in Jan 2022  they think. Daughter also stated that pt has an appt to be seen by Zev Qiu for evaluate of any reflux as pt sounds horse. Patient Active Problem List   Diagnosis Code    Essential hypertension B59    Non-alcoholic fatty liver disease K76.0    Uncontrolled type 2 diabetes mellitus with hyperglycemia ([facility] E11.65    Major depressive disorder, recurrent, unspecified F33.9    At high risk for falls Z91.81    Recurrent pain of right knee M25.561    Acquired hypothyroidism E03.9    Multilevel spondylosis M47.819    Chronic low back pain without sciatica M54.50, G89.29    Altered mental status R41.82    Bladder cancer (HCC) C67.9    Anemia D64.9    Microcytosis R71.8    Dysphagia R13.10    At risk for UTI related to indwelling catheter Z91.89     Past Medical History:   Diagnosis Date    Bladder cancer ([facility]     DKA (diabetic ketoacidosis) ([facility] 4/6/2022    Essential hypertension 7/21/2020    MRSA (methicillin resistant staph aureus) culture positive 5/46/7553    Non-alcoholic fatty liver disease 7/21/2020    Right groin ulcer ([facility] 4/22/2022     Past Surgical History:   Procedure Laterality Date    HX CATARACT REMOVAL Left 07/23/2019    HX CHOLECYSTECTOMY      HX COLONOSCOPY  2002    HX HYSTERECTOMY      partial  20 yrs ago    HX TUBAL LIGATION       Current Outpatient Medications   Medication Instructions    alcohol swabs (Alcohol Wipes) padm Use to check blood glucose once daily.  aspirin delayed-release 81 mg, Oral, DAILY    Blood-Glucose Meter (OneTouch Ultra2 Meter) misc Use to check blood glucose levels before meals and at bedtime.     calcium-cholecalciferol, d3, 500 mg-10 mcg (400 unit) chew Calcium 500 + D 500 mg-10 mcg (400 unit) chewable tablet   Take by oral route.  DULoxetine (CYMBALTA) 30 mg, Oral, DAILY    Ferrous Fumarate 325 mg (106 mg iron) tab 1 Tablet, Oral, DAILY    glucose blood VI test strips (OneTouch Ultra Test) strip Use to check blood glucose before meals and at bedtime.  lancets (Accu-Chek Multiclix Lancet) misc Use to check blood sugar once daily.  Lancing Device with Lancets (Accu-Chek Multiclix Lancet) kit Use to check blood sugar once daily.  Lantus Solostar U-100 Insulin 12 Units, SubCUTAneous, EVERY BEDTIME, At bedtime     linaGLIPtin (TRADJENTA) 5 mg, Oral, DAILY    lisinopriL (PRINIVIL, ZESTRIL) 5 mg, Oral, DAILY    metFORMIN ER (GLUCOPHAGE XR) 500 mg tablet Take 1 tab by mouth before breakfast and before evening meal daily.  metoprolol succinate (TOPROL-XL) 50 mg, Oral, DAILY    pantoprazole (Protonix) 40 mg tablet Take 1 tablet by mouth daily in am and wait 30 min before eating or taking other medications.  rosuvastatin (CRESTOR) 20 mg, Oral, EVERY BEDTIME    tamsulosin (FLOMAX) 0.4 mg, Oral, DAILY     Allergies   Allergen Reactions    Adhesive Unknown (comments)    Penicillins Other (comments)    Sulfa (Sulfonamide Antibiotics) Unknown (comments)     Social History     Tobacco Use    Smoking status: Former Smoker    Smokeless tobacco: Never Used   Vaping Use    Vaping Use: Never used   Substance Use Topics    Alcohol use: Not Currently    Drug use: Never     Family History   Problem Relation Age of Onset    Diabetes Mother     Diabetes Maternal Grandmother     Other Maternal Grandmother         CHF    Diabetes Paternal Grandmother        Review of Systems   Constitutional: Negative for chills, fever and malaise/fatigue. HENT: Negative for congestion, ear pain and sore throat. Respiratory: Negative for cough, shortness of breath and wheezing. Cardiovascular: Positive for leg swelling. Negative for chest pain and palpitations. Pt is always cold. Gastrointestinal: Positive for diarrhea. Negative for abdominal pain, constipation, heartburn, nausea and vomiting. No appetite- caretakes giving her Boost which seems to contribute to the diarrhea. Genitourinary: Negative. Musculoskeletal: Positive for back pain and falls. Negative for joint pain, myalgias and neck pain. Neurological: Positive for dizziness and weakness. Negative for tingling, sensory change and headaches. Psychiatric/Behavioral: Positive for depression and memory loss. The patient is not nervous/anxious and does not have insomnia. Objective  Physical Exam  Constitutional:       General: She is not in acute distress. Appearance: Normal appearance. HENT:      Head: Normocephalic. Right Ear: External ear normal.      Left Ear: External ear normal.      Nose: Nose normal.   Eyes:      Conjunctiva/sclera: Conjunctivae normal.   Neck:      Vascular: No carotid bruit. Cardiovascular:      Rate and Rhythm: Normal rate and regular rhythm. Heart sounds: Normal heart sounds. No murmur heard. Pulmonary:      Effort: Pulmonary effort is normal. No respiratory distress. Breath sounds: Normal breath sounds. Musculoskeletal:         General: No swelling or tenderness. Normal range of motion. Cervical back: Neck supple. Right lower leg: No edema. Left lower leg: No edema. Skin:     General: Skin is warm and dry. Coloration: Skin is not jaundiced. Findings: Bruising present. No lesion or rash. Comments: Small bruise under L eye. Neurological:      Mental Status: She is alert and oriented to person, place, and time. Motor: No weakness. Comments: In office wheelchair during appt. Psychiatric:         Behavior: Behavior normal.      Comments: Agitated and cursing a little a few times. Assessment & Plan      ICD-10-CM ICD-9-CM    1.  Hospital discharge follow-up  Z09 V67.59 NM DISCHARGE MEDS RECONCILED W/ CURRENT OUTPATIENT MED LIST   2. Discussion about advance care planning held with family member  Z71.0 V65.19 FULL CODE   3. Uncontrolled type 2 diabetes mellitus with hyperglycemia (HCC)  E11.65 250.02 lisinopriL (PRINIVIL, ZESTRIL) 5 mg tablet      AMB SUPPLY ORDER      metFORMIN ER (GLUCOPHAGE XR) 500 mg tablet      REFERRAL TO ENDOCRINOLOGY      linaGLIPtin (TRADJENTA) 5 mg tablet   4. Altered mental status, unspecified altered mental status type  R41.82 780.97 [facility]   5. Chronic low back pain without sciatica, unspecified back pain laterality  M54.50 724.2 DULoxetine (CYMBALTA) 30 mg capsule    G89.29 338.29    6. Iron deficiency  E61.1 280.9 Ferrous Fumarate 325 mg (106 mg iron) tab   7. Malignant neoplasm of urinary bladder, unspecified site (HCC)  C67.9 188.9    8. Hypercholesterolemia  E78.00 272.0 rosuvastatin (CRESTOR) 20 mg tablet   9. Incontinence of feces, unspecified fecal incontinence type  R15.9 787.60 AMB SUPPLY ORDER   10. Gastroesophageal reflux disease without esophagitis  K21.9 530.81 pantoprazole (Protonix) 40 mg tablet   11. At high risk for falls  Z91.81 V15.88 AMB SUPPLY ORDER       1. Hospital discharge follow-up    - IA DISCHARGE MEDS RECONCILED W/ CURRENT OUTPATIENT MED LIST    2. Discussion about advance care planning held with family member    - FULL CODE    3. Uncontrolled type 2 diabetes mellitus with hyperglycemia (HCC)  Pt needs assessment referral to endo for help w/ medication management as current med regime not adequate. She will continue on same medications pending eval by endo. - lisinopriL (PRINIVIL, ZESTRIL) 5 mg tablet; Take 1 Tablet by mouth daily. Dispense: 90 Tablet; Refill: 1  - AMB SUPPLY ORDER  - metFORMIN ER (GLUCOPHAGE XR) 500 mg tablet; Take 1 tab by mouth before breakfast and before evening meal daily. Dispense: 180 Tablet; Refill: 1  - REFERRAL TO ENDOCRINOLOGY  - linaGLIPtin (TRADJENTA) 5 mg tablet; Take 1 Tablet by mouth daily.   Dispense: 90 Tablet; Refill: 1    4. Altered mental status, unspecified altered mental status type  Spoke to  @ local [facility] regarding pt's mental status changes and care challenges. Advised her a fax con be   - [address]    5. Chronic low back pain without sciatica, unspecified back pain laterality  Discussed duloxetine and rationale for use. Venlafaxine not effective for depression. Hoping that will also help w/ chronic depression substances. - DULoxetine (CYMBALTA) 30 mg capsule; Take 1 Capsule by mouth daily. Dispense: 90 Capsule; Refill: 1    6. Iron deficiency    - Ferrous Fumarate 325 mg (106 mg iron) tab; Take 1 Tablet by mouth daily. Dispense: 90 Tablet; Refill: 3    7. Malignant neoplasm of urinary bladder, unspecified site [facility])  Pt will continue routine f/u with current urology provider. 8. Hypercholesterolemia    - rosuvastatin (CRESTOR) 20 mg tablet; Take 1 Tablet by mouth nightly. Dispense: 90 Tablet; Refill: 1    9. Incontinence of feces, unspecified fecal incontinence type  Order sent in for incontinence supplies - stool.   - AMB SUPPLY ORDER    10. Gastroesophageal reflux disease without esophagitis  If symptoms do not improve, will refer to GI for endoscope. - pantoprazole (Protonix) 40 mg tablet; Take 1 tablet by mouth daily in am and wait 30 min before eating or taking other medications. Dispense: 90 Tablet; Refill: 1    11. At high risk for falls  Order in for rollator walker. Reviewed safety issues. - AMB SUPPLY ORDER    4/22/2022  On this date I have spent 60 minutes reviewing previous notes, test results and face to face with the patient/caretaker discussing the diagnosis and importance of compliance with the treatment plan as well as documenting on the day of the visit. I have discussed the diagnosis with the patient and the intended plan as seen in the above orders. Pt/caretaker has expressed understanding.   Questions were answered concerning future plans.  I have discussed medication side effects and warnings as indicated with the patient as well. Mary Nathan NP         Transitional Care Management Progress Note    Patient: Marco A Valles  : 1950  PCP: Dona Cai NP    Date of office visit: 2022   Date of admission: 22  Date of discharge: 22  Hospital: [facility]    Call initiated w/i 2 business dates of discharge: *No response documented in the last 14 days   Date of the most recent call to the patient: *No documented post hospital discharge outreach found in the last 14 days      Assessment/Plan:   See encounter note. Subjective:     Marco A Valles is a 80 y.o. female presenting today for follow-up after hospital discharge. This encounter and supporting documentation was reviewed if available. Medication reconciliation was performed today. The main problem requiring admission was DKA w/ encephalopathy. Complications during admission: none      Interval history/Current status: stable. Admitting symptoms have: improved      Medications marked \"taking\" at this time:  Prior to Admission medications    Medication Sig Start Date End Date Taking? Authorizing Provider   insulin glargine (Lantus Solostar U-100 Insulin) 100 unit/mL (3 mL) inpn 12 Units by SubCUTAneous route nightly. At bedtime    Yes Provider, Historical   DULoxetine (CYMBALTA) 30 mg capsule Take 1 Capsule by mouth daily. 22  Yes Dona Cai NP   rosuvastatin (CRESTOR) 20 mg tablet Take 1 Tablet by mouth nightly. 22  Yes Dona Cai NP   lisinopriL (PRINIVIL, ZESTRIL) 5 mg tablet Take 1 Tablet by mouth daily. 22  Yes Dona Cai NP   pantoprazole (Protonix) 40 mg tablet Take 1 tablet by mouth daily in am and wait 30 min before eating or taking other medications. 22  Yes Dona Cai NP   metFORMIN ER (GLUCOPHAGE XR) 500 mg tablet Take 1 tab by mouth before breakfast and before evening meal daily.  22  Yes Dona Cai HEATH   linaGLIPtin (TRADJENTA) 5 mg tablet Take 1 Tablet by mouth daily. 4/22/22  Yes Rupa Shahid NP   Ferrous Fumarate 325 mg (106 mg iron) tab Take 1 Tablet by mouth daily. 4/22/22  Yes Rupa Shahid NP   glucose blood VI test strips (OneTouch Ultra Test) strip Use to check blood glucose before meals and at bedtime. 4/5/22  Yes Rupa Shahid NP   Blood-Glucose Meter (OneTouch Ultra2 Meter) misc Use to check blood glucose levels before meals and at bedtime. 4/5/22  Yes Rupa Shahid NP   metoprolol succinate (TOPROL-XL) 50 mg XL tablet Take 1 Tablet by mouth daily. Indications: high blood pressure 3/22/22  Yes Rupa Shahid NP   aspirin delayed-release 81 mg tablet Take 1 Tablet by mouth daily. Indications: treatment to prevent a heart attack 3/22/22  Yes Rupa Shahid NP   Lancing Device with Lancets (Accu-Chek Multiclix Lancet) kit Use to check blood sugar once daily. 3/22/22  Yes Rupa Shahid NP   lancets (Accu-Chek Multiclix Lancet) misc Use to check blood sugar once daily. 3/22/22  Yes Rupa Shahid NP   alcohol swabs (Alcohol Wipes) padm Use to check blood glucose once daily. 3/22/22  Yes Rupa Shahid NP   tamsulosin [facility]) 0.4 mg capsule Take 0.4 mg by mouth daily. 4/20/22   Provider, Historical   calcium-cholecalciferol, d3, 500 mg-10 mcg (400 unit) chew Calcium 500 + D 500 mg-10 mcg (400 unit) chewable tablet   Take by oral route. Provider, Historical   famotidine (PEPCID) 20 mg tablet Take 20 mg by mouth daily. 4/20/22 4/22/22  Provider, Historical   midodrine (PROAMATINE) 5 mg tablet TAKE 1/2 (ONE-HALF) TABLET BY MOUTH ONCE DAILY 4/20/22 4/22/22  Provider, Historical   doxycycline (MONODOX) 100 mg capsule Take 1 Capsule by mouth two (2) times a day for 10 days. 4/11/22 4/22/22  Darshan Spain MD   pantoprazole (Protonix) 40 mg tablet Take 1 Tablet by mouth daily for 30 days. 4/11/22 4/22/22  Darshan Spain MD   amLODIPine (NORVASC) 5 mg tablet Take 1 Tablet by mouth daily.  Indications: high blood pressure  Patient not taking: Reported on 4/22/2022 3/22/22 4/22/22  Lina Gibson NP   metFORMIN (GLUCOPHAGE) 1,000 mg tablet Take 1 Tablet by mouth two (2) times daily (with meals). Indications: type 2 diabetes mellitus 3/22/22 4/22/22  Lina Gibson NP   venlafaxine-SR [facility] 37.5 mg capsule Take 1 Capsule by mouth daily. Indications: anxiousness associated with depression  Patient not taking: Reported on 4/22/2022 3/22/22 4/22/22  Lina Gibson NP   metoprolol succinate 50 mg CSpX Take  by mouth. Patient not taking: Reported on 4/22/2022 4/22/22  Provider, Historical   insulin NPH/insulin regular (NovoLIN 70/30 U-100 Insulin) 100 unit/mL (70-30) injection by SubCUTAneous route. 22 units in the AM and 42 units at PM  Patient not taking: Reported on 4/22/2022 4/22/22  Provider, Historical        ROS:  See encounter note      Objective:     Visit Vitals  BP (!) 124/104 (BP 1 Location: Right upper arm, BP Patient Position: Sitting, BP Cuff Size: Adult)   Pulse (!) 109   Temp 98.1 °F (36.7 °C) (Temporal)   Resp 18   Ht 5' 6\" (1.676 m)   SpO2 97%   BMI 29.86 kg/m²        Physical Exam: (See encounter note). We discussed the expected course, resolution and complications of the diagnosis(es) in detail. Medication risks, benefits, costs, interactions, and alternatives were discussed as indicated. I advised her to contact the office if her condition worsens, changes or fails to improve as anticipated. She expressed understanding with the diagnosis(es) and plan.        Aysha Tang NP

## 2022-04-22 NOTE — DISCHARGE INSTRUCTIONS
Thank you! Thank you for allowing me to care for you in the emergency department. I sincerely hope that you are satisfied with your visit today. It is my goal to provide you with excellent care. Below you will find a list of your labs and imaging from your visit today. Should you have any questions regarding these results please do not hesitate to call the emergency department. Labs -     Recent Results (from the past 12 hour(s))   URINALYSIS W/ REFLEX CULTURE    Collection Time: 04/22/22  2:01 AM    Specimen: Urine   Result Value Ref Range    Color Yellow/Straw      Appearance Turbid (A) Clear      Specific gravity 1.015 1.003 - 1.030      pH (UA) 5.0 5.0 - 8.0      Protein 30 (A) Negative mg/dL    Glucose Negative Negative mg/dL    Ketone Negative Negative mg/dL    Bilirubin Negative Negative      Blood Small (A) Negative      Urobilinogen 0.1 0.1 - 1.0 EU/dL    Nitrites Negative Negative      Leukocyte Esterase Large (A) Negative      UA:UC IF INDICATED Urine Culture Ordered (A) Culture not indicated by UA result      WBC 20-50 0 - 4 /hpf    RBC 5-10 0 - 5 /hpf    Bacteria 2+ (A) Negative /hpf    Mucus Trace /lpf       Radiologic Studies -   No orders to display     CT Results  (Last 48 hours)      None          CXR Results  (Last 48 hours)      None               If you feel that you have not received excellent quality care or timely care, please ask to speak to the nurse manager. Please choose us in the future for your continued health care needs. ------------------------------------------------------------------------------------------------------------  The exam and treatment you received in the Emergency Department were for an urgent problem and are not intended as complete care. It is important that you follow-up with a doctor, nurse practitioner, or physician assistant to:  (1) confirm your diagnosis,  (2) re-evaluation of changes in your illness and treatment, and  (3) for ongoing care.   If your symptoms become worse or you do not improve as expected and you are unable to reach your usual health care provider, you should return to the Emergency Department. We are available 24 hours a day. Please take your discharge instructions with you when you go to your follow-up appointment. If you have any problem arranging a follow-up appointment, contact the Emergency Department immediately. If a prescription has been provided, please have it filled as soon as possible to prevent a delay in treatment. Read the entire medication instruction sheet provided to you by the pharmacy. If you have any questions or reservations about taking the medication due to side effects or interactions with other medications, please call your primary care physician or contact the ER to speak with the charge nurse. Make an appointment with your family doctor or the physician you were referred to for follow-up of this visit as instructed on your discharge paperwork, as this is a mandatory follow-up. Return to the ER if you are unable to be seen or if you are unable to be seen in a timely manner. If you have any problem arranging the follow-up visit, contact the Emergency Department immediately.

## 2022-04-23 DIAGNOSIS — E87.6 HYPOKALEMIA: Primary | ICD-10-CM

## 2022-04-23 RX ORDER — POTASSIUM CHLORIDE 20 MEQ/1
TABLET, EXTENDED RELEASE ORAL
Qty: 90 TABLET | Refills: 0 | Status: SHIPPED | OUTPATIENT
Start: 2022-04-23 | End: 2022-07-14 | Stop reason: SDUPTHER

## 2022-04-24 LAB
BACTERIA SPEC CULT: ABNORMAL
COLONY COUNT,CNT: ABNORMAL
SPECIAL REQUESTS,SREQ: ABNORMAL

## 2022-04-26 ENCOUNTER — TELEPHONE (OUTPATIENT)
Dept: FAMILY MEDICINE CLINIC | Age: 72
End: 2022-04-26

## 2022-04-26 NOTE — TELEPHONE ENCOUNTER
Message was left for me to call Ms Ceci Santiago @ [phone] in regards to pt. I called number after clinic hours when I had a chance. Connected w/ [facility] after hours service. Office was closed. Ms Sb Starr is a manager at office and not available at the time.  I will give her a call in am.

## 2022-04-27 RX ORDER — CIPROFLOXACIN 500 MG/1
500 TABLET ORAL 2 TIMES DAILY
Qty: 20 TABLET | Refills: 0 | Status: SHIPPED | OUTPATIENT
Start: 2022-04-27 | End: 2022-05-07

## 2022-04-27 NOTE — ED PROVIDER NOTES
EMERGENCY DEPARTMENT HISTORY AND PHYSICAL EXAM      Date: 4/22/2022  Patient Name: Terry Bowman    History of Presenting Illness     Chief Complaint   Patient presents with    Urinary Catheter Problem       History Provided By: Patient and Patient's Daughter    HPI: Terry Bowman, 80 y.o. female with a past medical history as noted below presents to the ED accompanied by daughter with cc of dislodged Monroe catheter. Daughter states patient was discharged from [facility] two days ago with Monroe catheter secondary to urinary retention. She states somehow the catheter was pulled out tonight. Patient has pending follow-up with urology 4/28/2022 and with her PCP in the a.m. She also reports a few episodes of diarrhea today, denies noting any blood. Patient denies abdominal pain or any other associated symptoms. There are no other complaints, changes, or physical findings at this time. PCP: Wayne Sandhoff, NP    No current facility-administered medications on file prior to encounter. Current Outpatient Medications on File Prior to Encounter   Medication Sig Dispense Refill    ERGOCALCIFEROL, VITAMIN D2, PO Take 500 Units by mouth two (2) times a day.  insulin glargine (Lantus Solostar U-100 Insulin) 100 unit/mL (3 mL) inpn 12 Units by SubCUTAneous route nightly. At bedtime       tamsulosin (FLOMAX) 0.4 mg capsule Take 0.4 mg by mouth daily.  calcium-cholecalciferol, d3, 500 mg-10 mcg (400 unit) chew Calcium 500 + D 500 mg-10 mcg (400 unit) chewable tablet   Take by oral route.  DULoxetine (CYMBALTA) 30 mg capsule Take 1 Capsule by mouth daily. 90 Capsule 1    rosuvastatin (CRESTOR) 20 mg tablet Take 1 Tablet by mouth nightly. 90 Tablet 1    lisinopriL (PRINIVIL, ZESTRIL) 5 mg tablet Take 1 Tablet by mouth daily. 90 Tablet 1    pantoprazole (Protonix) 40 mg tablet Take 1 tablet by mouth daily in am and wait 30 min before eating or taking other medications.  90 Tablet 1    metFORMIN ER (GLUCOPHAGE XR) 500 mg tablet Take 1 tab by mouth before breakfast and before evening meal daily. 180 Tablet 1    linaGLIPtin (TRADJENTA) 5 mg tablet Take 1 Tablet by mouth daily. 90 Tablet 1    Ferrous Fumarate 325 mg (106 mg iron) tab Take 1 Tablet by mouth daily. 90 Tablet 3    glucose blood VI test strips (OneTouch Ultra Test) strip Use to check blood glucose before meals and at bedtime. 120 Strip 11    Blood-Glucose Meter (OneTouch Ultra2 Meter) misc Use to check blood glucose levels before meals and at bedtime. 1 Each 0    metoprolol succinate (TOPROL-XL) 50 mg XL tablet Take 1 Tablet by mouth daily. Indications: high blood pressure 90 Tablet 1    aspirin delayed-release 81 mg tablet Take 1 Tablet by mouth daily. Indications: treatment to prevent a heart attack 90 Tablet 3    Lancing Device with Lancets (Accu-Chek Multiclix Lancet) kit Use to check blood sugar once daily. 1 Kit 0    lancets (Accu-Chek Multiclix Lancet) misc Use to check blood sugar once daily. 100 Each 3    alcohol swabs (Alcohol Wipes) padm Use to check blood glucose once daily.  100 Pad 3       Past History     Past Medical History:  Past Medical History:   Diagnosis Date    Bladder cancer ([address]     DKA (diabetic ketoacidosis) ([address] 4/6/2022    Essential hypertension 7/21/2020    MRSA (methicillin resistant staph aureus) culture positive 5/03/8603    Non-alcoholic fatty liver disease 7/21/2020    Right groin ulcer ([address] 4/22/2022       Past Surgical History:  Past Surgical History:   Procedure Laterality Date    HX CATARACT REMOVAL Left 07/23/2019    HX CHOLECYSTECTOMY      HX COLONOSCOPY  2002    HX HYSTERECTOMY      partial  20 yrs ago    HX TUBAL LIGATION         Family History:  Family History   Problem Relation Age of Onset    Diabetes Mother     Diabetes Maternal Grandmother     Other Maternal Grandmother         CHF    Diabetes Paternal Grandmother        Social History:  Social History     Tobacco Use    Smoking status: Former Smoker    Smokeless tobacco: Never Used   Vaping Use    Vaping Use: Never used   Substance Use Topics    Alcohol use: Not Currently    Drug use: Never       Allergies: Allergies   Allergen Reactions    Adhesive Unknown (comments)    Penicillins Other (comments)    Sulfa (Sulfonamide Antibiotics) Unknown (comments)         Review of Systems     Review of Systems   Unable to perform ROS: Dementia       Physical Exam     Physical Exam  Vitals and nursing note reviewed. Constitutional:       General: She is not in acute distress. Appearance: Normal appearance. She is overweight. She is not toxic-appearing. HENT:      Head: Normocephalic and atraumatic. Comments: Left eye bruising   Eyes:      Extraocular Movements: Extraocular movements intact. Conjunctiva/sclera: Conjunctivae normal.   Cardiovascular:      Rate and Rhythm: Normal rate and regular rhythm. Heart sounds: Normal heart sounds. Pulmonary:      Effort: Pulmonary effort is normal.      Breath sounds: Normal breath sounds. No wheezing or rales. Abdominal:      General: Bowel sounds are normal.      Palpations: Abdomen is soft. Tenderness: There is no abdominal tenderness. Musculoskeletal:         General: Normal range of motion. Cervical back: Normal range of motion and neck supple. Skin:     General: Skin is warm and dry. Neurological:      General: No focal deficit present. Mental Status: She is alert. Mental status is at baseline.          Lab and Diagnostic Study Results     Labs -     Admission on 04/22/2022, Discharged on 04/22/2022   Component Date Value    Color 04/22/2022 Yellow/Straw     Appearance 04/22/2022 Turbid*    Specific gravity 04/22/2022 1.015     pH (UA) 04/22/2022 5.0     Protein 04/22/2022 30*    Glucose 04/22/2022 Negative     Ketone 04/22/2022 Negative     Bilirubin 04/22/2022 Negative     Blood 04/22/2022 Small*    Urobilinogen 04/22/2022 0.1     Nitrites 04/22/2022 Negative     Leukocyte Esterase 04/22/2022 Large*    UA:UC IF INDICATED 04/22/2022 Urine Culture Ordered*    WBC 04/22/2022 20-50     RBC 04/22/2022 5-10     Bacteria 04/22/2022 2+*    Mucus 04/22/2022 Trace     Special Requests: 04/22/2022                      Value:No Special Requests  Reflexed from [id number]      Crystal Count 04/22/2022                      Value:>100,000  colonies/ml      Culture result: 04/22/2022 Klebsiella pneumoniae*         Radiologic Studies -   @lastxrresult@  CT Results  (Last 48 hours)    None        CXR Results  (Last 48 hours)    None            Medical Decision Making   - I am the first provider for this patient. - I reviewed the vital signs, available nursing notes, past medical history, past surgical history, family history and social history. - Initial assessment performed. The patients presenting problems have been discussed, and they are in agreement with the care plan formulated and outlined with them. I have encouraged them to ask questions as they arise throughout their visit. Vital Signs-Reviewed the patient's vital signs. See chart    Records Reviewed: Nursing Notes and Old Medical Records    ED Course:   Patient with recent discharge from [facility] presents with dislodged Monroe catheter. Patient has catheter secondary to urinary retention, pending urology follow-up 4/28. Patient is nontoxic-appearing, vital signs stable-afebrile. Abdominal exam benign. Monroe catheter inserted without difficulty by nursing. UA + leuk esterase and +2 bacteria, urine culture pending. Will discharge with ceftin with urology/PCP f/u as scheduled. Daughter states she is familiar with catheter care.        Provider Notes (Medical Decision Making):     MDM  Number of Diagnoses or Management Options  Displacement of Monroe catheter, initial encounter [facility]): new, no workup  Urinary tract infection associated with indwelling urethral catheter, initial encounter [facility]): new, no workup     Amount and/or Complexity of Data Reviewed  Clinical lab tests: ordered and reviewed    Risk of Complications, Morbidity, and/or Mortality  Presenting problems: low  Management options: low    Patient Progress  Patient progress: stable         Disposition   Disposition: Condition stable  DC- Adult Discharges: All of the diagnostic tests were reviewed and questions answered. Diagnosis, care plan and treatment options were discussed. The patient understands the instructions and will follow up as directed. The patients results have been reviewed with them. They have been counseled regarding their diagnosis. The patient and family member patient and daughter verbally convey understanding and agreement of the signs, symptoms, diagnosis, treatment and prognosis and additionally agrees to follow up as recommended with their PCP in 24 - 48 hours. They also agree with the care-plan and convey that all of their questions have been answered. I have also put together some discharge instructions for them that include: 1) educational information regarding their diagnosis, 2) how to care for their diagnosis at home, as well a 3) list of reasons why they would want to return to the ED prior to their follow-up appointment, should their condition change. DC-The patient and daughter was given verbal abdominal pain warning signs and and follow-up instructions  DC- Pain Control DC Home plan: Referral Family Medicine/PCP and Urology    Discharged    DISCHARGE PLAN:  1. Cannot display discharge medications since this patient is not currently admitted. 2.   Follow-up Information     Follow up With Specialties Details Why Contact Info    Your urologist  Go to  as scheduled     Milton Gaitan NP Nurse Practitioner Go today as scheduled [address] [facility]  [phone]          3. Return to ED if worse   4.    Discharge Medication List as of 4/22/2022  3:41 AM Diagnosis     Clinical Impression:   1. Displacement of Monroe catheter, initial encounter ([facility] [address]    2. Urinary tract infection associated with indwelling urethral catheter, initial encounter [facility])        Attestations:    Inés Quan NP    Please note that this dictation was completed with [employer], the computer voice recognition software. Quite often unanticipated grammatical, syntax, homophones, and other interpretive errors are inadvertently transcribed by the computer software. Please disregard these errors. Please excuse any errors that have escaped final proofreading. Thank you.

## 2022-04-27 NOTE — CALL BACK NOTE
Attempt to contact patient regarding urine culture results, VM left to return call. UA culture + klebsiella pneumonia, susceptible to cipro. RX cipro sent to pharmacy on file.

## 2022-04-28 ENCOUNTER — TELEPHONE (OUTPATIENT)
Dept: FAMILY MEDICINE CLINIC | Age: 72
End: 2022-04-28

## 2022-04-28 NOTE — TELEPHONE ENCOUNTER
Our office received a fax from the pharmacy requesting prior authorization for Levemir Flextouch with sig: inject 12 units subcutaneously once daily, I do not see this medication listed on the patient's current medication list and it appears that this was initially prescribed by Dr. Marcello Stern. Is patient supposed to be taking this medication? Please advise. Thanks!

## 2022-05-02 ENCOUNTER — TELEPHONE (OUTPATIENT)
Dept: FAMILY MEDICINE CLINIC | Age: 72
End: 2022-05-02

## 2022-05-02 NOTE — TELEPHONE ENCOUNTER
Zinc cream foam dressing 2 times a week needed verbal order needed for that I agreed to them completing that for Mrs. Sheriff Gaetano

## 2022-05-11 DIAGNOSIS — E11.65 UNCONTROLLED TYPE 2 DIABETES MELLITUS WITH HYPERGLYCEMIA (HCC): Primary | ICD-10-CM

## 2022-05-11 RX ORDER — LINEZOLID 600 MG/1
600 TABLET, FILM COATED ORAL EVERY 12 HOURS
COMMUNITY
Start: 2022-04-24 | End: 2022-06-16

## 2022-05-11 RX ORDER — PEN NEEDLE, DIABETIC 30 GX3/16"
NEEDLE, DISPOSABLE MISCELLANEOUS
Qty: 90 EACH | Refills: 3 | Status: SHIPPED | OUTPATIENT
Start: 2022-05-11 | End: 2022-06-23 | Stop reason: SDUPTHER

## 2022-05-21 ENCOUNTER — HOSPITAL ENCOUNTER (EMERGENCY)
Age: 72
Discharge: HOME | End: 2022-05-21
Attending: FAMILY MEDICINE
Payer: MEDICARE

## 2022-05-21 VITALS
TEMPERATURE: 98.5 F | SYSTOLIC BLOOD PRESSURE: 147 MMHG | BODY MASS INDEX: 29.73 KG/M2 | HEART RATE: 65 BPM | WEIGHT: 185 LBS | HEIGHT: 66 IN | DIASTOLIC BLOOD PRESSURE: 75 MMHG | RESPIRATION RATE: 18 BRPM | OXYGEN SATURATION: 100 %

## 2022-05-21 DIAGNOSIS — T83.9XXA URINARY CATHETER COMPLICATION, INITIAL ENCOUNTER (HCC): Primary | ICD-10-CM

## 2022-05-21 PROCEDURE — 99283 EMERGENCY DEPT VISIT LOW MDM: CPT

## 2022-05-21 NOTE — ED TRIAGE NOTES
PT. TO ED VIA EMS WITH C/O ACCIDENTALLY PULLED OTT CATHETER OUT THIS MORNING, TUBING CAUGHT ON CHAIR.

## 2022-05-21 NOTE — ED PROVIDER NOTES
EMERGENCY DEPARTMENT HISTORY AND PHYSICAL EXAM      Date: 5/21/2022  Patient Name: Ariella Lopez    History of Presenting Illness     Chief Complaint   Patient presents with    Urinary Catheter Problem       History Provided By:     HPI: Ariella Lopez, is an extremely pleasant 80 y.o. female presenting to the ED with a chief complaint of urinary catheter problem. Patient has indwelling Monroe catheter. She states she got up and it got hung up on a piece of furniture. It was pulled out. She denies any abdominal pain no vaginal pain. No bleeding. She is requesting the Monroe to be replaced. Denies other complaints. There are no other complaints, changes, or physical findings at this time. PCP: Ya Turner NP    No current facility-administered medications on file prior to encounter. Current Outpatient Medications on File Prior to Encounter   Medication Sig Dispense Refill    linezolid (ZYVOX) 600 mg tablet Take 600 mg by mouth every twelve (12) hours.  insulin detemir U-100 (LEVEMIR FLEXTOUCH) 100 unit/mL (3 mL) inpn Inject 12 units subcutaneously under skin at bedtime daily. 5 Pen 1    Insulin Needles, Disposable, 31 gauge x 5/16\" ndle Use one needle daily to administer Levemir dose at bedtime. 90 Each 3    ERGOCALCIFEROL, VITAMIN D2, PO Take 500 Units by mouth two (2) times a day.  potassium chloride (K-DUR, KLOR-CON M20) 20 mEq tablet Take 1 tab by mouth daily for low potassium. 90 Tablet 0    tamsulosin (FLOMAX) 0.4 mg capsule Take 0.4 mg by mouth daily.  calcium-cholecalciferol, d3, 500 mg-10 mcg (400 unit) chew Calcium 500 + D 500 mg-10 mcg (400 unit) chewable tablet   Take by oral route.  DULoxetine (CYMBALTA) 30 mg capsule Take 1 Capsule by mouth daily. 90 Capsule 1    rosuvastatin (CRESTOR) 20 mg tablet Take 1 Tablet by mouth nightly. 90 Tablet 1    lisinopriL (PRINIVIL, ZESTRIL) 5 mg tablet Take 1 Tablet by mouth daily.  90 Tablet 1    pantoprazole (Protonix) 40 mg tablet Take 1 tablet by mouth daily in am and wait 30 min before eating or taking other medications. 90 Tablet 1    metFORMIN ER (GLUCOPHAGE XR) 500 mg tablet Take 1 tab by mouth before breakfast and before evening meal daily. 180 Tablet 1    linaGLIPtin (TRADJENTA) 5 mg tablet Take 1 Tablet by mouth daily. 90 Tablet 1    Ferrous Fumarate 325 mg (106 mg iron) tab Take 1 Tablet by mouth daily. 90 Tablet 3    glucose blood VI test strips (OneTouch Ultra Test) strip Use to check blood glucose before meals and at bedtime. 120 Strip 11    Blood-Glucose Meter (OneTouch Ultra2 Meter) misc Use to check blood glucose levels before meals and at bedtime. 1 Each 0    metoprolol succinate (TOPROL-XL) 50 mg XL tablet Take 1 Tablet by mouth daily. Indications: high blood pressure 90 Tablet 1    aspirin delayed-release 81 mg tablet Take 1 Tablet by mouth daily. Indications: treatment to prevent a heart attack 90 Tablet 3    Lancing Device with Lancets (Accu-Chek Multiclix Lancet) kit Use to check blood sugar once daily. 1 Kit 0    lancets (Accu-Chek Multiclix Lancet) misc Use to check blood sugar once daily. 100 Each 3    alcohol swabs (Alcohol Wipes) padm Use to check blood glucose once daily.  100 Pad 3       Past History     Past Medical History:  Past Medical History:   Diagnosis Date    Bl[facility] ([facility]     Dementia ([facility]     DKA (diabetic ketoacidosis) ([facility] [address] 4/6/2022    Essential hypertension 7/21/2020    MRSA (methicillin resistant staph aureus) culture positive 5/75/4725    Non-alcoholic fatty liver disease 7/21/2020    Right groin ulcer ([address] 4/22/2022       Past Surgical History:  Past Surgical History:   Procedure Laterality Date    HX CATARACT REMOVAL Left 07/23/2019    HX CHOLECYSTECTOMY      HX COLONOSCOPY  2002    HX HYSTERECTOMY      partial  20 yrs ago    HX TUBAL LIGATION         Family History:  Family History   Problem Relation Age of Onset    Diabetes Mother     Diabetes Maternal Grandmother     Other Maternal Grandmother         CHF    Diabetes Paternal Grandmother        Social History:  Social History     Tobacco Use    Smoking status: Former Smoker    Smokeless tobacco: Never Used   Vaping Use    Vaping Use: Never used   Substance Use Topics    Alcohol use: Not Currently    Drug use: Never       Allergies: Allergies   Allergen Reactions    Adhesive Unknown (comments)    Penicillins Other (comments)    Sulfa (Sulfonamide Antibiotics) Unknown (comments)         Review of Systems     Review of Systems   Constitutional: Negative for activity change, appetite change, chills, fatigue and fever. HENT: Negative for congestion and sore throat. Eyes: Negative for photophobia and visual disturbance. Respiratory: Negative for cough, shortness of breath and wheezing. Cardiovascular: Negative for chest pain, palpitations and leg swelling. Gastrointestinal: Negative for abdominal pain, diarrhea, nausea and vomiting. Endocrine: Negative for cold intolerance and heat intolerance. Genitourinary: Negative for decreased urine volume, dysuria, flank pain, pelvic pain, urgency, vaginal bleeding, vaginal discharge and vaginal pain. Musculoskeletal: Negative for gait problem and joint swelling. Skin: Negative for color change and rash. Neurological: Negative for dizziness and headaches. Physical Exam     Physical Exam  Constitutional:       Appearance: She is well-developed. HENT:      Head: Normocephalic and atraumatic. Mouth/Throat:      Mouth: Mucous membranes are moist.      Pharynx: Oropharynx is clear. Eyes:      Conjunctiva/sclera: Conjunctivae normal.      Pupils: Pupils are equal, round, and reactive to light. Cardiovascular:      Rate and Rhythm: Normal rate and regular rhythm. Heart sounds: No murmur heard. Pulmonary:      Effort: No respiratory distress. Breath sounds: No stridor. No wheezing, rhonchi or rales.    Abdominal: General: There is no distension. Tenderness: There is no abdominal tenderness. There is no rebound. Genitourinary:     Comments: Dislodged Monroe catheter. No vaginal bleeding. Musculoskeletal:      Cervical back: Normal range of motion and neck supple. Skin:     General: Skin is warm and dry. Neurological:      General: No focal deficit present. Mental Status: She is alert and oriented to person, place, and time. Psychiatric:         Mood and Affect: Mood normal.         Behavior: Behavior normal.         Lab and Diagnostic Study Results     Labs -   No results found for this or any previous visit (from the past 12 hour(s)). Radiologic Studies -   @lastxrresult@  CT Results  (Last 48 hours)    None        CXR Results  (Last 48 hours)    None            Medical Decision Making   - I am the first provider for this patient. - I reviewed the vital signs, available nursing notes, past medical history, past surgical history, family history and social history. - Initial assessment performed. The patients presenting problems have been discussed, and they are in agreement with the care plan formulated and outlined with them. I have encouraged them to ask questions as they arise throughout their visit. Vital Signs-Reviewed the patient's vital signs. Patient Vitals for the past 12 hrs:   Temp Pulse Resp BP SpO2   05/21/22 1057 98.5 °F (36.9 °C) 65 18 (!) 147/75 100 %         ED Course/ Provider Notes (Medical Decision Making):     Patient presented to the emergency department with a chief complaint of urinary catheter problem. On examination the patient is nontoxic and well-appearing. Vitals were reviewed per above. No bleeding. Patient is pain-free. Bedside nurse easily inserted new Monroe catheter. Positive return of urine.   Patient to follow-up with her urologist.    Kya Duffyrosibel was given a thorough list of signs and symptoms that would warrant an immediate return to the emergency department. Otherwise Karlo Zuleta will follow up with PCP. Procedures   Medical Decision Makingedical Decision Making  Performed by: Haleigh Varela DO  Procedures  None       Disposition   Disposition:     Home     All of the diagnostic tests were reviewed and questions answered. Diagnosis, care plan and treatment options were discussed. The patient understands the instructions and will follow up as directed. The patients results have been reviewed with them. They have been counseled regarding their diagnosis. The patient verbally convey understanding and agreement of the signs, symptoms, diagnosis, treatment and prognosis and additionally agrees to follow up as recommended with their PCP in 24 - 48 hours. They also agree with the care-plan and convey that all of their questions have been answered. I have also put together some discharge instructions for them that include: 1) educational information regarding their diagnosis, 2) how to care for their diagnosis at home, as well a 3) list of reasons why they would want to return to the ED prior to their follow-up appointment, should their condition change. DISCHARGE PLAN:    1. Cannot display discharge medications since this patient is not currently admitted. 2.   Follow-up Information     Follow up With Specialties Details Why Contact Info    Your primary care doctor  Schedule an appointment as soon as possible for a visit in 2 days              3.  Return to ED if worse       4. Discharge Medication List as of 5/21/2022 11:55 AM            Diagnosis     Clinical Impression:    1. Urinary catheter complication, initial encounter [facility])        Attestations:    Haleigh Varela DO    Please note that this dictation was completed with [employer], the computer voice recognition software. Quite often unanticipated grammatical, syntax, homophones, and other interpretive errors are inadvertently transcribed by the computer software.   Please disregard these errors. Please excuse any errors that have escaped final proofreading. Thank you.

## 2022-05-26 ENCOUNTER — HOSPITAL ENCOUNTER (EMERGENCY)
Age: 72
Discharge: HOME | End: 2022-05-26
Attending: EMERGENCY MEDICINE
Payer: MEDICARE

## 2022-05-26 ENCOUNTER — APPOINTMENT (OUTPATIENT)
Dept: CT IMAGING | Age: 72
End: 2022-05-26
Attending: EMERGENCY MEDICINE
Payer: MEDICARE

## 2022-05-26 VITALS
HEIGHT: 66 IN | OXYGEN SATURATION: 100 % | TEMPERATURE: 98.6 F | HEART RATE: 75 BPM | WEIGHT: 180 LBS | SYSTOLIC BLOOD PRESSURE: 122 MMHG | BODY MASS INDEX: 28.93 KG/M2 | DIASTOLIC BLOOD PRESSURE: 76 MMHG | RESPIRATION RATE: 16 BRPM

## 2022-05-26 DIAGNOSIS — T83.9XXA FOLEY CATHETER PROBLEM, INITIAL ENCOUNTER (HCC): Primary | ICD-10-CM

## 2022-05-26 PROCEDURE — 99284 EMERGENCY DEPT VISIT MOD MDM: CPT

## 2022-05-26 PROCEDURE — 81003 URINALYSIS AUTO W/O SCOPE: CPT

## 2022-05-26 PROCEDURE — 74011000250 HC RX REV CODE- 250: Performed by: EMERGENCY MEDICINE

## 2022-05-26 PROCEDURE — 74011250637 HC RX REV CODE- 250/637: Performed by: EMERGENCY MEDICINE

## 2022-05-26 PROCEDURE — 70450 CT HEAD/BRAIN W/O DYE: CPT

## 2022-05-26 RX ORDER — LIDOCAINE HYDROCHLORIDE 20 MG/ML
JELLY TOPICAL
Status: COMPLETED | OUTPATIENT
Start: 2022-05-26 | End: 2022-05-26

## 2022-05-26 RX ORDER — ACETAMINOPHEN 500 MG
1000 TABLET ORAL ONCE
Status: COMPLETED | OUTPATIENT
Start: 2022-05-26 | End: 2022-05-26

## 2022-05-26 RX ORDER — LIDOCAINE HYDROCHLORIDE 20 MG/ML
JELLY TOPICAL
Status: DISCONTINUED | OUTPATIENT
Start: 2022-05-26 | End: 2022-05-26 | Stop reason: SDUPTHER

## 2022-05-26 RX ADMIN — LIDOCAINE HYDROCHLORIDE 1 ML: 20 JELLY TOPICAL at 03:41

## 2022-05-26 RX ADMIN — ACETAMINOPHEN 1000 MG: 500 TABLET ORAL at 03:58

## 2022-05-26 NOTE — ED TRIAGE NOTES
BIBA from home, pt states she stood up and pulled indwelling godoy out accidentally. Pt reports history of bladder cancer with retention.      Past Medical History:   Diagnosis Date    Bl[facility] ([facility] [address]     Dementia ([facility] [address]     DKA (diabetic ketoacidosis) ([facility] [address] 4/6/2022    Essential hypertension 7/21/2020    MRSA (methicillin resistant staph aureus) culture positive 1/52/0858    Non-alcoholic fatty liver disease 7/21/2020    Right groin ulcer ([facility] [address] 4/22/2022       Past Surgical History:   Procedure Laterality Date    HX CATARACT REMOVAL Left 07/23/2019    HX CHOLECYSTECTOMY      HX COLONOSCOPY  2002    HX HYSTERECTOMY      partial  20 yrs ago    HX TUBAL LIGATION

## 2022-05-26 NOTE — ED PROVIDER NOTES
EMERGENCY DEPARTMENT HISTORY AND PHYSICAL EXAM      Date: 5/26/2022  Patient Name: Ritu Bryant    History of Presenting Illness     Chief Complaint   Patient presents with    Urinary Catheter Problem       History Provided By: Patient    HPI: Ritu Bryant, 80 y.o. female with a past medical history significant Bladder Cancer, dementia, diabetes presents to the ED with cc of Monroe dislodgment. Patient has a chronic Monroe secondary to her bladder cancer. She is followed by urology in [place]. She does not recall the name of her urologist.  States today she was standing from sitting when she accidentally ripped the catheter out. No other complaints. There are no other complaints, changes, or physical findings at this time. PCP: Kristi Cole NP    No current facility-administered medications on file prior to encounter. Current Outpatient Medications on File Prior to Encounter   Medication Sig Dispense Refill    linezolid (ZYVOX) 600 mg tablet Take 600 mg by mouth every twelve (12) hours.  insulin detemir U-100 (LEVEMIR FLEXTOUCH) 100 unit/mL (3 mL) inpn Inject 12 units subcutaneously under skin at bedtime daily. 5 Pen 1    Insulin Needles, Disposable, 31 gauge x 5/16\" ndle Use one needle daily to administer Levemir dose at bedtime. 90 Each 3    ERGOCALCIFEROL, VITAMIN D2, PO Take 500 Units by mouth two (2) times a day.  potassium chloride (K-DUR, KLOR-CON M20) 20 mEq tablet Take 1 tab by mouth daily for low potassium. 90 Tablet 0    tamsulosin (FLOMAX) 0.4 mg capsule Take 0.4 mg by mouth daily.  calcium-cholecalciferol, d3, 500 mg-10 mcg (400 unit) chew Calcium 500 + D 500 mg-10 mcg (400 unit) chewable tablet   Take by oral route.  DULoxetine (CYMBALTA) 30 mg capsule Take 1 Capsule by mouth daily. 90 Capsule 1    rosuvastatin (CRESTOR) 20 mg tablet Take 1 Tablet by mouth nightly. 90 Tablet 1    lisinopriL (PRINIVIL, ZESTRIL) 5 mg tablet Take 1 Tablet by mouth daily.  90 Tablet 1  pantoprazole (Protonix) 40 mg tablet Take 1 tablet by mouth daily in am and wait 30 min before eating or taking other medications. 90 Tablet 1    metFORMIN ER (GLUCOPHAGE XR) 500 mg tablet Take 1 tab by mouth before breakfast and before evening meal daily. 180 Tablet 1    linaGLIPtin (TRADJENTA) 5 mg tablet Take 1 Tablet by mouth daily. 90 Tablet 1    Ferrous Fumarate 325 mg (106 mg iron) tab Take 1 Tablet by mouth daily. 90 Tablet 3    glucose blood VI test strips (OneTouch Ultra Test) strip Use to check blood glucose before meals and at bedtime. 120 Strip 11    Blood-Glucose Meter (OneTouch Ultra2 Meter) misc Use to check blood glucose levels before meals and at bedtime. 1 Each 0    metoprolol succinate (TOPROL-XL) 50 mg XL tablet Take 1 Tablet by mouth daily. Indications: high blood pressure 90 Tablet 1    aspirin delayed-release 81 mg tablet Take 1 Tablet by mouth daily. Indications: treatment to prevent a heart attack 90 Tablet 3    Lancing Device with Lancets (Accu-Chek Multiclix Lancet) kit Use to check blood sugar once daily. 1 Kit 0    lancets (Accu-Chek Multiclix Lancet) misc Use to check blood sugar once daily. 100 Each 3    alcohol swabs (Alcohol Wipes) padm Use to check blood glucose once daily.  100 Pad 3       Past History     Past Medical History:  Past Medical History:   Diagnosis Date    Bladder cancer ([address]     Dementia ([address]     DKA (diabetic ketoacidosis) ([address] 4/6/2022    Essential hypertension 7/21/2020    MRSA (methicillin resistant staph aureus) culture positive 5/00/1104    Non-alcoholic fatty liver disease 7/21/2020    Right groin ulcer ([address] 4/22/2022       Past Surgical History:  Past Surgical History:   Procedure Laterality Date    HX CATARACT REMOVAL Left 07/23/2019    HX CHOLECYSTECTOMY      HX COLONOSCOPY  2002    HX HYSTERECTOMY      partial  20 yrs ago    HX TUBAL LIGATION         Family History:  Family History   Problem Relation Age of Onset    Diabetes Mother     Diabetes Maternal Grandmother     Other Maternal Grandmother         CHF    Diabetes Paternal Grandmother        Social History:  Social History     Tobacco Use    Smoking status: Former Smoker    Smokeless tobacco: Never Used   Vaping Use    Vaping Use: Never used   Substance Use Topics    Alcohol use: Not Currently    Drug use: Never       Allergies: Allergies   Allergen Reactions    Adhesive Unknown (comments)    Penicillins Other (comments)    Sulfa (Sulfonamide Antibiotics) Unknown (comments)         Review of Systems     Review of Systems   Constitutional: Negative for activity change and fever. HENT: Negative for rhinorrhea and sore throat. Eyes: Negative for visual disturbance. Respiratory: Negative for cough. Cardiovascular: Negative for chest pain. Gastrointestinal: Negative for abdominal pain, diarrhea, nausea and vomiting. Genitourinary: Positive for difficulty urinating. Negative for dysuria. Musculoskeletal: Negative for arthralgias and myalgias. Skin: Negative for rash and wound. Neurological: Negative for syncope and headaches. Psychiatric/Behavioral: Negative for confusion. All other systems reviewed and are negative. Physical Exam     Physical Exam  Vitals and nursing note reviewed. Constitutional:       Appearance: Normal appearance. She is normal weight. HENT:      Head: Normocephalic and atraumatic. Nose: Nose normal.      Mouth/Throat:      Mouth: Mucous membranes are moist.   Eyes:      Conjunctiva/sclera: Conjunctivae normal.   Cardiovascular:      Rate and Rhythm: Normal rate. Pulses: Normal pulses. Pulmonary:      Effort: Pulmonary effort is normal. No respiratory distress. Musculoskeletal:         General: No swelling or deformity. Normal range of motion. Skin:     General: Skin is warm and dry. Findings: No rash. Neurological:      General: No focal deficit present.       Mental Status: She is alert and oriented to person, place, and time. Psychiatric:         Mood and Affect: Mood normal.         Behavior: Behavior normal.         Lab and Diagnostic Study Results     Labs -   No results found for this or any previous visit (from the past 12 hour(s)). Radiologic Studies -   @lastxrresult@  CT Results  (Last 48 hours)               05/26/22 0435  CT HEAD WO CONT Final result    Impression:      1. No acute intracranial hemorrhage or mass effect. 2. Chronic findings as detailed above. Narrative:  Exam: CT HEAD WO CONT       TECHNIQUE: Multiple transaxial CT images of the head were obtained without   contrast. Coronal and sagittal reformatted images were provided. Dose reduction: All CT scans at this facility are performed using dose reduction   optimization techniques as appropriate to a performed exam including the   following: Automated exposure control, adjustments of the mA and/or kV according   to patient size, or use of iterative reconstruction technique. COMPARISON: 4/18/2022       HISTORY: glf       FINDINGS:       No acute intracranial hemorrhage or mass effect. No acute large territory   infarction. Chronic left frontal lobe cortical infarct and lacunar infarct   within the left centrum semiovale. No extra-axial or intra-axial fluid   collections are identified. Generalized parenchymal volume loss with ex vacuo   enlargement of the CSF spaces. No midline shift. Basal cisterns are patent. Paranasal sinuses and tympanomastoid cavities are well-aerated. Orbits and   orbital contents are unremarkable. Skull base and calvarium are intact. CXR Results  (Last 48 hours)    None            Medical Decision Making   - I am the first provider for this patient. - I reviewed the vital signs, available nursing notes, past medical history, past surgical history, family history and social history. - Initial assessment performed.  The patients presenting problems have been discussed, and they are in agreement with the care plan formulated and outlined with them. I have encouraged them to ask questions as they arise throughout their visit. Vital Signs-Reviewed the patient's vital signs. Patient Vitals for the past 12 hrs:   Temp Pulse Resp BP SpO2   05/26/22 0405 -- 72 15 (!) 148/79 100 %   05/26/22 0256 -- 70 16 120/72 100 %   05/26/22 0238 -- -- -- -- 97 %   05/26/22 0233 98.6 °F (37 °C) 90 16 132/77 97 %       Records Reviewed: Nursing Notes        ED Course:     ED Course as of 05/26/22 0528   Thu May 26, 2022   650 73-year-old female presents for a Godoy catheter being dislodged. Patient has a chronic Godoy secondary to bladder cancer. She stood up and accidentally pulled the Godoy out. This has happened one other time previously. Will replace godoy. Patient will follow up with her Urologist in [facility]. [LW]   8524 Was replaced. On discharge patient states she had a mild headache and did have a fall when her Godoy was ripped out. Head CT is obtained which is negative. Patient discharged home. She will follow-up with outpatient urology. [LW]      ED Course User Index  [LW] Nola Triana MD         Disposition   Disposition: DC- Adult Discharges: All of the diagnostic tests were reviewed and questions answered. Diagnosis, care plan and treatment options were discussed. The patient understands the instructions and will follow up as directed. The patients results have been reviewed with them. They have been counseled regarding their diagnosis. The patient verbally convey understanding and agreement of the signs, symptoms, diagnosis, treatment and prognosis and additionally agrees to follow up as recommended with their PCP in 24 - 48 hours. They also agree with the care-plan and convey that all of their questions have been answered.   I have also put together some discharge instructions for them that include: 1) educational information regarding their diagnosis, 2) how to care for their diagnosis at home, as well a 3) list of reasons why they would want to return to the ED prior to their follow-up appointment, should their condition change. Discharged    DISCHARGE PLAN:  1. Current Discharge Medication List      CONTINUE these medications which have NOT CHANGED    Details   linezolid (ZYVOX) 600 mg tablet Take 600 mg by mouth every twelve (12) hours. insulin detemir U-100 (LEVEMIR FLEXTOUCH) 100 unit/mL (3 mL) inpn Inject 12 units subcutaneously under skin at bedtime daily. Qty: 5 Pen, Refills: 1    Associated Diagnoses: Uncontrolled type 2 diabetes mellitus with hyperglycemia (HCC)      Insulin Needles, Disposable, 31 gauge x 5/16\" ndle Use one needle daily to administer Levemir dose at bedtime. Qty: 90 Each, Refills: 3    Associated Diagnoses: Uncontrolled type 2 diabetes mellitus with hyperglycemia (HCC)      ERGOCALCIFEROL, VITAMIN D2, PO Take 500 Units by mouth two (2) times a day. potassium chloride (K-DUR, KLOR-CON M20) 20 mEq tablet Take 1 tab by mouth daily for low potassium. Qty: 90 Tablet, Refills: 0    Associated Diagnoses: Hypokalemia      tamsulosin (FLOMAX) 0.4 mg capsule Take 0.4 mg by mouth daily. calcium-cholecalciferol, d3, 500 mg-10 mcg (400 unit) chew Calcium 500 + D 500 mg-10 mcg (400 unit) chewable tablet   Take by oral route. DULoxetine (CYMBALTA) 30 mg capsule Take 1 Capsule by mouth daily. Qty: 90 Capsule, Refills: 1    Associated Diagnoses: Chronic low back pain without sciatica, unspecified back pain laterality      rosuvastatin (CRESTOR) 20 mg tablet Take 1 Tablet by mouth nightly. Qty: 90 Tablet, Refills: 1    Associated Diagnoses: Hypercholesterolemia      lisinopriL (PRINIVIL, ZESTRIL) 5 mg tablet Take 1 Tablet by mouth daily.   Qty: 90 Tablet, Refills: 1    Associated Diagnoses: Uncontrolled type 2 diabetes mellitus with hyperglycemia (HCC)      pantoprazole (Protonix) 40 mg tablet Take 1 tablet by mouth daily in am and wait 30 min before eating or taking other medications. Qty: 90 Tablet, Refills: 1    Associated Diagnoses: Gastroesophageal reflux disease without esophagitis      metFORMIN ER (GLUCOPHAGE XR) 500 mg tablet Take 1 tab by mouth before breakfast and before evening meal daily. Qty: 180 Tablet, Refills: 1    Associated Diagnoses: Uncontrolled type 2 diabetes mellitus with hyperglycemia ([facility])      linaGLIPtin (TRADJENTA) 5 mg tablet Take 1 Tablet by mouth daily. Qty: 90 Tablet, Refills: 1    Associated Diagnoses: Uncontrolled type 2 diabetes mellitus with hyperglycemia ([facility])      Ferrous Fumarate 325 mg (106 mg iron) tab Take 1 Tablet by mouth daily. Qty: 90 Tablet, Refills: 3    Associated Diagnoses: Iron deficiency      glucose blood VI test strips (OneTouch Ultra Test) strip Use to check blood glucose before meals and at bedtime. Qty: 120 Strip, Refills: 11    Associated Diagnoses: Type 2 diabetes mellitus with hyperglycemia, with long-term current use of insulin ([facility])      Blood-Glucose Meter (OneTouch Ultra2 Meter) misc Use to check blood glucose levels before meals and at bedtime. Qty: 1 Each, Refills: 0    Associated Diagnoses: Type 2 diabetes mellitus with hyperglycemia, with long-term current use of insulin ([facility])      metoprolol succinate (TOPROL-XL) 50 mg XL tablet Take 1 Tablet by mouth daily. Indications: high blood pressure  Qty: 90 Tablet, Refills: 1    Associated Diagnoses: Essential hypertension      aspirin delayed-release 81 mg tablet Take 1 Tablet by mouth daily. Indications: treatment to prevent a heart attack  Qty: 90 Tablet, Refills: 3    Associated Diagnoses: Type 2 diabetes mellitus with hyperglycemia, unspecified whether long term insulin use ([facility])      Lancing Device with Lancets (Accu-Chek Multiclix Lancet) kit Use to check blood sugar once daily.   Qty: 1 Kit, Refills: 0    Associated Diagnoses: Type 2 diabetes mellitus with hyperglycemia, unspecified whether long term insulin use ([address]      lancets (Accu-Chek Multiclix Lancet) misc Use to check blood sugar once daily. Qty: 100 Each, Refills: 3    Associated Diagnoses: Type 2 diabetes mellitus with hyperglycemia, unspecified whether long term insulin use (HCC)      alcohol swabs (Alcohol Wipes) padm Use to check blood glucose once daily. Qty: 100 Pad, Refills: 3    Associated Diagnoses: Type 2 diabetes mellitus with hyperglycemia, unspecified whether long term insulin use ([address]           2. Follow-up Information     Follow up With Specialties Details Why Contact Info    Kristi Cole NP Nurse Practitioner In 3 days  [facility] [address] [facility]  [phone]      Urology    Follow up with urology        3. Return to ED if worse   4. Current Discharge Medication List            Diagnosis     Clinical Impression:   1. Monroe catheter problem, initial encounter [facility])        Attestations:    Reena Michelle MD    Please note that this dictation was completed with [employer], the computer voice recognition software. Quite often unanticipated grammatical, syntax, homophones, and other interpretive errors are inadvertently transcribed by the computer software. Please disregard these errors. Please excuse any errors that have escaped final proofreading. Thank you.

## 2022-05-26 NOTE — ED NOTES
Pt discharged with godoy intact, pt and daughter verbalized understanding of education given on godoy care.

## 2022-05-26 NOTE — ED NOTES
Pt seen in ED for    Chief Complaint   Patient presents with    Urinary Catheter Problem         1. Monroe catheter problem, initial encounter [facility])           Reviewed discharge instructions with pt to include medications, cautions/side effects, follow-up with PCP or specialist as advised, and access to [address] Pt alert, oriented to baseline and able to make decisions and be discharged via their choice of transportation. Pt and daughter Merry verbalized understanding of discharge instructions. Pt ambulatory with unsteady gait which is baseline; accompanied by daughter; discharged with (0) e-scripts/prescriptions and a copy of discharge instructions.

## 2022-06-16 ENCOUNTER — OFFICE VISIT (OUTPATIENT)
Dept: FAMILY MEDICINE CLINIC | Age: 72
End: 2022-06-16
Payer: MEDICARE

## 2022-06-16 VITALS
HEIGHT: 66 IN | BODY MASS INDEX: 27.8 KG/M2 | DIASTOLIC BLOOD PRESSURE: 66 MMHG | SYSTOLIC BLOOD PRESSURE: 115 MMHG | HEART RATE: 100 BPM | OXYGEN SATURATION: 98 % | RESPIRATION RATE: 18 BRPM | TEMPERATURE: 97.8 F | WEIGHT: 173 LBS

## 2022-06-16 DIAGNOSIS — I10 ESSENTIAL HYPERTENSION: ICD-10-CM

## 2022-06-16 DIAGNOSIS — R13.10 DYSPHAGIA, UNSPECIFIED TYPE: ICD-10-CM

## 2022-06-16 DIAGNOSIS — Z51.81 MEDICATION MONITORING ENCOUNTER: Primary | ICD-10-CM

## 2022-06-16 DIAGNOSIS — R33.9 URINARY RETENTION: ICD-10-CM

## 2022-06-16 DIAGNOSIS — E11.65 UNCONTROLLED TYPE 2 DIABETES MELLITUS WITH HYPERGLYCEMIA (HCC): ICD-10-CM

## 2022-06-16 DIAGNOSIS — E87.6 HYPOKALEMIA: ICD-10-CM

## 2022-06-16 DIAGNOSIS — K21.9 GASTROESOPHAGEAL REFLUX DISEASE WITHOUT ESOPHAGITIS: ICD-10-CM

## 2022-06-16 DIAGNOSIS — C67.9 MALIGNANT NEOPLASM OF URINARY BLADDER, UNSPECIFIED SITE (HCC): ICD-10-CM

## 2022-06-16 PROCEDURE — G8536 NO DOC ELDER MAL SCRN: HCPCS | Performed by: FAMILY MEDICINE

## 2022-06-16 PROCEDURE — 1101F PT FALLS ASSESS-DOCD LE1/YR: CPT | Performed by: FAMILY MEDICINE

## 2022-06-16 PROCEDURE — 3046F HEMOGLOBIN A1C LEVEL >9.0%: CPT | Performed by: FAMILY MEDICINE

## 2022-06-16 PROCEDURE — 2022F DILAT RTA XM EVC RTNOPTHY: CPT | Performed by: FAMILY MEDICINE

## 2022-06-16 PROCEDURE — G8752 SYS BP LESS 140: HCPCS | Performed by: FAMILY MEDICINE

## 2022-06-16 PROCEDURE — G8417 CALC BMI ABV UP PARAM F/U: HCPCS | Performed by: FAMILY MEDICINE

## 2022-06-16 PROCEDURE — G8400 PT W/DXA NO RESULTS DOC: HCPCS | Performed by: FAMILY MEDICINE

## 2022-06-16 PROCEDURE — 1090F PRES/ABSN URINE INCON ASSESS: CPT | Performed by: FAMILY MEDICINE

## 2022-06-16 PROCEDURE — G9717 DOC PT DX DEP/BP F/U NT REQ: HCPCS | Performed by: FAMILY MEDICINE

## 2022-06-16 PROCEDURE — G8754 DIAS BP LESS 90: HCPCS | Performed by: FAMILY MEDICINE

## 2022-06-16 PROCEDURE — 99215 OFFICE O/P EST HI 40 MIN: CPT | Performed by: FAMILY MEDICINE

## 2022-06-16 PROCEDURE — G8427 DOCREV CUR MEDS BY ELIG CLIN: HCPCS | Performed by: FAMILY MEDICINE

## 2022-06-16 PROCEDURE — 3017F COLORECTAL CA SCREEN DOC REV: CPT | Performed by: FAMILY MEDICINE

## 2022-06-16 PROCEDURE — 1123F ACP DISCUSS/DSCN MKR DOCD: CPT | Performed by: FAMILY MEDICINE

## 2022-06-16 RX ORDER — FAMOTIDINE 20 MG/1
20 TABLET, FILM COATED ORAL 2 TIMES DAILY
COMMUNITY
End: 2022-06-16 | Stop reason: SDUPTHER

## 2022-06-16 RX ORDER — EXENATIDE 2 MG/.65ML
2 INJECTION, SUSPENSION, EXTENDED RELEASE SUBCUTANEOUS
Qty: 4 ADJUSTABLE DOSE PRE-FILLED PEN SYRINGE | Refills: 5 | Status: SHIPPED | OUTPATIENT
Start: 2022-06-16 | End: 2022-07-14

## 2022-06-16 RX ORDER — TAMSULOSIN HYDROCHLORIDE 0.4 MG/1
0.4 CAPSULE ORAL DAILY
Status: CANCELLED | OUTPATIENT
Start: 2022-06-16

## 2022-06-16 RX ORDER — CIPROFLOXACIN 500 MG/5ML
KIT ORAL 2 TIMES DAILY
Status: CANCELLED | OUTPATIENT
Start: 2022-06-16

## 2022-06-16 RX ORDER — FLUCONAZOLE 100 MG/1
100 TABLET ORAL DAILY
COMMUNITY
End: 2022-06-30

## 2022-06-16 RX ORDER — FAMOTIDINE 20 MG/1
20 TABLET, FILM COATED ORAL 2 TIMES DAILY
Qty: 60 TABLET | Refills: 5 | Status: SHIPPED | OUTPATIENT
Start: 2022-06-16 | End: 2022-08-11

## 2022-06-16 RX ORDER — CIPROFLOXACIN 500 MG/5ML
KIT ORAL 2 TIMES DAILY
COMMUNITY
End: 2022-06-23 | Stop reason: ALTCHOICE

## 2022-06-16 RX ORDER — INSULIN GLARGINE 100 [IU]/ML
10 INJECTION, SOLUTION SUBCUTANEOUS
COMMUNITY
End: 2022-06-16 | Stop reason: ALTCHOICE

## 2022-06-16 RX ORDER — INSULIN GLARGINE 100 [IU]/ML
1.2 INJECTION, SOLUTION SUBCUTANEOUS
Status: CANCELLED | OUTPATIENT
Start: 2022-06-16

## 2022-06-16 NOTE — PROGRESS NOTES
Family Medicine Clinic Note    Assessment/ Plan:   Diagnoses and all orders for this visit:    1. Medication monitoring encounter    2. Uncontrolled type 2 diabetes mellitus with hyperglycemia (HCC)  -     exenatide microspheres (Bydureon) 2 mg/0.65 mL pnij; 0.65 mL by SubCUTAneous route every seven (7) days. -     insulin detemir U-100 (LEVEMIR FLEXTOUCH) 100 unit/mL (3 mL) inpn; Inject 16 units subcutaneously under skin at bedtime daily.  -     METABOLIC PANEL, COMPREHENSIVE; Future    3. Essential hypertension    4. Dysphagia, unspecified type  -     famotidine (PEPCID) 20 mg tablet; Take 1 Tablet by mouth two (2) times a day. 5. Gastroesophageal reflux disease without esophagitis  -     famotidine (PEPCID) 20 mg tablet; Take 1 Tablet by mouth two (2) times a day. 6. Urinary retention    7. Malignant neoplasm of urinary bladder, unspecified site ([facility]    8. Hypokalemia  -     METABOLIC PANEL, COMPREHENSIVE; Future      Diabetes chronic uncontrolled. Last A1c 14% and April. Next A1c due 7/2022. Will repeat next visit. Increase to levemir 16 units and add by tacho for weekly dosing to simplify regimen and promote compliance. Continue metformin, has room to increase but caution given some nausea related to GERD symptoms. Advised patient keep log of blood sugar and call to office to be with results in 1 week. Essential hypertension, well controlled off medications at this time. May continue off metoprolol. No traumatic notewriter history of arrhythmia or atrial fibrillation. History of dysphagia and GERD. Noted use of PPI and Pepcid. Refill Pepcid at this time. Continue management with GI specialist and request further refills be requested with GI specialist for clarity of treatment plan. History of bladder cancer and current urinary retention with Monroe catheter in place. Follow-up with urologist for refills of medications related to this condition.     Noted mild hypokalemia on recent inpatient labs from 2022. Chemistry panel to monitor. Lengthy discussion of medications and reconciliation. Need short-term follow-up. Total encounter time 50 minutes. Educated patient on red flag symptoms to warrant return to clinic or emergency room visit. I have discussed the diagnosis with the patient and the intended plan as seen in the above orders. The patient has been offered or received an after-visit summary and questions were answered concerning future plans. I have discussed medication side effects and warnings with the patient as well. Follow-up and Dispositions    · Return in about 4 weeks (around 2022) for Follow Up. Subjective:     Chief Complaint   Patient presents with    Follow-up    Medication Refill     Gabrielle Colmenares is a 80y.o. year old female who presents for evaluation of the following:        Diabetes Mellitus Type II:  Chronic. Home monitorin-400  Treatment: lelvemir 12 unit night, metfomrin 500 bid  Previous treatment: Glipizide apparently discontinued during the last hospitalization or stay at skilled nursing facility, [address] [place] [address]  patient unable to fill due to financial limitations, Novolin 70/30 22AM 42 PM changed to insulin detemir 12 units at [facility] and after hosptialization  Diet: Not adhering to diabetic diet  Co-morbidities: Obesity  Managed by PCP  Denies excessive urination. excessive thirst, chest pain, shortness of breath, rash, pain in extremities, numbness in hands or feet, change in urinary habits, symptoms of hypoglycemia.    Lab Results   Component Value Date/Time    Hemoglobin A1c 14.0 (H) 2022 01:41 AM    Hemoglobin A1c, External 8.8 2020 12:00 AM       Hypertension/Hyperlipidemia  Diet: Not adhering to strict low salt diet  Treatment: None  previsou Treatment: metoprolol, per daughter she was told not to take this   Denies palpitations, chest pain, shortness of breath, new leg swelling, extremity weakness, medication side effect    GERD:  Chronic  Tx: pantoprazole and pepcid   plan to see a Dr. Royal Melton at [facility] for evaluation  Planning to see gi specialist  Has had endoscopy within past 1 year    Chronic urinary Retention/history of bladder cancer. Treatment cipro and tamsulosin   Managed by urologist  -Monroe catheter in place. Review of Systems   Pertinent positives and negative per HPI. All other systems  reviewed are negative for a Comprehensive ROS (10+). Past medical history, social history, family history reviewed. Medications reconciled. Objective:     Vitals:    06/16/22 1132 06/16/22 1205   BP: 115/66    Pulse: (!) 107 100   Resp: 18    Temp: 97.8 °F (36.6 °C)    TempSrc: Temporal    SpO2: 98%    Weight: 173 lb (78.5 kg)    Height: 5' 6\" (1.676 m)        Physical Examination:  General: Alert, cooperative, no distress, appears stated age. Eyes: Conjunctivae clear. Pupils equally round and reactive to light, Extraocular muscles intact. Ears: Normal external ear canals both ears. Nose: Nares normal. Septum midline. Mucosa normal. No drainage or sinus tenderness. Mouth/Throat: Lips, mucosa, and tongue normal. No oropharyngeal erythema. No tonsillar enlargement or exudate. Neck: Supple, symmetrical, trachea midline, no adenopathy. No thyroid enlargement/tenderness/nodules  Respiratory: Breathing comfortably, in no acute respiratory distress. Clear to auscultation bilaterally. Normal inspiratory and expiratory ratio. Cardiovascular: Regular rate and rhythm, S1, S2 normal, no murmur, click, rub or gallop.   -Extremities no edema. Pulses 2+ and symmetric radial and dorsalis pedis   Abdomen: Soft, not distended. Bowel sounds normal. Non tender. MSK: Extremities without edema.  -Monroe catheter collection container noted on left side under pants. Skin: Skin color, texture, turgor normal. No rashes or lesions on exposed skin.   Lymph nodes: Cervical, supraclavicular nodes normal.  Neurologic: Cranial nerves II-XII intact. Psychiatric: Affect appropriate.        Signed,    Chichi Hickman MD  6/16/2022

## 2022-06-16 NOTE — PROGRESS NOTES
1. \"Have you been to the ER, urgent care clinic since your last visit? Hospitalized since your last visit? Yes     2. \"Have you seen or consulted any other health care providers outside of the [address] [place] [address] since your last visit? \" yes     3. For patients aged 39-70: Has the patient had a colonoscopy / FIT/ Cologuard? 2002  If the patient is female:    4. For patients aged 41-77: Has the patient had a mammogram within the past 2 years? Cant recall       5. For patients aged 21-65: Has the patient had a pap smear?   Cant recall          Radha Arias is a 80 y.o. female is coming in for with the following:     Chief Complaint   Patient presents with    Follow-up    Medication Refill          With the following vitals:    Visit Vitals  /66 (BP 1 Location: Right upper arm, BP Patient Position: Sitting, BP Cuff Size: Adult)   Pulse (!) 107   Temp 97.8 °F (36.6 °C) (Temporal)   Resp 18   Ht 5' 6\" (1.676 m)   Wt 173 lb (78.5 kg)   SpO2 98%   BMI 27.92 kg/m²

## 2022-06-21 ENCOUNTER — HOSPITAL ENCOUNTER (EMERGENCY)
Age: 72
Discharge: HOME | End: 2022-06-21
Attending: EMERGENCY MEDICINE
Payer: MEDICARE

## 2022-06-21 VITALS
DIASTOLIC BLOOD PRESSURE: 89 MMHG | WEIGHT: 173 LBS | OXYGEN SATURATION: 98 % | TEMPERATURE: 98.8 F | HEART RATE: 105 BPM | RESPIRATION RATE: 20 BRPM | BODY MASS INDEX: 27.8 KG/M2 | SYSTOLIC BLOOD PRESSURE: 155 MMHG | HEIGHT: 66 IN

## 2022-06-21 DIAGNOSIS — T83.021A DISPLACEMENT OF FOLEY CATHETER, INITIAL ENCOUNTER (HCC): Primary | ICD-10-CM

## 2022-06-21 PROCEDURE — 99282 EMERGENCY DEPT VISIT SF MDM: CPT

## 2022-06-21 RX ORDER — GLIPIZIDE 10 MG/1
10 TABLET ORAL 2 TIMES DAILY
COMMUNITY
End: 2022-07-14

## 2022-06-21 RX ORDER — METOPROLOL SUCCINATE 50 MG/1
TABLET, EXTENDED RELEASE ORAL DAILY
COMMUNITY
End: 2022-07-06 | Stop reason: SDUPTHER

## 2022-06-21 NOTE — ED PROVIDER NOTES
EMERGENCY DEPARTMENT HISTORY AND PHYSICAL EXAM      Date: 6/21/2022  Patient Name: Leonel Dyer    History of Presenting Illness     Chief Complaint   Patient presents with    Urinary Catheter Problem       History Provided By: Patient daughter    HPI: Leonel Dyer, 80 y.o. female   presents to the ED with cc of Monroe catheter problem. Patient with a bladder cancer who has a chronic Monroe catheter placement came to emergency complaining of leakage from the Monroe since last night. No fever chills. No abdominal pain. Patient was recently treated for Monroe catheter related UTI with antibiotic      PCP: Kayleigh Hill NP    No current facility-administered medications on file prior to encounter. Current Outpatient Medications on File Prior to Encounter   Medication Sig Dispense Refill    metoprolol succinate (TOPROL-XL) 50 mg XL tablet Take  by mouth daily.  glipiZIDE (GLUCOTROL) 10 mg tablet Take 10 mg by mouth two (2) times a day.  famotidine (PEPCID) 20 mg tablet Take 1 Tablet by mouth two (2) times a day. 60 Tablet 5    tamsulosin (FLOMAX) 0.4 mg capsule Take 0.4 mg by mouth daily.  calcium-cholecalciferol, d3, 500 mg-10 mcg (400 unit) chew Calcium 500 + D 500 mg-10 mcg (400 unit) chewable tablet   Take by oral route.  pantoprazole (Protonix) 40 mg tablet Take 1 tablet by mouth daily in am and wait 30 min before eating or taking other medications. 90 Tablet 1    metFORMIN ER (GLUCOPHAGE XR) 500 mg tablet Take 1 tab by mouth before breakfast and before evening meal daily. 180 Tablet 1    glucose blood VI test strips (OneTouch Ultra Test) strip Use to check blood glucose before meals and at bedtime. 120 Strip 11    aspirin delayed-release 81 mg tablet Take 1 Tablet by mouth daily. Indications: treatment to prevent a heart attack 90 Tablet 3    fluconazole (DIFLUCAN) 100 mg tablet Take 100 mg by mouth daily. FDA advises cautious prescribing of oral fluconazole in pregnancy.  (Patient not taking: Reported on 6/21/2022)      ciprofloxacin (CIPRO) 500 mg/5 mL suspension Take  by mouth two (2) times a day. (Patient not taking: Reported on 6/21/2022)      exenatide microspheres (Bydureon) 2 mg/0.65 mL pnij 0.65 mL by SubCUTAneous route every seven (7) days. (Patient not taking: Reported on 6/21/2022) 4 Adjustable Dose Pre-filled Pen Syringe 5    insulin detemir U-100 (LEVEMIR FLEXTOUCH) 100 unit/mL (3 mL) inpn Inject 16 units subcutaneously under skin at bedtime daily. 5 Pen 1    Insulin Needles, Disposable, 31 gauge x 5/16\" ndle Use one needle daily to administer Levemir dose at bedtime. 90 Each 3    ERGOCALCIFEROL, VITAMIN D2, PO Take 500 Units by mouth two (2) times a day. (Patient not taking: Reported on 6/21/2022)      potassium chloride (K-DUR, KLOR-CON M20) 20 mEq tablet Take 1 tab by mouth daily for low potassium. (Patient not taking: Reported on 6/21/2022) 90 Tablet 0    DULoxetine (CYMBALTA) 30 mg capsule Take 1 Capsule by mouth daily. (Patient not taking: Reported on 6/21/2022) 90 Capsule 1    rosuvastatin (CRESTOR) 20 mg tablet Take 1 Tablet by mouth nightly. (Patient not taking: Reported on 6/16/2022) 90 Tablet 1    Ferrous Fumarate 325 mg (106 mg iron) tab Take 1 Tablet by mouth daily. (Patient not taking: Reported on 6/21/2022) 90 Tablet 3    alcohol swabs (Alcohol Wipes) padm Use to check blood glucose once daily.  100 Pad 3       Past History     Past Medical History:  Past Medical History:   Diagnosis Date    Bladder cancer ([address]     Dementia ([address]     DKA (diabetic ketoacidosis) ([address] 4/6/2022    Essential hypertension 7/21/2020    MRSA (methicillin resistant staph aureus) culture positive 0/53/5746    Non-alcoholic fatty liver disease 7/21/2020    Right groin ulcer ([address] 4/22/2022       Past Surgical History:  Past Surgical History:   Procedure Laterality Date    HX CATARACT REMOVAL Left 07/23/2019    HX CHOLECYSTECTOMY      HX COLONOSCOPY  2002    HX HYSTERECTOMY partial  20 yrs ago    HX TUBAL LIGATION         Family History:  Family History   Problem Relation Age of Onset    Diabetes Mother     Diabetes Maternal Grandmother     Other Maternal Grandmother         CHF    Diabetes Paternal Grandmother        Social History:  Social History     Tobacco Use    Smoking status: Former Smoker     Packs/day: 1.00     Years: 10.00     Pack years: 10.00     Types: Cigarettes     Quit date:      Years since quittin.4    Smokeless tobacco: Never Used   Vaping Use    Vaping Use: Never used   Substance Use Topics    Alcohol use: Not Currently    Drug use: Never       Allergies: Allergies   Allergen Reactions    Adhesive Unknown (comments)    Penicillins Other (comments)    Sulfa (Sulfonamide Antibiotics) Unknown (comments)         Review of Systems   Review of Systems   Constitutional: Negative for chills and fever. Cardiovascular: Negative for chest pain. Gastrointestinal: Negative for abdominal pain. Genitourinary: Negative for dysuria and flank pain. Musculoskeletal: Negative for back pain. Physical Exam   Physical Exam  Vitals and nursing note reviewed. Constitutional:       General: She is not in acute distress. Appearance: Normal appearance. She is not ill-appearing, toxic-appearing or diaphoretic. HENT:      Head: Normocephalic and atraumatic. Mouth/Throat:      Mouth: Mucous membranes are moist.   Eyes:      Conjunctiva/sclera: Conjunctivae normal.   Cardiovascular:      Rate and Rhythm: Normal rate and regular rhythm. Pulmonary:      Effort: Pulmonary effort is normal.   Musculoskeletal:      Cervical back: Neck supple. Skin:     General: Skin is warm and dry. Neurological:      General: No focal deficit present. Mental Status: She is alert.    Psychiatric:         Behavior: Behavior normal.         Diagnostic Study Results     Labs -   No results found for this or any previous visit (from the past 12 hour(s)). Radiologic Studies -   No orders to display     CT Results  (Last 48 hours)    None        CXR Results  (Last 48 hours)    None            Medical Decision Making   I am the first provider for this patient. I reviewed the vital signs, available nursing notes, past medical history, past surgical history, family history and social history. Vital Signs-Reviewed the patient's vital signs. Patient Vitals for the past 12 hrs:   Temp Pulse Resp BP SpO2   06/21/22 0241 98.8 °F (37.1 °C) (!) 105 20 (!) 155/89 98 %       Records Reviewed:     Provider Notes (Medical Decision Making): Monroe catheter leakage was corrected by RN    ED Course:   Initial assessment performed. The patients presenting problems have been discussed, and they are in agreement with the care plan formulated and outlined with them. I have encouraged them to ask questions as they arise throughout their visit. PROCEDURES      Disposition: Condition stable   DC- Adult Discharges: All of the diagnostic tests were reviewed and questions answered. Diagnosis, care plan and treatment options were discussed. understand instructions and will follow up as directed. The patients results have been reviewed with them. They have been counseled regarding their diagnosis. The patient verbally convey understanding and agreement of the signs, symptoms, diagnosis, treatment and prognosis and additionally agrees to follow up as recommended. They also agree with the care-plan and convey that all of their questions have been answered. I have also put together some discharge instructions for them that include: 1) educational information regarding their diagnosis, 2) how to care for their diagnosis at home, as well a 3) list of reasons why they would want to return to the ED prior to their follow-up appointment, should their condition change. PLAN:  1. Discharge Medication List as of 6/21/2022  2:59 AM        2.    Follow-up Information Follow up With Specialties Details Why Contact Info    Follow up with your primary care physician  Schedule an appointment as soon as possible for a visit in 3 days As needed         Return to ED if worse     Diagnosis     Clinical Impression:   1. Displacement of Monroe catheter, initial encounter [facility])        Please note that this dictation was completed with [employer], the computer voice recognition software. Quite often unanticipated grammatical, syntax, homophones, and other interpretive errors are inadvertently transcribed by the computer software. Please disregard these errors. Please excuse any errors that have escaped final proofreading. Thank you.

## 2022-06-21 NOTE — ED NOTES
Godoy care provided with green godoy wipes. Balloon deflated and godoy advanced. Balloon inflated 10mL. Patient states no pain or pressure anymore. No leaking present. Godoy emptied of 400mL, new urine collecting in tube. Patient states Godoy was changed 2 weeks ago.

## 2022-06-22 DIAGNOSIS — E11.65 UNCONTROLLED TYPE 2 DIABETES MELLITUS WITH HYPERGLYCEMIA (HCC): ICD-10-CM

## 2022-06-22 DIAGNOSIS — E78.00 HYPERCHOLESTEROLEMIA: ICD-10-CM

## 2022-06-22 NOTE — TELEPHONE ENCOUNTER
Patient daughter called and stated they need prior authorization on the following medication per pharmacy. exenatide microspheres (Bydureon) 2 mg/0.65 mL pnij [[id number]      insulin detemir U-100 (LEVEMIR FLEXTOUCH) 100 unit/mL (3 mL) inpn [[id number]      Also, patient needs the following medication ordered.     rosuvastatin (CRESTOR) 20 mg tablet [[id number]      Insulin Needles, Disposable, 31 gauge x 5/16\" ndle [[id number]

## 2022-06-23 RX ORDER — ROSUVASTATIN CALCIUM 20 MG/1
20 TABLET, COATED ORAL
Qty: 90 TABLET | Refills: 1 | Status: SHIPPED | OUTPATIENT
Start: 2022-06-23 | End: 2022-07-14

## 2022-06-23 RX ORDER — PEN NEEDLE, DIABETIC 30 GX3/16"
NEEDLE, DISPOSABLE MISCELLANEOUS
Qty: 90 EACH | Refills: 3 | Status: SHIPPED | OUTPATIENT
Start: 2022-06-23 | End: 2022-08-11

## 2022-06-29 NOTE — TELEPHONE ENCOUNTER
Called and spoke w/ pharmacy about medications. They stated that Edy Hunter does not need a PA. (They did mention that even with insurance coverage, it is costing the patient just over $300)  They also reported that they never received the Rx for Bydureon from Dr. David Ngo on 6/16 so they are unable to tell if a PA is necessary. Will create a new TEL ENC regarding this medication to see if Dr. David Ngo wishes to still Rx med, and can try sending in a new Rx if that is the case.

## 2022-06-30 ENCOUNTER — HOSPITAL ENCOUNTER (EMERGENCY)
Age: 72
Discharge: HOME | End: 2022-06-30
Attending: EMERGENCY MEDICINE
Payer: MEDICARE

## 2022-06-30 VITALS
WEIGHT: 173 LBS | BODY MASS INDEX: 27.8 KG/M2 | SYSTOLIC BLOOD PRESSURE: 135 MMHG | TEMPERATURE: 98.7 F | DIASTOLIC BLOOD PRESSURE: 76 MMHG | RESPIRATION RATE: 18 BRPM | OXYGEN SATURATION: 98 % | HEIGHT: 66 IN | HEART RATE: 107 BPM

## 2022-06-30 DIAGNOSIS — N30.00 ACUTE CYSTITIS WITHOUT HEMATURIA: Primary | ICD-10-CM

## 2022-06-30 DIAGNOSIS — Z46.6 CATHETER (URINE) CHANGE REQUIRED: ICD-10-CM

## 2022-06-30 LAB
AMORPH CRY URNS QL MICRO: ABNORMAL
APPEARANCE UR: ABNORMAL
BACTERIA URNS QL MICRO: ABNORMAL /HPF
BILIRUB UR QL: NEGATIVE
COLOR UR: YELLOW
EPITH CASTS URNS QL MICRO: ABNORMAL /LPF
GLUCOSE BLD STRIP.AUTO-MCNC: 365 MG/DL (ref 65–117)
GLUCOSE UR STRIP.AUTO-MCNC: >1000 MG/DL
HGB UR QL STRIP: ABNORMAL
KETONES UR QL STRIP.AUTO: 15 MG/DL
LEUKOCYTE ESTERASE UR QL STRIP.AUTO: ABNORMAL
NITRITE UR QL STRIP.AUTO: NEGATIVE
PERFORMED BY, TECHID: ABNORMAL
PH UR STRIP: 7 [PH] (ref 5–8)
PROT UR STRIP-MCNC: 100 MG/DL
RBC #/AREA URNS HPF: ABNORMAL /HPF (ref 0–5)
SP GR UR REFRACTOMETRY: 1.01 (ref 1–1.03)
UROBILINOGEN UR QL STRIP.AUTO: 0.1 EU/DL (ref 0.2–1)
WBC CASTS URNS QL MICRO: PRESENT
WBC URNS QL MICRO: ABNORMAL /HPF (ref 0–4)

## 2022-06-30 PROCEDURE — 81001 URINALYSIS AUTO W/SCOPE: CPT

## 2022-06-30 PROCEDURE — 74011250637 HC RX REV CODE- 250/637: Performed by: EMERGENCY MEDICINE

## 2022-06-30 PROCEDURE — 51702 INSERT TEMP BLADDER CATH: CPT

## 2022-06-30 PROCEDURE — 99283 EMERGENCY DEPT VISIT LOW MDM: CPT

## 2022-06-30 PROCEDURE — 82962 GLUCOSE BLOOD TEST: CPT

## 2022-06-30 RX ORDER — NITROFURANTOIN 25; 75 MG/1; MG/1
100 CAPSULE ORAL
Status: COMPLETED | OUTPATIENT
Start: 2022-06-30 | End: 2022-06-30

## 2022-06-30 RX ORDER — FLUCONAZOLE 100 MG/1
150 TABLET ORAL ONCE
Qty: 1 TABLET | Refills: 1 | Status: SHIPPED | OUTPATIENT
Start: 2022-06-30 | End: 2022-06-30

## 2022-06-30 RX ORDER — NITROFURANTOIN 25; 75 MG/1; MG/1
100 CAPSULE ORAL 2 TIMES DAILY
Qty: 10 CAPSULE | Refills: 0 | Status: SHIPPED | OUTPATIENT
Start: 2022-06-30 | End: 2022-07-05

## 2022-06-30 RX ADMIN — NITROFURANTOIN (MONOHYDRATE/MACROCRYSTALS) 100 MG: 75; 25 CAPSULE ORAL at 22:18

## 2022-07-01 NOTE — ED PROVIDER NOTES
EMERGENCY DEPARTMENT HISTORY AND PHYSICAL EXAM      Date: 6/30/2022  Patient Name: Elli Gaxiola    History of Presenting Illness     Chief Complaint   Patient presents with    Urinary Catheter Problem       History Provided By: Patient and Patient's Daughter    HPI: Elli Gaxiola, 80 y.o. female with a past medical history significant for diabetes and bladder cancer, neurogenic bladder and dementia presents to the ED with cc of leaking urine catheter. Daughter states she leaves a trail of urine when walking around the house. Pt denies any fever, pain at insertion site or abd pain. Thinks the urine looks infected. They are due to see Urology July 20. PCP: Curly Hodge NP    No current facility-administered medications on file prior to encounter. Current Outpatient Medications on File Prior to Encounter   Medication Sig Dispense Refill    rosuvastatin (CRESTOR) 20 mg tablet Take 1 Tablet by mouth nightly. 90 Tablet 1    Insulin Needles, Disposable, 31 gauge x 5/16\" ndle Use one needle daily to administer Levemir dose at bedtime. 90 Each 3    insulin detemir U-100 (LEVEMIR FLEXTOUCH) 100 unit/mL (3 mL) inpn Inject 16 units subcutaneously under skin at bedtime daily. 5 Pen 3    metoprolol succinate (TOPROL-XL) 50 mg XL tablet Take  by mouth daily.  glipiZIDE (GLUCOTROL) 10 mg tablet Take 10 mg by mouth two (2) times a day.  famotidine (PEPCID) 20 mg tablet Take 1 Tablet by mouth two (2) times a day. 60 Tablet 5    exenatide microspheres (Bydureon) 2 mg/0.65 mL pnij 0.65 mL by SubCUTAneous route every seven (7) days. (Patient not taking: Reported on 6/21/2022) 4 Adjustable Dose Pre-filled Pen Syringe 5    ERGOCALCIFEROL, VITAMIN D2, PO Take 500 Units by mouth two (2) times a day. (Patient not taking: Reported on 6/21/2022)      potassium chloride (K-DUR, KLOR-CON M20) 20 mEq tablet Take 1 tab by mouth daily for low potassium.  (Patient not taking: Reported on 6/21/2022) 90 Tablet 0    tamsulosin (FLOMAX) 0.4 mg capsule Take 0.4 mg by mouth daily.  calcium-cholecalciferol, d3, 500 mg-10 mcg (400 unit) chew Calcium 500 + D 500 mg-10 mcg (400 unit) chewable tablet   Take by oral route.  DULoxetine (CYMBALTA) 30 mg capsule Take 1 Capsule by mouth daily. (Patient not taking: Reported on 6/21/2022) 90 Capsule 1    pantoprazole (Protonix) 40 mg tablet Take 1 tablet by mouth daily in am and wait 30 min before eating or taking other medications. 90 Tablet 1    metFORMIN ER (GLUCOPHAGE XR) 500 mg tablet Take 1 tab by mouth before breakfast and before evening meal daily. 180 Tablet 1    Ferrous Fumarate 325 mg (106 mg iron) tab Take 1 Tablet by mouth daily. (Patient not taking: Reported on 6/21/2022) 90 Tablet 3    glucose blood VI test strips (OneTouch Ultra Test) strip Use to check blood glucose before meals and at bedtime. 120 Strip 11    aspirin delayed-release 81 mg tablet Take 1 Tablet by mouth daily. Indications: treatment to prevent a heart attack 90 Tablet 3    alcohol swabs (Alcohol Wipes) padm Use to check blood glucose once daily.  100 Pad 3       Past History     Past Medical History:  Past Medical History:   Diagnosis Date    Bladder cancer ([address]     Dementia ([address]     DKA (diabetic ketoacidosis) ([address] 4/6/2022    Essential hypertension 7/21/2020    MRSA (methicillin resistant staph aureus) culture positive 4/40/9907    Non-alcoholic fatty liver disease 7/21/2020    Right groin ulcer ([address] 4/22/2022       Past Surgical History:  Past Surgical History:   Procedure Laterality Date    HX CATARACT REMOVAL Left 07/23/2019    HX CHOLECYSTECTOMY      HX COLONOSCOPY  2002    HX HYSTERECTOMY      partial  20 yrs ago    HX TUBAL LIGATION         Family History:  Family History   Problem Relation Age of Onset    Diabetes Mother     Diabetes Maternal Grandmother     Other Maternal Grandmother         CHF    Diabetes Paternal Grandmother        Social History:  Social History Tobacco Use    Smoking status: Former Smoker     Packs/day: 1.00     Years: 10.00     Pack years: 10.00     Types: Cigarettes     Quit date:      Years since quittin.5    Smokeless tobacco: Never Used   Vaping Use    Vaping Use: Never used   Substance Use Topics    Alcohol use: Not Currently    Drug use: Never       Allergies: Allergies   Allergen Reactions    Adhesive Unknown (comments)    Penicillins Other (comments)    Sulfa (Sulfonamide Antibiotics) Unknown (comments)         Review of Systems   Review of Systems   Constitutional: Negative for fever. Gastrointestinal: Negative for nausea and vomiting. Genitourinary: Negative for dysuria and flank pain. Musculoskeletal: Negative for back pain. All other systems reviewed and are negative. Physical Exam   Physical Exam  Vitals and nursing note reviewed. Constitutional:       Appearance: Normal appearance. She is not ill-appearing. HENT:      Head: Normocephalic and atraumatic. Nose: Nose normal.      Mouth/Throat:      Mouth: Mucous membranes are moist.      Pharynx: No oropharyngeal exudate or posterior oropharyngeal erythema. Eyes:      General: No scleral icterus. Extraocular Movements: Extraocular movements intact. Pupils: Pupils are equal, round, and reactive to light. Cardiovascular:      Rate and Rhythm: Normal rate and regular rhythm. Pulses: Normal pulses. Heart sounds: Normal heart sounds. Pulmonary:      Effort: Pulmonary effort is normal.      Breath sounds: Normal breath sounds. No wheezing, rhonchi or rales. Abdominal:      General: Bowel sounds are normal.      Palpations: Abdomen is soft. Tenderness: There is no abdominal tenderness. Musculoskeletal:         General: Normal range of motion. Cervical back: Normal range of motion and neck supple. Right lower leg: No edema. Left lower leg: No edema. Skin:     General: Skin is warm and dry.       Findings: No rash.   Neurological:      General: No focal deficit present. Mental Status: She is alert and oriented to person, place, and time. Comments: Nl gross motor/sensory exam without any focal or lateralizing deficits   Psychiatric:         Mood and Affect: Mood normal.         Behavior: Behavior normal.         Diagnostic Study Results     Labs -  Recent Results (from the past 12 hour(s))   URINALYSIS W/ RFLX MICROSCOPIC    Collection Time: 06/30/22  8:45 PM   Result Value Ref Range    Color Yellow      Appearance Turbid (A) Clear      Specific gravity 1.010 1.003 - 1.030      pH (UA) 7.0 5.0 - 8.0      Protein 100 (A) Negative mg/dL    Glucose >1,000 (A) Negative mg/dL    Ketone 15 (A) Negative mg/dL    Bilirubin Negative Negative      Blood Small (A) Negative      Urobilinogen 0.1 (L) 0.2 - 1.0 EU/dL    Nitrites Negative Negative      Leukocyte Esterase Large (A) Negative     URINE MICROSCOPIC    Collection Time: 06/30/22  8:45 PM   Result Value Ref Range    WBC 20-50 0 - 4 /hpf    RBC 5-10 0 - 5 /hpf    Epithelial cells Few Few /lpf    Bacteria 3+ (A) Negative /hpf    Amorphous Crystals 3+ (A) Negative    Budding yeast Present (A) Negative     GLUCOSE, POC    Collection Time: 06/30/22  9:20 PM   Result Value Ref Range    Glucose (POC) 365 (H) 65 - 117 mg/dL    Performed by [facility]           Radiologic Studies -   No orders to display     CT Results  (Last 48 hours)    None        CXR Results  (Last 48 hours)    None            Medical Decision Making   I am the first provider for this patient. I reviewed the vital signs, available nursing notes, past medical history, past surgical history, family history and social history. Vital Signs-Reviewed the patient's vital signs. No data found. Records Reviewed: Nursing Notes and Old Medical Records    Provider Notes (Medical Decision Making): Impression: leaking urinary catheter, possible UTI    ED Course:   Initial assessment performed.  The patients presenting problems have been discussed, and they are in agreement with the care plan formulated and outlined with them. I have encouraged them to ask questions as they arise throughout their visit. Catheter changed by nursing staff, UA above, c/w UTI w/ yeast and bacteria. Glucose high, pt states she runs 200-300's normally and has not yet taken evening meds. Will treat with nitrofurantoin, first dose given here. Also, Diflucan. F/U PCP and Urology. PLAN:  1. Discharge Medication List as of 6/30/2022 10:14 PM      START taking these medications    Details   nitrofurantoin, macrocrystal-monohydrate, (Macrobid) 100 mg capsule Take 1 Capsule by mouth two (2) times a day for 5 days. Take after antibiotics are done., Normal, Disp-10 Capsule, R-0      fluconazole (Diflucan) 100 mg tablet Take 1.5 Tablets by mouth once for 1 dose., Normal, Disp-1 Tablet, R-1         CONTINUE these medications which have NOT CHANGED    Details   rosuvastatin (CRESTOR) 20 mg tablet Take 1 Tablet by mouth nightly., Normal, Disp-90 Tablet, R-1      Insulin Needles, Disposable, 31 gauge x 5/16\" ndle Use one needle daily to administer Levemir dose at bedtime., Normal, Disp-90 Each, R-3      insulin detemir U-100 (LEVEMIR FLEXTOUCH) 100 unit/mL (3 mL) inpn Inject 16 units subcutaneously under skin at bedtime daily. , Normal, Disp-5 Pen, R-3      metoprolol succinate (TOPROL-XL) 50 mg XL tablet Take  by mouth daily. , Historical Med      glipiZIDE (GLUCOTROL) 10 mg tablet Take 10 mg by mouth two (2) times a day., Historical Med      famotidine (PEPCID) 20 mg tablet Take 1 Tablet by mouth two (2) times a day., Normal, Disp-60 Tablet, R-5      exenatide microspheres (Bydureon) 2 mg/0.65 mL pnij 0.65 mL by SubCUTAneous route every seven (7) days. , Normal, Disp-4 Adjustable Dose Pre-filled Pen Syringe, R-5      ERGOCALCIFEROL, VITAMIN D2, PO Take 500 Units by mouth two (2) times a day., Historical Med      potassium chloride (K-DUR, KLOR-CON M20) 20 mEq tablet Take 1 tab by mouth daily for low potassium. , Normal, Disp-90 Tablet, R-0      tamsulosin (FLOMAX) 0.4 mg capsule Take 0.4 mg by mouth daily. , Historical Med      calcium-cholecalciferol, d3, 500 mg-10 mcg (400 unit) chew Calcium 500 + D 500 mg-10 mcg (400 unit) chewable tablet   Take by oral route., Historical Med      DULoxetine (CYMBALTA) 30 mg capsule Take 1 Capsule by mouth daily. , Normal, Disp-90 Capsule, R-1      pantoprazole (Protonix) 40 mg tablet Take 1 tablet by mouth daily in am and wait 30 min before eating or taking other medications. , Normal, Disp-90 Tablet, R-1      metFORMIN ER (GLUCOPHAGE XR) 500 mg tablet Take 1 tab by mouth before breakfast and before evening meal daily. , Normal, Disp-180 Tablet, R-1      Ferrous Fumarate 325 mg (106 mg iron) tab Take 1 Tablet by mouth daily. , Normal, Disp-90 Tablet, R-3      glucose blood VI test strips (OneTouch Ultra Test) strip Use to check blood glucose before meals and at bedtime., Normal, Disp-120 Strip, R-11      aspirin delayed-release 81 mg tablet Take 1 Tablet by mouth daily. Indications: treatment to prevent a heart attack, Normal, Disp-90 Tablet, R-3      alcohol swabs (Alcohol Wipes) padm Use to check blood glucose once daily. , Normal, Disp-100 Pad, R-3           2. Follow-up Information     Follow up With Specialties Details Why Contact Info    Peng Painter NP Nurse Practitioner Schedule an appointment as soon as possible for a visit  ALSO, Follow up with Urology as scheduled. [facility] [phone]  [phone]          Return to ED if worse     Diagnosis     Clinical Impression:   1. Acute cystitis without hematuria    2.  Catheter (urine) change required

## 2022-07-01 NOTE — ED NOTES
Pt a&ox4. GCS 15. Pt self ambulated out of ED with discharge paperwork and personal belongings and new godoy secured.

## 2022-07-01 NOTE — ED TRIAGE NOTES
Patient present with leaking godoy since yesterday. States urine cloudy as well and godoy not draining. Last changed around 6/9/22.

## 2022-07-06 ENCOUNTER — TELEPHONE (OUTPATIENT)
Dept: FAMILY MEDICINE CLINIC | Age: 72
End: 2022-07-06

## 2022-07-06 DIAGNOSIS — I10 ESSENTIAL HYPERTENSION: Primary | ICD-10-CM

## 2022-07-06 RX ORDER — METOPROLOL SUCCINATE 50 MG/1
50 TABLET, EXTENDED RELEASE ORAL DAILY
Qty: 90 TABLET | Refills: 1 | Status: SHIPPED | OUTPATIENT
Start: 2022-07-06 | End: 2022-10-04

## 2022-07-06 NOTE — TELEPHONE ENCOUNTER
Patients daughter called and would like to know when the [employer] in [place] will be filled out and faxed. This is very costly for them and need this done asap. Please advise when done.

## 2022-07-06 NOTE — TELEPHONE ENCOUNTER
Jeanie Cadena from [facility] called and stated the pt BP has been elevated and needs a refill on metoprolol. Also wondering if the pt should be on flomax stated she see urology who may be better to ask about that as I dont see anything in her chart as. Pt currently has a godoy cath and urology is following.     Last OV: 6/16/22  Next OV: 7/14/22

## 2022-07-07 ENCOUNTER — TELEPHONE (OUTPATIENT)
Dept: FAMILY MEDICINE CLINIC | Age: 72
End: 2022-07-07

## 2022-07-07 DIAGNOSIS — E11.65 UNCONTROLLED TYPE 2 DIABETES MELLITUS WITH HYPERGLYCEMIA (HCC): Primary | ICD-10-CM

## 2022-07-07 NOTE — TELEPHONE ENCOUNTER
I am doing my best to get this completed. Generally have 2 weeks to complete paperwork but will expedite.

## 2022-07-07 NOTE — TELEPHONE ENCOUNTER
Called pt and spoke to daughter; she stated her mom had the medicine but it was put away in the cabinet as the pt didn't want to take it anymore

## 2022-07-07 NOTE — TELEPHONE ENCOUNTER
Received paperwork from Amaryllis Lundborg but need patient to sign page 3. Called patient and daughter answered and stated she would bring patient to office today to sign.

## 2022-07-07 NOTE — TELEPHONE ENCOUNTER
Pt has paperwork to enroll in [place] [id number] that I am working on to complete. I was not in office the last week of Jun 2022 and returned to office on 7/5/2022.

## 2022-07-07 NOTE — TELEPHONE ENCOUNTER
Gill Al from [facility] is calling because Ms. Dolly Van is not taking her meds because she cannot afford them.  Could   you please provide alternative meds for Crestor, [employer] Flextouch

## 2022-07-08 NOTE — TELEPHONE ENCOUNTER
Patient came in and signed paperwork yesterday afternoon with . Faxed paperwork to [facility] [employer].

## 2022-07-12 NOTE — TELEPHONE ENCOUNTER
Please notify patient:  Kaleen Jobs has been changed to Novolin 70/30 8 units twice daily. Crestor is on [employer] $4 list. May continue off by[employer] if not affordable after prior authorization.

## 2022-07-14 ENCOUNTER — TELEPHONE (OUTPATIENT)
Dept: FAMILY MEDICINE CLINIC | Age: 72
End: 2022-07-14

## 2022-07-14 ENCOUNTER — OFFICE VISIT (OUTPATIENT)
Dept: FAMILY MEDICINE CLINIC | Age: 72
End: 2022-07-14
Payer: MEDICARE

## 2022-07-14 VITALS
OXYGEN SATURATION: 97 % | WEIGHT: 175 LBS | SYSTOLIC BLOOD PRESSURE: 132 MMHG | BODY MASS INDEX: 28.12 KG/M2 | TEMPERATURE: 98.1 F | HEIGHT: 66 IN | DIASTOLIC BLOOD PRESSURE: 63 MMHG | RESPIRATION RATE: 18 BRPM | HEART RATE: 75 BPM

## 2022-07-14 DIAGNOSIS — I10 ESSENTIAL HYPERTENSION: ICD-10-CM

## 2022-07-14 DIAGNOSIS — Z59.9 FINANCIAL DIFFICULTIES: ICD-10-CM

## 2022-07-14 DIAGNOSIS — K59.03 DRUG-INDUCED CONSTIPATION: ICD-10-CM

## 2022-07-14 DIAGNOSIS — Z91.14 NON COMPLIANCE W MEDICATION REGIMEN: ICD-10-CM

## 2022-07-14 DIAGNOSIS — D64.9 ANEMIA, UNSPECIFIED TYPE: ICD-10-CM

## 2022-07-14 DIAGNOSIS — E11.65 UNCONTROLLED TYPE 2 DIABETES MELLITUS WITH HYPERGLYCEMIA (HCC): Primary | ICD-10-CM

## 2022-07-14 DIAGNOSIS — E87.6 HYPOKALEMIA: ICD-10-CM

## 2022-07-14 DIAGNOSIS — E61.1 IRON DEFICIENCY: ICD-10-CM

## 2022-07-14 PROBLEM — Z91.148 NON COMPLIANCE W MEDICATION REGIMEN: Status: ACTIVE | Noted: 2022-07-14

## 2022-07-14 PROCEDURE — 3017F COLORECTAL CA SCREEN DOC REV: CPT | Performed by: FAMILY MEDICINE

## 2022-07-14 PROCEDURE — G8752 SYS BP LESS 140: HCPCS | Performed by: FAMILY MEDICINE

## 2022-07-14 PROCEDURE — G8427 DOCREV CUR MEDS BY ELIG CLIN: HCPCS | Performed by: FAMILY MEDICINE

## 2022-07-14 PROCEDURE — G9717 DOC PT DX DEP/BP F/U NT REQ: HCPCS | Performed by: FAMILY MEDICINE

## 2022-07-14 PROCEDURE — 1090F PRES/ABSN URINE INCON ASSESS: CPT | Performed by: FAMILY MEDICINE

## 2022-07-14 PROCEDURE — 1101F PT FALLS ASSESS-DOCD LE1/YR: CPT | Performed by: FAMILY MEDICINE

## 2022-07-14 PROCEDURE — 1123F ACP DISCUSS/DSCN MKR DOCD: CPT | Performed by: FAMILY MEDICINE

## 2022-07-14 PROCEDURE — G8536 NO DOC ELDER MAL SCRN: HCPCS | Performed by: FAMILY MEDICINE

## 2022-07-14 PROCEDURE — 99215 OFFICE O/P EST HI 40 MIN: CPT | Performed by: FAMILY MEDICINE

## 2022-07-14 PROCEDURE — G8417 CALC BMI ABV UP PARAM F/U: HCPCS | Performed by: FAMILY MEDICINE

## 2022-07-14 PROCEDURE — G8754 DIAS BP LESS 90: HCPCS | Performed by: FAMILY MEDICINE

## 2022-07-14 PROCEDURE — 2022F DILAT RTA XM EVC RTNOPTHY: CPT | Performed by: FAMILY MEDICINE

## 2022-07-14 PROCEDURE — 3046F HEMOGLOBIN A1C LEVEL >9.0%: CPT | Performed by: FAMILY MEDICINE

## 2022-07-14 PROCEDURE — G8400 PT W/DXA NO RESULTS DOC: HCPCS | Performed by: FAMILY MEDICINE

## 2022-07-14 RX ORDER — ATORVASTATIN CALCIUM 20 MG/1
20 TABLET, FILM COATED ORAL DAILY
Qty: 90 TABLET | Refills: 1 | Status: SHIPPED | OUTPATIENT
Start: 2022-07-14

## 2022-07-14 RX ORDER — POTASSIUM CHLORIDE 750 MG/1
10 TABLET, EXTENDED RELEASE ORAL DAILY
Qty: 90 TABLET | Refills: 1 | Status: SHIPPED | OUTPATIENT
Start: 2022-07-14

## 2022-07-14 RX ORDER — DOCUSATE SODIUM 100 MG/1
100 CAPSULE, LIQUID FILLED ORAL
Qty: 90 CAPSULE | Refills: 1 | Status: SHIPPED | OUTPATIENT
Start: 2022-07-14 | End: 2022-10-12

## 2022-07-14 SDOH — ECONOMIC STABILITY - INCOME SECURITY: PROBLEM RELATED TO HOUSING AND ECONOMIC CIRCUMSTANCES, UNSPECIFIED: Z59.9

## 2022-07-14 NOTE — TELEPHONE ENCOUNTER
Returned call to the home health nurse. She provided list of medications. She states she did not go through every medication will consider patient have rosuvastatin. Requested more thorough medication reconciliation at next nursing visit.

## 2022-07-14 NOTE — PROGRESS NOTES
1. \"Have you been to the ER, urgent care clinic since your last visit? Hospitalized since your last visit? Yes     2. \"Have you seen or consulted any other health care providers outside of the [address] [place] [address] since your last visit? \" yes     3. For patients aged 39-70: Has the patient had a colonoscopy / FIT/ Cologuard? 2002    If the patient is female:    4. For patients aged 41-77: Has the patient had a mammogram within the past 2 years? Cant recall      5. For patients aged 21-65: Has the patient had a pap smear?   Cant recall         Lei Lopez is a 80 y.o. female is coming in for with the following:     Chief Complaint   Patient presents with    Follow-up          With the following vitals:    Visit Vitals  /63 (BP 1 Location: Right arm, BP Patient Position: Sitting, BP Cuff Size: Adult)   Pulse 75   Temp 98.1 °F (36.7 °C) (Temporal)   Resp 18   Ht 5' 6\" (1.676 m)   Wt 175 lb (79.4 kg)   SpO2 97%   BMI 28.25 kg/m²

## 2022-07-14 NOTE — PATIENT INSTRUCTIONS
Learning About Meal Planning for Diabetes  Why plan your meals? Meal planning can be a key part of managing diabetes. Planning meals and snacks with the right balance of carbohydrate, protein, and fat can help you keep your blood sugar at the target level you set with your doctor. You don't have to eat special foods. You can eat what your family eats, including sweets once in a while. But you do have to pay attention to how often you eat and how much you eat of certain foods. You may want to work with a dietitian or a diabetes educator. They can give you tips and meal ideas and can answer your questions about meal planning. This health professional can also help you reach a healthy weight if that is one of your goals. What plan is right for you? Your dietitian or diabetes educator may suggest that you start with the plate format or carbohydrate counting. The plate format  The plate format is a simple way to help you manage how you eat. You plan meals by learning how much space each food should take on a plate. Using the plate format helps you manage the amount of carbohydrate you eat. It can make it easier to keep your blood sugar level within your target range. It also helps you see if you're eating healthy portion sizes. To use the plate format, you put non-starchy vegetables on half your plate. Add lean protein foods, such as fish, lean meats and poultry, or soy products, on one-quarter of the plate. Put a grain or starchy vegetable (such as brown rice or a potato) on the final quarter of the plate. You can add a small piece of fruit and some low-fat or fat-free milk or yogurt, depending on your carbohydrate goal for each meal.  Here are some tips for using the plate format:  · Make sure that you are not using an oversized plate. A 9-inch plate is best. Many restaurants use larger plates. · Get used to using the plate format at home. Then you can use it when you eat out.   · Write down your questions about using the plate format. Talk to your doctor, a dietitian, or a diabetes educator about your concerns. Carbohydrate counting  With carbohydrate counting, you plan meals based on the amount of carbohydrate in each food. Carbohydrate raises blood sugar higher and more quickly than any other nutrient. It is found in desserts, breads and cereals, and fruit. It's also found in starchy vegetables such as potatoes and corn, grains such as rice and pasta, and milk and yogurt. You can help keep your blood sugar levels within your target range by planning how much carbohydrate to have at meals and snacks. The amount you need depends on several things. These include your weight, how active you are, which diabetes medicines you take, and what your goals are for your blood sugar levels. A registered dietitian or diabetes educator can help you plan how much carbohydrate to include in each meal and snack. An example of a carbohydrate counting plan is:  · 45 to 60 grams at each meal. That's about the same as 3 to 4 carbohydrate servings. · 15 to 20 grams at each snack. That's about the same as 1 carbohydrate serving. The Nutrition Facts label on packaged foods tells you how much carbohydrate is in a serving of the food. First, look at the serving size on the food label. Is that the amount you eat in a serving? All of the nutrition information on a food label is based on that serving size. So if you eat more or less than that, you'll need to adjust the other numbers. Total carbohydrate is the next thing you need to look for on the label. If you count carbohydrate servings, one serving of carbohydrate is 15 grams. For foods that don't come with labels, such as fresh fruits and vegetables, you'll need a guide that lists carbohydrate in these foods. Ask your doctor, dietitian, or diabetes educator about books or other nutrition guides you can use.   If you take insulin, you need to know how many grams of carbohydrate are in a meal. This lets you know how much rapid-acting insulin to take before you eat. If you use an insulin pump, you get a constant rate of insulin during the day. So the pump must be programmed at meals to give you extra insulin to cover the rise in blood sugar after meals. When you know how much carbohydrate you will eat, you can take the right amount of insulin. Or, if you always use the same amount of insulin, you need to make sure that you eat the same amount of carbohydrate at meals. If you need more help to understand carbohydrate counting and food labels, ask your doctor, dietitian, or diabetes educator. How can you plan healthy meals? Here are some tips to get started:  · Plan your meals a week at a time. Don't forget to include snacks too. · Use cookbooks or online recipes to plan several main meals. Plan some quick meals for busy nights. You also can double some recipes that freeze well. Then you can save half for other busy nights when you don't have time to cook. · Make sure you have the ingredients you need for your recipes. If you're running low on basic items, put these items on your shopping list too. · List foods that you use to make breakfasts, lunches, and snacks. List plenty of fruits and vegetables. · Post this list on the refrigerator. Add to it as you think of more things you need. · Take the list to the store to do your weekly shopping. Follow-up care is a key part of your treatment and safety. Be sure to make and go to all appointments, and call your doctor if you are having problems. It's also a good idea to know your test results and keep a list of the medicines you take. Where can you learn more? Go to http://www.gray.com/  Enter N000 in the search box to learn more about \"Learning About Meal Planning for Diabetes. \"  Current as of: September 8, 2021               Content Version: 13.2  © 8512-1402 [employer]wise, [facility].    Care instructions adapted under license by [employer] (which disclaims liability or warranty for this information). If you have questions about a medical condition or this instruction, always ask your healthcare professional. [address] any warranty or liability for your use of this information.

## 2022-07-14 NOTE — PROGRESS NOTES
Family Medicine Clinic Note    Assessment/ Plan:   Diagnoses and all orders for this visit:    1. Uncontrolled type 2 diabetes mellitus with hyperglycemia (HCC)  -     REFERRAL TO ENDOCRINOLOGY  -     atorvastatin (LIPITOR) 20 mg tablet; Take 1 Tablet by mouth daily. -     insulin detemir U-100 (LEVEMIR FLEXTOUCH) 100 unit/mL (3 mL) inpn; Inject 20 units subcutaneously under skin at bedtime daily for 1 week then inject 24 units at bedtime daily.  -     HEMOGLOBIN A1C WITH EAG    2. Essential hypertension    3. Iron deficiency  -     Ferrous Fumarate 325 mg (106 mg iron) tab; Take 1 Tablet by mouth every other day. -     docusate sodium (COLACE) 100 mg capsule; Take 1 Capsule by mouth two (2) times daily as needed for Constipation for up to 90 days.  -     IRON PROFILE  -     FERRITIN    4. Non compliance w medication regimen    5. Hypokalemia  -     potassium chloride (KLOR-CON M10) 10 mEq tablet; Take 1 Tablet by mouth daily. Take 1 tab by mouth daily for low potassium. 6. Drug-induced constipation  -     docusate sodium (COLACE) 100 mg capsule; Take 1 Capsule by mouth two (2) times daily as needed for Constipation for up to 90 days. 7. Financial difficulties    8. Anemia, unspecified type  -     CBC WITH AUTOMATED DIFF      Diabetes chronic uncontrolled. Last A1c 14% and April. Repeat A1C next visit as anticipate this is still high based on home readings >300. Increase to levemir 20units for 1 week then 24 units and call office with blood sugars. Will continue to titrate levemir which is an affordable medication at at this time since she has the  coupon. Continue metformin, has room to increase but patient cannot tolerate size of whole pill and has been cutting in half. Advised she take whole pill only on metformin since it is extended release.  Referral placed to endocrinology as recommedned by PCP previously and as requested by patient family.   - Advised family call me for any blood sugars >350.   - noted finaincal difficulty,  mediation non compliance, amd diabeteic diet non compliance is cotributing to poor control  - Freestyle rosalia monitor attached by nursing staff at visit. - Noted family prefers insulin in pen form given ample supply at home of pen needles and ease of use   - Noted patient unable to afford bydureon    Essential hypertension, well controlled off medications at this time. Has restarted metoprolol 50mg daily. Heart rate is improved, continue this medication. Which she may or may not be cutting in half. Will attempt med rec on next phone call. Noted pharmacy has only dispensed metoprolol ER 50mg tab. History of dysphagia and GERD. Noted use of PPI and Pepcid. Refill Pepcid at this time. Continue management with GI specialist and request further refills be requested with GI specialist for clarity of treatment plan. History of bladder cancer and current urinary retention with Monroe catheter in place. Follow-up with urologist for refills of medications related to this condition. Noted mild hypokalemia on recent inpatient labs from April 2022. Chemistry panel to monitor. Reminded patient to complete labs ordered last visit. Restart potassium at lower dose for smaller pill to promote compliance. Anemia, restart iron supplementation every other day to reduce constipation. May add colace to improve this side effect as well. Lengthy medication reconciliation attempt. Total encounter time 45 minutes    Educated patient on red flag symptoms to warrant return to clinic or emergency room visit. I have discussed the diagnosis with the patient and the intended plan as seen in the above orders. The patient has been offered or received an after-visit summary and questions were answered concerning future plans. I have discussed medication side effects and warnings with the patient as well.     Follow-up and Dispositions    · Return in about 4 weeks (around 2022) for Follow up with Dr. Chantale Swift. Subjective:     Chief Complaint   Patient presents with   Victor Hugo Waddell is a 80y.o. year old female who presents for evaluation of the following:      Prefers pen for insulin    Diabetes Mellitus Type II:  Chronic. Home monitorins  Treatment:   Prescribed lelvemir 16 unit night, metfomrin 500 bid,, bydrueon  - Had been taking Lantus since sh had that at home  - Has been able to fill levemir 30 day supply and has been taking this since this Tuesday  Previous treatment: Glipizide apparently discontinued during the last hospitalization or stay at skilled nursing facility, [facility] patient unable to fill due to financial limitations, Novolin 70/30 22AM 42 PM changed to insulin detemir 12 units at [facility] and after hosptialization  Diet: Dinner last night was hotdog on a bun, switched to sugar free sweets. Does eat fast food  Co-morbidities: Obesity  Managed by PCP  Denies excessive urination. excessive thirst, chest pain, shortness of breath, rash, pain in extremities, numbness in hands or feet, change in urinary habits, symptoms of hypoglycemia. Lab Results   Component Value Date/Time    Hemoglobin A1c 14.0 (H) 2022 01:41 AM    Hemoglobin A1c, External 8.8 2020 12:00 AM     Hypokalemia:   Not complaint with potassium supplement    Hypertension/Hyperlipidemia  Diet: Not adhering to strict low salt diet  Treatment: None  previsou Treatment: metoprolol, per daughter she was told not to take this   Denies palpitations, chest pain, shortness of breath, new leg swelling, extremity weakness, medication side effect    GERD:  Chronic  Tx: pantoprazole and pepcid  Plan to see a Dr. Kassandra Solano at [facility] for evaluation  Has had endoscopy within past 1 year    Chronic urinary Retention/history of bladder cancer. Treatment cipro and tamsulosin   Managed by urologist, Dr. Carly Pedro  -Monroe catheter in place.         Review of Systems   Pertinent positives and negative per HPI. All other systems  reviewed are negative for a Comprehensive ROS (10+). Past medical history, social history, family history reviewed. Medications reconciled. Objective:     Vitals:    07/14/22 1101   BP: 132/63   Pulse: 75   Resp: 18   Temp: 98.1 °F (36.7 °C)   TempSrc: Temporal   SpO2: 97%   Weight: 175 lb (79.4 kg)   Height: 5' 6\" (1.676 m)       Physical Examination:  General: Alert, cooperative, no distress, appears stated age. Eyes: Conjunctivae clear. Pupils equally round and reactive to light, Extraocular muscles intact. Ears: Normal external ear canals both ears. Nose: Nares normal. Septum midline. Mucosa normal. No drainage or sinus tenderness. Mouth/Throat: Lips, mucosa, and tongue normal. No oropharyngeal erythema. No tonsillar enlargement or exudate. Neck: Supple, symmetrical, trachea midline, no adenopathy. No thyroid enlargement/tenderness/nodules  Respiratory: Breathing comfortably, in no acute respiratory distress. Clear to auscultation bilaterally. Normal inspiratory and expiratory ratio. Cardiovascular: Regular rate and rhythm, S1, S2 normal, no murmur, click, rub or gallop.   -Extremities no edema. Pulses 2+ and symmetric radial and dorsalis pedis   Abdomen: Soft, not distended. Bowel sounds normal. Non tender. MSK: Extremities without edema.  -Monroe catheter collection container noted under pants. Skin: Skin color, texture, turgor normal. No rashes or lesions on exposed skin. Lymph nodes: Cervical, supraclavicular nodes normal.  Neurologic: Cranial nerves II-XII intact. Psychiatric: Affect appropriate.  Paucity of speech daughter provides most of his try      Signed,    Mallory Barajas MD  7/14/2022

## 2022-07-14 NOTE — TELEPHONE ENCOUNTER
RN from [facility] called to let us know that blood sugars are running high yesterday was  379 and ranging from     Her range is  [phone]

## 2022-07-16 ENCOUNTER — HOSPITAL ENCOUNTER (EMERGENCY)
Age: 72
Discharge: HOME | End: 2022-07-16
Attending: FAMILY MEDICINE
Payer: MEDICARE

## 2022-07-16 VITALS
OXYGEN SATURATION: 100 % | DIASTOLIC BLOOD PRESSURE: 72 MMHG | SYSTOLIC BLOOD PRESSURE: 135 MMHG | HEIGHT: 66 IN | RESPIRATION RATE: 17 BRPM | HEART RATE: 69 BPM | WEIGHT: 172 LBS | TEMPERATURE: 98.9 F | BODY MASS INDEX: 27.64 KG/M2

## 2022-07-16 DIAGNOSIS — T83.9XXA PROBLEM WITH URINARY CATHETER (HCC): Primary | ICD-10-CM

## 2022-07-16 PROCEDURE — 51702 INSERT TEMP BLADDER CATH: CPT

## 2022-07-16 PROCEDURE — 99283 EMERGENCY DEPT VISIT LOW MDM: CPT

## 2022-07-17 NOTE — ED PROVIDER NOTES
EMERGENCY DEPARTMENT HISTORY AND PHYSICAL EXAM      Date: 7/16/2022  Patient Name: Nba Barrera    History of Presenting Illness     Chief Complaint   Patient presents with    Urinary Catheter Problem       History Provided By:     HPI: Nba Barrera, is a very pleasant 80 y.o. female presenting to the ED with a chief complaint of urinary catheter problem. Patient has chronic indwelling Monroe catheter. Patient states most recently she noticed urine is slightly leaking at the insertion site of the Monroe. No abdominal pain. No fevers chills or rigors. No flank pain. Feeling well. The patient denies any other symptoms at this time. PCP: Aydin Isabel NP    No current facility-administered medications on file prior to encounter. Current Outpatient Medications on File Prior to Encounter   Medication Sig Dispense Refill    atorvastatin (LIPITOR) 20 mg tablet Take 1 Tablet by mouth daily. 90 Tablet 1    insulin detemir U-100 (LEVEMIR FLEXTOUCH) 100 unit/mL (3 mL) inpn Inject 20 units subcutaneously under skin at bedtime daily for 1 week then inject 24 units at bedtime daily. 5 Pen 3    Ferrous Fumarate 325 mg (106 mg iron) tab Take 1 Tablet by mouth every other day. 90 Tablet 1    potassium chloride (KLOR-CON M10) 10 mEq tablet Take 1 Tablet by mouth daily. Take 1 tab by mouth daily for low potassium. 90 Tablet 1    docusate sodium (COLACE) 100 mg capsule Take 1 Capsule by mouth two (2) times daily as needed for Constipation for up to 90 days. 90 Capsule 1    metoprolol succinate (TOPROL-XL) 50 mg XL tablet Take 1 Tablet by mouth daily for 90 days. 90 Tablet 1    Insulin Needles, Disposable, 31 gauge x 5/16\" ndle Use one needle daily to administer Levemir dose at bedtime. 90 Each 3    famotidine (PEPCID) 20 mg tablet Take 1 Tablet by mouth two (2) times a day. 60 Tablet 5    ERGOCALCIFEROL, VITAMIN D2, PO Take 500 Units by mouth two (2) times a day.  (Patient not taking: Reported on 6/21/2022)      tamsulosin (FLOMAX) 0.4 mg capsule Take 0.4 mg by mouth daily.  calcium-cholecalciferol, d3, 500 mg-10 mcg (400 unit) chew Calcium 500 + D 500 mg-10 mcg (400 unit) chewable tablet   Take by oral route.  DULoxetine (CYMBALTA) 30 mg capsule Take 1 Capsule by mouth daily. 90 Capsule 1    pantoprazole (Protonix) 40 mg tablet Take 1 tablet by mouth daily in am and wait 30 min before eating or taking other medications. 90 Tablet 1    metFORMIN ER (GLUCOPHAGE XR) 500 mg tablet Take 1 tab by mouth before breakfast and before evening meal daily. 180 Tablet 1    glucose blood VI test strips (OneTouch Ultra Test) strip Use to check blood glucose before meals and at bedtime. 120 Strip 11    aspirin delayed-release 81 mg tablet Take 1 Tablet by mouth daily. Indications: treatment to prevent a heart attack 90 Tablet 3    alcohol swabs (Alcohol Wipes) padm Use to check blood glucose once daily.  100 Pad 3       Past History     Past Medical History:  Past Medical History:   Diagnosis Date    [facility] [address]     Dementia [facility] [address]     DKA (diabetic ketoacidosis) ([facility] [address] 4/6/2022    Essential hypertension 7/21/2020    MRSA (methicillin resistant staph aureus) culture positive 8/58/3957    Non-alcoholic fatty liver disease 7/21/2020    Right groin ulcer ([facility] [address] 4/22/2022       Past Surgical History:  Past Surgical History:   Procedure Laterality Date    HX CATARACT REMOVAL Left 07/23/2019    HX CHOLECYSTECTOMY      HX COLONOSCOPY  2002    HX HYSTERECTOMY      partial  20 yrs ago    HX TUBAL LIGATION         Family History:  Family History   Problem Relation Age of Onset    Diabetes Mother     Diabetes Maternal Grandmother     Other Maternal Grandmother         CHF    Diabetes Paternal Grandmother        Social History:  Social History     Tobacco Use    Smoking status: Former Smoker     Packs/day: 1.00     Years: 10.00     Pack years: 10.00     Types: Cigarettes     Quit date: 1980     Years since quittin.5    Smokeless tobacco: Never Used   Vaping Use    Vaping Use: Never used   Substance Use Topics    Alcohol use: Not Currently    Drug use: Never       Allergies: Allergies   Allergen Reactions    Adhesive Unknown (comments)    Penicillins Other (comments)    Sulfa (Sulfonamide Antibiotics) Unknown (comments)         Review of Systems     Review of Systems   Constitutional: Negative for activity change, appetite change, chills, fatigue and fever. HENT: Negative for congestion and sore throat. Eyes: Negative for photophobia and visual disturbance. Respiratory: Negative for cough, shortness of breath and wheezing. Cardiovascular: Negative for chest pain, palpitations and leg swelling. Gastrointestinal: Negative for abdominal pain, diarrhea, nausea and vomiting. Endocrine: Negative for cold intolerance and heat intolerance. Genitourinary:        See HPI   Musculoskeletal: Negative for gait problem and joint swelling. Skin: Negative for color change and rash. Neurological: Negative for dizziness and headaches. Physical Exam     Physical Exam  Constitutional:       Appearance: She is well-developed. HENT:      Head: Normocephalic and atraumatic. Mouth/Throat:      Mouth: Mucous membranes are moist.      Pharynx: Oropharynx is clear. Eyes:      Conjunctiva/sclera: Conjunctivae normal.      Pupils: Pupils are equal, round, and reactive to light. Cardiovascular:      Rate and Rhythm: Normal rate and regular rhythm. Heart sounds: No murmur heard. Pulmonary:      Effort: No respiratory distress. Breath sounds: No stridor. No wheezing, rhonchi or rales. Abdominal:      General: There is no distension. Tenderness: There is no abdominal tenderness. There is no rebound. Genitourinary:     Comments: Monroe catheter in place. Small amount of clear urine draining at urethra  Musculoskeletal:      Cervical back: Normal range of motion and neck supple. Skin:     General: Skin is warm and dry. Neurological:      General: No focal deficit present. Mental Status: She is alert and oriented to person, place, and time. Psychiatric:         Mood and Affect: Mood normal.         Behavior: Behavior normal.         Lab and Diagnostic Study Results     Labs -   No results found for this or any previous visit (from the past 12 hour(s)). Radiologic Studies -   @lastxrresult@  CT Results  (Last 48 hours)    None        CXR Results  (Last 48 hours)    None            Medical Decision Making   - I am the first provider for this patient. - I reviewed the vital signs, available nursing notes, past medical history, past surgical history, family history and social history. - Initial assessment performed. The patients presenting problems have been discussed, and they are in agreement with the care plan formulated and outlined with them. I have encouraged them to ask questions as they arise throughout their visit. Vital Signs-Reviewed the patient's vital signs. Patient Vitals for the past 12 hrs:   Temp Pulse Resp BP SpO2   07/16/22 2013 98.9 °F (37.2 °C) 69 17 135/72 100 %         ED Course/ Provider Notes (Medical Decision Making):     Patient presented to the emergency department with the aforementioned chief complaint. On examination the patient is nontoxic. Vitals were reviewed per above. Monroe catheter replaced. No further leaking. Information follow-up with urology. Marilyn Sandhu was given a thorough list of signs and symptoms that would warrant an immediate return to the emergency department. Otherwise Marilyn Sandhu will follow up with PCP. Procedures   Medical Decision Makingedical Decision Making  Performed by: Erica Davis DO  Procedures  None       Disposition   Disposition:     Home     All of the diagnostic tests were reviewed and questions answered. Diagnosis, care plan and treatment options were discussed.   The patient understands the instructions and will follow up as directed. The patients results have been reviewed with them. They have been counseled regarding their diagnosis. The patient verbally convey understanding and agreement of the signs, symptoms, diagnosis, treatment and prognosis and additionally agrees to follow up as recommended with their PCP in 24 - 48 hours. They also agree with the care-plan and convey that all of their questions have been answered. I have also put together some discharge instructions for them that include: 1) educational information regarding their diagnosis, 2) how to care for their diagnosis at home, as well a 3) list of reasons why they would want to return to the ED prior to their follow-up appointment, should their condition change. DISCHARGE PLAN:    1. Cannot display discharge medications since this patient is not currently admitted. 2.   Follow-up Information     Follow up With Specialties Details Why Contact Info    Please follow-up with your urologist  Call               3.  Return to ED if worse       4. Discharge Medication List as of 7/16/2022  8:42 PM            Diagnosis     Clinical Impression:    1. Problem with urinary catheter [facility])        Attestations:    Nicole Ortega, DO    Please note that this dictation was completed with [employer], the computer voice recognition software. Quite often unanticipated grammatical, syntax, homophones, and other interpretive errors are inadvertently transcribed by the computer software. Please disregard these errors. Please excuse any errors that have escaped final proofreading. Thank you.

## 2022-07-23 LAB
BASOPHILS # BLD AUTO: 0 X10E3/UL (ref 0–0.2)
BASOPHILS NFR BLD AUTO: 1 %
EOSINOPHIL # BLD AUTO: 0.3 X10E3/UL (ref 0–0.4)
EOSINOPHIL NFR BLD AUTO: 4 %
ERYTHROCYTE [DISTWIDTH] IN BLOOD BY AUTOMATED COUNT: 13.1 % (ref 11.7–15.4)
EST. AVERAGE GLUCOSE BLD GHB EST-MCNC: 240 MG/DL
FERRITIN SERPL-MCNC: 38 NG/ML (ref 15–150)
HBA1C MFR BLD: 10 % (ref 4.8–5.6)
HCT VFR BLD AUTO: 37.1 % (ref 34–46.6)
HGB BLD-MCNC: 11.5 G/DL (ref 11.1–15.9)
IMM GRANULOCYTES # BLD AUTO: 0 X10E3/UL (ref 0–0.1)
IMM GRANULOCYTES NFR BLD AUTO: 0 %
IRON SATN MFR SERPL: 15 % (ref 15–55)
IRON SERPL-MCNC: 47 UG/DL (ref 27–139)
LYMPHOCYTES # BLD AUTO: 2 X10E3/UL (ref 0.7–3.1)
LYMPHOCYTES NFR BLD AUTO: 26 %
MCH RBC QN AUTO: 25.3 PG (ref 26.6–33)
MCHC RBC AUTO-ENTMCNC: 31 G/DL (ref 31.5–35.7)
MCV RBC AUTO: 82 FL (ref 79–97)
MONOCYTES # BLD AUTO: 0.6 X10E3/UL (ref 0.1–0.9)
MONOCYTES NFR BLD AUTO: 7 %
NEUTROPHILS # BLD AUTO: 4.7 X10E3/UL (ref 1.4–7)
NEUTROPHILS NFR BLD AUTO: 62 %
PLATELET # BLD AUTO: 309 X10E3/UL (ref 150–450)
RBC # BLD AUTO: 4.54 X10E6/UL (ref 3.77–5.28)
TIBC SERPL-MCNC: 312 UG/DL (ref 250–450)
UIBC SERPL-MCNC: 265 UG/DL (ref 118–369)
WBC # BLD AUTO: 7.5 X10E3/UL (ref 3.4–10.8)

## 2022-07-26 ENCOUNTER — TELEPHONE (OUTPATIENT)
Dept: FAMILY MEDICINE CLINIC | Age: 72
End: 2022-07-26

## 2022-07-26 NOTE — PROGRESS NOTES
Called patient on mobile number and called spouse with no response. Left voicemail on patient's mobile number with instructions for call back. -Need discussion of Persistently uncontrolled diabetes. Consider increase to insulin 28 units depending on patient's home readings recently.

## 2022-07-26 NOTE — TELEPHONE ENCOUNTER
Patient daughter called to say that someone from this office needs to call [address] [facility] to give them the office address because when the patient tells them they say it is not active so please call [phone] and give them our business address

## 2022-07-27 ENCOUNTER — TELEPHONE (OUTPATIENT)
Dept: FAMILY MEDICINE CLINIC | Age: 72
End: 2022-07-27

## 2022-07-27 NOTE — PROGRESS NOTES
Discussed with patient's daughter who accompanied her to last in person office visit. Anemia resolved. A1C improved but remains uncontrolled. Increase to Levemir 24 units at bedtime. Will consider increase to 28 units at next appointment in 1 month based on home reading. Noted Home health reports Glucose 278 on 7/21/22. Patient's daughter expressed understanding and agreement with plan.

## 2022-07-27 NOTE — TELEPHONE ENCOUNTER
[facility]  is calling to say that she did not change the patient catheter and her sugar is running high the nurse is Ernesto Evans [phone] please give her a call if you have any questions

## 2022-07-28 NOTE — TELEPHONE ENCOUNTER
[address] [address] and spoke to Corry Bertrand and she stated there was no issue with address and the application is approved.

## 2022-08-04 ENCOUNTER — APPOINTMENT (OUTPATIENT)
Dept: CT IMAGING | Age: 72
End: 2022-08-04
Attending: FAMILY MEDICINE
Payer: MEDICARE

## 2022-08-04 ENCOUNTER — HOSPITAL ENCOUNTER (EMERGENCY)
Age: 72
Discharge: HOME | End: 2022-08-05
Attending: FAMILY MEDICINE
Payer: MEDICARE

## 2022-08-04 ENCOUNTER — APPOINTMENT (OUTPATIENT)
Dept: GENERAL RADIOLOGY | Age: 72
End: 2022-08-04
Attending: FAMILY MEDICINE
Payer: MEDICARE

## 2022-08-04 VITALS
OXYGEN SATURATION: 97 % | SYSTOLIC BLOOD PRESSURE: 158 MMHG | RESPIRATION RATE: 18 BRPM | DIASTOLIC BLOOD PRESSURE: 76 MMHG | BODY MASS INDEX: 27.32 KG/M2 | HEART RATE: 59 BPM | HEIGHT: 66 IN | TEMPERATURE: 99.3 F | WEIGHT: 170 LBS

## 2022-08-04 DIAGNOSIS — N30.01 ACUTE CYSTITIS WITH HEMATURIA: Primary | ICD-10-CM

## 2022-08-04 DIAGNOSIS — R73.9 HYPERGLYCEMIA: ICD-10-CM

## 2022-08-04 LAB
ALBUMIN SERPL-MCNC: 3 G/DL (ref 3.5–5)
ALBUMIN/GLOB SERPL: 1 {RATIO} (ref 1.1–2.2)
ALP SERPL-CCNC: 84 U/L (ref 45–117)
ALT SERPL-CCNC: 32 U/L (ref 12–78)
ANION GAP SERPL CALC-SCNC: 5 MMOL/L (ref 5–15)
APPEARANCE UR: ABNORMAL
AST SERPL W P-5'-P-CCNC: 17 U/L (ref 15–37)
BACTERIA URNS QL MICRO: ABNORMAL /HPF
BASOPHILS # BLD: 0 K/UL (ref 0–0.1)
BASOPHILS NFR BLD: 0 % (ref 0–1)
BILIRUB SERPL-MCNC: 0.2 MG/DL (ref 0.2–1)
BILIRUB UR QL: NEGATIVE
BUN SERPL-MCNC: 27 MG/DL (ref 6–20)
BUN/CREAT SERPL: 24 (ref 12–20)
CA-I BLD-MCNC: 8.9 MG/DL (ref 8.5–10.1)
CHLORIDE SERPL-SCNC: 102 MMOL/L (ref 97–108)
CO2 SERPL-SCNC: 28 MMOL/L (ref 21–32)
COLOR UR: YELLOW
CREAT SERPL-MCNC: 1.12 MG/DL (ref 0.55–1.02)
DIFFERENTIAL METHOD BLD: ABNORMAL
EOSINOPHIL # BLD: 0.2 K/UL (ref 0–0.4)
EOSINOPHIL NFR BLD: 3 % (ref 0–7)
EPITH CASTS URNS QL MICRO: ABNORMAL /LPF
ERYTHROCYTE [DISTWIDTH] IN BLOOD BY AUTOMATED COUNT: 14.1 % (ref 11.5–14.5)
GLOBULIN SER CALC-MCNC: 3 G/DL (ref 2–4)
GLUCOSE BLD STRIP.AUTO-MCNC: 335 MG/DL (ref 65–117)
GLUCOSE SERPL-MCNC: 457 MG/DL (ref 65–100)
GLUCOSE UR STRIP.AUTO-MCNC: >1000 MG/DL
HCT VFR BLD AUTO: 32.4 % (ref 35–47)
HGB BLD-MCNC: 10.4 G/DL (ref 11.5–16)
HGB UR QL STRIP: ABNORMAL
IMM GRANULOCYTES # BLD AUTO: 0 K/UL (ref 0–0.04)
IMM GRANULOCYTES NFR BLD AUTO: 0 % (ref 0–0.5)
KETONES UR QL STRIP.AUTO: NEGATIVE MG/DL
LEUKOCYTE ESTERASE UR QL STRIP.AUTO: ABNORMAL
LYMPHOCYTES # BLD: 2 K/UL (ref 0.8–3.5)
LYMPHOCYTES NFR BLD: 30 % (ref 12–49)
MCH RBC QN AUTO: 25.5 PG (ref 26–34)
MCHC RBC AUTO-ENTMCNC: 32.1 G/DL (ref 30–36.5)
MCV RBC AUTO: 79.4 FL (ref 80–99)
MONOCYTES # BLD: 0.4 K/UL (ref 0–1)
MONOCYTES NFR BLD: 7 % (ref 5–13)
NEUTS SEG # BLD: 3.8 K/UL (ref 1.8–8)
NEUTS SEG NFR BLD: 60 % (ref 32–75)
NITRITE UR QL STRIP.AUTO: NEGATIVE
OTHER,OTHU: ABNORMAL
PERFORMED BY, TECHID: ABNORMAL
PH UR STRIP: 6 [PH] (ref 5–8)
PLATELET # BLD AUTO: 248 K/UL (ref 150–400)
PMV BLD AUTO: 10 FL (ref 8.9–12.9)
POTASSIUM SERPL-SCNC: 4.4 MMOL/L (ref 3.5–5.1)
PROT SERPL-MCNC: 6 G/DL (ref 6.4–8.2)
PROT UR STRIP-MCNC: 30 MG/DL
RBC # BLD AUTO: 4.08 M/UL (ref 3.8–5.2)
RBC #/AREA URNS HPF: ABNORMAL /HPF (ref 0–5)
SODIUM SERPL-SCNC: 135 MMOL/L (ref 136–145)
SP GR UR REFRACTOMETRY: 1.01 (ref 1–1.03)
TROPONIN-HIGH SENSITIVITY: 6 NG/L (ref 0–51)
UROBILINOGEN UR QL STRIP.AUTO: 0.1 EU/DL (ref 0.2–1)
WAXY CASTS URNS QL MICRO: ABNORMAL /LPF
WBC # BLD AUTO: 6.4 K/UL (ref 3.6–11)
WBC URNS QL MICRO: ABNORMAL /HPF (ref 0–4)

## 2022-08-04 PROCEDURE — 74011636637 HC RX REV CODE- 636/637: Performed by: FAMILY MEDICINE

## 2022-08-04 PROCEDURE — 74011000250 HC RX REV CODE- 250: Performed by: FAMILY MEDICINE

## 2022-08-04 PROCEDURE — 99284 EMERGENCY DEPT VISIT MOD MDM: CPT

## 2022-08-04 PROCEDURE — 96375 TX/PRO/DX INJ NEW DRUG ADDON: CPT

## 2022-08-04 PROCEDURE — 96376 TX/PRO/DX INJ SAME DRUG ADON: CPT

## 2022-08-04 PROCEDURE — 36415 COLL VENOUS BLD VENIPUNCTURE: CPT

## 2022-08-04 PROCEDURE — 84484 ASSAY OF TROPONIN QUANT: CPT

## 2022-08-04 PROCEDURE — 74011250636 HC RX REV CODE- 250/636: Performed by: FAMILY MEDICINE

## 2022-08-04 PROCEDURE — 80053 COMPREHEN METABOLIC PANEL: CPT

## 2022-08-04 PROCEDURE — 82962 GLUCOSE BLOOD TEST: CPT

## 2022-08-04 PROCEDURE — 96374 THER/PROPH/DIAG INJ IV PUSH: CPT

## 2022-08-04 PROCEDURE — 96361 HYDRATE IV INFUSION ADD-ON: CPT

## 2022-08-04 PROCEDURE — 70450 CT HEAD/BRAIN W/O DYE: CPT

## 2022-08-04 PROCEDURE — 85025 COMPLETE CBC W/AUTO DIFF WBC: CPT

## 2022-08-04 PROCEDURE — 81001 URINALYSIS AUTO W/SCOPE: CPT

## 2022-08-04 PROCEDURE — 71045 X-RAY EXAM CHEST 1 VIEW: CPT

## 2022-08-04 RX ADMIN — INSULIN HUMAN 5 UNITS: 100 INJECTION, SOLUTION PARENTERAL at 23:33

## 2022-08-04 RX ADMIN — SODIUM CHLORIDE 500 ML: 9 INJECTION, SOLUTION INTRAVENOUS at 22:27

## 2022-08-04 RX ADMIN — CEFTRIAXONE 1 G: 1 INJECTION, POWDER, FOR SOLUTION INTRAMUSCULAR; INTRAVENOUS at 23:35

## 2022-08-04 RX ADMIN — INSULIN HUMAN 10 UNITS: 100 INJECTION, SOLUTION PARENTERAL at 22:26

## 2022-08-04 NOTE — ED TRIAGE NOTES
Pt has a chronic godoy due to bladder cancer and urinary retention;pt is a diabetic. family states pt has been confused for 2 days and blood sugar is 440 and pt is complaining about her urinary catheter. Pt is a&ox2 in triage, disoriented to year and situation.

## 2022-08-05 ENCOUNTER — TELEPHONE (OUTPATIENT)
Dept: FAMILY MEDICINE CLINIC | Age: 72
End: 2022-08-05

## 2022-08-05 DIAGNOSIS — E11.65 UNCONTROLLED TYPE 2 DIABETES MELLITUS WITH HYPERGLYCEMIA (HCC): ICD-10-CM

## 2022-08-05 LAB
GLUCOSE BLD STRIP.AUTO-MCNC: 278 MG/DL (ref 65–117)
PERFORMED BY, TECHID: ABNORMAL

## 2022-08-05 PROCEDURE — 82962 GLUCOSE BLOOD TEST: CPT

## 2022-08-05 RX ORDER — CEPHALEXIN 500 MG/1
500 CAPSULE ORAL 2 TIMES DAILY
Qty: 14 CAPSULE | Refills: 0 | Status: SHIPPED | OUTPATIENT
Start: 2022-08-05 | End: 2022-08-12

## 2022-08-05 NOTE — ED NOTES
Removed chronic Godoy patient presented with. Patient states godoy leaking and causing discomfort. .     New godoy placed per orders. Sterile technique applied. 400ml urine removed. Urine sample sent to lab. No complaints.

## 2022-08-05 NOTE — ED NOTES
Patient alert to self, place, year and situation. Respirations even and unlabored. NAD. No pain with godoy, godoy drained 550mL urine. Reviewed d/c instructions. Family at bedside.

## 2022-08-05 NOTE — TELEPHONE ENCOUNTER
Daughter called to say that patient was in the ER for UTI which was treated and given a prescription for Kelfex 500 mg caps and changed her catherer ,blood sugar was 440 when they went to the ER and they administered 15 units of  Humalog and blood sugar when down to 252.  Patient s daughter would like to know if she can give patient Humalog at home when the blood sugar is high to see if it will come down please give daughter Diaz Prude a call about this to advise

## 2022-08-05 NOTE — TELEPHONE ENCOUNTER
Called RN stated she changed on 2 times that week. PT is non compliment when it comes to diet she just eats candy and popsicles all day long    Log June 28 lowest was 275-reading just say hi  Fasting blood sugar:  387 328    They have not gone to see endo, but they think she needs it. She needs   Needs someone to \"q\" her to take meds, and if mr hunt is sick she wont take her shot. They think she needs to go to assistive living only Leigha Rivers the daughter from [address] [place] [phone] is the only one helping. Mr. Efrain Marie doesn't take care of Mrs. Efrain Marie

## 2022-08-05 NOTE — ED NOTES
Verbal shift change report given to Orestes Caldwell RN (oncoming nurse) by Radha Leblanc RN (offgoing nurse). Report included the following information SBAR, Kardex, MAR, and Recent Results.

## 2022-08-05 NOTE — DISCHARGE INSTRUCTIONS
Thank you! Thank you for allowing me to care for you in the emergency department. It is my goal to provide you with excellent care. If you have not received excellent quality care, please ask to speak to the nurse manager. Please fill out the survey that will come to you by mail or email since we listen to your feedback! Below you will find a list of your tests from today's visit. Should you have any questions, please do not hesitate to call the emergency department. Labs  Recent Results (from the past 12 hour(s))   TROPONIN-HIGH SENSITIVITY    Collection Time: 08/04/22  8:50 PM   Result Value Ref Range    Troponin-High Sensitivity 6 0 - 51 ng/L   CBC WITH AUTOMATED DIFF    Collection Time: 08/04/22  8:55 PM   Result Value Ref Range    WBC 6.4 3.6 - 11.0 K/uL    RBC 4.08 3.80 - 5.20 M/uL    HGB 10.4 (L) 11.5 - 16.0 g/dL    HCT 32.4 (L) 35.0 - 47.0 %    MCV 79.4 (L) 80.0 - 99.0 FL    MCH 25.5 (L) 26.0 - 34.0 PG    MCHC 32.1 30.0 - 36.5 g/dL    RDW 14.1 11.5 - 14.5 %    PLATELET 488 871 - 829 K/uL    MPV 10.0 8.9 - 12.9 FL    NEUTROPHILS 60 32 - 75 %    LYMPHOCYTES 30 12 - 49 %    MONOCYTES 7 5 - 13 %    EOSINOPHILS 3 0 - 7 %    BASOPHILS 0 0 - 1 %    IMMATURE GRANULOCYTES 0 0.0 - 0.5 %    ABS. NEUTROPHILS 3.8 1.8 - 8.0 K/UL    ABS. LYMPHOCYTES 2.0 0.8 - 3.5 K/UL    ABS. MONOCYTES 0.4 0.0 - 1.0 K/UL    ABS. EOSINOPHILS 0.2 0.0 - 0.4 K/UL    ABS. BASOPHILS 0.0 0.0 - 0.1 K/UL    ABS. IMM.  GRANS. 0.0 0.00 - 0.04 K/UL    DF AUTOMATED     METABOLIC PANEL, COMPREHENSIVE    Collection Time: 08/04/22  8:55 PM   Result Value Ref Range    Sodium 135 (L) 136 - 145 mmol/L    Potassium 4.4 3.5 - 5.1 mmol/L    Chloride 102 97 - 108 mmol/L    CO2 28 21 - 32 mmol/L    Anion gap 5 5 - 15 mmol/L    Glucose 457 (H) 65 - 100 mg/dL    BUN 27 (H) 6 - 20 mg/dL    Creatinine 1.12 (H) 0.55 - 1.02 mg/dL    BUN/Creatinine ratio 24 (H) 12 - 20      GFR est AA 58 (L) >60 ml/min/1.73m2    GFR est non-AA 48 (L) >60 ml/min/1.73m2 Calcium 8.9 8.5 - 10.1 mg/dL    Bilirubin, total 0.2 0.2 - 1.0 mg/dL    AST (SGOT) 17 15 - 37 U/L    ALT (SGPT) 32 12 - 78 U/L    Alk. phosphatase 84 45 - 117 U/L    Protein, total 6.0 (L) 6.4 - 8.2 g/dL    Albumin 3.0 (L) 3.5 - 5.0 g/dL    Globulin 3.0 2.0 - 4.0 g/dL    A-G Ratio 1.0 (L) 1.1 - 2.2     URINALYSIS W/ RFLX MICROSCOPIC    Collection Time: 08/04/22  9:50 PM   Result Value Ref Range    Color Yellow      Appearance Cloudy (A) Clear      Specific gravity 1.015 1.003 - 1.030      pH (UA) 6.0 5.0 - 8.0      Protein 30 (A) Negative mg/dL    Glucose >1,000 (A) Negative mg/dL    Ketone Negative Negative mg/dL    Bilirubin Negative Negative      Blood Small (A) Negative      Urobilinogen 0.1 (L) 0.2 - 1.0 EU/dL    Nitrites Negative Negative      Leukocyte Esterase Small (A) Negative     URINE MICROSCOPIC    Collection Time: 08/04/22  9:50 PM   Result Value Ref Range    WBC 20-50 0 - 4 /hpf    RBC 20-50 0 - 5 /hpf    Epithelial cells Few Few /lpf    Bacteria 1+ (A) Negative /hpf    Waxy cast 0-2 (A) Negative /lpf    Other: Yeast     GLUCOSE, POC    Collection Time: 08/04/22 11:03 PM   Result Value Ref Range    Glucose (POC) 335 (H) 65 - 117 mg/dL    Performed by [facility], POC    Collection Time: 08/05/22 12:06 AM   Result Value Ref Range    Glucose (POC) 278 (H) 65 - 117 mg/dL    Performed by [facility] [place]        Radiologic Studies  CT HEAD WO CONT   Final Result   No acute findings. XR CHEST PORT   Final Result   1. No acute disease           CT Results  (Last 48 hours)                 08/04/22 2021  CT HEAD WO CONT Final result    Impression:  No acute findings. Narrative:  Clinical indication: Change in mental status. Axial CT scan of the brain obtained without intravenous contrast, comparison May   26, 2022. CT dose reduction was achieved through the use of a standardized   protocol tailored for this examination and automatic exposure control for dose   modulation .     There is no extra-axial fluid collection, hemorrhage shift or masses. Mild   atrophy and nonspecific white matter changes. CXR Results  (Last 48 hours)                 08/04/22 2015  XR CHEST PORT Final result    Impression:  1. No acute disease           Narrative:  INDICATION:  AMS        Exam: Portable chest 2008. Comparison: 4/6/2022. Findings: Cardiomediastinal silhouette is within normal limits. Implanted device   at the left heart border unchanged. Pulmonary vasculature is not engorged. There   are no focal parenchymal opacities, effusions, or pneumothorax.                 ------------------------------------------------------------------------------------------------------------  The exam and treatment you received in the Emergency Department were for an urgent problem and are not intended as complete care. It is important that you follow-up with a doctor, nurse practitioner, or physician assistant to:  (1) confirm your diagnosis,  (2) re-evaluation of changes in your illness and treatment, and  (3) for ongoing care. Please take your discharge instructions with you when you go to your follow-up appointment. If you have any problem arranging a follow-up appointment, contact the Emergency Department. If your symptoms become worse or you do not improve as expected and you are unable to reach your health care provider, please return to the Emergency Department. We are available 24 hours a day. If a prescription has been provided, please have it filled as soon as possible to prevent a delay in treatment. If you have any questions or reservations about taking the medication due to side effects or interactions with other medications, please call your primary care provider or contact the ER.

## 2022-08-06 NOTE — ED PROVIDER NOTES
EMERGENCY DEPARTMENT HISTORY AND PHYSICAL EXAM      Date: 8/4/2022  Patient Name: Tameka Neville    History of Presenting Illness         History Provided By:     HPI: Tameka Neville, is a very pleasant 80 y.o. female presenting to the ED with a chief complaint of high blood sugar, painful Monroe. Daughter is at the bedside and helps provide history. Patient states that her chronic indwelling Monroe catheter has been irritating. She also has a burning sensation at her urethra. She is also noticed her blood sugar is elevated. States she has been taking her medications as prescribed. Daughter states she has also noticed that her mother has been slightly more confused than usual.  This is usually what happens when she has a urinary tract infection. No weakness in extremities. No facial droop. No difficulty with speech. No chest pain, shortness of breath, fevers, chills. No other complaints at this time. The patient denies any other symptoms at this time. PCP: Alta Lewis NP    No current facility-administered medications on file prior to encounter. Current Outpatient Medications on File Prior to Encounter   Medication Sig Dispense Refill    atorvastatin (LIPITOR) 20 mg tablet Take 1 Tablet by mouth daily. 90 Tablet 1    insulin detemir U-100 (LEVEMIR FLEXTOUCH) 100 unit/mL (3 mL) inpn Inject 20 units subcutaneously under skin at bedtime daily for 1 week then inject 24 units at bedtime daily. 5 Pen 3    Ferrous Fumarate 325 mg (106 mg iron) tab Take 1 Tablet by mouth every other day. 90 Tablet 1    potassium chloride (KLOR-CON M10) 10 mEq tablet Take 1 Tablet by mouth daily. Take 1 tab by mouth daily for low potassium. 90 Tablet 1    docusate sodium (COLACE) 100 mg capsule Take 1 Capsule by mouth two (2) times daily as needed for Constipation for up to 90 days. 90 Capsule 1    metoprolol succinate (TOPROL-XL) 50 mg XL tablet Take 1 Tablet by mouth daily for 90 days.  90 Tablet 1    Insulin Needles, Disposable, 31 gauge x 5/16\" ndle Use one needle daily to administer Levemir dose at bedtime. 90 Each 3    famotidine (PEPCID) 20 mg tablet Take 1 Tablet by mouth two (2) times a day. 60 Tablet 5    ERGOCALCIFEROL, VITAMIN D2, PO Take 500 Units by mouth two (2) times a day. (Patient not taking: Reported on 6/21/2022)      tamsulosin (FLOMAX) 0.4 mg capsule Take 0.4 mg by mouth daily. calcium-cholecalciferol, d3, 500 mg-10 mcg (400 unit) chew Calcium 500 + D 500 mg-10 mcg (400 unit) chewable tablet   Take by oral route. DULoxetine (CYMBALTA) 30 mg capsule Take 1 Capsule by mouth daily. 90 Capsule 1    pantoprazole (Protonix) 40 mg tablet Take 1 tablet by mouth daily in am and wait 30 min before eating or taking other medications. 90 Tablet 1    metFORMIN ER (GLUCOPHAGE XR) 500 mg tablet Take 1 tab by mouth before breakfast and before evening meal daily. 180 Tablet 1    glucose blood VI test strips (OneTouch Ultra Test) strip Use to check blood glucose before meals and at bedtime. 120 Strip 11    aspirin delayed-release 81 mg tablet Take 1 Tablet by mouth daily. Indications: treatment to prevent a heart attack 90 Tablet 3    alcohol swabs (Alcohol Wipes) padm Use to check blood glucose once daily.  100 Pad 3       Past History     Past Medical History:  Past Medical History:   Diagnosis Date    Bladder cancer ([address]     Dementia ([facility]     DKA (diabetic ketoacidosis) ([facility] [address] 4/6/2022    Essential hypertension 7/21/2020    MRSA (methicillin resistant staph aureus) culture positive 5/97/0752    Non-alcoholic fatty liver disease 7/21/2020    Right groin ulcer ([address] 4/22/2022       Past Surgical History:  Past Surgical History:   Procedure Laterality Date    HX CATARACT REMOVAL Left 07/23/2019    HX CHOLECYSTECTOMY      HX COLONOSCOPY  2002    HX HYSTERECTOMY      partial  20 yrs ago    HX TUBAL LIGATION         Family History:  Family History   Problem Relation Age of Onset    Diabetes Mother     Diabetes Maternal Grandmother     Other Maternal Grandmother         CHF    Diabetes Paternal Grandmother        Social History:  Social History     Tobacco Use    Smoking status: Former     Packs/day: 1.00     Years: 10.00     Pack years: 10.00     Types: Cigarettes     Quit date:      Years since quittin.6    Smokeless tobacco: Never   Vaping Use    Vaping Use: Never used   Substance Use Topics    Alcohol use: Not Currently    Drug use: Never       Allergies: Allergies   Allergen Reactions    Adhesive Unknown (comments)    Penicillins Other (comments)    Sulfa (Sulfonamide Antibiotics) Unknown (comments)         Review of Systems     Review of Systems   Constitutional:  Negative for activity change, appetite change, chills, fatigue and fever. HENT:  Negative for congestion and sore throat. Eyes:  Negative for photophobia and visual disturbance. Respiratory:  Negative for cough, shortness of breath and wheezing. Cardiovascular:  Negative for chest pain, palpitations and leg swelling. Gastrointestinal:  Negative for abdominal pain, diarrhea, nausea and vomiting. Endocrine: Negative for cold intolerance and heat intolerance. Genitourinary:  Positive for dysuria. Negative for hematuria. Musculoskeletal:  Negative for gait problem and joint swelling. Skin:  Negative for color change and rash. Neurological:  Negative for dizziness, seizures, facial asymmetry, speech difficulty, weakness, light-headedness and headaches. Physical Exam     Physical Exam  Constitutional:       Appearance: She is well-developed. HENT:      Head: Normocephalic and atraumatic. Mouth/Throat:      Mouth: Mucous membranes are moist.      Pharynx: Oropharynx is clear. Eyes:      Conjunctiva/sclera: Conjunctivae normal.      Pupils: Pupils are equal, round, and reactive to light. Cardiovascular:      Rate and Rhythm: Normal rate and regular rhythm. Heart sounds: No murmur heard.   Pulmonary:      Effort: No respiratory distress. Breath sounds: No stridor. No wheezing, rhonchi or rales. Abdominal:      General: There is no distension. Tenderness: There is no abdominal tenderness. There is no rebound. Musculoskeletal:      Cervical back: Normal range of motion and neck supple. Skin:     General: Skin is warm and dry. Neurological:      General: No focal deficit present. Mental Status: She is alert and oriented to person, place, and time. Comments: No focal neurologic deficits, alert and oriented x3, cranial nerves II through XII grossly intact, no sensory deficits, no weakness in extremities, no pronator drift, no abnormal coordination, no abnormal gait, no abnormal deep tendon reflexes. No motor nor sensory deficits. No nystagmus. Psychiatric:         Mood and Affect: Mood normal.         Behavior: Behavior normal.       Lab and Diagnostic Study Results     Labs -   No results found for this or any previous visit (from the past 12 hour(s)). Radiologic Studies -   @lastxrresult@  CT Results  (Last 48 hours)                 08/04/22 2021  CT HEAD WO CONT Final result    Impression:  No acute findings. Narrative:  Clinical indication: Change in mental status. Axial CT scan of the brain obtained without intravenous contrast, comparison May   26, 2022. CT dose reduction was achieved through the use of a standardized   protocol tailored for this examination and automatic exposure control for dose   modulation . There is no extra-axial fluid collection, hemorrhage shift or masses. Mild   atrophy and nonspecific white matter changes. CXR Results  (Last 48 hours)                 08/04/22 2015  XR CHEST PORT Final result    Impression:  1. No acute disease           Narrative:  INDICATION:  AMS        Exam: Portable chest 2008. Comparison: 4/6/2022. Findings: Cardiomediastinal silhouette is within normal limits.  Implanted device   at the left heart border unchanged. Pulmonary vasculature is not engorged. There   are no focal parenchymal opacities, effusions, or pneumothorax. Medical Decision Making   - I am the first provider for this patient. - I reviewed the vital signs, available nursing notes, past medical history, past surgical history, family history and social history. - Initial assessment performed. The patients presenting problems have been discussed, and they are in agreement with the care plan formulated and outlined with them. I have encouraged them to ask questions as they arise throughout their visit. Vital Signs-Reviewed the patient's vital signs. No data found. ED Course/ Provider Notes (Medical Decision Making):     Patient presented to the emergency department with the aforementioned chief complaint. On examination the patient is nontoxic. Vitals were reviewed per above. No focal deficits on exam.  CMP demonstrating hyperglycemia, blood glucose 457. No anion gap metabolic acidosis. Monroe catheter replaced. Urinalysis consistent with UTI. Antibiotics prescribed. Patient was given IV fluids and insulin with improvement of blood glucose to 278. Daughter states this is often where her blood glucose stands on a daily basis. CT head negative for acute abnormality. Suspect her slight confusion is related to her urinary tract infection. Discussed admission in light of her hyperglycemia and confusion but through shared decision-making, they prefer to be discharged home. Patient and daughter understands she will need close outpatient follow-up. [place] [facility] and daughter were given a thorough list of signs and symptoms that would warrant an immediate return to the emergency department. Otherwise [place] [facility] will follow up with PCP.        Procedures   Medical Decision Makingedical Decision Making  Performed by: Breanna Chavez DO  Procedures  None       Disposition   Disposition:     Home     All of the diagnostic tests were reviewed and questions answered. Diagnosis, care plan and treatment options were discussed. The patient understands the instructions and will follow up as directed. The patients results have been reviewed with them. They have been counseled regarding their diagnosis. The patient verbally convey understanding and agreement of the signs, symptoms, diagnosis, treatment and prognosis and additionally agrees to follow up as recommended with their PCP in 24 - 48 hours. They also agree with the care-plan and convey that all of their questions have been answered. I have also put together some discharge instructions for them that include: 1) educational information regarding their diagnosis, 2) how to care for their diagnosis at home, as well a 3) list of reasons why they would want to return to the ED prior to their follow-up appointment, should their condition change. DISCHARGE PLAN:    1. Cannot display discharge medications since this patient is not currently admitted. 2.   Follow-up Information       Follow up With Specialties Details Why Contact Info    Please make a follow-up appointment with your urologist  Call       Your primary care doctor  Schedule an appointment as soon as possible for a visit in 2 days                3.  Return to ED if worse       4. Discharge Medication List as of 8/5/2022 12:44 AM        START taking these medications    Details   cephALEXin (Keflex) 500 mg capsule Take 1 Capsule by mouth two (2) times a day for 7 days. , Normal, Disp-14 Capsule, R-0           CONTINUE these medications which have NOT CHANGED    Details   atorvastatin (LIPITOR) 20 mg tablet Take 1 Tablet by mouth daily. , Normal, Disp-90 Tablet, R-1      insulin detemir U-100 (LEVEMIR FLEXTOUCH) 100 unit/mL (3 mL) inpn Inject 20 units subcutaneously under skin at bedtime daily for 1 week then inject 24 units at bedtime daily. , No Print, Disp-5 Pen, R-3      Ferrous Fumarate 325 mg (106 mg iron) tab Take 1 Tablet by mouth every other day., No Print, Disp-90 Tablet, R-1      potassium chloride (KLOR-CON M10) 10 mEq tablet Take 1 Tablet by mouth daily. Take 1 tab by mouth daily for low potassium. , Normal, Disp-90 Tablet, R-1      docusate sodium (COLACE) 100 mg capsule Take 1 Capsule by mouth two (2) times daily as needed for Constipation for up to 90 days. , Normal, Disp-90 Capsule, R-1      metoprolol succinate (TOPROL-XL) 50 mg XL tablet Take 1 Tablet by mouth daily for 90 days. , Normal, Disp-90 Tablet, R-1      Insulin Needles, Disposable, 31 gauge x 5/16\" ndle Use one needle daily to administer Levemir dose at bedtime., Normal, Disp-90 Each, R-3      famotidine (PEPCID) 20 mg tablet Take 1 Tablet by mouth two (2) times a day., Normal, Disp-60 Tablet, R-5      ERGOCALCIFEROL, VITAMIN D2, PO Take 500 Units by mouth two (2) times a day., Historical Med      tamsulosin (FLOMAX) 0.4 mg capsule Take 0.4 mg by mouth daily. , Historical Med      calcium-cholecalciferol, d3, 500 mg-10 mcg (400 unit) chew Calcium 500 + D 500 mg-10 mcg (400 unit) chewable tablet   Take by oral route., Historical Med      DULoxetine (CYMBALTA) 30 mg capsule Take 1 Capsule by mouth daily. , Normal, Disp-90 Capsule, R-1      pantoprazole (Protonix) 40 mg tablet Take 1 tablet by mouth daily in am and wait 30 min before eating or taking other medications. , Normal, Disp-90 Tablet, R-1      metFORMIN ER (GLUCOPHAGE XR) 500 mg tablet Take 1 tab by mouth before breakfast and before evening meal daily. , Normal, Disp-180 Tablet, R-1      glucose blood VI test strips (OneTouch Ultra Test) strip Use to check blood glucose before meals and at bedtime., Normal, Disp-120 Strip, R-11      aspirin delayed-release 81 mg tablet Take 1 Tablet by mouth daily. Indications: treatment to prevent a heart attack, Normal, Disp-90 Tablet, R-3      alcohol swabs (Alcohol Wipes) padm Use to check blood glucose once daily. , Normal, Disp-100 Pad, R-3 Diagnosis     Clinical Impression:    1. Acute cystitis with hematuria    2. Hyperglycemia        Attestations:    Brandan Dumont, DO    Please note that this dictation was completed with [employer], the computer voice recognition software. Quite often unanticipated grammatical, syntax, homophones, and other interpretive errors are inadvertently transcribed by the computer software. Please disregard these errors. Please excuse any errors that have escaped final proofreading. Thank you.

## 2022-08-08 NOTE — TELEPHONE ENCOUNTER
Returned phone call with no response. Left voicemail instructions for call back. Need clarity on compliance with long-acting Lantus. May benefit from endocrinology management of diabetes. Will attempt call again.

## 2022-08-09 ENCOUNTER — TELEPHONE (OUTPATIENT)
Dept: FAMILY MEDICINE CLINIC | Age: 72
End: 2022-08-09

## 2022-08-10 ENCOUNTER — TELEPHONE (OUTPATIENT)
Dept: FAMILY MEDICINE CLINIC | Age: 72
End: 2022-08-10

## 2022-08-10 RX ORDER — METFORMIN HYDROCHLORIDE 500 MG/1
1000 TABLET, EXTENDED RELEASE ORAL 2 TIMES DAILY
Qty: 360 TABLET | Refills: 1 | Status: SHIPPED | OUTPATIENT
Start: 2022-08-10 | End: 2022-08-11 | Stop reason: SDUPTHER

## 2022-08-10 NOTE — TELEPHONE ENCOUNTER
Returned call to patient's daughter Trevin Dominique. She reports patient has pending appointment tomorrow. She has had spikes of blood sugar with emergency room visit recently. We reviewed emergency room visit at upcoming appointment. She asks if patient can start a correctional dose insulin. I reviewed requirement for regular glucose checks in order to avoid hypoglycemia and overmedication with correctional dose insulin. She states patient does not consistently check her own sugars but relies on other people to check it. We will need to discuss the risks and benefits of starting correctional scale with patient and family members in person. Daughter also asked if we can increase the metformin dose back to 1000 mg twice daily. On chart review there is no clear documentation as to why the dose was changed suggestible increase to metformin 1000 units twice daily and refill provided. Patient's order reports she was approved for [facility] insulin. We will discuss things further at his upcoming follow-up appointment. Patient's daughter expressed understanding and agreement with current plan.

## 2022-08-10 NOTE — TELEPHONE ENCOUNTER
[facility] [employer] has faxed over a request for additional documentation for medical necessity order is in

## 2022-08-11 ENCOUNTER — TELEPHONE (OUTPATIENT)
Dept: FAMILY MEDICINE CLINIC | Age: 72
End: 2022-08-11

## 2022-08-11 ENCOUNTER — OFFICE VISIT (OUTPATIENT)
Dept: FAMILY MEDICINE CLINIC | Age: 72
End: 2022-08-11
Payer: MEDICARE

## 2022-08-11 VITALS
OXYGEN SATURATION: 98 % | RESPIRATION RATE: 18 BRPM | BODY MASS INDEX: 28.12 KG/M2 | WEIGHT: 175 LBS | HEIGHT: 66 IN | TEMPERATURE: 98 F | SYSTOLIC BLOOD PRESSURE: 125 MMHG | DIASTOLIC BLOOD PRESSURE: 73 MMHG | HEART RATE: 61 BPM

## 2022-08-11 DIAGNOSIS — R79.89 ELEVATED SERUM CREATININE: ICD-10-CM

## 2022-08-11 DIAGNOSIS — N39.0 URINARY TRACT INFECTION WITHOUT HEMATURIA, SITE UNSPECIFIED: ICD-10-CM

## 2022-08-11 DIAGNOSIS — E03.9 ACQUIRED HYPOTHYROIDISM: ICD-10-CM

## 2022-08-11 DIAGNOSIS — I10 ESSENTIAL HYPERTENSION: ICD-10-CM

## 2022-08-11 DIAGNOSIS — Z91.14 NON COMPLIANCE W MEDICATION REGIMEN: ICD-10-CM

## 2022-08-11 DIAGNOSIS — E11.65 UNCONTROLLED TYPE 2 DIABETES MELLITUS WITH HYPERGLYCEMIA (HCC): Primary | ICD-10-CM

## 2022-08-11 PROCEDURE — G8752 SYS BP LESS 140: HCPCS | Performed by: FAMILY MEDICINE

## 2022-08-11 PROCEDURE — 3017F COLORECTAL CA SCREEN DOC REV: CPT | Performed by: FAMILY MEDICINE

## 2022-08-11 PROCEDURE — 1101F PT FALLS ASSESS-DOCD LE1/YR: CPT | Performed by: FAMILY MEDICINE

## 2022-08-11 PROCEDURE — G8417 CALC BMI ABV UP PARAM F/U: HCPCS | Performed by: FAMILY MEDICINE

## 2022-08-11 PROCEDURE — G9717 DOC PT DX DEP/BP F/U NT REQ: HCPCS | Performed by: FAMILY MEDICINE

## 2022-08-11 PROCEDURE — G8427 DOCREV CUR MEDS BY ELIG CLIN: HCPCS | Performed by: FAMILY MEDICINE

## 2022-08-11 PROCEDURE — 1123F ACP DISCUSS/DSCN MKR DOCD: CPT | Performed by: FAMILY MEDICINE

## 2022-08-11 PROCEDURE — 2022F DILAT RTA XM EVC RTNOPTHY: CPT | Performed by: FAMILY MEDICINE

## 2022-08-11 PROCEDURE — 1090F PRES/ABSN URINE INCON ASSESS: CPT | Performed by: FAMILY MEDICINE

## 2022-08-11 PROCEDURE — G8754 DIAS BP LESS 90: HCPCS | Performed by: FAMILY MEDICINE

## 2022-08-11 PROCEDURE — G8400 PT W/DXA NO RESULTS DOC: HCPCS | Performed by: FAMILY MEDICINE

## 2022-08-11 PROCEDURE — 3046F HEMOGLOBIN A1C LEVEL >9.0%: CPT | Performed by: FAMILY MEDICINE

## 2022-08-11 PROCEDURE — 99215 OFFICE O/P EST HI 40 MIN: CPT | Performed by: FAMILY MEDICINE

## 2022-08-11 PROCEDURE — G8536 NO DOC ELDER MAL SCRN: HCPCS | Performed by: FAMILY MEDICINE

## 2022-08-11 RX ORDER — METFORMIN HYDROCHLORIDE 500 MG/1
1000 TABLET, EXTENDED RELEASE ORAL 2 TIMES DAILY
Qty: 360 TABLET | Refills: 1 | Status: SHIPPED | OUTPATIENT
Start: 2022-08-11 | End: 2022-10-27 | Stop reason: SDUPTHER

## 2022-08-11 NOTE — PROGRESS NOTES
Visit Vitals  BP (!) 140/97 (BP 1 Location: Left arm, BP Patient Position: Sitting)   Pulse 61   Temp 98 °F (36.7 °C) (Temporal)   Resp 18   Ht 5' 6\" (1.676 m)   Wt 175 lb (79.4 kg)   SpO2 98%   BMI 28.25 kg/m²     Chief Complaint   Patient presents with    Diabetes     Diabetes 7 follow up. Blood sugar today 351     1. Have you been to the ER, urgent care clinic since your last visit? Hospitalized since your last visit? Yes, last week for elevated Blood sugar    2. Have you seen or consulted any other health care providers outside of the [address] [place] [address] since your last visit? Include any pap smears or colon screening.  No

## 2022-08-11 NOTE — TELEPHONE ENCOUNTER
Can you clarify her metformin order it says it is 2 tabs PO BID then 1 tab BID. Do you want her to take one or two?

## 2022-08-11 NOTE — PATIENT INSTRUCTIONS
Learning About Meal Planning for Diabetes  Why plan your meals? Meal planning can be a key part of managing diabetes. Planning meals and snacks with the right balance of carbohydrate, protein, and fat can help you keep your blood sugar at the target level you set with your doctor. You don't have to eat special foods. You can eat what your family eats, including sweets once in a while. But you do have to pay attention to how often you eat and how much you eat of certain foods. You may want to work with a dietitian or a diabetes educator. They can give you tips and meal ideas and can answer your questions about meal planning. This health professional can also help you reach a healthy weight if that is one of your goals. What plan is right for you? Your dietitian or diabetes educator may suggest that you start with the plate format or carbohydrate counting. The plate format  The plate format is a simple way to help you manage how you eat. You plan meals by learning how much space each food should take on a plate. Using the plate format helps you manage the amount of carbohydrate you eat. It can make it easier to keep your blood sugar level within your target range. It also helps you see if you're eating healthy portion sizes. To use the plate format, you put non-starchy vegetables on half your plate. Add lean protein foods, such as fish, lean meats and poultry, or soy products, on one-quarter of the plate. Put a grain or starchy vegetable (such as brown rice or a potato) on the final quarter of the plate. You can add a small piece of fruit and some low-fat or fat-free milk or yogurt, depending on your carbohydrate goal for each meal.  Here are some tips for using the plate format:  Make sure that you are not using an oversized plate. A 9-inch plate is best. Many restaurants use larger plates. Get used to using the plate format at home. Then you can use it when you eat out.   Write down your questions about using the plate format. Talk to your doctor, a dietitian, or a diabetes educator about your concerns. Carbohydrate counting  With carbohydrate counting, you plan meals based on the amount of carbohydrate in each food. Carbohydrate raises blood sugar higher and more quickly than any other nutrient. It is found in desserts, breads and cereals, and fruit. It's also found in starchy vegetables such as potatoes and corn, grains such as rice and pasta, and milk and yogurt. You can help keep your blood sugar levels within your target range by planning how much carbohydrate to have at meals and snacks. The amount you need depends on several things. These include your weight, how active you are, which diabetes medicines you take, and what your goals are for your blood sugar levels. A registered dietitian or diabetes educator can help you plan how much carbohydrate to include in each meal and snack. An example of a carbohydrate counting plan is:  45 to 60 grams at each meal. That's about the same as 3 to 4 carbohydrate servings. 15 to 20 grams at each snack. That's about the same as 1 carbohydrate serving. The Nutrition Facts label on packaged foods tells you how much carbohydrate is in a serving of the food. First, look at the serving size on the food label. Is that the amount you eat in a serving? All of the nutrition information on a food label is based on that serving size. So if you eat more or less than that, you'll need to adjust the other numbers. Total carbohydrate is the next thing you need to look for on the label. If you count carbohydrate servings, one serving of carbohydrate is 15 grams. For foods that don't come with labels, such as fresh fruits and vegetables, you'll need a guide that lists carbohydrate in these foods. Ask your doctor, dietitian, or diabetes educator about books or other nutrition guides you can use.   If you take insulin, you need to know how many grams of carbohydrate are in a meal. This lets you know how much rapid-acting insulin to take before you eat. If you use an insulin pump, you get a constant rate of insulin during the day. So the pump must be programmed at meals to give you extra insulin to cover the rise in blood sugar after meals. When you know how much carbohydrate you will eat, you can take the right amount of insulin. Or, if you always use the same amount of insulin, you need to make sure that you eat the same amount of carbohydrate at meals. If you need more help to understand carbohydrate counting and food labels, ask your doctor, dietitian, or diabetes educator. How can you plan healthy meals? Here are some tips to get started:  Plan your meals a week at a time. Don't forget to include snacks too. Use cookbooks or online recipes to plan several main meals. Plan some quick meals for busy nights. You also can double some recipes that freeze well. Then you can save half for other busy nights when you don't have time to cook. Make sure you have the ingredients you need for your recipes. If you're running low on basic items, put these items on your shopping list too. List foods that you use to make breakfasts, lunches, and snacks. List plenty of fruits and vegetables. Post this list on the refrigerator. Add to it as you think of more things you need. Take the list to the store to do your weekly shopping. Follow-up care is a key part of your treatment and safety. Be sure to make and go to all appointments, and call your doctor if you are having problems. It's also a good idea to know your test results and keep a list of the medicines you take. Where can you learn more? Go to http://www.[employer].com/  Enter V321 in the search box to learn more about \"Learning About Meal Planning for Diabetes. \"  Current as of: September 8, 2021               Content Version: 13.2  © 5947-7103 [employer].    Care instructions adapted under license by [employer] (which disclaims liability or warranty for this information). If you have questions about a medical condition or this instruction, always ask your healthcare professional. [address] any warranty or liability for your use of this information.

## 2022-08-11 NOTE — TELEPHONE ENCOUNTER
Fax office note from 8/11/2022 to Marisela Gautam MD, [phone]

## 2022-08-11 NOTE — TELEPHONE ENCOUNTER
Moncia Hector from [facility] - B [phone] calling to confirm exact dosage of Metformin and how to take. Please call Monica Hector.

## 2022-08-11 NOTE — PROGRESS NOTES
Family Medicine Clinic Note    Assessment/ Plan:   Diagnoses and all orders for this visit:    1. Uncontrolled type 2 diabetes mellitus with hyperglycemia (HCC)  -     insulin detemir U-100 (LEVEMIR FLEXTOUCH) 100 unit/mL (3 mL) inpn; Inject 28 units subcutaneously under skin at bedtime daily. Then inject 32 units at bedtime daily IF blood sugar average is higher than 250 in 1 week  -     metFORMIN ER (GLUCOPHAGE XR) 500 mg tablet; Take 2 Tablets by mouth two (2) times a day. 2. Essential hypertension  -     METABOLIC PANEL, COMPREHENSIVE    3. Acquired hypothyroidism  -     TSH 3RD GENERATION  -     T4, FREE    4. Urinary tract infection without hematuria, site unspecified    5. Non compliance w medication regimen    6. Elevated serum creatinine  -     METABOLIC PANEL, COMPREHENSIVE      Diabetes chronic uncontrolled. Last A1c 10% down from 14%. Repeat A1C due 10/2022. Average home glucose >300. Recent ED visit with Glucose >400   - Increase to basal insulin 28 units nightly, may increase to 32 units in  1 week if average glucose remains > 250  - Increase to metformin 1000mg twice daily.   - Discussed mealtime insulin with patient and daughter. Patient is unwilling to check blood sugar with each meal for safe dosing.  - Diet modification encouraged including limiting sugar drinks, choosing low or no sugar snacks increase vegetable intake, plan meals/snacks in advance  - Follow up with endocrinology 9/2022 as planned. - Noted patient unable to afford GLP1 rec agonist  - noted finaincal difficulty,  mediation non compliance, and diabeteic diet non compliance is contributing to poor control  - Noted family prefers insulin in pen form given ample supply at home of pen needles and ease of use     Essential hypertension, well controlled off medications at this time. Continue restarted metoprolol 50mg daily. History of dysphagia and GERD. Noted use of PPI and Pepcid. Refill Pepcid at this time.   Continue management with GI specialist and request further refills be requested with GI specialist for clarity of treatment plan. Charted history of hypothyroidism, currently untreated. Labs to monitor as not on file currently. Patient and daughter unfamiliar with this diagnosis. ED Follow up wit UTI with hyperglycemia. Symptoms improved. Continue Keflex. Elevated creatinine on ED labs, will repeat today to monitor for resolution. Encourage adequate oral hydration. History of bladder cancer and current urinary retention with Monroe catheter in place. Follow-up with urologist who is managing. Hypokalemia. Continue potassium 1/2 tab. Unclear if taking half of 10meq or 20meq tab but will monitor on lab. Patient daughter will call to office with reconciliation. Lengthy medication reconciliation and counseling. Total encounter time 40 minutes    Educated patient on red flag symptoms to warrant return to clinic or emergency room visit. I have discussed the diagnosis with the patient and the intended plan as seen in the above orders. The patient has been offered or received an after-visit summary and questions were answered concerning future plans. I have discussed medication side effects and warnings with the patient as well. Follow-up and Dispositions    Return in about 8 weeks (around 10/6/2022) for Follow Up chronic conditions with PCP- need A1C. Subjective:     Chief Complaint   Patient presents with    Diabetes     Diabetes 7 follow up. Blood sugar today Reji Howard is a 71y.o. year old female who presents for evaluation of the following: In office with daughter Margarito Macedo      Diabetes Mellitus Type II:  Chronic. Home monitorins-400s  Treatment: Prescribed instructions to increase to Levemir 24 units which was not done in a timely fashion.   Previous treatment: Glipizide apparently discontinued during the last hospitalization or stay at skilled nursing facility, [facility] patient unable to fill due to financial limitations, Novolin 70/30 22AM 42 PM changed to insulin detemir 12 units at [facility] and after hosptialization  Diet: Eating sandwiches for meals due to convenience, has added some sugar-free snacks, does still eat pastries and other sweets daily  Co-morbidities: Obesity  Managed by PCP  Denies symptoms of hypoglycemia. Lab Results   Component Value Date/Time    Hemoglobin A1c 10.0 (H) 07/22/2022 02:29 PM    Hemoglobin A1c, External 8.8 02/18/2020 12:00 AM     Key Antihyperglycemic Medications               metFORMIN ER (GLUCOPHAGE XR) 500 mg tablet (Taking) Take 2 Tablets by mouth two (2) times a day. Take 1 tab by mouth before breakfast and before evening meal daily. insulin detemir U-100 (LEVEMIR FLEXTOUCH) 100 unit/mL (3 mL) inpn (Taking) Inject 20 units subcutaneously under skin at bedtime daily for 1 week then inject 24 units at bedtime daily. Chronic urinary Retention/history of bladder cancer. Treatment: tamsulosin   Managed by urologist, Dr. Thomas Fatima  - states urologist told her badder dysfunction may be related to uncontrolled diabetes  -Recent emergency room visit for UTI treated with Keflex with change of catheter on 8/4/2022. Hypertension/Hyperlipidemia  Diet: Not adhering to strict low salt diet  Treatment: None  previsou Treatment: metoprolol, per daughter she was told not to take this   Denies palpitations, chest pain, shortness of breath, new leg swelling, extremity weakness, medication side effect  Key CAD CHF Meds               atorvastatin (LIPITOR) 20 mg tablet Take 1 Tablet by mouth daily. metoprolol succinate (TOPROL-XL) 50 mg XL tablet Take 1 Tablet by mouth daily for 90 days. aspirin delayed-release 81 mg tablet Take 1 Tablet by mouth daily. Indications: treatment to prevent a heart attack          Charted history of hypothyroidism  No TSH on file  Patient's daughter vaguely remembers history of thyroid disorder.   Reports patient was noncompliant with medications daily she did have a thyroid problem. Details of this condition are unknown at this time. GERD:  Chronic  Tx: pantoprazole and pepcid  Plan to see a Dr. Cassie Alcantara at [address] [place] [address] for evaluation  Has had endoscopy within past 1 year      Review of Systems   Pertinent positives and negative per HPI. All other systems  reviewed are negative for a Comprehensive ROS (10+). Past medical history, social history, family history reviewed. Medications reconciled. Objective:     Vitals:    08/11/22 1122 08/11/22 1155   BP: (!) 140/97 125/73   Pulse: 61    Resp: 18    Temp: 98 °F (36.7 °C)    TempSrc: Temporal    SpO2: 98%    Weight: 175 lb (79.4 kg)    Height: 5' 6\" (1.676 m)          Physical Examination:  General: Alert, cooperative, no distress, appears stated age. Eyes: Conjunctivae clear. Ears: Normal external ear canals both ears. Nose: Nares normal.   Mouth/Throat: Lips, mucosa, and tongue normal. No oropharyngeal erythema. No tonsillar enlargement or exudate. Neck: Supple, symmetrical, trachea midline, no adenopathy. No thyroid enlargement/tenderness/nodules  Respiratory: Breathing comfortably, in no acute respiratory distress. Clear to auscultation bilaterally. Normal inspiratory and expiratory ratio. Cardiovascular: Regular rate and rhythm, S1, S2 normal, no murmur, click, rub or gallop.   -Extremities no edema. Pulses 2+ and symmetric radial   Abdomen: Soft, not distended. Bowel sounds normal. Non tender. MSK: Extremities without edema.  -Monroe catheter collection container noted under left pant leg  Skin: Skin color, texture, turgor normal. No rashes or lesions on exposed skin. Lymph nodes: Cervical, supraclavicular nodes normal.  Neurologic: Cranial nerves II-XII intact. Psychiatric: Affect appropriate.  Paucity of speech daughter provides most of history      Signed,    Dyllan Huerta MD  8/11/2022

## 2022-08-12 NOTE — TELEPHONE ENCOUNTER
Seems like this is 2 separate issues:  1- the Duloxetine refill was taken care of via fax (see scanned Rx request that was signed by Dr. Angela Jasso dated yesterday)    2- pharmacy is requesting documentation for Rx of test strips due to insurance only covering up to a certain amount. Since insurance covers up to 300 per 3 months for a pt on insulin, that classifies pt as having a higher utilization than usual. I see that it was sent for QTY of 120 per month, however, no documentation from provider stating why there is more than the usual amount. Will defer to provider to see if QTY needs to be adjusted, or it there is a reason for having a QTY than 100. Please advise.

## 2022-08-15 ENCOUNTER — HOSPITAL ENCOUNTER (EMERGENCY)
Age: 72
Discharge: HOME | End: 2022-08-15
Attending: EMERGENCY MEDICINE
Payer: MEDICARE

## 2022-08-15 VITALS
OXYGEN SATURATION: 98 % | BODY MASS INDEX: 28.12 KG/M2 | RESPIRATION RATE: 14 BRPM | WEIGHT: 175 LBS | HEART RATE: 71 BPM | HEIGHT: 66 IN | SYSTOLIC BLOOD PRESSURE: 133 MMHG | DIASTOLIC BLOOD PRESSURE: 89 MMHG | TEMPERATURE: 98.5 F

## 2022-08-15 DIAGNOSIS — T83.9XXA PROBLEM WITH FOLEY CATHETER, INITIAL ENCOUNTER (HCC): Primary | ICD-10-CM

## 2022-08-15 PROCEDURE — 51702 INSERT TEMP BLADDER CATH: CPT

## 2022-08-15 PROCEDURE — 99283 EMERGENCY DEPT VISIT LOW MDM: CPT

## 2022-08-15 NOTE — ED TRIAGE NOTES
Patient's family member states the patient's catheter has fallen out again; home health changed her catheter 5 days ago; pt's family states the patient has DM and sometimes her sugar being elevated causes her to be confused, dragging the catheter bag on the ground, possibly pulling at it

## 2022-08-16 NOTE — ED PROVIDER NOTES
EMERGENCY DEPARTMENT HISTORY AND PHYSICAL EXAM      Date: 8/15/2022  Patient Name: Elli Gaxiola    History of Presenting Illness     Chief Complaint   Patient presents with    Urinary Catheter Problem       History Provided By: Patient    HPI: Elli Gaxiola, 71 y.o. female presents to the ED with CC of the catheter is leaking. The family conveys that it was just replaced last week but the patient does not use a leg bag and lets the catheter dry her across the floor and I think that is contributing to the leaking as its pulling out. Patient denies any fever, chills, nausea, vomiting, chest pain, shortness of breath, rash, diarrhea, headache, night sweats, weight loss. Symptoms are mild at this point time but they want to get ahead of it. .   Tylenol psych she has a small pressure feeling in her pelvic area    Patient denies SOB, chest pain, or any neurological symptoms. There are no other complaints, changes, or physical findings at this time. Past History     Past Medical History:  Past Medical History:   Diagnosis Date    Bladder cancer ([facility] [address]     Dementia ([facility] [address]     DKA (diabetic ketoacidosis) ([facility] [address] 4/6/2022    Essential hypertension 7/21/2020    MRSA (methicillin resistant staph aureus) culture positive 2/18/5697    Non-alcoholic fatty liver disease 7/21/2020    Right groin ulcer ([facility] [address] 4/22/2022       Allergies: Allergies   Allergen Reactions    Adhesive Unknown (comments)    Penicillins Other (comments)    Sulfa (Sulfonamide Antibiotics) Unknown (comments)       Review of Systems   Vital signs and nursing notes reviewed  Review of Systems   Constitutional: Negative. Negative for appetite change, chills, fatigue and fever. HENT: Negative. Negative for congestion and sinus pain. Eyes: Negative. Negative for pain and visual disturbance. Respiratory: Negative. Negative for chest tightness and shortness of breath. Cardiovascular: Negative. Negative for chest pain. Gastrointestinal: Negative. Negative for abdominal pain, diarrhea, nausea and vomiting. Genitourinary:  Positive for pelvic pain. Negative for difficulty urinating. No discharge   Musculoskeletal: Negative. Negative for arthralgias. Skin: Negative. Negative for rash. Neurological: Negative. Negative for weakness and headaches. Hematological: Negative. Psychiatric/Behavioral: Negative. Negative for agitation. The patient is not nervous/anxious. All other systems reviewed and are negative. Physical Exam   Visit Vitals  BP (!) 147/72 (BP 1 Location: Right upper arm, BP Patient Position: At rest)   Pulse 62   Temp 98.5 °F (36.9 °C)   Resp 16   Ht 5' 6\" (1.676 m)   Wt 79.4 kg (175 lb)   SpO2 99%   BMI 28.25 kg/m²     CONSTITUTIONAL: Alert, in no distress. Appears stated age. HEAD:  Normocephalic, atraumatic  EYES: EOM intact. No conjunctival injection or scleral icterus  Neck:  Supple. No meningismus  RESP: Normal with no work of breathing, speaking in full sentences. CV: Well perfused. NEURO: Alert with normal mentation, moving extremities spontaneously  PSYCH: Normal mood, normal affect  : Monroe catheter in place*    Medical Decision Making     ED Course:   Initial assessment performed. The patients presenting problems have been discussed, and they are in agreement with the care plan formulated and outlined with them. I have encouraged them to ask questions as they arise throughout their visit. Critical Care Time: None    Disposition:  DISCHARGE NOTE:  The pt is ready for discharge. The pt's signs, symptoms, diagnosis, and discharge instructions have been discussed and pt has conveyed their understanding. The pt is to follow up as recommended or return to ER should their symptoms worsen. Plan has been discussed and pt is in agreement. PLAN:  1. Current Discharge Medication List        2.    Follow-up Information       Follow up With Specialties Details Why Contact Info    Katie Lozada NP Nurse Practitioner Call in 2 days  [address] [address] [phone]              Diagnosis     Clinical Impression:   1. Problem with Monroe catheter, initial encounter [facility])        Please note that this dictation was completed with [employer], the computer voice recognition software. Quite often unanticipated grammatical, syntax, homophones, and other interpretive errors are inadvertently transcribed by the computer software. Please disregards these errors. Please excuse any errors that have escaped final proofreading.

## 2022-08-17 NOTE — ED NOTES
Continued Stay Note  [facility]     Patient Name: Yakov Dubon  MRN: [id number]  Today's Date: 8/17/2022    Admit Date: 8/11/2022     Discharge Plan     Row Name 08/17/22 1425       Plan    Plan Plan is to return to  level of care at [facility]. ........Porsha GONZALEZ/ MORA               Discharge Codes    No documentation.               Expected Discharge Date and Time     Expected Discharge Date Expected Discharge Time    Aug 18, 2022             Porsha Ellis RN     Orders to replace patient's chronic Godoy received. Patient presents with leaking godoy. Godoy was placed \"around June 9,2022\" per patient. Godoy leaking and appears soiled with adhesive and grime. Educated patient on Godoy Care.

## 2022-08-23 ENCOUNTER — HOSPITAL ENCOUNTER (EMERGENCY)
Age: 72
Discharge: HOME | End: 2022-08-23
Attending: EMERGENCY MEDICINE
Payer: MEDICARE

## 2022-08-23 VITALS
WEIGHT: 175 LBS | SYSTOLIC BLOOD PRESSURE: 136 MMHG | TEMPERATURE: 98 F | HEIGHT: 66 IN | OXYGEN SATURATION: 99 % | HEART RATE: 72 BPM | DIASTOLIC BLOOD PRESSURE: 70 MMHG | RESPIRATION RATE: 16 BRPM | BODY MASS INDEX: 28.12 KG/M2

## 2022-08-23 DIAGNOSIS — T83.9XXA PROBLEM WITH FOLEY CATHETER, INITIAL ENCOUNTER (HCC): Primary | ICD-10-CM

## 2022-08-23 PROCEDURE — 99282 EMERGENCY DEPT VISIT SF MDM: CPT

## 2022-08-24 NOTE — ED PROVIDER NOTES
[facility] DEPARTMENT HISTORY AND PHYSICAL EXAM      Date: 8/23/2022  Patient Name: Marcos Cain    History of Presenting Illness     Chief Complaint   Patient presents with    Urinary Catheter Problem       History Provided By: Patient    HPI: Marcos Cain, 71 y.o. female   presents to the ED with cc of Monroe catheter problem. Patient who has had a Monroe catheter for last several weeks by urologist for history of bladder CVA and neurogenic bladder came to emergency room complaining of the loosening of the connection with mild leakage. No abdominal pain. No fever chills. No nausea vomiting. PCP: Yves Clifton NP    No current facility-administered medications on file prior to encounter. Current Outpatient Medications on File Prior to Encounter   Medication Sig Dispense Refill    insulin detemir U-100 (LEVEMIR FLEXTOUCH) 100 unit/mL (3 mL) inpn Inject 28 units subcutaneously under skin at bedtime daily. Then inject 32 units at bedtime daily IF blood sugar average is higher than 250 in 1 week 5 Pen 3    metFORMIN ER (GLUCOPHAGE XR) 500 mg tablet Take 2 Tablets by mouth two (2) times a day. 360 Tablet 1    atorvastatin (LIPITOR) 20 mg tablet Take 1 Tablet by mouth daily. 90 Tablet 1    Ferrous Fumarate 325 mg (106 mg iron) tab Take 1 Tablet by mouth every other day. 90 Tablet 1    potassium chloride (KLOR-CON M10) 10 mEq tablet Take 1 Tablet by mouth daily. Take 1 tab by mouth daily for low potassium. (Patient taking differently: Take 10 mEq by mouth in the morning. Take 1/2 tab by mouth daily for low potassium.) 90 Tablet 1    docusate sodium (COLACE) 100 mg capsule Take 1 Capsule by mouth two (2) times daily as needed for Constipation for up to 90 days. 90 Capsule 1    metoprolol succinate (TOPROL-XL) 50 mg XL tablet Take 1 Tablet by mouth daily for 90 days. 90 Tablet 1    ERGOCALCIFEROL, VITAMIN D2, PO Take 500 Units by mouth two (2) times a day.       tamsulosin (FLOMAX) 0.4 mg capsule Take 0.4 mg by mouth daily. calcium-cholecalciferol, d3, 500 mg-10 mcg (400 unit) chew Calcium 500 + D 500 mg-10 mcg (400 unit) chewable tablet   Take by oral route. DULoxetine (CYMBALTA) 30 mg capsule Take 1 Capsule by mouth daily. 90 Capsule 1    pantoprazole (Protonix) 40 mg tablet Take 1 tablet by mouth daily in am and wait 30 min before eating or taking other medications. 90 Tablet 1    glucose blood VI test strips (OneTouch Ultra Test) strip Use to check blood glucose before meals and at bedtime. 120 Strip 11    aspirin delayed-release 81 mg tablet Take 1 Tablet by mouth daily. Indications: treatment to prevent a heart attack 90 Tablet 3    alcohol swabs (Alcohol Wipes) padm Use to check blood glucose once daily.  100 Pad 3       Past History     Past Medical History:  Past Medical History:   Diagnosis Date    At risk for infection associated with Monroe catheter     Bladder cancer ([facility] [address]     Dementia ([facility] [address]     DKA (diabetic ketoacidosis) ([facility] [address] 2022    Essential hypertension 2020    MRSA (methicillin resistant staph aureus) culture positive     Non-alcoholic fatty liver disease 2020    Right groin ulcer ([address] 2022       Past Surgical History:  Past Surgical History:   Procedure Laterality Date    HX CATARACT REMOVAL Left 2019    HX CHOLECYSTECTOMY      HX COLONOSCOPY  2002    HX HYSTERECTOMY      partial  20 yrs ago    HX TUBAL LIGATION         Family History:  Family History   Problem Relation Age of Onset    Diabetes Mother     Diabetes Maternal Grandmother     Other Maternal Grandmother         CHF    Diabetes Paternal Grandmother        Social History:  Social History     Tobacco Use    Smoking status: Former     Packs/day: 1.00     Years: 10.00     Pack years: 10.00     Types: Cigarettes     Quit date:      Years since quittin.6    Smokeless tobacco: Never   Vaping Use    Vaping Use: Never used   Substance Use Topics    Alcohol use: Not Currently    Drug use: Never Allergies: Allergies   Allergen Reactions    Adhesive Unknown (comments)    Penicillins Other (comments)    Sulfa (Sulfonamide Antibiotics) Unknown (comments)         Review of Systems   Review of Systems   Constitutional:  Negative for chills and fever. Genitourinary:  Positive for hematuria. Neurological:  Negative for headaches. Physical Exam   Physical Exam  Vitals and nursing note reviewed. Constitutional:       General: She is not in acute distress. Appearance: Normal appearance. She is not ill-appearing, toxic-appearing or diaphoretic. HENT:      Head: Normocephalic and atraumatic. Mouth/Throat:      Mouth: Mucous membranes are moist.   Eyes:      Conjunctiva/sclera: Conjunctivae normal.   Pulmonary:      Effort: Pulmonary effort is normal.   Abdominal:      Tenderness: There is no abdominal tenderness. There is no guarding or rebound. Genitourinary:     Comments: Monroe catheter in place  Musculoskeletal:      Cervical back: Neck supple. Skin:     General: Skin is warm and dry. Neurological:      General: No focal deficit present. Mental Status: She is alert. Psychiatric:         Behavior: Behavior normal.       Diagnostic Study Results     Labs -   No results found for this or any previous visit (from the past 12 hour(s)). Radiologic Studies -   No orders to display     CT Results  (Last 48 hours)      None          CXR Results  (Last 48 hours)      None              Medical Decision Making   I am the first provider for this patient. I reviewed the vital signs, available nursing notes, past medical history, past surgical history, family history and social history. Vital Signs-Reviewed the patient's vital signs. Patient Vitals for the past 12 hrs:   Temp Pulse Resp BP SpO2   08/23/22 2105 98 °F (36.7 °C) 72 16 136/70 99 %       Records Reviewed:     Provider Notes (Medical Decision Making):       ED Course:   Initial assessment performed.  The patients presenting problems have been discussed, and they are in agreement with the care plan formulated and outlined with them. I have encouraged them to ask questions as they arise throughout their visit. RN attended Monroe catheter and it is in a working order. PROCEDURES      Disposition: Condition stable   DC- Adult Discharges: All of the diagnostic tests were reviewed and questions answered. Diagnosis, care plan and treatment options were discussed. understand instructions and will follow up as directed. The patients results have been reviewed with them. They have been counseled regarding their diagnosis. The patient verbally convey understanding and agreement of the signs, symptoms, diagnosis, treatment and prognosis and additionally agrees to follow up as recommended. They also agree with the care-plan and convey that all of their questions have been answered. I have also put together some discharge instructions for them that include: 1) educational information regarding their diagnosis, 2) how to care for their diagnosis at home, as well a 3) list of reasons why they would want to return to the ED prior to their follow-up appointment, should their condition change. PLAN:  1. Current Discharge Medication List        2. Follow-up Information       Follow up With Specialties Details Why Contact Info    Follow up with your primary care physician  Schedule an appointment as soon as possible for a visit in 3 days As needed           Return to ED if worse     Diagnosis     Clinical Impression:   1. Problem with Monroe catheter, initial encounter [facility])        Please note that this dictation was completed with [employer], the computer voice recognition software. Quite often unanticipated grammatical, syntax, homophones, and other interpretive errors are inadvertently transcribed by the computer software. Please disregard these errors. Please excuse any errors that have escaped final proofreading.   Thank you.

## 2022-08-24 NOTE — ED NOTES
Perineal care and catheter care performed, extensive pt education with family member present performed.  Pt and family member verbalize understanding

## 2022-08-24 NOTE — ED TRIAGE NOTES
Pt with recurrent catheter complaints, reports \"coming apart at leg sticker\" pt had it replaced last week, no leaking, draining appropriately

## 2022-10-05 ENCOUNTER — TELEPHONE (OUTPATIENT)
Dept: FAMILY MEDICINE CLINIC | Age: 72
End: 2022-10-05

## 2022-10-05 NOTE — TELEPHONE ENCOUNTER
Called patient and cell no was daughter's number and explained mother did not show up for appointment and wanted to check on her and see about rescheduling appointment. Daughter reached out to mother on cell number;however no answer. Daughter stated to cancel appointment and would call back to make an appointment.

## 2022-10-11 NOTE — TELEPHONE ENCOUNTER
FYI- Pt's last A1c was 10.0. She is way out of control. I thought she needed to check her BS more often so more data could be collected. The order was for 120 strips a month w/ 11 refills so would cover the whole year. New provider can decide how often pt should check her BS.

## 2022-10-27 DIAGNOSIS — E11.65 UNCONTROLLED TYPE 2 DIABETES MELLITUS WITH HYPERGLYCEMIA (HCC): ICD-10-CM

## 2022-10-27 RX ORDER — METFORMIN HYDROCHLORIDE 500 MG/1
1000 TABLET, EXTENDED RELEASE ORAL 2 TIMES DAILY
Qty: 360 TABLET | Refills: 1 | Status: SHIPPED | OUTPATIENT
Start: 2022-10-27

## 2022-10-27 NOTE — TELEPHONE ENCOUNTER
Last OV:8/11/22  Next OV:none  Last Refill:8/11/22    Requested Prescriptions     Pending Prescriptions Disp Refills    metFORMIN ER (GLUCOPHAGE XR) 500 mg tablet 360 Tablet 1     Sig: Take 2 Tablets by mouth two (2) times a day.

## 2022-10-27 NOTE — TELEPHONE ENCOUNTER
[facility] sent in a prescription refill request for the below:    metFORMIN ER (GLUCOPHAGE XR) 500 mg tablet [[id number]

## 2022-11-29 ENCOUNTER — TELEPHONE (OUTPATIENT)
Dept: FAMILY MEDICINE CLINIC | Age: 72
End: 2022-11-29

## 2022-11-29 NOTE — TELEPHONE ENCOUNTER
I called and left a vm for the pt to return my call in reference to her Levmir.  Medication has been delivered to the office

## 2022-12-25 ENCOUNTER — HOSPITAL ENCOUNTER (INPATIENT)
Age: 72
LOS: 1 days | Discharge: HOME | DRG: 699 | End: 2022-12-26
Attending: STUDENT IN AN ORGANIZED HEALTH CARE EDUCATION/TRAINING PROGRAM | Admitting: INTERNAL MEDICINE
Payer: MEDICARE

## 2022-12-25 DIAGNOSIS — R73.9 HYPERGLYCEMIA: Primary | ICD-10-CM

## 2022-12-25 DIAGNOSIS — N39.0 URINARY TRACT INFECTION WITHOUT HEMATURIA, SITE UNSPECIFIED: ICD-10-CM

## 2022-12-25 PROBLEM — E11.65 HYPERGLYCEMIA DUE TO DIABETES MELLITUS (HCC): Status: ACTIVE | Noted: 2022-12-25

## 2022-12-25 PROBLEM — I16.0 HYPERTENSIVE URGENCY: Status: ACTIVE | Noted: 2022-12-25

## 2022-12-25 LAB
ANION GAP SERPL CALC-SCNC: 8 MMOL/L (ref 5–15)
APPEARANCE UR: CLEAR
BACTERIA URNS QL MICRO: ABNORMAL /HPF
BASOPHILS # BLD: 0 K/UL (ref 0–0.1)
BASOPHILS NFR BLD: 1 % (ref 0–1)
BILIRUB UR QL: NEGATIVE
BUN SERPL-MCNC: 25 MG/DL (ref 6–20)
BUN/CREAT SERPL: 20 (ref 12–20)
CA-I BLD-MCNC: 8.6 MG/DL (ref 8.5–10.1)
CHLORIDE SERPL-SCNC: 95 MMOL/L (ref 97–108)
CO2 SERPL-SCNC: 24 MMOL/L (ref 21–32)
COLOR UR: ABNORMAL
CREAT SERPL-MCNC: 1.27 MG/DL (ref 0.55–1.02)
DIFFERENTIAL METHOD BLD: ABNORMAL
EOSINOPHIL # BLD: 0.2 K/UL (ref 0–0.4)
EOSINOPHIL NFR BLD: 3 % (ref 0–7)
EPITH CASTS URNS QL MICRO: ABNORMAL /LPF
ERYTHROCYTE [DISTWIDTH] IN BLOOD BY AUTOMATED COUNT: 13.5 % (ref 11.5–14.5)
GLUCOSE BLD STRIP.AUTO-MCNC: 222 MG/DL (ref 65–100)
GLUCOSE BLD STRIP.AUTO-MCNC: 259 MG/DL (ref 65–100)
GLUCOSE BLD STRIP.AUTO-MCNC: 310 MG/DL (ref 65–100)
GLUCOSE BLD STRIP.AUTO-MCNC: 375 MG/DL (ref 65–100)
GLUCOSE BLD STRIP.AUTO-MCNC: 562 MG/DL (ref 65–100)
GLUCOSE BLD STRIP.AUTO-MCNC: >600 MG/DL (ref 65–100)
GLUCOSE SERPL-MCNC: 828 MG/DL (ref 65–100)
GLUCOSE UR STRIP.AUTO-MCNC: >1000 MG/DL
HCT VFR BLD AUTO: 38.6 % (ref 35–47)
HGB BLD-MCNC: 12.3 G/DL (ref 11.5–16)
HGB UR QL STRIP: NEGATIVE
IMM GRANULOCYTES # BLD AUTO: 0 K/UL (ref 0–0.04)
IMM GRANULOCYTES NFR BLD AUTO: 1 % (ref 0–0.5)
KETONES UR QL STRIP.AUTO: NEGATIVE MG/DL
LACTATE SERPL-SCNC: 1.4 MMOL/L (ref 0.4–2)
LEUKOCYTE ESTERASE UR QL STRIP.AUTO: NEGATIVE
LYMPHOCYTES # BLD: 1.5 K/UL (ref 0.8–3.5)
LYMPHOCYTES NFR BLD: 23 % (ref 12–49)
MCH RBC QN AUTO: 26.9 PG (ref 26–34)
MCHC RBC AUTO-ENTMCNC: 31.9 G/DL (ref 30–36.5)
MCV RBC AUTO: 84.5 FL (ref 80–99)
MONOCYTES # BLD: 0.4 K/UL (ref 0–1)
MONOCYTES NFR BLD: 6 % (ref 5–13)
NEUTS SEG # BLD: 4.5 K/UL (ref 1.8–8)
NEUTS SEG NFR BLD: 66 % (ref 32–75)
NITRITE UR QL STRIP.AUTO: POSITIVE
NRBC # BLD: 0 K/UL (ref 0–0.01)
NRBC BLD-RTO: 0 PER 100 WBC
PERFORMED BY, TECHID: ABNORMAL
PH UR STRIP: 5 [PH]
PLATELET # BLD AUTO: 225 K/UL (ref 150–400)
PMV BLD AUTO: 9.9 FL (ref 8.9–12.9)
POTASSIUM SERPL-SCNC: 4.8 MMOL/L (ref 3.5–5.1)
PROT UR STRIP-MCNC: ABNORMAL MG/DL
RBC # BLD AUTO: 4.57 M/UL (ref 3.8–5.2)
RBC #/AREA URNS HPF: ABNORMAL /HPF (ref 0–5)
SODIUM SERPL-SCNC: 127 MMOL/L (ref 136–145)
SP GR UR REFRACTOMETRY: 1.01 (ref 1–1.03)
UA: UC IF INDICATED,UAUC: ABNORMAL
UROBILINOGEN UR QL STRIP.AUTO: 0.1 EU/DL (ref 0.2–1)
WBC # BLD AUTO: 6.7 K/UL (ref 3.6–11)
WBC URNS QL MICRO: ABNORMAL /HPF (ref 0–4)

## 2022-12-25 PROCEDURE — 74011636637 HC RX REV CODE- 636/637: Performed by: INTERNAL MEDICINE

## 2022-12-25 PROCEDURE — 65270000029 HC RM PRIVATE

## 2022-12-25 PROCEDURE — 74011250637 HC RX REV CODE- 250/637: Performed by: INTERNAL MEDICINE

## 2022-12-25 PROCEDURE — 87070 CULTURE OTHR SPECIMN AEROBIC: CPT

## 2022-12-25 PROCEDURE — 96361 HYDRATE IV INFUSION ADD-ON: CPT

## 2022-12-25 PROCEDURE — 87077 CULTURE AEROBIC IDENTIFY: CPT

## 2022-12-25 PROCEDURE — 74011250636 HC RX REV CODE- 250/636: Performed by: INTERNAL MEDICINE

## 2022-12-25 PROCEDURE — 82962 GLUCOSE BLOOD TEST: CPT

## 2022-12-25 PROCEDURE — 87086 URINE CULTURE/COLONY COUNT: CPT

## 2022-12-25 PROCEDURE — 80048 BASIC METABOLIC PNL TOTAL CA: CPT

## 2022-12-25 PROCEDURE — 87147 CULTURE TYPE IMMUNOLOGIC: CPT

## 2022-12-25 PROCEDURE — 81001 URINALYSIS AUTO W/SCOPE: CPT

## 2022-12-25 PROCEDURE — 74011000250 HC RX REV CODE- 250: Performed by: STUDENT IN AN ORGANIZED HEALTH CARE EDUCATION/TRAINING PROGRAM

## 2022-12-25 PROCEDURE — 96374 THER/PROPH/DIAG INJ IV PUSH: CPT

## 2022-12-25 PROCEDURE — 83605 ASSAY OF LACTIC ACID: CPT

## 2022-12-25 PROCEDURE — 74011636637 HC RX REV CODE- 636/637: Performed by: STUDENT IN AN ORGANIZED HEALTH CARE EDUCATION/TRAINING PROGRAM

## 2022-12-25 PROCEDURE — 85025 COMPLETE CBC W/AUTO DIFF WBC: CPT

## 2022-12-25 PROCEDURE — 87186 SC STD MICRODIL/AGAR DIL: CPT

## 2022-12-25 PROCEDURE — 74011000250 HC RX REV CODE- 250: Performed by: INTERNAL MEDICINE

## 2022-12-25 PROCEDURE — 99285 EMERGENCY DEPT VISIT HI MDM: CPT

## 2022-12-25 PROCEDURE — 74011250636 HC RX REV CODE- 250/636: Performed by: STUDENT IN AN ORGANIZED HEALTH CARE EDUCATION/TRAINING PROGRAM

## 2022-12-25 RX ORDER — MAGNESIUM SULFATE 100 %
4 CRYSTALS MISCELLANEOUS AS NEEDED
Status: DISCONTINUED | OUTPATIENT
Start: 2022-12-25 | End: 2022-12-25 | Stop reason: SDUPTHER

## 2022-12-25 RX ORDER — MAGNESIUM SULFATE 100 %
4 CRYSTALS MISCELLANEOUS AS NEEDED
Status: DISCONTINUED | OUTPATIENT
Start: 2022-12-25 | End: 2022-12-26 | Stop reason: HOSPADM

## 2022-12-25 RX ORDER — TAMSULOSIN HYDROCHLORIDE 0.4 MG/1
0.4 CAPSULE ORAL DAILY
COMMUNITY

## 2022-12-25 RX ORDER — ONDANSETRON 2 MG/ML
4 INJECTION INTRAMUSCULAR; INTRAVENOUS
Status: DISCONTINUED | OUTPATIENT
Start: 2022-12-25 | End: 2022-12-26 | Stop reason: HOSPADM

## 2022-12-25 RX ORDER — HYDRALAZINE HYDROCHLORIDE 20 MG/ML
20 INJECTION INTRAMUSCULAR; INTRAVENOUS
Status: DISCONTINUED | OUTPATIENT
Start: 2022-12-25 | End: 2022-12-26 | Stop reason: HOSPADM

## 2022-12-25 RX ORDER — TAMSULOSIN HYDROCHLORIDE 0.4 MG/1
0.4 CAPSULE ORAL DAILY
Status: DISCONTINUED | OUTPATIENT
Start: 2022-12-25 | End: 2022-12-26 | Stop reason: HOSPADM

## 2022-12-25 RX ORDER — SODIUM CHLORIDE 0.9 % (FLUSH) 0.9 %
5-40 SYRINGE (ML) INJECTION AS NEEDED
Status: DISCONTINUED | OUTPATIENT
Start: 2022-12-25 | End: 2022-12-26 | Stop reason: HOSPADM

## 2022-12-25 RX ORDER — ONDANSETRON 4 MG/1
4 TABLET, ORALLY DISINTEGRATING ORAL
Status: DISCONTINUED | OUTPATIENT
Start: 2022-12-25 | End: 2022-12-26 | Stop reason: HOSPADM

## 2022-12-25 RX ORDER — AMLODIPINE BESYLATE 5 MG/1
TABLET ORAL
COMMUNITY
Start: 2022-10-27

## 2022-12-25 RX ORDER — INSULIN LISPRO 100 [IU]/ML
INJECTION, SOLUTION INTRAVENOUS; SUBCUTANEOUS
Status: DISCONTINUED | OUTPATIENT
Start: 2022-12-25 | End: 2022-12-26 | Stop reason: HOSPADM

## 2022-12-25 RX ORDER — SODIUM CHLORIDE 0.9 % (FLUSH) 0.9 %
5-40 SYRINGE (ML) INJECTION EVERY 8 HOURS
Status: DISCONTINUED | OUTPATIENT
Start: 2022-12-25 | End: 2022-12-26 | Stop reason: HOSPADM

## 2022-12-25 RX ORDER — INSULIN GLARGINE 100 [IU]/ML
30 INJECTION, SOLUTION SUBCUTANEOUS
Status: DISCONTINUED | OUTPATIENT
Start: 2022-12-25 | End: 2022-12-26 | Stop reason: HOSPADM

## 2022-12-25 RX ORDER — ACETAMINOPHEN 325 MG/1
650 TABLET ORAL
Status: DISCONTINUED | OUTPATIENT
Start: 2022-12-25 | End: 2022-12-26 | Stop reason: HOSPADM

## 2022-12-25 RX ORDER — INSULIN LISPRO 100 [IU]/ML
INJECTION, SOLUTION INTRAVENOUS; SUBCUTANEOUS EVERY 4 HOURS
Status: DISCONTINUED | OUTPATIENT
Start: 2022-12-25 | End: 2022-12-25 | Stop reason: SDUPTHER

## 2022-12-25 RX ORDER — ASPIRIN 81 MG/1
81 TABLET ORAL DAILY
Status: DISCONTINUED | OUTPATIENT
Start: 2022-12-25 | End: 2022-12-26 | Stop reason: HOSPADM

## 2022-12-25 RX ORDER — ACETAMINOPHEN 650 MG/1
650 SUPPOSITORY RECTAL
Status: DISCONTINUED | OUTPATIENT
Start: 2022-12-25 | End: 2022-12-26 | Stop reason: HOSPADM

## 2022-12-25 RX ORDER — DEXTROSE MONOHYDRATE 100 MG/ML
0-250 INJECTION, SOLUTION INTRAVENOUS AS NEEDED
Status: DISCONTINUED | OUTPATIENT
Start: 2022-12-25 | End: 2022-12-26 | Stop reason: HOSPADM

## 2022-12-25 RX ORDER — POLYETHYLENE GLYCOL 3350 17 G/17G
17 POWDER, FOR SOLUTION ORAL DAILY PRN
Status: DISCONTINUED | OUTPATIENT
Start: 2022-12-25 | End: 2022-12-26 | Stop reason: HOSPADM

## 2022-12-25 RX ORDER — INSULIN GLARGINE 100 [IU]/ML
30 INJECTION, SOLUTION SUBCUTANEOUS DAILY
Status: DISCONTINUED | OUTPATIENT
Start: 2022-12-25 | End: 2022-12-25

## 2022-12-25 RX ORDER — FAMOTIDINE 20 MG/1
20 TABLET, FILM COATED ORAL
Status: DISCONTINUED | OUTPATIENT
Start: 2022-12-25 | End: 2022-12-26 | Stop reason: HOSPADM

## 2022-12-25 RX ORDER — METFORMIN HYDROCHLORIDE 500 MG/1
500 TABLET ORAL
COMMUNITY

## 2022-12-25 RX ORDER — ENOXAPARIN SODIUM 100 MG/ML
40 INJECTION SUBCUTANEOUS DAILY
Status: DISCONTINUED | OUTPATIENT
Start: 2022-12-25 | End: 2022-12-26 | Stop reason: HOSPADM

## 2022-12-25 RX ORDER — INSULIN GLARGINE 100 [IU]/ML
26 INJECTION, SOLUTION SUBCUTANEOUS
COMMUNITY

## 2022-12-25 RX ORDER — ATORVASTATIN CALCIUM 20 MG/1
20 TABLET, FILM COATED ORAL DAILY
Status: DISCONTINUED | OUTPATIENT
Start: 2022-12-25 | End: 2022-12-26 | Stop reason: HOSPADM

## 2022-12-25 RX ORDER — FAMOTIDINE 20 MG/1
20 TABLET, FILM COATED ORAL
COMMUNITY

## 2022-12-25 RX ORDER — AMLODIPINE BESYLATE 5 MG/1
10 TABLET ORAL DAILY
Status: DISCONTINUED | OUTPATIENT
Start: 2022-12-25 | End: 2022-12-26 | Stop reason: HOSPADM

## 2022-12-25 RX ORDER — INSULIN GLARGINE 100 [IU]/ML
30 INJECTION, SOLUTION SUBCUTANEOUS ONCE
Status: COMPLETED | OUTPATIENT
Start: 2022-12-25 | End: 2022-12-25

## 2022-12-25 RX ADMIN — INSULIN LISPRO 15 UNITS: 100 INJECTION, SOLUTION INTRAVENOUS; SUBCUTANEOUS at 09:12

## 2022-12-25 RX ADMIN — SODIUM CHLORIDE, PRESERVATIVE FREE 10 ML: 5 INJECTION INTRAVENOUS at 08:51

## 2022-12-25 RX ADMIN — SODIUM CHLORIDE, PRESERVATIVE FREE 10 ML: 5 INJECTION INTRAVENOUS at 13:33

## 2022-12-25 RX ADMIN — INSULIN GLARGINE 30 UNITS: 100 INJECTION, SOLUTION SUBCUTANEOUS at 07:02

## 2022-12-25 RX ADMIN — ASPIRIN 81 MG: 81 TABLET, COATED ORAL at 08:52

## 2022-12-25 RX ADMIN — INSULIN LISPRO 4 UNITS: 100 INJECTION, SOLUTION INTRAVENOUS; SUBCUTANEOUS at 17:37

## 2022-12-25 RX ADMIN — ENOXAPARIN SODIUM 40 MG: 100 INJECTION SUBCUTANEOUS at 08:53

## 2022-12-25 RX ADMIN — INSULIN LISPRO 5 UNITS: 100 INJECTION, SOLUTION INTRAVENOUS; SUBCUTANEOUS at 21:53

## 2022-12-25 RX ADMIN — FAMOTIDINE 20 MG: 20 TABLET, FILM COATED ORAL at 21:53

## 2022-12-25 RX ADMIN — AMLODIPINE BESYLATE 10 MG: 5 TABLET ORAL at 08:52

## 2022-12-25 RX ADMIN — INSULIN HUMAN 12 UNITS: 100 INJECTION, SOLUTION PARENTERAL at 07:01

## 2022-12-25 RX ADMIN — INSULIN GLARGINE 30 UNITS: 100 INJECTION, SOLUTION SUBCUTANEOUS at 21:54

## 2022-12-25 RX ADMIN — ATORVASTATIN CALCIUM 20 MG: 20 TABLET, FILM COATED ORAL at 08:52

## 2022-12-25 RX ADMIN — SODIUM CHLORIDE 1000 ML: 9 INJECTION, SOLUTION INTRAVENOUS at 06:07

## 2022-12-25 RX ADMIN — TAMSULOSIN HYDROCHLORIDE 0.4 MG: 0.4 CAPSULE ORAL at 08:52

## 2022-12-25 RX ADMIN — CEFTRIAXONE SODIUM 1 G: 1 INJECTION, POWDER, FOR SOLUTION INTRAMUSCULAR; INTRAVENOUS at 07:32

## 2022-12-25 RX ADMIN — SODIUM CHLORIDE, PRESERVATIVE FREE 10 ML: 5 INJECTION INTRAVENOUS at 21:53

## 2022-12-25 RX ADMIN — INSULIN LISPRO 7 UNITS: 100 INJECTION, SOLUTION INTRAVENOUS; SUBCUTANEOUS at 13:17

## 2022-12-25 RX ADMIN — SODIUM CHLORIDE 1000 ML: 9 INJECTION, SOLUTION INTRAVENOUS at 07:02

## 2022-12-25 NOTE — PROGRESS NOTES
Dual skin assessment performed by Andi Sosa RN and myself. Patient has an open wound to her left, inner thigh. Patient states it was from the previous placement of her godoy tube. The patient also has some blanchable redness to her sacrum. Zinc cream applied. Remaining skin is clean, dry, and intact.

## 2022-12-25 NOTE — PROGRESS NOTES
Problem: Falls - Risk of  Goal: *Absence of Falls  Description: Document Ely Ni Fall Risk and appropriate interventions in the flowsheet. Outcome: Progressing Towards Goal  Note: Fall Risk Interventions:  Mobility Interventions: Bed/chair exit alarm, Patient to call before getting OOB, PT Consult for mobility concerns         Medication Interventions: Bed/chair exit alarm, Patient to call before getting OOB, Teach patient to arise slowly    Elimination Interventions: Bed/chair exit alarm, Call light in reach, Patient to call for help with toileting needs    History of Falls Interventions: Bed/chair exit alarm, Door open when patient unattended, Investigate reason for fall         Problem: Patient Education: Go to Patient Education Activity  Goal: Patient/Family Education  Outcome: Progressing Towards Goal     Problem: Pressure Injury - Risk of  Goal: *Prevention of pressure injury  Description: Document Rene Scale and appropriate interventions in the flowsheet.   Outcome: Progressing Towards Goal  Note: Pressure Injury Interventions:  Sensory Interventions: Assess changes in LOC, Check visual cues for pain, Minimize linen layers    Moisture Interventions: Absorbent underpads, Internal/External urinary devices, Minimize layers    Activity Interventions: Increase time out of bed, PT/OT evaluation    Mobility Interventions: HOB 30 degrees or less, PT/OT evaluation    Nutrition Interventions: Document food/fluid/supplement intake, Offer support with meals,snacks and hydration

## 2022-12-25 NOTE — H&P
GENERAL GENERIC H&P/CONSULT    Subjective:    72F with history of hypertension, diabetes, poor mediation compliance. History of neurogenic bladder, chronic indwelling godoy catheter, recurrent urinary infections. 12/25/22 presents to hospital with urinary symptoms of itching and burning, associated with malfunction of her godoy catheter. Godoy was replaced in the ED course and urine analysis suggestive of infection, further complicated by accelerated hypertension and marked hyperglycemia, for which I have been asked to admit to hospital for further workup and management of her condition. Past Medical History:   Diagnosis Date    At risk for infection associated with Godoy catheter     Bladder cancer ([facility] [address]     Dementia ([facility] [address]     DKA (diabetic ketoacidosis) ([facility] [address] 04/06/2022    Essential hypertension 07/21/2020    MRSA (methicillin resistant staph aureus) culture positive 24/45/9809    Non-alcoholic fatty liver disease 07/21/2020    Right groin ulcer ([facility] [address] 04/22/2022      Past Surgical History:   Procedure Laterality Date    HX CATARACT REMOVAL Left 07/23/2019    HX CHOLECYSTECTOMY      HX COLONOSCOPY  2002    HX HYSTERECTOMY      partial  20 yrs ago    HX TUBAL LIGATION        Prior to Admission medications    Medication Sig Start Date End Date Taking? Authorizing Provider   amLODIPine (NORVASC) 5 mg tablet  10/27/22  Yes Katty, MD Renetta   insulin glargine (Lantus Solostar U-100 Insulin) 100 unit/mL (3 mL) inpn 26 Units by SubCUTAneous route nightly. Yes Other, MD Renetta   metFORMIN (GLUCOPHAGE) 500 mg tablet Take 500 mg by mouth nightly. Yes Other, MD Renetta   tamsulosin (FLOMAX) 0.4 mg capsule Take 0.4 mg by mouth daily. Yes Other, MD Renetta   famotidine (Pepcid AC) 20 mg tablet Take 20 mg by mouth nightly. Yes Katty, MD Renetta   metFORMIN ER (GLUCOPHAGE XR) 500 mg tablet Take 2 Tablets by mouth two (2) times a day. Patient taking differently: Take 1,000 mg by mouth daily.  10/27/22  Yes Piedad Burkitt Dada Thorne,    atorvastatin (LIPITOR) 20 mg tablet Take 1 Tablet by mouth daily. Patient taking differently: Take 20 mg by mouth two (2) times a day. 22  Yes Angelina Hernandez MD   calcium-cholecalciferol, d3, 500 mg-10 mcg (400 unit) chew Calcium 500 + D 500 mg-10 mcg (400 unit) chewable tablet   Take by oral route. Yes Provider, Historical   aspirin delayed-release 81 mg tablet Take 1 Tablet by mouth daily. Indications: treatment to prevent a heart attack 3/22/22  Yes Tania Haro NP   potassium chloride (KLOR-CON M10) 10 mEq tablet Take 1 Tablet by mouth daily. Take 1 tab by mouth daily for low potassium. Patient not taking: Reported on 2022   Angelina Hernandez MD   ERGOCALCIFEROL, VITAMIN D2, PO Take 500 Units by mouth two (2) times a day. Patient not taking: Reported on 2022    Provider, Historical   glucose blood VI test strips (OneTouch Ultra Test) strip Use to check blood glucose before meals and at bedtime. 22   Tania Haro NP   alcohol swabs (Alcohol Wipes) padm Use to check blood glucose once daily. 3/22/22   Tania Haro NP     Allergies   Allergen Reactions    Adhesive Unknown (comments)    Penicillins Other (comments)    Sulfa (Sulfonamide Antibiotics) Unknown (comments)      Social History     Tobacco Use    Smoking status: Former     Packs/day: 1.00     Years: 10.00     Pack years: 10.00     Types: Cigarettes     Quit date:      Years since quittin.0    Smokeless tobacco: Never   Substance Use Topics    Alcohol use: Not Currently      Family History   Problem Relation Age of Onset    Diabetes Mother     Diabetes Maternal Grandmother     Other Maternal Grandmother         CHF    Diabetes Paternal Grandmother       Review of Systems   All other systems reviewed and are negative. Objective:    No intake/output data recorded. No intake/output data recorded.   Patient Vitals for the past 8 hrs:   BP Temp Pulse Resp SpO2 Height Weight   22 0728 (!) 182/84 -- -- 18 -- -- --   12/25/22 0643 (!) 159/84 -- -- -- -- -- --   12/25/22 0443 (!) 170/91 98.5 °F (36.9 °C) (!) 109 20 99 % 5' 6\" (1.676 m) 77.1 kg (170 lb)     Physical Exam  Vitals and nursing note reviewed. HENT:      Head: Normocephalic and atraumatic. Nose: Nose normal.      Mouth/Throat:      Mouth: Mucous membranes are dry. Eyes:      Extraocular Movements: Extraocular movements intact. Pupils: Pupils are equal, round, and reactive to light. Cardiovascular:      Rate and Rhythm: Normal rate and regular rhythm. Pulses: Normal pulses. Heart sounds: Normal heart sounds. Pulmonary:      Effort: Pulmonary effort is normal.      Breath sounds: Normal breath sounds. Abdominal:      General: Abdomen is flat. Palpations: Abdomen is soft. Musculoskeletal:      Cervical back: Normal range of motion and neck supple. Skin:     General: Skin is warm. Neurological:      General: No focal deficit present. Mental Status: She is alert.    Psychiatric:         Mood and Affect: Mood normal.        Labs:    Recent Results (from the past 24 hour(s))   URINALYSIS W/ REFLEX CULTURE    Collection Time: 12/25/22  5:16 AM    Specimen: Urine   Result Value Ref Range    Color Yellow/Straw      Appearance Clear Clear      Specific gravity 1.010 1.003 - 1.030      pH (UA) 5.0      Protein Trace (A) Negative mg/dL    Glucose >1,000 (A) Negative mg/dL    Ketone Negative Negative mg/dL    Bilirubin Negative Negative      Blood Negative Negative      Urobilinogen 0.1 (L) 0.2 - 1.0 EU/dL    Nitrites Positive (A) Negative      Leukocyte Esterase Negative Negative      WBC 20-50 0 - 4 /hpf    RBC 0-5 0 - 5 /hpf    Epithelial cells Few Few /lpf    Bacteria 1+ (A) Negative /hpf    UA:UC IF INDICATED Urine Culture Ordered (A) Culture not indicated by UA result     CBC WITH AUTOMATED DIFF    Collection Time: 12/25/22  5:34 AM   Result Value Ref Range    WBC 6.7 3.6 - 11.0 K/uL    RBC 4.57 3.80 - 5.20 M/uL    HGB 12.3 11.5 - 16.0 g/dL    HCT 38.6 35.0 - 47.0 %    MCV 84.5 80.0 - 99.0 FL    MCH 26.9 26.0 - 34.0 PG    MCHC 31.9 30.0 - 36.5 g/dL    RDW 13.5 11.5 - 14.5 %    PLATELET 290 502 - 803 K/uL    MPV 9.9 8.9 - 12.9 FL    NRBC 0.0 0.0  WBC    ABSOLUTE NRBC 0.00 0.00 - 0.01 K/uL    NEUTROPHILS 66 32 - 75 %    LYMPHOCYTES 23 12 - 49 %    MONOCYTES 6 5 - 13 %    EOSINOPHILS 3 0 - 7 %    BASOPHILS 1 0 - 1 %    IMMATURE GRANULOCYTES 1 (H) 0 - 0.5 %    ABS. NEUTROPHILS 4.5 1.8 - 8.0 K/UL    ABS. LYMPHOCYTES 1.5 0.8 - 3.5 K/UL    ABS. MONOCYTES 0.4 0.0 - 1.0 K/UL    ABS. EOSINOPHILS 0.2 0.0 - 0.4 K/UL    ABS. BASOPHILS 0.0 0.0 - 0.1 K/UL    ABS. IMM. GRANS. 0.0 0.00 - 0.04 K/UL    DF AUTOMATED     METABOLIC PANEL, BASIC    Collection Time: 12/25/22  5:34 AM   Result Value Ref Range    Sodium 127 (L) 136 - 145 mmol/L    Potassium 4.8 3.5 - 5.1 mmol/L    Chloride 95 (L) 97 - 108 mmol/L    CO2 24 21 - 32 mmol/L    Anion gap 8 5 - 15 mmol/L    Glucose 828 (HH) 65 - 100 mg/dL    BUN 25 (H) 6 - 20 mg/dL    Creatinine 1.27 (H) 0.55 - 1.02 mg/dL    BUN/Creatinine ratio 20 12 - 20      eGFR 45 (L) >60 ml/min/1.73m2    Calcium 8.6 8.5 - 10.1 mg/dL   LACTIC ACID    Collection Time: 12/25/22  5:34 AM   Result Value Ref Range    Lactic acid 1.4 0.4 - 2.0 mmol/L   GLUCOSE, POC    Collection Time: 12/25/22  6:52 AM   Result Value Ref Range    Glucose (POC) >600 (HH) 65 - 100 mg/dL    Performed by Waleska Newman        ECG:   Telemetry monitor    Assessment:  Active Problems:    UTI (urinary tract infection) (12/25/2022)      Hypertensive urgency (12/25/2022)      Hyperglycemia due to diabetes mellitus ([facility] [address] (12/25/2022)    72F with history of hypertension, diabetes, poor mediation compliance. History of neurogenic bladder, chronic indwelling godoy catheter, recurrent urinary infections. 12/25/22 presents to hospital with urinary symptoms of itching and burning, associated with malfunction of her godoy catheter.  Godoy was replaced in the ED course and urine analysis suggestive of infection, further complicated by accelerated hypertension and marked hyperglycemia, for which I have been asked to admit to hospital for further workup and management of her condition.     Plan:    1) Urinary infection in the setting of chronic indwelling godoy catheter     Ceftriaxone for now pending further culture data    2) Cardiovascular issues including accelerated hypertension     Telemetry monitoring   Echocardiogram   Continue home meds    Amlodipine    Aspirin    Atorvastatin   Hydralazine PRN   Cardiology    3) Marked hyperglycemia in the setting of poorly controlled diabetes with poor medication compliance     Basal glargine   Sliding scale lispro ACHS    4) DVT prophylaxis with enoxaparin        Signed:  Fuad Rodriguez MD 12/25/2022

## 2022-12-25 NOTE — ED PROVIDER NOTES
[facility]  EMERGENCY DEPARTMENT ENCOUNTER NOTE        Date: 12/25/2022  Patient Name: Javier Stokes      History of Presenting Illness     Chief Complaint   Patient presents with    Urinary Catheter Problem       History Provided By: Patient    HPI: Javier Stokes, 71 y.o. female with history of chronic indwelling catheter due to neurogenic bladder comes to the ED with Monroe mesh malfunction. She is also reporting burning sensation tonight around the Monroe. Reports that she has not drain urine since she woke up this morning. No abdominal pain, fever or chills. No recent changes in her bowel habits or dietary habits. Does not have any other complaints. Presenting issue is of mild severity, no aggravating leaving factors without associate additional symptoms. PCP: Blaise Downey DO    Current Facility-Administered Medications   Medication Dose Route Frequency Provider Last Rate Last Admin    insulin lispro (HUMALOG) injection   SubCUTAneous Q4H Deisy Bingham MD        glucose chewable tablet 16 g  4 Tablet Oral PRN Deisy Bingham MD        glucagon (GLUCAGEN) injection 1 mg  1 mg IntraMUSCular PRN Deisy Bingham MD        dextrose 10% infusion 0-250 mL  0-250 mL IntraVENous PRN Deisy Bingham MD        sodium chloride 0.9 % bolus infusion 1,000 mL  1,000 mL IntraVENous ONCE Deisy Bingham MD 1,000 mL/hr at 12/25/22 0702 1,000 mL at 12/25/22 0182    cefTRIAXone (ROCEPHIN) 1 g in sterile water (preservative free) 10 mL IV syringe  1 g IntraVENous ONCE Deisy Bingham MD         Current Outpatient Medications   Medication Sig Dispense Refill    metFORMIN ER (GLUCOPHAGE XR) 500 mg tablet Take 2 Tablets by mouth two (2) times a day. 360 Tablet 1    insulin detemir U-100 (LEVEMIR FLEXTOUCH) 100 unit/mL (3 mL) inpn Inject 28 units subcutaneously under skin at bedtime daily.  Then inject 32 units at bedtime daily IF blood sugar average is higher than 250 in 1 week 5 Pen 3    atorvastatin (LIPITOR) 20 mg tablet Take 1 Tablet by mouth daily. 90 Tablet 1    Ferrous Fumarate 325 mg (106 mg iron) tab Take 1 Tablet by mouth every other day. 90 Tablet 1    potassium chloride (KLOR-CON M10) 10 mEq tablet Take 1 Tablet by mouth daily. Take 1 tab by mouth daily for low potassium. (Patient taking differently: Take 10 mEq by mouth in the morning. Take 1/2 tab by mouth daily for low potassium.) 90 Tablet 1    ERGOCALCIFEROL, VITAMIN D2, PO Take 500 Units by mouth two (2) times a day. tamsulosin (FLOMAX) 0.4 mg capsule Take 0.4 mg by mouth daily. calcium-cholecalciferol, d3, 500 mg-10 mcg (400 unit) chew Calcium 500 + D 500 mg-10 mcg (400 unit) chewable tablet   Take by oral route. DULoxetine (CYMBALTA) 30 mg capsule Take 1 Capsule by mouth daily. 90 Capsule 1    pantoprazole (Protonix) 40 mg tablet Take 1 tablet by mouth daily in am and wait 30 min before eating or taking other medications. 90 Tablet 1    glucose blood VI test strips (OneTouch Ultra Test) strip Use to check blood glucose before meals and at bedtime. 120 Strip 11    aspirin delayed-release 81 mg tablet Take 1 Tablet by mouth daily. Indications: treatment to prevent a heart attack 90 Tablet 3    alcohol swabs (Alcohol Wipes) padm Use to check blood glucose once daily.  100 Pad 3       Past History     Past Medical History:  Past Medical History:   Diagnosis Date    At risk for infection associated with Monroe catheter     Bladder cancer ([facility] [address]     Dementia ([facility] [address]     DKA (diabetic ketoacidosis) ([facility] [address] 04/06/2022    Essential hypertension 07/21/2020    MRSA (methicillin resistant staph aureus) culture positive 75/23/8587    Non-alcoholic fatty liver disease 07/21/2020    Right groin ulcer ([facility] [address] 04/22/2022       Past Surgical History:  Past Surgical History:   Procedure Laterality Date    HX CATARACT REMOVAL Left 07/23/2019    HX CHOLECYSTECTOMY      HX COLONOSCOPY  2002    HX HYSTERECTOMY      partial  20 yrs ago HX TUBAL LIGATION         Family History:  Family History   Problem Relation Age of Onset    Diabetes Mother     Diabetes Maternal Grandmother     Other Maternal Grandmother         CHF    Diabetes Paternal Grandmother        Social History:  Social History     Tobacco Use    Smoking status: Former     Packs/day: 1.00     Years: 10.00     Pack years: 10.00     Types: Cigarettes     Quit date:      Years since quittin.0    Smokeless tobacco: Never   Vaping Use    Vaping Use: Never used   Substance Use Topics    Alcohol use: Not Currently    Drug use: Never       Allergies: Allergies   Allergen Reactions    Adhesive Unknown (comments)    Penicillins Other (comments)    Sulfa (Sulfonamide Antibiotics) Unknown (comments)         Review of Systems     Review of Systems    A 10 point review of system was performed and was negative except as noted above in HPI    Physical Exam     Physical Exam  Vitals and nursing note reviewed. Exam conducted with a chaperone present. Constitutional:       General: She is not in acute distress. Appearance: She is well-developed. She is not diaphoretic. HENT:      Head: Normocephalic and atraumatic. Eyes:      Extraocular Movements: Extraocular movements intact. Conjunctiva/sclera: Conjunctivae normal.   Cardiovascular:      Rate and Rhythm: Normal rate and regular rhythm. Heart sounds: Normal heart sounds. Pulmonary:      Effort: Pulmonary effort is normal.      Breath sounds: Normal breath sounds. Abdominal:      Palpations: Abdomen is soft. Tenderness: There is no abdominal tenderness. Genitourinary:     Comments: Monroe cath  Musculoskeletal:      Cervical back: Neck supple. Right lower leg: No tenderness. No edema. Left lower leg: No tenderness. No edema. Neurological:      General: No focal deficit present. Mental Status: She is alert and oriented to person, place, and time.        Lab and Diagnostic Study Results     Labs -     Recent Results (from the past 12 hour(s))   URINALYSIS W/ REFLEX CULTURE    Collection Time: 12/25/22  5:16 AM    Specimen: Urine   Result Value Ref Range    Color Yellow/Straw      Appearance Clear Clear      Specific gravity 1.010 1.003 - 1.030      pH (UA) 5.0      Protein Trace (A) Negative mg/dL    Glucose >1,000 (A) Negative mg/dL    Ketone Negative Negative mg/dL    Bilirubin Negative Negative      Blood Negative Negative      Urobilinogen 0.1 (L) 0.2 - 1.0 EU/dL    Nitrites Positive (A) Negative      Leukocyte Esterase Negative Negative      WBC 20-50 0 - 4 /hpf    RBC 0-5 0 - 5 /hpf    Epithelial cells Few Few /lpf    Bacteria 1+ (A) Negative /hpf    UA:UC IF INDICATED Urine Culture Ordered (A) Culture not indicated by UA result     CBC WITH AUTOMATED DIFF    Collection Time: 12/25/22  5:34 AM   Result Value Ref Range    WBC 6.7 3.6 - 11.0 K/uL    RBC 4.57 3.80 - 5.20 M/uL    HGB 12.3 11.5 - 16.0 g/dL    HCT 38.6 35.0 - 47.0 %    MCV 84.5 80.0 - 99.0 FL    MCH 26.9 26.0 - 34.0 PG    MCHC 31.9 30.0 - 36.5 g/dL    RDW 13.5 11.5 - 14.5 %    PLATELET 050 772 - 807 K/uL    MPV 9.9 8.9 - 12.9 FL    NRBC 0.0 0.0  WBC    ABSOLUTE NRBC 0.00 0.00 - 0.01 K/uL    NEUTROPHILS 66 32 - 75 %    LYMPHOCYTES 23 12 - 49 %    MONOCYTES 6 5 - 13 %    EOSINOPHILS 3 0 - 7 %    BASOPHILS 1 0 - 1 %    IMMATURE GRANULOCYTES 1 (H) 0 - 0.5 %    ABS. NEUTROPHILS 4.5 1.8 - 8.0 K/UL    ABS. LYMPHOCYTES 1.5 0.8 - 3.5 K/UL    ABS. MONOCYTES 0.4 0.0 - 1.0 K/UL    ABS. EOSINOPHILS 0.2 0.0 - 0.4 K/UL    ABS. BASOPHILS 0.0 0.0 - 0.1 K/UL    ABS. IMM.  GRANS. 0.0 0.00 - 0.04 K/UL    DF AUTOMATED     METABOLIC PANEL, BASIC    Collection Time: 12/25/22  5:34 AM   Result Value Ref Range    Sodium 127 (L) 136 - 145 mmol/L    Potassium 4.8 3.5 - 5.1 mmol/L    Chloride 95 (L) 97 - 108 mmol/L    CO2 24 21 - 32 mmol/L    Anion gap 8 5 - 15 mmol/L    Glucose 828 (HH) 65 - 100 mg/dL    BUN 25 (H) 6 - 20 mg/dL    Creatinine 1.27 (H) 0.55 - 1.02 mg/dL    BUN/Creatinine ratio 20 12 - 20      eGFR 45 (L) >60 ml/min/1.73m2    Calcium 8.6 8.5 - 10.1 mg/dL   LACTIC ACID    Collection Time: 12/25/22  5:34 AM   Result Value Ref Range    Lactic acid 1.4 0.4 - 2.0 mmol/L   GLUCOSE, POC    Collection Time: 12/25/22  6:52 AM   Result Value Ref Range    Glucose (POC) >600 (HH) 65 - 100 mg/dL    Performed by Deshaun Hahn        Radiologic Studies -   [unfilled]  CT Results  (Last 48 hours)      None          CXR Results  (Last 48 hours)      None            Medical Decision Making and ED Course   - I am the first and primary provider for this patient AND AM THE PRIMARY PROVIDER OF RECORD. - I reviewed the vital signs, available nursing notes, past medical history, past surgical history, family history and social history. - Initial assessment performed. The patients presenting problems have been discussed, and the staff are in agreement with the care plan formulated and outlined with them. I have encouraged them to ask questions as they arise throughout their visit. Vital Signs-Reviewed the patient's vital signs. Patient Vitals for the past 24 hrs:   Temp Pulse Resp BP SpO2   12/25/22 0443 98.5 °F (36.9 °C) (!) 109 20 (!) 170/91 99 %       Records Reviewed: Nursing Notes    Provider Notes (Medical Decision Making):     Patient is 81-year-old female who presents to the ED initially with Monroe malfunction. It was noted that she has excoriation around her genital area. Also noted to be tachycardic with some signs of dehydration. Concern for hyperglycemia likely secondary to urine tract infection. Other differential diagnoses were considered and does not appear to be likely. Plan is to obtain labs, replace Monroe and send a fresh urine sample and intravenously rehydrate the patient and reassess. ED course:    Work-up revealed urinary tract infection. Also it shows hyperglycemia with a sugar level of 828. This is probably the most culprit.   We will continue to be as hydration, ordered insulin, and antibiotic, but the patient to the hospital.    Critical Care  Performed by: Ryder Armendariz MD  Authorized by: Ryder Armendariz MD     Critical care provider statement:     Critical care time (minutes):  35    Critical care time was exclusive of:  Separately billable procedures and treating other patients    Critical care was necessary to treat or prevent imminent or life-threatening deterioration of the following conditions:  Endocrine crisis, dehydration and sepsis    Critical care was time spent personally by me on the following activities:  Development of treatment plan with patient or surrogate, evaluation of patient's response to treatment, obtaining history from patient or surrogate, ordering and performing treatments and interventions, ordering and review of laboratory studies, re-evaluation of patient's condition and review of old charts    I assumed direction of critical care for this patient from another provider in my specialty: no      Care discussed with: admitting provider        Diagnosis     Clinical Impression:   1. Hyperglycemia    2. Urinary tract infection without hematuria, site unspecified        Disposition     Disposition: Condition stable    Admitted      Attestations: Remberto Barrera MD    Please note that this dictation was completed with [employer] the computer voice recognition software. Quite often unanticipated grammatical, syntax, homophones, and other interpretive errors are inadvertently transcribed by the computer software. Please disregard these errors. Please excuse any errors that have escaped final proofreading. Thank you.

## 2022-12-25 NOTE — CONSULTS
[facility] CARDIOLOGY  CARDIOLOGY CONSULTATION      REASON FOR CONSULT: Elevated BP/AF    REQUESTING PROVIDER: Dr. Mann Tee:  AF    HISTORY OF PRESENT ILLNESS:  Zuly Lema is a 71y.o. year-old female with past medical history significant for AF s/p watchman device who was evaluated today due to labile bp. Patient presented with urinary tract symptoms. No c/o chest pain, dyspnea, vomiting or syncope or palpitations. Records from hospital admission course thus far reviewed. Telemetry reviewed. No events overnight, NSR.    EKG pending. INPATIENT MEDICATIONS:  Home medications reviewed. Current Facility-Administered Medications:     dextrose 10% infusion 0-250 mL, 0-250 mL, IntraVENous, PRN, Deisy Bingham MD    sodium chloride (NS) flush 5-40 mL, 5-40 mL, IntraVENous, Q8H, Eloisa Martínez MD, 10 mL at 12/25/22 1333    sodium chloride (NS) flush 5-40 mL, 5-40 mL, IntraVENous, PRN, Fabián Martínez MD    acetaminophen (TYLENOL) tablet 650 mg, 650 mg, Oral, Q6H PRN **OR** acetaminophen (TYLENOL) suppository 650 mg, 650 mg, Rectal, Q6H PRN, Fabián Martínez MD    polyethylene glycol (MIRALAX) packet 17 g, 17 g, Oral, DAILY PRN, Fabián Martínez MD    ondansetron (ZOFRAN ODT) tablet 4 mg, 4 mg, Oral, Q8H PRN **OR** ondansetron (ZOFRAN) injection 4 mg, 4 mg, IntraVENous, Q6H PRN, Fabián Martínez MD    enoxaparin (LOVENOX) injection 40 mg, 40 mg, SubCUTAneous, DAILY, Fabián Martínez MD, 40 mg at 12/25/22 0853    amLODIPine (NORVASC) tablet 10 mg, 10 mg, Oral, DAILY, Yane Silvestre MD, 10 mg at 12/25/22 1646    aspirin delayed-release tablet 81 mg, 81 mg, Oral, DAILY, Yane Silvestre MD, 81 mg at 12/25/22 8750    atorvastatin (LIPITOR) tablet 20 mg, 20 mg, Oral, DAILY, Yane Silvestre MD, 20 mg at 12/25/22 6280    . PHARMACY TO SUBSTITUTE PER PROTOCOL (Reordered from: calcium-cholecalciferol, d3, 500 mg-10 mcg (400 unit) chew), , , Per Protocol, Yane Silvestre MD    famotidine (PEPCID) tablet 20 mg, 20 mg, Oral, QHS, Fabián Martínez MD    [facility]) capsule 0.4 mg, 0.4 mg, Oral, DAILY, Fabián Martínez MD, 0.4 mg at 12/25/22 3898    glucose chewable tablet 16 g, 4 Tablet, Oral, PRN, Fabián Martínez MD    glucagon [facility]) injection 1 mg, 1 mg, IntraMUSCular, PRN, Fabián Martínez MD    dextrose 10% infusion 0-250 mL, 0-250 mL, IntraVENous, PRN, Fabián Martínez MD    insulin lispro (HUMALOG) injection, , SubCUTAneous, AC&HS, Yane Silvestre MD, 4 Units at 12/25/22 1737    hydrALAZINE (APRESOLINE) 20 mg/mL injection 20 mg, 20 mg, IntraVENous, Q6H PRN, Yane Silvestre MD    insulin glargine (LANTUS) injection 30 Units, 30 Units, SubCUTAneous, QHS, Fabián Martínez MD     ALLERGIES:  Allergies reviewed with the patient,  Allergies   Allergen Reactions    Adhesive Unknown (comments)    Penicillins Other (comments)    Sulfa (Sulfonamide Antibiotics) Unknown (comments)    . FAMILY HISTORY:  Family history reviewed. None significant. SOCIAL HISTORY:  Notable for no  tobacco use, no heavy alcohol or illicit drug use. REVIEW OF SYSTEMS:  Complete review of systems performed, pertinents noted above, all other systems are negative. PHYSICAL EXAMINATION:  Vital sign assessment reveal a blood pressure of  105/70  and pulse rate of 88. Cardiovascular exam has a heart with a regular rate and rhythm, normal S1 and S2. No murmur present. There are no rubs or gallops. Good peripheral pulses. No jugular venous distension. No carotid bruits are present. Respiratory exam reveals clear lung fields, no rales or rhonchi. Gastrointestinal exam has soft, nontender abdomen with normal bowel sounds. Lymphatic exam reveals no edema and no varicosities. No notable skin changes. Neurologic exam is nonfocal.  Musculoskeletal exam is notable for a normal gait. Recent labs results and imaging reviewed.         IMPRESSION AND RECOMMENDATIONS:  Paroxysmal atrial fibrillation: s/p watchman placement. Agree with ASA. 2.   HTN: Labile bp, will continue to monitor. Continue CCB. 3.  Hyperlipidemia: Continue with statins. Thank you for involving us in the care of this patient. Please do not hesitate to call if additional questions arise.       Tiffany Alcantara MD  12/25/2022

## 2022-12-26 ENCOUNTER — APPOINTMENT (OUTPATIENT)
Dept: NON INVASIVE DIAGNOSTICS | Age: 72
DRG: 699 | End: 2022-12-26
Attending: INTERNAL MEDICINE
Payer: MEDICARE

## 2022-12-26 ENCOUNTER — HOSPITAL ENCOUNTER (EMERGENCY)
Age: 72
Discharge: HOME | End: 2022-12-26
Attending: EMERGENCY MEDICINE
Payer: MEDICARE

## 2022-12-26 VITALS
BODY MASS INDEX: 27.32 KG/M2 | OXYGEN SATURATION: 99 % | HEART RATE: 101 BPM | SYSTOLIC BLOOD PRESSURE: 129 MMHG | RESPIRATION RATE: 22 BRPM | WEIGHT: 170 LBS | TEMPERATURE: 98.5 F | HEIGHT: 66 IN | DIASTOLIC BLOOD PRESSURE: 67 MMHG

## 2022-12-26 VITALS
OXYGEN SATURATION: 100 % | HEART RATE: 97 BPM | RESPIRATION RATE: 22 BRPM | DIASTOLIC BLOOD PRESSURE: 79 MMHG | WEIGHT: 170 LBS | HEIGHT: 66 IN | SYSTOLIC BLOOD PRESSURE: 114 MMHG | BODY MASS INDEX: 27.32 KG/M2 | TEMPERATURE: 99 F

## 2022-12-26 DIAGNOSIS — T83.9XXD PROBLEM WITH FOLEY CATHETER, SUBSEQUENT ENCOUNTER: Primary | ICD-10-CM

## 2022-12-26 LAB
ALBUMIN SERPL-MCNC: 2.7 G/DL (ref 3.5–5)
ALBUMIN/GLOB SERPL: 1 {RATIO} (ref 1.1–2.2)
ALP SERPL-CCNC: 93 U/L (ref 45–117)
ALT SERPL-CCNC: 42 U/L (ref 12–78)
ANION GAP SERPL CALC-SCNC: 5 MMOL/L (ref 5–15)
AST SERPL W P-5'-P-CCNC: 8 U/L (ref 15–37)
BASOPHILS # BLD: 0 K/UL (ref 0–0.1)
BASOPHILS NFR BLD: 1 % (ref 0–1)
BILIRUB SERPL-MCNC: 0.3 MG/DL (ref 0.2–1)
BUN SERPL-MCNC: 17 MG/DL (ref 6–20)
BUN/CREAT SERPL: 19 (ref 12–20)
CA-I BLD-MCNC: 9 MG/DL (ref 8.5–10.1)
CHLORIDE SERPL-SCNC: 105 MMOL/L (ref 97–108)
CO2 SERPL-SCNC: 27 MMOL/L (ref 21–32)
CREAT SERPL-MCNC: 0.91 MG/DL (ref 0.55–1.02)
DIFFERENTIAL METHOD BLD: ABNORMAL
ECHO AO ROOT DIAM: 2.9 CM
ECHO AO ROOT INDEX: 1.55 CM/M2
ECHO AV AREA PEAK VELOCITY: 2.9 CM2
ECHO AV AREA VTI: 3.3 CM2
ECHO AV AREA/BSA PEAK VELOCITY: 1.6 CM2/M2
ECHO AV AREA/BSA VTI: 1.8 CM2/M2
ECHO AV MEAN GRADIENT: 3 MMHG
ECHO AV MEAN VELOCITY: 0.8 M/S
ECHO AV PEAK GRADIENT: 5 MMHG
ECHO AV PEAK VELOCITY: 1.2 M/S
ECHO AV VELOCITY RATIO: 0.83
ECHO AV VTI: 19.1 CM
ECHO LA AREA 2C: 18.1 CM2
ECHO LA AREA 4C: 18.3 CM2
ECHO LA DIAMETER INDEX: 1.82 CM/M2
ECHO LA DIAMETER: 3.4 CM
ECHO LA MAJOR AXIS: 5.3 CM
ECHO LA MINOR AXIS: 5.4 CM
ECHO LA TO AORTIC ROOT RATIO: 1.17
ECHO LA VOL BP: 51 ML (ref 22–52)
ECHO LA VOL/BSA BIPLANE: 27 ML/M2 (ref 16–34)
ECHO LV E' LATERAL VELOCITY: 6 CM/S
ECHO LV E' SEPTAL VELOCITY: 6 CM/S
ECHO LV EDV A2C: 145 ML
ECHO LV EDV A4C: 145 ML
ECHO LV EDV INDEX A4C: 78 ML/M2
ECHO LV EDV NDEX A2C: 78 ML/M2
ECHO LV EJECTION FRACTION A2C: 61 %
ECHO LV EJECTION FRACTION A4C: 60 %
ECHO LV EJECTION FRACTION BIPLANE: 60 % (ref 55–100)
ECHO LV ESV A2C: 56 ML
ECHO LV ESV A4C: 58 ML
ECHO LV ESV INDEX A2C: 30 ML/M2
ECHO LV ESV INDEX A4C: 31 ML/M2
ECHO LV FRACTIONAL SHORTENING: 45 % (ref 28–44)
ECHO LV INTERNAL DIMENSION DIASTOLE INDEX: 2.35 CM/M2
ECHO LV INTERNAL DIMENSION DIASTOLIC: 4.4 CM (ref 3.9–5.3)
ECHO LV INTERNAL DIMENSION SYSTOLIC INDEX: 1.28 CM/M2
ECHO LV INTERNAL DIMENSION SYSTOLIC: 2.4 CM
ECHO LV IVSD: 1.2 CM (ref 0.6–0.9)
ECHO LV MASS 2D: 180 G (ref 67–162)
ECHO LV MASS INDEX 2D: 96.2 G/M2 (ref 43–95)
ECHO LV POSTERIOR WALL DIASTOLIC: 1.1 CM (ref 0.6–0.9)
ECHO LV RELATIVE WALL THICKNESS RATIO: 0.5
ECHO LVOT AREA: 3.5 CM2
ECHO LVOT AV VTI INDEX: 0.96
ECHO LVOT DIAM: 2.1 CM
ECHO LVOT MEAN GRADIENT: 2 MMHG
ECHO LVOT PEAK GRADIENT: 4 MMHG
ECHO LVOT PEAK VELOCITY: 1 M/S
ECHO LVOT STROKE VOLUME INDEX: 33.9 ML/M2
ECHO LVOT SV: 63.4 ML
ECHO LVOT VTI: 18.3 CM
ECHO MV A VELOCITY: 0.68 M/S
ECHO MV E DECELERATION TIME (DT): 170 MS
ECHO MV E VELOCITY: 0.63 M/S
ECHO MV E/A RATIO: 0.93
ECHO MV E/E' LATERAL: 10.5
ECHO MV E/E' RATIO (AVERAGED): 10.5
ECHO MV E/E' SEPTAL: 10.5
EOSINOPHIL # BLD: 0.2 K/UL (ref 0–0.4)
EOSINOPHIL NFR BLD: 3 % (ref 0–7)
ERYTHROCYTE [DISTWIDTH] IN BLOOD BY AUTOMATED COUNT: 13.2 % (ref 11.5–14.5)
EST. AVERAGE GLUCOSE BLD GHB EST-MCNC: ABNORMAL MG/DL
GLOBULIN SER CALC-MCNC: 2.7 G/DL (ref 2–4)
GLUCOSE BLD STRIP.AUTO-MCNC: 301 MG/DL (ref 65–100)
GLUCOSE BLD STRIP.AUTO-MCNC: 393 MG/DL (ref 65–100)
GLUCOSE SERPL-MCNC: 311 MG/DL (ref 65–100)
HBA1C MFR BLD: >14 % (ref 4–5.6)
HCT VFR BLD AUTO: 33.6 % (ref 35–47)
HGB BLD-MCNC: 10.8 G/DL (ref 11.5–16)
IMM GRANULOCYTES # BLD AUTO: 0 K/UL (ref 0–0.04)
IMM GRANULOCYTES NFR BLD AUTO: 0 % (ref 0–0.5)
LYMPHOCYTES # BLD: 1.9 K/UL (ref 0.8–3.5)
LYMPHOCYTES NFR BLD: 29 % (ref 12–49)
MAGNESIUM SERPL-MCNC: 2.1 MG/DL (ref 1.6–2.4)
MCH RBC QN AUTO: 26.5 PG (ref 26–34)
MCHC RBC AUTO-ENTMCNC: 32.1 G/DL (ref 30–36.5)
MCV RBC AUTO: 82.6 FL (ref 80–99)
MONOCYTES # BLD: 0.4 K/UL (ref 0–1)
MONOCYTES NFR BLD: 7 % (ref 5–13)
NEUTS SEG # BLD: 3.8 K/UL (ref 1.8–8)
NEUTS SEG NFR BLD: 60 % (ref 32–75)
NRBC # BLD: 0 K/UL (ref 0–0.01)
NRBC BLD-RTO: 0 PER 100 WBC
PERFORMED BY, TECHID: ABNORMAL
PERFORMED BY, TECHID: ABNORMAL
PLATELET # BLD AUTO: 186 K/UL (ref 150–400)
PMV BLD AUTO: 9.7 FL (ref 8.9–12.9)
POTASSIUM SERPL-SCNC: 4.2 MMOL/L (ref 3.5–5.1)
PROT SERPL-MCNC: 5.4 G/DL (ref 6.4–8.2)
RBC # BLD AUTO: 4.07 M/UL (ref 3.8–5.2)
SODIUM SERPL-SCNC: 137 MMOL/L (ref 136–145)
WBC # BLD AUTO: 6.4 K/UL (ref 3.6–11)

## 2022-12-26 PROCEDURE — 74011250637 HC RX REV CODE- 250/637: Performed by: INTERNAL MEDICINE

## 2022-12-26 PROCEDURE — 85025 COMPLETE CBC W/AUTO DIFF WBC: CPT

## 2022-12-26 PROCEDURE — 83735 ASSAY OF MAGNESIUM: CPT

## 2022-12-26 PROCEDURE — 82962 GLUCOSE BLOOD TEST: CPT

## 2022-12-26 PROCEDURE — 74011250636 HC RX REV CODE- 250/636: Performed by: INTERNAL MEDICINE

## 2022-12-26 PROCEDURE — 74011250636 HC RX REV CODE- 250/636: Performed by: HOSPITALIST

## 2022-12-26 PROCEDURE — 74011000250 HC RX REV CODE- 250: Performed by: HOSPITALIST

## 2022-12-26 PROCEDURE — 93306 TTE W/DOPPLER COMPLETE: CPT

## 2022-12-26 PROCEDURE — 80053 COMPREHEN METABOLIC PANEL: CPT

## 2022-12-26 PROCEDURE — 74011636637 HC RX REV CODE- 636/637: Performed by: INTERNAL MEDICINE

## 2022-12-26 PROCEDURE — 83036 HEMOGLOBIN GLYCOSYLATED A1C: CPT

## 2022-12-26 PROCEDURE — 74011250637 HC RX REV CODE- 250/637: Performed by: EMERGENCY MEDICINE

## 2022-12-26 PROCEDURE — 36415 COLL VENOUS BLD VENIPUNCTURE: CPT

## 2022-12-26 PROCEDURE — 99283 EMERGENCY DEPT VISIT LOW MDM: CPT

## 2022-12-26 RX ORDER — LEVOFLOXACIN 250 MG/1
750 TABLET ORAL ONCE
Status: COMPLETED | OUTPATIENT
Start: 2022-12-27 | End: 2022-12-26

## 2022-12-26 RX ORDER — LEVOFLOXACIN 750 MG/1
750 TABLET ORAL DAILY
Qty: 7 TABLET | Refills: 0 | Status: SHIPPED | OUTPATIENT
Start: 2022-12-26 | End: 2023-01-02

## 2022-12-26 RX ADMIN — CEFTRIAXONE SODIUM 1 G: 1 INJECTION, POWDER, FOR SOLUTION INTRAMUSCULAR; INTRAVENOUS at 09:21

## 2022-12-26 RX ADMIN — ASPIRIN 81 MG: 81 TABLET, COATED ORAL at 09:22

## 2022-12-26 RX ADMIN — TAMSULOSIN HYDROCHLORIDE 0.4 MG: 0.4 CAPSULE ORAL at 09:22

## 2022-12-26 RX ADMIN — AMLODIPINE BESYLATE 10 MG: 5 TABLET ORAL at 09:22

## 2022-12-26 RX ADMIN — INSULIN LISPRO 10 UNITS: 100 INJECTION, SOLUTION INTRAVENOUS; SUBCUTANEOUS at 09:21

## 2022-12-26 RX ADMIN — INSULIN LISPRO 15 UNITS: 100 INJECTION, SOLUTION INTRAVENOUS; SUBCUTANEOUS at 13:17

## 2022-12-26 RX ADMIN — ATORVASTATIN CALCIUM 20 MG: 20 TABLET, FILM COATED ORAL at 09:22

## 2022-12-26 RX ADMIN — LEVOFLOXACIN 750 MG: 250 TABLET, FILM COATED ORAL at 23:17

## 2022-12-26 RX ADMIN — ENOXAPARIN SODIUM 40 MG: 100 INJECTION SUBCUTANEOUS at 09:22

## 2022-12-26 NOTE — PROGRESS NOTES
VSS. Patient given discharge instructions. Medications and follow-up appointments reviewed. IV and Telemetry removed. Case management, provider, and primary nurse aware of discharge. Discharge plan of care/case management plan validated with provider discharge order. Patient being discharged home today with godoy in place. Waiting on ride.

## 2022-12-26 NOTE — DISCHARGE INSTRUCTIONS
INSTRUCTIONS ON DISCHARGE     (1) please take the follow-ing medications for 7 days           LEVAQUIN 750 mg daily for 7 days    (2) please follow-up with PCP in 1 week    (3) please follow a very strict diabetic diet.

## 2022-12-26 NOTE — DISCHARGE SUMMARY
Physician Discharge Summary     Patient ID:    Agnieszka Heredia  [id number]  41 y.o.  1950    Admit date: 12/25/2022    Discharge date : 12/26/2022    Chronic Diagnoses:    Problem List as of 12/26/2022 Date Reviewed: 8/11/2022            Codes Class Noted - Resolved    UTI (urinary tract infection) ICD-10-CM: N39.0  ICD-9-CM: 599.0  12/25/2022 - Present        Hypertensive urgency ICD-10-CM: I16.0  ICD-9-CM: 401.9  12/25/2022 - Present        Hyperglycemia due to diabetes mellitus ([facility] ICD-10-CM: E11.65  ICD-9-CM: 250.02  12/25/2022 - Present        Non compliance w medication regimen ICD-10-CM: Z91.14  ICD-9-CM: V15.81  7/14/2022 - Present        Multilevel spondylosis ICD-10-CM: M47.819  ICD-9-CM: 721.90  4/22/2022 - Present        Chronic low back pain without sciatica ICD-10-CM: M54.50, G89.29  ICD-9-CM: 724.2, 338.29  4/22/2022 - Present        Altered mental status ICD-10-CM: R41.82  ICD-9-CM: 780.97  4/22/2022 - Present        Bladder cancer ([facility] ICD-10-CM: C67.9  ICD-9-CM: 188.9  4/22/2022 - Present        Anemia ICD-10-CM: D64.9  ICD-9-CM: 285.9  4/22/2022 - Present        Microcytosis ICD-10-CM: R71.8  ICD-9-CM: 790.09  4/22/2022 - Present        Dysphagia ICD-10-CM: R13.10  ICD-9-CM: 787.20  4/22/2022 - Present        At risk for UTI related to indwelling catheter ICD-10-CM: Z91.89  ICD-9-CM: V15.89  4/22/2022 - Present        Recurrent pain of right knee ICD-10-CM: M25.561  ICD-9-CM: 719.46  4/6/2022 - Present        Acquired hypothyroidism ICD-10-CM: E03.9  ICD-9-CM: 244.9  4/6/2022 - Present        Uncontrolled type 2 diabetes mellitus with hyperglycemia (HCC) ICD-10-CM: E11.65  ICD-9-CM: 250.02  3/22/2022 - Present        Major depressive disorder, recurrent, unspecified ICD-10-CM: F33.9  ICD-9-CM: 296.30  3/22/2022 - Present        At high risk for falls ICD-10-CM: Z91.81  ICD-9-CM: V15.88  3/22/2022 - Present        Essential hypertension ICD-10-CM: I10  ICD-9-CM: 401.9 7/21/2020 - Present        Non-alcoholic fatty liver disease ICD-10-CM: K76.0  ICD-9-CM: 571.8  7/21/2020 - Present        RESOLVED: MRSA (methicillin resistant staph aureus) culture positive ICD-10-CM: Z22.322  ICD-9-CM: V02.54  4/22/2022 - 4/22/2022        RESOLVED: Right groin ulcer ([facility] ICD-10-CM: L98.499  ICD-9-CM: 707.8  4/22/2022 - 4/22/2022        RESOLVED: DKA (diabetic ketoacidosis) ([facility] ICD-10-CM: E11.10  ICD-9-CM: 250.12  4/6/2022 - 4/22/2022 22    Final Diagnoses:   UTI (urinary tract infection) [N39.0]  Hyperglycemia due to diabetes mellitus ([facility] [E11.65]  Hypertensive urgency [I16.0]    Reason for Hospitalization:  UTI  Hyperglycemia with DMII  RENZO  HTN  HLD      Hospital Course:      72F with history of hypertension, diabetes, poor mediation compliance. History of neurogenic bladder, chronic indwelling godoy catheter, recurrent urinary infections. 12/25/22 presents to hospital with urinary symptoms of itching and burning, associated with malfunction of her godoy catheter. Godoy was replaced in the ED course and urine analysis suggestive of infection, further complicated by accelerated hypertension and marked hyperglycemia,    She was treated with IVF, and ceftriaxone    UA was suggestive of Uti and thigh wound (been there foe 2 weeks and improving) mostly grew moderate gram negative rods. She improved clinically and was planned for discharge with the following instructions. INSTRUCTIONS ON DISCHARGE     (1) please take the follow-ing medications for 7 days           LEVAQUIN 750 mg daily for 7 days    (2) please follow-up with PCP in 1 week    (3) please follow a very strict diabetic diet. Discharge Medications:   Current Discharge Medication List        START taking these medications    Details   levoFLOXacin (Levaquin) 750 mg tablet Take 1 Tablet by mouth daily for 7 days.   Qty: 7 Tablet, Refills: 0  Start date: 12/26/2022, End date: 1/2/2023           CONTINUE these medications which have NOT CHANGED    Details   amLODIPine (NORVASC) 5 mg tablet       insulin glargine (Lantus Solostar U-100 Insulin) 100 unit/mL (3 mL) inpn 26 Units by SubCUTAneous route nightly. metFORMIN (GLUCOPHAGE) 500 mg tablet Take 500 mg by mouth nightly. tamsulosin (FLOMAX) 0.4 mg capsule Take 0.4 mg by mouth daily. famotidine (Pepcid AC) 20 mg tablet Take 20 mg by mouth nightly. metFORMIN ER (GLUCOPHAGE XR) 500 mg tablet Take 2 Tablets by mouth two (2) times a day. Qty: 360 Tablet, Refills: 1    Associated Diagnoses: Uncontrolled type 2 diabetes mellitus with hyperglycemia (HCC)      atorvastatin (LIPITOR) 20 mg tablet Take 1 Tablet by mouth daily. Qty: 90 Tablet, Refills: 1    Associated Diagnoses: Uncontrolled type 2 diabetes mellitus with hyperglycemia (HCC)      calcium-cholecalciferol, d3, 500 mg-10 mcg (400 unit) chew Calcium 500 + D 500 mg-10 mcg (400 unit) chewable tablet   Take by oral route. aspirin delayed-release 81 mg tablet Take 1 Tablet by mouth daily. Indications: treatment to prevent a heart attack  Qty: 90 Tablet, Refills: 3    Associated Diagnoses: Type 2 diabetes mellitus with hyperglycemia, unspecified whether long term insulin use (HCC)      potassium chloride (KLOR-CON M10) 10 mEq tablet Take 1 Tablet by mouth daily. Take 1 tab by mouth daily for low potassium. Qty: 90 Tablet, Refills: 1    Associated Diagnoses: Hypokalemia      ERGOCALCIFEROL, VITAMIN D2, PO Take 500 Units by mouth two (2) times a day. glucose blood VI test strips (OneTouch Ultra Test) strip Use to check blood glucose before meals and at bedtime. Qty: 120 Strip, Refills: 11    Associated Diagnoses: Type 2 diabetes mellitus with hyperglycemia, with long-term current use of insulin (HCC)      alcohol swabs (Alcohol Wipes) padm Use to check blood glucose once daily.   Qty: 100 Pad, Refills: 3    Associated Diagnoses: Type 2 diabetes mellitus with hyperglycemia, unspecified whether long term insulin use ([facility]               Follow up Care:    1. Montrell Matt, DO in 1-2 weeks. Please call to set up an appointment shortly after discharge. Diet:  Diabetic Diet    Disposition:  Home. Advanced Directive:   FULL x   DNR      Discharge Exam:  Physical Exam  Vitals and nursing note reviewed. HENT:      Head: Normocephalic and atraumatic. Nose: Nose normal.      Mouth/Throat:      Mouth: Mucous membranes are dry. Eyes:      Extraocular Movements: Extraocular movements intact. Pupils: Pupils are equal, round, and reactive to light. Cardiovascular:      Rate and Rhythm: Normal rate and regular rhythm. Pulses: Normal pulses. Heart sounds: Normal heart sounds. Pulmonary:      Effort: Pulmonary effort is normal.      Breath sounds: Normal breath sounds. Abdominal:      General: Abdomen is flat. Palpations: Abdomen is soft. Musculoskeletal:      Cervical back: Normal range of motion and neck supple. Skin:     General: Skin is warm. Neurological:      General: No focal deficit present. Mental Status: She is alert. Psychiatric:         Mood and Affect: Mood normal.     CONSULTATIONS: None    Significant Diagnostic Studies:     Radiologic Studies -   Results from Hospital Encounter encounter on 08/04/22    XR CHEST PORT    Narrative  INDICATION:  AMS    Exam: Portable chest 2008. Comparison: 4/6/2022. Findings: Cardiomediastinal silhouette is within normal limits. Implanted device  at the left heart border unchanged. Pulmonary vasculature is not engorged. There  are no focal parenchymal opacities, effusions, or pneumothorax. Impression  1.  No acute disease     CT Results  (Last 48 hours)      None                Discharge time spent 35 minutes    Signed:  Lake Sarmiento MD  12/26/2022  10:09 AM

## 2022-12-26 NOTE — PROGRESS NOTES
DC order noted. Chart reviewed. Medicare pt has received, reviewed, and signed 2nd IM letter informing them of their right to appeal the discharge. Signed copy filed in Med Rec Dept.     No other CM intervention is required for this [address] discharge

## 2022-12-26 NOTE — PROGRESS NOTES
Reason for Admission:  UTI                     RUR Score:  12%                   Plan for utilizing home health:  Not at this time        PCP: First and Last name:  Perez Benites DO     Name of Practice: None   Are you a current patient: Yes/No:    Approximate date of last visit:    Can you participate in a virtual visit with your PCP:                     Current Advanced Directive/Advance Care Plan: Full Code      Healthcare Decision Maker:   Click here to complete [address] including selection of the [address] Relationship (ie \"Primary\")             Primary Decision Maker: [address] [place] [address] [phone]                  Transition of Care Plan:       Met f/f with Pt, she confirmed that the information on the face sheet is correct. Pt stated that she lives with her . Pt stated no HH, no DME and independent with ADL    Pt stated that she uses [address] in [place]. Pt stated that family will give her a ride when she is D/C. CM dspo: home with no needs at this time.

## 2022-12-27 LAB
BACTERIA SPEC CULT: ABNORMAL
BACTERIA SPEC CULT: NORMAL
COLONY COUNT,CNT: ABNORMAL
GRAM STN SPEC: NORMAL
SPECIAL REQUESTS,SREQ: ABNORMAL
SPECIAL REQUESTS,SREQ: NORMAL

## 2022-12-27 NOTE — ED PROVIDER NOTES
EMERGENCY DEPARTMENT HISTORY AND PHYSICAL EXAM      Date: 12/26/2022  Patient Name: Kasia Curry      History of Presenting Illness     Chief Complaint   Patient presents with    Urinary Catheter Problem       History Provided By: Patient and daughter and grandson    HPI: Kasia Curry, 71 y.o. female with a past medical history significant for Dementia, groin ulcer, UTI, DM presents to the ED with cc of catheter problem. Pt discharged this afternoon for UTI on levaquin. Pt had some leaking from vagina around catheter, has had this issue many times before from manipulation, has not yet seen Urology. Denies worsening pain, F/C/N/V/D. Here for godoy evaluation. Per daughter and grandson, pt's  did not  abx tonight so going into care of grandson. There are no other complaints, changes, or physical findings at this time. PCP: Eric Marrero, DO    Current Outpatient Medications   Medication Sig Dispense Refill    levoFLOXacin (Levaquin) 750 mg tablet Take 1 Tablet by mouth daily for 7 days. 7 Tablet 0    amLODIPine (NORVASC) 5 mg tablet       insulin glargine (Lantus Solostar U-100 Insulin) 100 unit/mL (3 mL) inpn 26 Units by SubCUTAneous route nightly. metFORMIN (GLUCOPHAGE) 500 mg tablet Take 500 mg by mouth nightly. tamsulosin (FLOMAX) 0.4 mg capsule Take 0.4 mg by mouth daily. famotidine (Pepcid AC) 20 mg tablet Take 20 mg by mouth nightly. metFORMIN ER (GLUCOPHAGE XR) 500 mg tablet Take 2 Tablets by mouth two (2) times a day. (Patient taking differently: Take 1,000 mg by mouth daily.) 360 Tablet 1    atorvastatin (LIPITOR) 20 mg tablet Take 1 Tablet by mouth daily. (Patient taking differently: Take 20 mg by mouth two (2) times a day.) 90 Tablet 1    potassium chloride (KLOR-CON M10) 10 mEq tablet Take 1 Tablet by mouth daily. Take 1 tab by mouth daily for low potassium.  (Patient not taking: Reported on 12/25/2022) 90 Tablet 1    ERGOCALCIFEROL, VITAMIN D2, PO Take 500 Units by mouth two (2) times a day. (Patient not taking: Reported on 2022)      calcium-cholecalciferol, d3, 500 mg-10 mcg (400 unit) chew Calcium 500 + D 500 mg-10 mcg (400 unit) chewable tablet   Take by oral route. glucose blood VI test strips (OneTouch Ultra Test) strip Use to check blood glucose before meals and at bedtime. 120 Strip 11    aspirin delayed-release 81 mg tablet Take 1 Tablet by mouth daily. Indications: treatment to prevent a heart attack 90 Tablet 3    alcohol swabs (Alcohol Wipes) padm Use to check blood glucose once daily. 100 Pad 3       Past History     Past Medical History:  Past Medical History:   Diagnosis Date    At risk for infection associated with Monroe catheter     Bladder cancer ([facility]     Dementia ([facility]     DKA (diabetic ketoacidosis) ([facility] 2022    Essential hypertension 2020    MRSA (methicillin resistant staph aureus) culture positive     Non-alcoholic fatty liver disease 2020    Right groin ulcer ([facility] 2022       Past Surgical History:  Past Surgical History:   Procedure Laterality Date    HX CATARACT REMOVAL Left 2019    HX CHOLECYSTECTOMY      HX COLONOSCOPY  2002    HX HYSTERECTOMY      partial  20 yrs ago    HX TUBAL LIGATION         Family History:  Family History   Problem Relation Age of Onset    Diabetes Mother     Diabetes Maternal Grandmother     Other Maternal Grandmother         CHF    Diabetes Paternal Grandmother        Social History:  Social History     Tobacco Use    Smoking status: Former     Packs/day: 1.00     Years: 10.00     Pack years: 10.00     Types: Cigarettes     Quit date:      Years since quittin.0    Smokeless tobacco: Never   Vaping Use    Vaping Use: Never used   Substance Use Topics    Alcohol use: Not Currently    Drug use: Never       Allergies:   Allergies   Allergen Reactions    Adhesive Unknown (comments)    Penicillins Other (comments)    Sulfa (Sulfonamide Antibiotics) Unknown (comments)         Review of Systems   Constitutional: Negative except as in HPI. Eyes: Negative except as in HPI.  ENT: Negative except as in HPI. Cardiovascular: Negative except as in HPI. Respiratory: Negative except as in HPI. Gastrointestinal: Negative except as in HPI. Genitourinary: Negative except as in HPI. Musculoskeletal: Negative except as in HPI. Integumentary: Negative except as in HPI. Neurological: Negative except as in HPI. Psychiatric: Negative except as in HPI. Endocrine: Negative except as in HPI. Hematologic/Lymphatic: Negative except as in HPI. Allergic/Immunologic: Negative except as in HPI. Physical Exam   Constitutional: Awake and alert, interactive, NAD  Eyes: PERRL, no injection or scleral icterus, no discharge  HEENT: NCAT, neck supple, MMM, no oropharyngeal exudates  CV: RRR, no m/r/g  Respiratory: CTAB, no r/r/w  GI: Abd soft, nondistended, nontender  : catheter in place draining cloudy yellow urine  MSK: FROM, no joint effusions or edema  Skin: healing ulcer of R inner thigh w/surrounding erythema but no spreading or fluctuance. Neuro: CN2-12 intact, symmetric facies, fluent speech. Psych: Well-groomed, normal speech, behavior, appropriate mood    Lab and Diagnostic Study Results     Labs -     Recent Results (from the past 12 hour(s))   GLUCOSE, POC    Collection Time: 12/26/22 11:27 AM   Result Value Ref Range    Glucose (POC) 393 (H) 65 - 100 mg/dL    Performed by Sima Rivera        Radiologic Studies -   [unfilled]  CT Results  (Last 48 hours)      None          CXR Results  (Last 48 hours)      None            Medical Decision Making and ED Course   - I am the first and primary provider for this patient AND AM THE PRIMARY PROVIDER OF RECORD. - I reviewed the vital signs, available nursing notes, past medical history, past surgical history, family history and social history. - Initial assessment performed.  The patients presenting problems have been discussed, and the staff are in agreement with the care plan formulated and outlined with them. I have encouraged them to ask questions as they arise throughout their visit. Vital Signs-Reviewed the patient's vital signs. Patient Vitals for the past 12 hrs:   Temp Pulse Resp BP SpO2   12/26/22 2304 -- (!) 101 22 129/67 99 %   12/26/22 2250 98.5 °F (36.9 °C) (!) 109 24 (!) 96/59 98 %       EKG interpretation:     BSUS: Collapsed bladder w/inflated godoy catheter within bladder lumen         Provider Notes (Medical Decision Making):   72F w/catether problem, will give dose of levaquin here and discharge back to care of grandson per plan discussed with daughter. Return precautions discussed. ED Course:       ED Course as of 12/26/22 2326   Mon Dec 26, 2022   2322 BSUS collapsed bladder. Discussed with daughter and grandson, to go to grandson's house tonight and stay in his care, feel like she is safe for discharge, will discharge. [YA]      ED Course User Index  [YA] Ximena Guo MD         Disposition     Disposition: DC- Adult Discharges: All of the diagnostic tests were reviewed and questions answered. Diagnosis, care plan and treatment options were discussed. The patient understands the instructions and will follow up as directed. The patients results have been reviewed with them. They have been counseled regarding their diagnosis. The patient and family member patient, daughter, and other:  grandson verbally convey understanding and agreement of the signs, symptoms, diagnosis, treatment and prognosis and additionally agrees to follow up as recommended with their PCP in 24 - 48 hours. They also agree with the care-plan and convey that all of their questions have been answered.   I have also put together some discharge instructions for them that include: 1) educational information regarding their diagnosis, 2) how to care for their diagnosis at home, as well a 3) list of reasons why they would want to return to the ED prior to their follow-up appointment, should their condition change. Discharged      Diagnosis     Clinical Impression:   1. Problem with Monroe catheter, subsequent encounter        Attestations:     Nasreen Rosales MD

## 2022-12-27 NOTE — ED TRIAGE NOTES
Pt c/o leaking godoy catheter pta but pt has since resolved the situation; prescribed ABX but pt has not picked up prescription yet; pt's grandson will be getting prescription tomorrow; pt was d/c'd from [facility] today

## 2022-12-27 NOTE — DISCHARGE INSTRUCTIONS
Farhan Hugo was seen in our ER for her catheter problem. Thankfully, it was draining normally on our evaluation. We gave her an antibiotic to treat her urinary tract infection but she needs to continue this for a week, so go to the pharmacy tomorrow. You felt she was safe at her grandson's house, but return to the ER for any issues with safety at home or any other new or concerning symptoms. Thank you! Thank you for allowing me to care for you in the emergency department. It is my goal to provide you with excellent care. If you have not received excellent quality care, please ask to speak to the nurse manager. Please fill out the survey that will come to you by mail or email since we listen to your feedback! Below you will find a list of your tests from today's visit. Should you have any questions, please do not hesitate to call the emergency department. Labs  Recent Results (from the past 12 hour(s))   GLUCOSE, POC    Collection Time: 12/26/22 11:27 AM   Result Value Ref Range    Glucose (POC) 393 (H) 65 - 100 mg/dL    Performed by Rakesh Conn        Radiologic Studies  No orders to display     CT Results  (Last 48 hours)      None          CXR Results  (Last 48 hours)      None          ------------------------------------------------------------------------------------------------------------  The exam and treatment you received in the Emergency Department were for an urgent problem and are not intended as complete care. It is important that you follow-up with a doctor, nurse practitioner, or physician assistant to:  (1) confirm your diagnosis,  (2) re-evaluation of changes in your illness and treatment, and  (3) for ongoing care. Please take your discharge instructions with you when you go to your follow-up appointment. If you have any problem arranging a follow-up appointment, contact the Emergency Department.   If your symptoms become worse or you do not improve as expected and you are unable to reach your health care provider, please return to the Emergency Department. We are available 24 hours a day. If a prescription has been provided, please have it filled as soon as possible to prevent a delay in treatment. If you have any questions or reservations about taking the medication due to side effects or interactions with other medications, please call your primary care provider or contact the ER.

## 2022-12-30 ENCOUNTER — HOSPITAL ENCOUNTER (EMERGENCY)
Age: 72
Discharge: HOME | End: 2022-12-31
Attending: EMERGENCY MEDICINE
Payer: MEDICARE

## 2022-12-30 DIAGNOSIS — R07.9 CHEST PAIN, UNSPECIFIED TYPE: Primary | ICD-10-CM

## 2022-12-30 DIAGNOSIS — N17.9 AKI (ACUTE KIDNEY INJURY) (HCC): ICD-10-CM

## 2022-12-30 LAB
ALBUMIN SERPL-MCNC: 3 G/DL (ref 3.5–5)
ALBUMIN/GLOB SERPL: 1 {RATIO} (ref 1.1–2.2)
ALP SERPL-CCNC: 91 U/L (ref 45–117)
ALT SERPL-CCNC: 44 U/L (ref 12–78)
ANION GAP SERPL CALC-SCNC: 8 MMOL/L (ref 5–15)
ANION GAP SERPL CALC-SCNC: 9 MMOL/L (ref 5–15)
AST SERPL W P-5'-P-CCNC: 10 U/L (ref 15–37)
BASOPHILS # BLD: 0 K/UL (ref 0–0.1)
BASOPHILS NFR BLD: 0 % (ref 0–1)
BILIRUB SERPL-MCNC: 0.3 MG/DL (ref 0.2–1)
BUN SERPL-MCNC: 24 MG/DL (ref 6–20)
BUN SERPL-MCNC: 24 MG/DL (ref 6–20)
BUN/CREAT SERPL: 22 (ref 12–20)
BUN/CREAT SERPL: 23 (ref 12–20)
CA-I BLD-MCNC: 8.6 MG/DL (ref 8.5–10.1)
CA-I BLD-MCNC: 9.4 MG/DL (ref 8.5–10.1)
CHLORIDE SERPL-SCNC: 107 MMOL/L (ref 97–108)
CHLORIDE SERPL-SCNC: 111 MMOL/L (ref 97–108)
CO2 SERPL-SCNC: 22 MMOL/L (ref 21–32)
CO2 SERPL-SCNC: 22 MMOL/L (ref 21–32)
CREAT SERPL-MCNC: 1.06 MG/DL (ref 0.55–1.02)
CREAT SERPL-MCNC: 1.11 MG/DL (ref 0.55–1.02)
DIFFERENTIAL METHOD BLD: ABNORMAL
EOSINOPHIL # BLD: 0.4 K/UL (ref 0–0.4)
EOSINOPHIL NFR BLD: 4 % (ref 0–7)
ERYTHROCYTE [DISTWIDTH] IN BLOOD BY AUTOMATED COUNT: 13.5 % (ref 11.5–14.5)
GLOBULIN SER CALC-MCNC: 3.1 G/DL (ref 2–4)
GLUCOSE SERPL-MCNC: 214 MG/DL (ref 65–100)
GLUCOSE SERPL-MCNC: 239 MG/DL (ref 65–100)
HCT VFR BLD AUTO: 36.3 % (ref 35–47)
HGB BLD-MCNC: 11.7 G/DL (ref 11.5–16)
IMM GRANULOCYTES # BLD AUTO: 0.1 K/UL (ref 0–0.04)
IMM GRANULOCYTES NFR BLD AUTO: 1 % (ref 0–0.5)
LYMPHOCYTES # BLD: 2.9 K/UL (ref 0.8–3.5)
LYMPHOCYTES NFR BLD: 28 % (ref 12–49)
MCH RBC QN AUTO: 25.9 PG (ref 26–34)
MCHC RBC AUTO-ENTMCNC: 32.2 G/DL (ref 30–36.5)
MCV RBC AUTO: 80.5 FL (ref 80–99)
MONOCYTES # BLD: 0.7 K/UL (ref 0–1)
MONOCYTES NFR BLD: 7 % (ref 5–13)
NEUTS SEG # BLD: 6.5 K/UL (ref 1.8–8)
NEUTS SEG NFR BLD: 60 % (ref 32–75)
NRBC # BLD: 0 K/UL (ref 0–0.01)
NRBC BLD-RTO: 0 PER 100 WBC
PLATELET # BLD AUTO: 252 K/UL (ref 150–400)
PMV BLD AUTO: 9.6 FL (ref 8.9–12.9)
POTASSIUM SERPL-SCNC: 4 MMOL/L (ref 3.5–5.1)
POTASSIUM SERPL-SCNC: 4.6 MMOL/L (ref 3.5–5.1)
PROT SERPL-MCNC: 6.1 G/DL (ref 6.4–8.2)
RBC # BLD AUTO: 4.51 M/UL (ref 3.8–5.2)
SODIUM SERPL-SCNC: 138 MMOL/L (ref 136–145)
SODIUM SERPL-SCNC: 141 MMOL/L (ref 136–145)
TROPONIN-HIGH SENSITIVITY: 6 NG/L (ref 0–51)
TROPONIN-HIGH SENSITIVITY: 6 NG/L (ref 0–51)
WBC # BLD AUTO: 10.7 K/UL (ref 3.6–11)

## 2022-12-30 PROCEDURE — 84484 ASSAY OF TROPONIN QUANT: CPT

## 2022-12-30 PROCEDURE — 99284 EMERGENCY DEPT VISIT MOD MDM: CPT

## 2022-12-30 PROCEDURE — 74011250636 HC RX REV CODE- 250/636: Performed by: EMERGENCY MEDICINE

## 2022-12-30 PROCEDURE — 80048 BASIC METABOLIC PNL TOTAL CA: CPT

## 2022-12-30 PROCEDURE — 85025 COMPLETE CBC W/AUTO DIFF WBC: CPT

## 2022-12-30 PROCEDURE — 93005 ELECTROCARDIOGRAM TRACING: CPT

## 2022-12-30 PROCEDURE — 36415 COLL VENOUS BLD VENIPUNCTURE: CPT

## 2022-12-30 PROCEDURE — 80053 COMPREHEN METABOLIC PANEL: CPT

## 2022-12-30 RX ORDER — ONDANSETRON 4 MG/1
4 TABLET, ORALLY DISINTEGRATING ORAL
Qty: 15 TABLET | Refills: 0 | Status: SHIPPED | OUTPATIENT
Start: 2022-12-30 | End: 2023-01-04

## 2022-12-30 RX ADMIN — SODIUM CHLORIDE 1000 ML: 9 INJECTION, SOLUTION INTRAVENOUS at 20:19

## 2022-12-30 NOTE — ED TRIAGE NOTES
PER EMS, Patient started having chest pain and abd pain with N/V/D since yesterday.  Patient does have MRSA infection wound on inside of L thigh

## 2022-12-31 VITALS
RESPIRATION RATE: 18 BRPM | SYSTOLIC BLOOD PRESSURE: 129 MMHG | WEIGHT: 169.97 LBS | OXYGEN SATURATION: 99 % | DIASTOLIC BLOOD PRESSURE: 77 MMHG | TEMPERATURE: 98.1 F | HEIGHT: 66 IN | BODY MASS INDEX: 27.32 KG/M2 | HEART RATE: 88 BPM

## 2022-12-31 NOTE — ED PROVIDER NOTES
EMERGENCY DEPARTMENT HISTORY AND PHYSICAL EXAM      Date: 2022  Patient Name: Gage Bahena    History of Presenting Illness     Chief Complaint   Patient presents with    Chest Pain       History Provided By: Patient    HPI: Gage Bahena, 71 y.o. female presents to the emergency department 2 days history of chest pain. Patient reports associated nausea vomiting and diarrhea. Patient also reports concern about infection on the inside of her left thigh. Patient reports pain is sharp, intermittent without noted aggravating relieving factors. Patient denies fevers or chills. There are no other complaints, changes, or physical findings at this time. Past History   Past Medical History:  Past Medical History:   Diagnosis Date    At risk for infection associated with [place] catheter     Bladder cancer ([facility] [address]     Dementia ([facility] [address]     DKA (diabetic ketoacidosis) ([facility] [address] 2022    Essential hypertension 2020    MRSA (methicillin resistant staph aureus) culture positive     Non-alcoholic fatty liver disease 2020    Right groin ulcer ([facility] [address] 2022       Past Surgical History:  Past Surgical History:   Procedure Laterality Date    HX CATARACT REMOVAL Left 2019    HX CHOLECYSTECTOMY      HX COLONOSCOPY  2002    HX HYSTERECTOMY      partial  20 yrs ago    HX TUBAL LIGATION         Family History:  Family History   Problem Relation Age of Onset    Diabetes Mother     Diabetes Maternal Grandmother     Other Maternal Grandmother         CHF    Diabetes Paternal Grandmother        Social History:  Social History     Tobacco Use    Smoking status: Former     Packs/day: 1.00     Years: 10.00     Pack years: 10.00     Types: Cigarettes     Quit date:      Years since quittin.0    Smokeless tobacco: Never   Vaping Use    Vaping Use: Never used   Substance Use Topics    Alcohol use: Not Currently    Drug use: Never       Allergies:   Allergies   Allergen Reactions    Adhesive Unknown (comments)    Penicillins Other (comments)    Sulfa (Sulfonamide Antibiotics) Unknown (comments)       PCP: Uma Larsen, DO    Current Outpatient Medications   Medication Sig Dispense Refill    ondansetron (ZOFRAN ODT) 4 mg disintegrating tablet Take 1 Tablet by mouth every eight (8) hours as needed for Nausea or Vomiting for up to 5 days. 15 Tablet 0    aluminum-magnesium hydroxide (MAALOX) 200-200 mg/5 mL suspension Take 15 mL by mouth every six (6) hours as needed for Indigestion for up to 7 days. 150 mL 0    levoFLOXacin (Levaquin) 750 mg tablet Take 1 Tablet by mouth daily for 7 days. 7 Tablet 0    amLODIPine (NORVASC) 5 mg tablet       insulin glargine (Lantus Solostar U-100 Insulin) 100 unit/mL (3 mL) inpn 26 Units by SubCUTAneous route nightly. metFORMIN (GLUCOPHAGE) 500 mg tablet Take 500 mg by mouth nightly. tamsulosin (FLOMAX) 0.4 mg capsule Take 0.4 mg by mouth daily. famotidine (Pepcid AC) 20 mg tablet Take 20 mg by mouth nightly. metFORMIN ER (GLUCOPHAGE XR) 500 mg tablet Take 2 Tablets by mouth two (2) times a day. (Patient taking differently: Take 1,000 mg by mouth daily.) 360 Tablet 1    atorvastatin (LIPITOR) 20 mg tablet Take 1 Tablet by mouth daily. (Patient taking differently: Take 20 mg by mouth two (2) times a day.) 90 Tablet 1    potassium chloride (KLOR-CON M10) 10 mEq tablet Take 1 Tablet by mouth daily. Take 1 tab by mouth daily for low potassium. (Patient not taking: Reported on 12/25/2022) 90 Tablet 1    ERGOCALCIFEROL, VITAMIN D2, PO Take 500 Units by mouth two (2) times a day. (Patient not taking: Reported on 12/25/2022)      calcium-cholecalciferol, d3, 500 mg-10 mcg (400 unit) chew Calcium 500 + D 500 mg-10 mcg (400 unit) chewable tablet   Take by oral route. glucose blood VI test strips (OneTouch Ultra Test) strip Use to check blood glucose before meals and at bedtime.  120 Strip 11    aspirin delayed-release 81 mg tablet Take 1 Tablet by mouth daily. Indications: treatment to prevent a heart attack 90 Tablet 3    alcohol swabs (Alcohol Wipes) padm Use to check blood glucose once daily. 100 Pad 3     Review of Systems   Review of Systems  Review of Systems   Constitutional: Negative for chills and fever. HENT: Negative for sinus pressure and sinus pain. Eyes: Negative for photophobia and redness. Respiratory: Negative for shortness of breath and wheezing. Cardiovascular: Positive for chest pain and negative for palpitations. Gastrointestinal: Positive for abdominal pain and nausea. Genitourinary: Negative for flank pain and hematuria. Musculoskeletal: Negative for arthralgias and gait problem. Skin: Negative for color change and pallor. Neurological: Negative for dizziness and weakness. Physical Exam   Physical Exam  Physical Exam  Constitutional:       General: No acute distress. Appearance: Normal appearance. Not toxic-appearing. HENT:      Head: Normocephalic and atraumatic. Nose: Nose normal.      Mouth/Throat:      Mouth: Mucous membranes are moist.   Eyes:      Extraocular Movements: Extraocular movements intact. Pupils: Pupils are equal, round, and reactive to light. Cardiovascular:      Rate and Rhythm: Normal rate. Pulses: Normal pulses. Pulmonary:      Effort: Pulmonary effort is normal.      Breath sounds: No stridor. Abdominal:      General: Abdomen is flat. There is no distension. Musculoskeletal:         General: Normal range of motion. Cervical back: Normal range of motion and neck supple. Skin:     General: Skin is warm and dry. Capillary Refill: Capillary refill takes less than 2 seconds. Neurological:      General: No focal deficit present. Mental Status: Aert and oriented to person, place, and time.    Psychiatric:         Mood and Affect: Mood normal.         Behavior: Behavior normal.     Lab and Diagnostic Study Results   Labs -     No results found for this or any previous visit (from the past 12 hour(s)). Radiologic Studies -   [unfilled]  CT Results  (Last 48 hours)      None          CXR Results  (Last 48 hours)      None            Medical Decision Making and ED Course   Differential Diagnosis & Medical Decision Making Provider Note:       - I am the first and primary provider for this patient. I reviewed the vital signs, available nursing notes, past medical history, past surgical history, family history and social history. The patient's presenting problems have been discussed, and the staff are in agreement with the care plan formulated and outlined with them. I have encouraged them to ask questions as they arise throughout their visit. Vital Signs-Reviewed the patient's vital signs. No data found. ED Course:   ED Course as of 12/31/22 2152   Fri Dec 30, 2022   2250 Patient handed off to me by Dr. Feliz Noble to follow-up repeat lab work. Troponin after 2 hours remains normal.  BMP has improved. Results were discussed with patient and she is understanding and comfortable with discharge. Discussed need for follow-up with her primary care doctor or cardiologist. Lanre Ma      ED Course User Roberto Lee MD       Disposition   Disposition: DC- Adult Discharges: All of the diagnostic tests were reviewed and questions answered. Diagnosis, care plan and treatment options were discussed. The patient understands the instructions and will follow up as directed. The patients results have been reviewed with them. They have been counseled regarding their diagnosis. The patient verbally convey understanding and agreement of the signs, symptoms, diagnosis, treatment and prognosis and additionally agrees to follow up as recommended with their PCP in 24 - 48 hours. They also agree with the care-plan and convey that all of their questions have been answered.   I have also put together some discharge instructions for them that include: 1) educational information regarding their diagnosis, 2) how to care for their diagnosis at home, as well a 3) list of reasons why they would want to return to the ED prior to their follow-up appointment, should their condition change. DISCHARGE PLAN:  1. Current Discharge Medication List        CONTINUE these medications which have NOT CHANGED    Details   levoFLOXacin (Levaquin) 750 mg tablet Take 1 Tablet by mouth daily for 7 days. Qty: 7 Tablet, Refills: 0      amLODIPine (NORVASC) 5 mg tablet       insulin glargine (Lantus Solostar U-100 Insulin) 100 unit/mL (3 mL) inpn 26 Units by SubCUTAneous route nightly. metFORMIN (GLUCOPHAGE) 500 mg tablet Take 500 mg by mouth nightly. tamsulosin (FLOMAX) 0.4 mg capsule Take 0.4 mg by mouth daily. famotidine (Pepcid AC) 20 mg tablet Take 20 mg by mouth nightly. metFORMIN ER (GLUCOPHAGE XR) 500 mg tablet Take 2 Tablets by mouth two (2) times a day. Qty: 360 Tablet, Refills: 1    Associated Diagnoses: Uncontrolled type 2 diabetes mellitus with hyperglycemia (HCC)      atorvastatin (LIPITOR) 20 mg tablet Take 1 Tablet by mouth daily. Qty: 90 Tablet, Refills: 1    Associated Diagnoses: Uncontrolled type 2 diabetes mellitus with hyperglycemia (HCC)      potassium chloride (KLOR-CON M10) 10 mEq tablet Take 1 Tablet by mouth daily. Take 1 tab by mouth daily for low potassium. Qty: 90 Tablet, Refills: 1    Associated Diagnoses: Hypokalemia      ERGOCALCIFEROL, VITAMIN D2, PO Take 500 Units by mouth two (2) times a day. calcium-cholecalciferol, d3, 500 mg-10 mcg (400 unit) chew Calcium 500 + D 500 mg-10 mcg (400 unit) chewable tablet   Take by oral route. glucose blood VI test strips (OneTouch Ultra Test) strip Use to check blood glucose before meals and at bedtime.   Qty: 120 Strip, Refills: 11    Associated Diagnoses: Type 2 diabetes mellitus with hyperglycemia, with long-term current use of insulin (HCC)      aspirin delayed-release 81 mg tablet Take 1 Tablet by mouth daily. Indications: treatment to prevent a heart attack  Qty: 90 Tablet, Refills: 3    Associated Diagnoses: Type 2 diabetes mellitus with hyperglycemia, unspecified whether long term insulin use ([facility])      alcohol swabs (Alcohol Wipes) padm Use to check blood glucose once daily. Qty: 100 Pad, Refills: 3    Associated Diagnoses: Type 2 diabetes mellitus with hyperglycemia, unspecified whether long term insulin use ([facility] [address]           2. Follow-up Information       Follow up With Specialties Details Why Contact Info    Airam Pat, [address]  [phone]            3. Return to ED if worse   4. Discharge Medication List as of 12/31/2022 12:04 AM        START taking these medications    Details   ondansetron (ZOFRAN ODT) 4 mg disintegrating tablet Take 1 Tablet by mouth every eight (8) hours as needed for Nausea or Vomiting for up to 5 days. , Normal, Disp-15 Tablet, R-0      aluminum-magnesium hydroxide (MAALOX) 200-200 mg/5 mL suspension Take 15 mL by mouth every six (6) hours as needed for Indigestion for up to 7 days. , Normal, Disp-150 mL, R-0           CONTINUE these medications which have NOT CHANGED    Details   levoFLOXacin (Levaquin) 750 mg tablet Take 1 Tablet by mouth daily for 7 days. , Normal, Disp-7 Tablet, R-0      amLODIPine (NORVASC) 5 mg tablet Historical Med      insulin glargine (Lantus Solostar U-100 Insulin) 100 unit/mL (3 mL) inpn 26 Units by SubCUTAneous route nightly., Historical Med      metFORMIN (GLUCOPHAGE) 500 mg tablet Take 500 mg by mouth nightly., Historical Med      tamsulosin (FLOMAX) 0.4 mg capsule Take 0.4 mg by mouth daily. , Historical Med      famotidine (Pepcid AC) 20 mg tablet Take 20 mg by mouth nightly., Historical Med      metFORMIN ER (GLUCOPHAGE XR) 500 mg tablet Take 2 Tablets by mouth two (2) times a day., Normal, Disp-360 Tablet, R-1      atorvastatin (LIPITOR) 20 mg tablet Take 1 Tablet by mouth daily. , Normal, Disp-90 Tablet, R-1      potassium chloride (KLOR-CON M10) 10 mEq tablet Take 1 Tablet by mouth daily. Take 1 tab by mouth daily for low potassium. , Normal, Disp-90 Tablet, R-1      ERGOCALCIFEROL, VITAMIN D2, PO Take 500 Units by mouth two (2) times a day., Historical Med      calcium-cholecalciferol, d3, 500 mg-10 mcg (400 unit) chew Calcium 500 + D 500 mg-10 mcg (400 unit) chewable tablet   Take by oral route., Historical Med      glucose blood VI test strips (OneTouch Ultra Test) strip Use to check blood glucose before meals and at bedtime., Normal, Disp-120 Strip, R-11      aspirin delayed-release 81 mg tablet Take 1 Tablet by mouth daily. Indications: treatment to prevent a heart attack, Normal, Disp-90 Tablet, R-3      alcohol swabs (Alcohol Wipes) padm Use to check blood glucose once daily. , Normal, Disp-100 Pad, R-3             Diagnosis/Clinical Impression     Clinical Impression:   1. Chest pain, unspecified type    2. RENZO (acute kidney injury) [facility])        Attestations: Marilyn Alonso MD, am the primary clinician of record. Please note that this dictation was completed with [employer], the computer voice recognition software. Quite often unanticipated grammatical, syntax, homophones, and other interpretive errors are inadvertently transcribed by the computer software. Please disregard these errors. Please excuse any errors that have escaped final proofreading. Thank you.

## 2023-01-01 ENCOUNTER — APPOINTMENT (OUTPATIENT)
Dept: GENERAL RADIOLOGY | Age: 73
End: 2023-01-01
Attending: FAMILY MEDICINE
Payer: MEDICARE

## 2023-01-01 ENCOUNTER — HOSPITAL ENCOUNTER (EMERGENCY)
Age: 73
Discharge: HOME | End: 2023-01-01
Attending: EMERGENCY MEDICINE
Payer: MEDICARE

## 2023-01-01 VITALS
DIASTOLIC BLOOD PRESSURE: 64 MMHG | SYSTOLIC BLOOD PRESSURE: 132 MMHG | HEART RATE: 98 BPM | HEIGHT: 66 IN | OXYGEN SATURATION: 100 % | RESPIRATION RATE: 20 BRPM | WEIGHT: 177 LBS | TEMPERATURE: 100.1 F | BODY MASS INDEX: 28.45 KG/M2

## 2023-01-01 DIAGNOSIS — R11.2 NAUSEA AND VOMITING, UNSPECIFIED VOMITING TYPE: ICD-10-CM

## 2023-01-01 DIAGNOSIS — U07.1 COVID-19 VIRUS INFECTION: ICD-10-CM

## 2023-01-01 DIAGNOSIS — J06.9 ACUTE UPPER RESPIRATORY INFECTION: Primary | ICD-10-CM

## 2023-01-01 LAB
ANION GAP SERPL CALC-SCNC: 8 MMOL/L (ref 5–15)
APPEARANCE UR: ABNORMAL
ATRIAL RATE: 98 BPM
BACTERIA URNS QL MICRO: ABNORMAL /HPF
BASOPHILS # BLD: 0 K/UL (ref 0–0.1)
BASOPHILS NFR BLD: 0 % (ref 0–1)
BILIRUB UR QL: NEGATIVE
BUN SERPL-MCNC: 20 MG/DL (ref 6–20)
BUN/CREAT SERPL: 17 (ref 12–20)
CA-I BLD-MCNC: 8.6 MG/DL (ref 8.5–10.1)
CALCULATED P AXIS, ECG09: 42 DEGREES
CALCULATED T AXIS, ECG11: -3 DEGREES
CHLORIDE SERPL-SCNC: 101 MMOL/L (ref 97–108)
CO2 SERPL-SCNC: 25 MMOL/L (ref 21–32)
COLOR UR: YELLOW
CREAT SERPL-MCNC: 1.21 MG/DL (ref 0.55–1.02)
DIAGNOSIS, 93000: NORMAL
DIFFERENTIAL METHOD BLD: ABNORMAL
EOSINOPHIL # BLD: 0.2 K/UL (ref 0–0.4)
EOSINOPHIL NFR BLD: 3 % (ref 0–7)
EPITH CASTS URNS QL MICRO: ABNORMAL /LPF
ERYTHROCYTE [DISTWIDTH] IN BLOOD BY AUTOMATED COUNT: 13.7 % (ref 11.5–14.5)
FLUAV RNA SPEC QL NAA+PROBE: NOT DETECTED
FLUBV RNA SPEC QL NAA+PROBE: NOT DETECTED
GLUCOSE SERPL-MCNC: 393 MG/DL (ref 65–100)
GLUCOSE UR STRIP.AUTO-MCNC: >1000 MG/DL
HCT VFR BLD AUTO: 31.7 % (ref 35–47)
HGB BLD-MCNC: 10.3 G/DL (ref 11.5–16)
HGB UR QL STRIP: ABNORMAL
IMM GRANULOCYTES # BLD AUTO: 0.1 K/UL (ref 0–0.04)
IMM GRANULOCYTES NFR BLD AUTO: 1 % (ref 0–0.5)
KETONES UR QL STRIP.AUTO: NEGATIVE MG/DL
LACTATE SERPL-SCNC: 1 MMOL/L (ref 0.4–2)
LEUKOCYTE ESTERASE UR QL STRIP.AUTO: ABNORMAL
LYMPHOCYTES # BLD: 0.8 K/UL (ref 0.8–3.5)
LYMPHOCYTES NFR BLD: 12 % (ref 12–49)
MCH RBC QN AUTO: 26.7 PG (ref 26–34)
MCHC RBC AUTO-ENTMCNC: 32.5 G/DL (ref 30–36.5)
MCV RBC AUTO: 82.1 FL (ref 80–99)
MONOCYTES # BLD: 0.6 K/UL (ref 0–1)
MONOCYTES NFR BLD: 10 % (ref 5–13)
NEUTS SEG # BLD: 4.6 K/UL (ref 1.8–8)
NEUTS SEG NFR BLD: 74 % (ref 32–75)
NITRITE UR QL STRIP.AUTO: NEGATIVE
P-R INTERVAL, ECG05: 134 MS
PH UR STRIP: 5 [PH] (ref 5–8)
PLATELET # BLD AUTO: 168 K/UL (ref 150–400)
PMV BLD AUTO: 10 FL (ref 8.9–12.9)
POTASSIUM SERPL-SCNC: 4.9 MMOL/L (ref 3.5–5.1)
PROT UR STRIP-MCNC: 30 MG/DL
Q-T INTERVAL, ECG07: 354 MS
QRS DURATION, ECG06: 66 MS
QTC CALCULATION (BEZET), ECG08: 451 MS
RBC # BLD AUTO: 3.86 M/UL (ref 3.8–5.2)
RBC #/AREA URNS HPF: ABNORMAL /HPF (ref 0–5)
SARS-COV-2, COV2: DETECTED
SODIUM SERPL-SCNC: 134 MMOL/L (ref 136–145)
SP GR UR REFRACTOMETRY: 1.01 (ref 1–1.03)
TROPONIN-HIGH SENSITIVITY: 7 NG/L (ref 0–51)
UA: UC IF INDICATED,UAUC: ABNORMAL
UROBILINOGEN UR QL STRIP.AUTO: 0.1 EU/DL (ref 0.2–1)
VENTRICULAR RATE, ECG03: 98 BPM
WBC # BLD AUTO: 6.2 K/UL (ref 3.6–11)
WBC URNS QL MICRO: ABNORMAL /HPF (ref 0–4)
YEAST URNS QL MICRO: PRESENT

## 2023-01-01 PROCEDURE — 99285 EMERGENCY DEPT VISIT HI MDM: CPT

## 2023-01-01 PROCEDURE — 82962 GLUCOSE BLOOD TEST: CPT

## 2023-01-01 PROCEDURE — 87106 FUNGI IDENTIFICATION YEAST: CPT

## 2023-01-01 PROCEDURE — 87086 URINE CULTURE/COLONY COUNT: CPT

## 2023-01-01 PROCEDURE — 74011250636 HC RX REV CODE- 250/636: Performed by: EMERGENCY MEDICINE

## 2023-01-01 PROCEDURE — 80048 BASIC METABOLIC PNL TOTAL CA: CPT

## 2023-01-01 PROCEDURE — 96374 THER/PROPH/DIAG INJ IV PUSH: CPT

## 2023-01-01 PROCEDURE — 71045 X-RAY EXAM CHEST 1 VIEW: CPT

## 2023-01-01 PROCEDURE — 36415 COLL VENOUS BLD VENIPUNCTURE: CPT

## 2023-01-01 PROCEDURE — 74011250637 HC RX REV CODE- 250/637: Performed by: EMERGENCY MEDICINE

## 2023-01-01 PROCEDURE — 85025 COMPLETE CBC W/AUTO DIFF WBC: CPT

## 2023-01-01 PROCEDURE — 84484 ASSAY OF TROPONIN QUANT: CPT

## 2023-01-01 PROCEDURE — 93005 ELECTROCARDIOGRAM TRACING: CPT

## 2023-01-01 PROCEDURE — 87636 SARSCOV2 & INF A&B AMP PRB: CPT

## 2023-01-01 PROCEDURE — 96361 HYDRATE IV INFUSION ADD-ON: CPT

## 2023-01-01 PROCEDURE — 87040 BLOOD CULTURE FOR BACTERIA: CPT

## 2023-01-01 PROCEDURE — 83605 ASSAY OF LACTIC ACID: CPT

## 2023-01-01 PROCEDURE — 81001 URINALYSIS AUTO W/SCOPE: CPT

## 2023-01-01 RX ORDER — AZITHROMYCIN 250 MG/1
500 TABLET, FILM COATED ORAL
Status: COMPLETED | OUTPATIENT
Start: 2023-01-01 | End: 2023-01-01

## 2023-01-01 RX ORDER — NIRMATRELVIR AND RITONAVIR 150-100 MG
2 KIT ORAL EVERY 12 HOURS
Qty: 1 BOX | Refills: 0 | Status: SHIPPED | OUTPATIENT
Start: 2023-01-01 | End: 2023-01-06

## 2023-01-01 RX ORDER — ONDANSETRON 4 MG/1
4 TABLET, FILM COATED ORAL
Qty: 12 TABLET | Refills: 0 | Status: SHIPPED | OUTPATIENT
Start: 2023-01-01 | End: 2023-01-05

## 2023-01-01 RX ORDER — DEXAMETHASONE SODIUM PHOSPHATE 10 MG/ML
10 INJECTION INTRAMUSCULAR; INTRAVENOUS ONCE
Status: COMPLETED | OUTPATIENT
Start: 2023-01-01 | End: 2023-01-01

## 2023-01-01 RX ADMIN — SODIUM CHLORIDE 1000 ML: 9 INJECTION, SOLUTION INTRAVENOUS at 20:50

## 2023-01-01 RX ADMIN — AZITHROMYCIN MONOHYDRATE 500 MG: 250 TABLET ORAL at 20:49

## 2023-01-01 RX ADMIN — DEXAMETHASONE SODIUM PHOSPHATE 10 MG: 10 INJECTION INTRAMUSCULAR; INTRAVENOUS at 20:49

## 2023-01-01 NOTE — ED TRIAGE NOTES
Grandson states patient was release from [facility] yesterday. Has bladder cancer. States has been vomiting and having trouble breathing.

## 2023-01-02 NOTE — ED PROVIDER NOTES
EMERGENCY DEPARTMENT HISTORY AND PHYSICAL EXAM      Date: 1/1/2023  Patient Name: Theressa Bence    History of Presenting Illness     Chief Complaint   Patient presents with    Vomiting       History Provided By: Patient son    HPI: Theressa Bence, 71 y.o. female   presents to the ED with cc of shortness of breath. Patient complains of shortness of breath due to severe nasal congestion for last 4 days. Occasional nonproductive cough. No chest pain. No fever chills. Patient also complains of intermittent episode of nausea and vomiting. Son states the patient was given dose of Zofran prior to arrival with improvement of the nausea. Patient received COVID vaccination including booster. No abdominal pain. Patient is a chronic Monroe catheter due to bladder CA. Patient was seen at [facility] yesterday for chest pain and was discharged after evaluation in the emergency room. PCP: Palomo Abarca,     No current facility-administered medications on file prior to encounter. Current Outpatient Medications on File Prior to Encounter   Medication Sig Dispense Refill    ondansetron (ZOFRAN ODT) 4 mg disintegrating tablet Take 1 Tablet by mouth every eight (8) hours as needed for Nausea or Vomiting for up to 5 days. 15 Tablet 0    aluminum-magnesium hydroxide (MAALOX) 200-200 mg/5 mL suspension Take 15 mL by mouth every six (6) hours as needed for Indigestion for up to 7 days. 150 mL 0    levoFLOXacin (Levaquin) 750 mg tablet Take 1 Tablet by mouth daily for 7 days. 7 Tablet 0    amLODIPine (NORVASC) 5 mg tablet       insulin glargine (Lantus Solostar U-100 Insulin) 100 unit/mL (3 mL) inpn 26 Units by SubCUTAneous route nightly. metFORMIN (GLUCOPHAGE) 500 mg tablet Take 500 mg by mouth nightly. tamsulosin (FLOMAX) 0.4 mg capsule Take 0.4 mg by mouth daily. famotidine (Pepcid AC) 20 mg tablet Take 20 mg by mouth nightly.       metFORMIN ER (GLUCOPHAGE XR) 500 mg tablet Take 2 Tablets by mouth two (2) times a day. (Patient taking differently: Take 1,000 mg by mouth daily.) 360 Tablet 1    atorvastatin (LIPITOR) 20 mg tablet Take 1 Tablet by mouth daily. (Patient taking differently: Take 20 mg by mouth two (2) times a day.) 90 Tablet 1    potassium chloride (KLOR-CON M10) 10 mEq tablet Take 1 Tablet by mouth daily. Take 1 tab by mouth daily for low potassium. (Patient not taking: Reported on 12/25/2022) 90 Tablet 1    ERGOCALCIFEROL, VITAMIN D2, PO Take 500 Units by mouth two (2) times a day. (Patient not taking: Reported on 12/25/2022)      calcium-cholecalciferol, d3, 500 mg-10 mcg (400 unit) chew Calcium 500 + D 500 mg-10 mcg (400 unit) chewable tablet   Take by oral route. glucose blood VI test strips (OneTouch Ultra Test) strip Use to check blood glucose before meals and at bedtime. 120 Strip 11    aspirin delayed-release 81 mg tablet Take 1 Tablet by mouth daily. Indications: treatment to prevent a heart attack 90 Tablet 3    alcohol swabs (Alcohol Wipes) padm Use to check blood glucose once daily.  100 Pad 3       Past History     Past Medical History:  Past Medical History:   Diagnosis Date    At risk for infection associated with Monroe catheter     Bladder cancer ([address]     Dementia ([facility] [address]     DKA (diabetic ketoacidosis) ([address] 04/06/2022    Essential hypertension 07/21/2020    MRSA (methicillin resistant staph aureus) culture positive 72/03/1577    Non-alcoholic fatty liver disease 07/21/2020    Right groin ulcer ([address] 04/22/2022       Past Surgical History:  Past Surgical History:   Procedure Laterality Date    HX CATARACT REMOVAL Left 07/23/2019    HX CHOLECYSTECTOMY      HX COLONOSCOPY  2002    HX HYSTERECTOMY      partial  20 yrs ago    HX TUBAL LIGATION         Family History:  Family History   Problem Relation Age of Onset    Diabetes Mother     Diabetes Maternal Grandmother     Other Maternal Grandmother         CHF    Diabetes Paternal Grandmother        Social History:  Social History Tobacco Use    Smoking status: Former     Packs/day: 1.00     Years: 10.00     Pack years: 10.00     Types: Cigarettes     Quit date:      Years since quittin.0    Smokeless tobacco: Never   Vaping Use    Vaping Use: Never used   Substance Use Topics    Alcohol use: Not Currently    Drug use: Never       Allergies: Allergies   Allergen Reactions    Adhesive Unknown (comments)    Penicillins Other (comments)    Sulfa (Sulfonamide Antibiotics) Unknown (comments)         Review of Systems   Review of Systems   Constitutional:  Negative for activity change, appetite change, chills and fever. HENT:  Positive for rhinorrhea. Negative for sore throat. Eyes:  Negative for discharge. Respiratory:  Positive for cough and shortness of breath. Cardiovascular:  Negative for chest pain. Gastrointestinal:  Positive for vomiting. Negative for abdominal pain and diarrhea. Genitourinary:  Negative for dysuria. Musculoskeletal:  Negative for back pain. Skin:  Negative for rash. Neurological:  Negative for headaches. Hematological:  Negative for adenopathy. Psychiatric/Behavioral:  Negative for suicidal ideas. All other systems reviewed and are negative. Physical Exam   Physical Exam  Vitals and nursing note reviewed. Constitutional:       General: She is not in acute distress. Appearance: Normal appearance. She is not ill-appearing, toxic-appearing or diaphoretic. HENT:      Head: Normocephalic and atraumatic. Nose: Congestion present. Mouth/Throat:      Mouth: Mucous membranes are moist.   Eyes:      Conjunctiva/sclera: Conjunctivae normal.   Cardiovascular:      Rate and Rhythm: Normal rate and regular rhythm. Heart sounds: Normal heart sounds. Pulmonary:      Effort: Pulmonary effort is normal. No respiratory distress. Breath sounds: Normal breath sounds. No stridor. No wheezing, rhonchi or rales. Abdominal:      General: Abdomen is flat.  Bowel sounds are normal. There is no distension. Palpations: Abdomen is soft. Tenderness: There is no abdominal tenderness. There is no guarding or rebound. Musculoskeletal:      Cervical back: Neck supple. No tenderness. Right lower leg: No edema. Left lower leg: No edema. Skin:     General: Skin is warm and dry. Neurological:      General: No focal deficit present. Mental Status: She is alert and oriented to person, place, and time. Psychiatric:         Behavior: Behavior normal.         Thought Content: Thought content normal.       Diagnostic Study Results     Labs -     Recent Results (from the past 12 hour(s))   CBC WITH AUTOMATED DIFF    Collection Time: 01/01/23  6:45 PM   Result Value Ref Range    WBC 6.2 3.6 - 11.0 K/uL    RBC 3.86 3.80 - 5.20 M/uL    HGB 10.3 (L) 11.5 - 16.0 g/dL    HCT 31.7 (L) 35.0 - 47.0 %    MCV 82.1 80.0 - 99.0 FL    MCH 26.7 26.0 - 34.0 PG    MCHC 32.5 30.0 - 36.5 g/dL    RDW 13.7 11.5 - 14.5 %    PLATELET 206 346 - 790 K/uL    MPV 10.0 8.9 - 12.9 FL    NEUTROPHILS 74 32 - 75 %    LYMPHOCYTES 12 12 - 49 %    MONOCYTES 10 5 - 13 %    EOSINOPHILS 3 0 - 7 %    BASOPHILS 0 0 - 1 %    IMMATURE GRANULOCYTES 1 (H) 0.0 - 0.5 %    ABS. NEUTROPHILS 4.6 1.8 - 8.0 K/UL    ABS. LYMPHOCYTES 0.8 0.8 - 3.5 K/UL    ABS. MONOCYTES 0.6 0.0 - 1.0 K/UL    ABS. EOSINOPHILS 0.2 0.0 - 0.4 K/UL    ABS. BASOPHILS 0.0 0.0 - 0.1 K/UL    ABS. IMM.  GRANS. 0.1 (H) 0.00 - 0.04 K/UL    DF AUTOMATED     METABOLIC PANEL, BASIC    Collection Time: 01/01/23  6:45 PM   Result Value Ref Range    Sodium 134 (L) 136 - 145 mmol/L    Potassium 4.9 3.5 - 5.1 mmol/L    Chloride 101 97 - 108 mmol/L    CO2 25 21 - 32 mmol/L    Anion gap 8 5 - 15 mmol/L    Glucose 393 (H) 65 - 100 mg/dL    BUN 20 6 - 20 mg/dL    Creatinine 1.21 (H) 0.55 - 1.02 mg/dL    BUN/Creatinine ratio 17 12 - 20      eGFR 48 (L) >60 ml/min/1.73m2    Calcium 8.6 8.5 - 10.1 mg/dL   TROPONIN-HIGH SENSITIVITY    Collection Time: 01/01/23  6:45 PM Result Value Ref Range    Troponin-High Sensitivity 7 0 - 51 ng/L   URINALYSIS W/ REFLEX CULTURE    Collection Time: 01/01/23  6:45 PM    Specimen: Urine   Result Value Ref Range    Color Yellow      Appearance Hazy (A) Clear      Specific gravity 1.015 1.003 - 1.030      pH (UA) 5.0 5.0 - 8.0      Protein 30 (A) Negative mg/dL    Glucose >1,000 (A) Negative mg/dL    Ketone Negative Negative mg/dL    Bilirubin Negative Negative      Blood Small (A) Negative      Urobilinogen 0.1 (L) 0.2 - 1.0 EU/dL    Nitrites Negative Negative      Leukocyte Esterase Large (A) Negative      WBC 10-20 0 - 4 /hpf    RBC 5-10 0 - 5 /hpf    Epithelial cells Few Few /lpf    Bacteria 2+ (A) Negative /hpf    UA:UC IF INDICATED Urine Culture Ordered (A) Culture not indicated by UA result      Yeast Present (A) Negative     COVID-19 WITH INFLUENZA A/B    Collection Time: 01/01/23  7:15 PM   Result Value Ref Range    SARS-CoV-2 by PCR DETECTED (A) Not Detected      Influenza A by PCR Not Detected Not Detected      Influenza B by PCR Not Detected Not Detected     LACTIC ACID    Collection Time: 01/01/23  7:15 PM   Result Value Ref Range    Lactic acid 1.0 0.4 - 2.0 mmol/L       Radiologic Studies -   XR CHEST PORT   Final Result   No acute cardiopulmonary process seen. CT Results  (Last 48 hours)      None          CXR Results  (Last 48 hours)                 01/01/23 1852  XR CHEST PORT Final result    Impression:  No acute cardiopulmonary process seen. Narrative:  EXAM: XR CHEST PORT       INDICATION: Cough, fever       COMPARISON: 8/4/2022       FINDINGS: A portable AP radiograph of the chest was obtained at 1849 hours. The   patient is on a cardiac monitor. The lungs are clear. The cardiac and   mediastinal contours and pulmonary vascularity are normal.  The bones and soft   tissues are grossly within normal limits. A loop recorder is present.                      Medical Decision Making   I am the first provider for this patient. I reviewed the vital signs, available nursing notes, past medical history, past surgical history, family history and social history. Vital Signs-Reviewed the patient's vital signs. Patient Vitals for the past 12 hrs:   Temp Pulse Resp BP SpO2   01/01/23 2055 -- 98 20 -- 100 %   01/01/23 1823 -- (!) 115 20 132/64 99 %   01/01/23 1822 100.1 °F (37.8 °C) -- -- -- --       Records Reviewed:     Provider Notes (Medical Decision Making):   Patient presents with a 4-day history of shortness of breath due to severe nasal congestion with nausea and vomiting. Clinically patient is nontoxic-appearing and well-hydrated. No respiratory distress with clear lung sounds and normal pulse oximetry on room air. Chest x-ray is negative for pneumonia. Labs were unremarkable with normal WBC and negative lactic. COVID swab was positive with negative influenza. No recurrence of vomiting throughout the stay emergency room. Patient was discharged with a prescription for Paxlovid and instruction for symptomatic treatment of COVID infection. ED Course:   Initial assessment performed. The patients presenting problems have been discussed, and they are in agreement with the care plan formulated and outlined with them. I have encouraged them to ask questions as they arise throughout their visit. No apparent distress. No vomiting. No respiratory distress. Pulse oximetry room air is 99%    PROCEDURES      Disposition: Condition stable   DC- Adult Discharges: All of the diagnostic tests were reviewed and questions answered. Diagnosis, care plan and treatment options were discussed. understand instructions and will follow up as directed. The patients results have been reviewed with them. They have been counseled regarding their diagnosis. The patient verbally convey understanding and agreement of the signs, symptoms, diagnosis, treatment and prognosis and additionally agrees to follow up as recommended.   They also agree with the care-plan and convey that all of their questions have been answered. I have also put together some discharge instructions for them that include: 1) educational information regarding their diagnosis, 2) how to care for their diagnosis at home, as well a 3) list of reasons why they would want to return to the ED prior to their follow-up appointment, should their condition change. PLAN:  1. Current Discharge Medication List        START taking these medications    Details   nirmatrelvir-ritonavir (Paxlovid, EUA,) 150-100 mg Take 2 Tablets by mouth every twelve (12) hours for 5 days. Qty: 1 Box, Refills: 0  Start date: 1/1/2023, End date: 1/6/2023      ondansetron hcl (Zofran) 4 mg tablet Take 1 Tablet by mouth every eight (8) hours as needed for Nausea or Vomiting for up to 4 days. Qty: 12 Tablet, Refills: 0  Start date: 1/1/2023, End date: 1/5/2023           2. Follow-up Information       Follow up With Specialties Details Why Contact Info    Follow up with your primary care physician  Schedule an appointment as soon as possible for a visit in 3 days As needed           Return to ED if worse     Diagnosis     Clinical Impression:   1. Acute upper respiratory infection    2. COVID-19 virus infection    3. Nausea and vomiting, unspecified vomiting type        Please note that this dictation was completed with [employer], the computer voice recognition software. Quite often unanticipated grammatical, syntax, homophones, and other interpretive errors are inadvertently transcribed by the computer software. Please disregard these errors. Please excuse any errors that have escaped final proofreading. Thank you.

## 2023-01-03 LAB
GLUCOSE BLD STRIP.AUTO-MCNC: 401 MG/DL (ref 65–100)
PERFORMED BY, TECHID: ABNORMAL

## 2023-01-04 ENCOUNTER — HOSPITAL ENCOUNTER (EMERGENCY)
Age: 73
Discharge: HOME | End: 2023-01-04
Attending: FAMILY MEDICINE
Payer: MEDICARE

## 2023-01-04 VITALS
HEART RATE: 72 BPM | DIASTOLIC BLOOD PRESSURE: 64 MMHG | BODY MASS INDEX: 27.64 KG/M2 | RESPIRATION RATE: 20 BRPM | SYSTOLIC BLOOD PRESSURE: 136 MMHG | WEIGHT: 172 LBS | OXYGEN SATURATION: 100 % | HEIGHT: 66 IN | TEMPERATURE: 97.4 F

## 2023-01-04 DIAGNOSIS — T83.9XXA PROBLEM WITH FOLEY CATHETER, INITIAL ENCOUNTER (HCC): Primary | ICD-10-CM

## 2023-01-04 LAB
ATRIAL RATE: 107 BPM
BACTERIA SPEC CULT: ABNORMAL
CALCULATED P AXIS, ECG09: 49 DEGREES
CALCULATED R AXIS, ECG10: -27 DEGREES
CALCULATED T AXIS, ECG11: 58 DEGREES
COLONY COUNT,CNT: ABNORMAL
COLONY COUNT,CNT: ABNORMAL
DIAGNOSIS, 93000: NORMAL
P-R INTERVAL, ECG05: 142 MS
Q-T INTERVAL, ECG07: 328 MS
QRS DURATION, ECG06: 70 MS
QTC CALCULATION (BEZET), ECG08: 437 MS
SPECIAL REQUESTS,SREQ: ABNORMAL
VENTRICULAR RATE, ECG03: 107 BPM

## 2023-01-04 PROCEDURE — 99283 EMERGENCY DEPT VISIT LOW MDM: CPT

## 2023-01-04 NOTE — ED PROVIDER NOTES
EMERGENCY DEPARTMENT HISTORY AND PHYSICAL EXAM      Date: 1/4/2023  Patient Name: Kendall Medina    History of Presenting Illness     Chief Complaint   Patient presents with    Urinary Catheter Problem       History Provided By:     HPI: Kendall Medina, is a very pleasant 71 y.o. female presenting to the ED with a chief complaint of urinary catheter problem. Patient states her leg bag has been leaking. She otherwise feels well. No complaints. No abdominal pain. Monroe catheter is draining well. The patient denies any other symptoms at this time. PCP: Eli Crump, DO    No current facility-administered medications on file prior to encounter. Current Outpatient Medications on File Prior to Encounter   Medication Sig Dispense Refill    nirmatrelvir-ritonavir (Paxlovid, EUA,) 150-100 mg Take 2 Tablets by mouth every twelve (12) hours for 5 days. 1 Box 0    ondansetron hcl (Zofran) 4 mg tablet Take 1 Tablet by mouth every eight (8) hours as needed for Nausea or Vomiting for up to 4 days. 12 Tablet 0    ondansetron (ZOFRAN ODT) 4 mg disintegrating tablet Take 1 Tablet by mouth every eight (8) hours as needed for Nausea or Vomiting for up to 5 days. 15 Tablet 0    aluminum-magnesium hydroxide (MAALOX) 200-200 mg/5 mL suspension Take 15 mL by mouth every six (6) hours as needed for Indigestion for up to 7 days. 150 mL 0    amLODIPine (NORVASC) 5 mg tablet       insulin glargine (Lantus Solostar U-100 Insulin) 100 unit/mL (3 mL) inpn 26 Units by SubCUTAneous route nightly. metFORMIN (GLUCOPHAGE) 500 mg tablet Take 500 mg by mouth nightly. tamsulosin (FLOMAX) 0.4 mg capsule Take 0.4 mg by mouth daily. famotidine (Pepcid AC) 20 mg tablet Take 20 mg by mouth nightly. metFORMIN ER (GLUCOPHAGE XR) 500 mg tablet Take 2 Tablets by mouth two (2) times a day.  (Patient taking differently: Take 1,000 mg by mouth daily.) 360 Tablet 1    atorvastatin (LIPITOR) 20 mg tablet Take 1 Tablet by mouth daily. (Patient taking differently: Take 20 mg by mouth two (2) times a day.) 90 Tablet 1    potassium chloride (KLOR-CON M10) 10 mEq tablet Take 1 Tablet by mouth daily. Take 1 tab by mouth daily for low potassium. (Patient not taking: Reported on 12/25/2022) 90 Tablet 1    ERGOCALCIFEROL, VITAMIN D2, PO Take 500 Units by mouth two (2) times a day. (Patient not taking: Reported on 12/25/2022)      calcium-cholecalciferol, d3, 500 mg-10 mcg (400 unit) chew Calcium 500 + D 500 mg-10 mcg (400 unit) chewable tablet   Take by oral route. glucose blood VI test strips (OneTouch Ultra Test) strip Use to check blood glucose before meals and at bedtime. 120 Strip 11    aspirin delayed-release 81 mg tablet Take 1 Tablet by mouth daily. Indications: treatment to prevent a heart attack 90 Tablet 3    alcohol swabs (Alcohol Wipes) padm Use to check blood glucose once daily.  100 Pad 3       Past History     Past Medical History:  Past Medical History:   Diagnosis Date    At risk for infection associated with Monroe catheter     Bladder cancer ([facility] [address]     Dementia ([facility] [address]     DKA (diabetic ketoacidosis) ([facility] [address] 04/06/2022    Essential hypertension 07/21/2020    MRSA (methicillin resistant staph aureus) culture positive 35/41/1876    Non-alcoholic fatty liver disease 07/21/2020    Right groin ulcer ([facility] [address] 04/22/2022       Past Surgical History:  Past Surgical History:   Procedure Laterality Date    HX CATARACT REMOVAL Left 07/23/2019    HX CHOLECYSTECTOMY      HX COLONOSCOPY  2002    HX HYSTERECTOMY      partial  20 yrs ago    HX TUBAL LIGATION         Family History:  Family History   Problem Relation Age of Onset    Diabetes Mother     Diabetes Maternal Grandmother     Other Maternal Grandmother         CHF    Diabetes Paternal Grandmother        Social History:  Social History     Tobacco Use    Smoking status: Former     Packs/day: 1.00     Years: 10.00     Pack years: 10.00     Types: Cigarettes     Quit date: 1980     Years since quittin.0    Smokeless tobacco: Never   Vaping Use    Vaping Use: Never used   Substance Use Topics    Alcohol use: Not Currently    Drug use: Never       Allergies: Allergies   Allergen Reactions    Adhesive Unknown (comments)    Penicillins Other (comments)    Sulfa (Sulfonamide Antibiotics) Unknown (comments)         Review of Systems     Review of Systems   Constitutional:  Negative for activity change, appetite change, chills, fatigue and fever. HENT:  Negative for congestion and sore throat. Eyes:  Negative for photophobia and visual disturbance. Respiratory:  Negative for cough, shortness of breath and wheezing. Cardiovascular:  Negative for chest pain, palpitations and leg swelling. Gastrointestinal:  Negative for abdominal pain, diarrhea, nausea and vomiting. Endocrine: Negative for cold intolerance and heat intolerance. Genitourinary:  Negative for hematuria. Musculoskeletal:  Negative for gait problem and joint swelling. Skin:  Negative for color change and rash. Neurological:  Negative for dizziness and headaches. Physical Exam     Physical Exam  Constitutional:       Appearance: She is well-developed. HENT:      Head: Normocephalic and atraumatic. Mouth/Throat:      Mouth: Mucous membranes are moist.      Pharynx: Oropharynx is clear. Eyes:      Conjunctiva/sclera: Conjunctivae normal.      Pupils: Pupils are equal, round, and reactive to light. Cardiovascular:      Rate and Rhythm: Normal rate and regular rhythm. Heart sounds: No murmur heard. Pulmonary:      Effort: No respiratory distress. Breath sounds: No stridor. No wheezing, rhonchi or rales. Abdominal:      General: There is no distension. Tenderness: There is no abdominal tenderness. There is no rebound.    Genitourinary:     Comments: Monroe catheter draining spontaneously with small amount of leak at leg bag  Musculoskeletal:      Cervical back: Normal range of motion and neck supple. Skin:     General: Skin is warm and dry. Neurological:      General: No focal deficit present. Mental Status: She is alert and oriented to person, place, and time. Psychiatric:         Mood and Affect: Mood normal.         Behavior: Behavior normal.       Lab and Diagnostic Study Results     Labs -   No results found for this or any previous visit (from the past 12 hour(s)). Radiologic Studies -   @lastxrresult@  CT Results  (Last 48 hours)      None          CXR Results  (Last 48 hours)      None              Medical Decision Making   - I am the first provider for this patient. - I reviewed the vital signs, available nursing notes, past medical history, past surgical history, family history and social history. - Initial assessment performed. The patients presenting problems have been discussed, and they are in agreement with the care plan formulated and outlined with them. I have encouraged them to ask questions as they arise throughout their visit. Vital Signs-Reviewed the patient's vital signs. Patient Vitals for the past 12 hrs:   Temp Pulse Resp BP SpO2   01/04/23 0634 -- -- -- -- 100 %   01/04/23 0629 97.4 °F (36.3 °C) 72 20 136/64 100 %         ED Course/ Provider Notes (Medical Decision Making):     Patient presented to the emergency department with the aforementioned chief complaint. On examination the patient is nontoxic. Vitals were reviewed per above. Leaking Monroe catheter leg bag replaced. Patient feeling well. Discharged home with instructions to follow-up with urology. Worthington Yuli was given a thorough list of signs and symptoms that would warrant an immediate return to the emergency department. Otherwise Drake Roldan will follow up with PCP.        Procedures   Medical Decision Makingedical Decision Making  Performed by: Tevni Silva DO  Procedures  None       Disposition   Disposition:     Home     All of the diagnostic tests were reviewed and questions answered. Diagnosis, care plan and treatment options were discussed. The patient understands the instructions and will follow up as directed. The patients results have been reviewed with them. They have been counseled regarding their diagnosis. The patient verbally convey understanding and agreement of the signs, symptoms, diagnosis, treatment and prognosis and additionally agrees to follow up as recommended with their PCP in 24 - 48 hours. They also agree with the care-plan and convey that all of their questions have been answered. I have also put together some discharge instructions for them that include: 1) educational information regarding their diagnosis, 2) how to care for their diagnosis at home, as well a 3) list of reasons why they would want to return to the ED prior to their follow-up appointment, should their condition change. DISCHARGE PLAN:    1. Current Discharge Medication List        CONTINUE these medications which have NOT CHANGED    Details   nirmatrelvir-ritonavir (Paxlovid, EUA,) 150-100 mg Take 2 Tablets by mouth every twelve (12) hours for 5 days. Qty: 1 Box, Refills: 0      ondansetron hcl (Zofran) 4 mg tablet Take 1 Tablet by mouth every eight (8) hours as needed for Nausea or Vomiting for up to 4 days. Qty: 12 Tablet, Refills: 0      ondansetron (ZOFRAN ODT) 4 mg disintegrating tablet Take 1 Tablet by mouth every eight (8) hours as needed for Nausea or Vomiting for up to 5 days. Qty: 15 Tablet, Refills: 0      aluminum-magnesium hydroxide (MAALOX) 200-200 mg/5 mL suspension Take 15 mL by mouth every six (6) hours as needed for Indigestion for up to 7 days. Qty: 150 mL, Refills: 0      amLODIPine (NORVASC) 5 mg tablet       insulin glargine (Lantus Solostar U-100 Insulin) 100 unit/mL (3 mL) inpn 26 Units by SubCUTAneous route nightly. metFORMIN (GLUCOPHAGE) 500 mg tablet Take 500 mg by mouth nightly. tamsulosin (FLOMAX) 0.4 mg capsule Take 0.4 mg by mouth daily. famotidine (Pepcid AC) 20 mg tablet Take 20 mg by mouth nightly. metFORMIN ER (GLUCOPHAGE XR) 500 mg tablet Take 2 Tablets by mouth two (2) times a day. Qty: 360 Tablet, Refills: 1    Associated Diagnoses: Uncontrolled type 2 diabetes mellitus with hyperglycemia (HCC)      atorvastatin (LIPITOR) 20 mg tablet Take 1 Tablet by mouth daily. Qty: 90 Tablet, Refills: 1    Associated Diagnoses: Uncontrolled type 2 diabetes mellitus with hyperglycemia (HCC)      potassium chloride (KLOR-CON M10) 10 mEq tablet Take 1 Tablet by mouth daily. Take 1 tab by mouth daily for low potassium. Qty: 90 Tablet, Refills: 1    Associated Diagnoses: Hypokalemia      ERGOCALCIFEROL, VITAMIN D2, PO Take 500 Units by mouth two (2) times a day. calcium-cholecalciferol, d3, 500 mg-10 mcg (400 unit) chew Calcium 500 + D 500 mg-10 mcg (400 unit) chewable tablet   Take by oral route. glucose blood VI test strips (OneTouch Ultra Test) strip Use to check blood glucose before meals and at bedtime. Qty: 120 Strip, Refills: 11    Associated Diagnoses: Type 2 diabetes mellitus with hyperglycemia, with long-term current use of insulin (HCC)      aspirin delayed-release 81 mg tablet Take 1 Tablet by mouth daily. Indications: treatment to prevent a heart attack  Qty: 90 Tablet, Refills: 3    Associated Diagnoses: Type 2 diabetes mellitus with hyperglycemia, unspecified whether long term insulin use (HCC)      alcohol swabs (Alcohol Wipes) padm Use to check blood glucose once daily. Qty: 100 Pad, Refills: 3    Associated Diagnoses: Type 2 diabetes mellitus with hyperglycemia, unspecified whether long term insulin use ([facility])               2.   Follow-up Information    None           3. Return to ED if worse       4. Current Discharge Medication List            Diagnosis     Clinical Impression:    1.  Problem with Monroe catheter, initial encounter [facility])        Attestations:    Erica Davis, DO    Please note that this dictation was completed with Dragon, the computer voice recognition software. Quite often unanticipated grammatical, syntax, homophones, and other interpretive errors are inadvertently transcribed by the computer software. Please disregard these errors. Please excuse any errors that have escaped final proofreading. Thank you.

## 2023-01-07 LAB
BACTERIA SPEC CULT: NORMAL
SPECIAL REQUESTS,SREQ: NORMAL

## 2023-01-12 ENCOUNTER — HOSPITAL ENCOUNTER (EMERGENCY)
Age: 73
Discharge: HOME | End: 2023-01-12
Payer: MEDICARE

## 2023-01-12 VITALS
SYSTOLIC BLOOD PRESSURE: 147 MMHG | OXYGEN SATURATION: 100 % | DIASTOLIC BLOOD PRESSURE: 72 MMHG | BODY MASS INDEX: 27.32 KG/M2 | HEART RATE: 102 BPM | HEIGHT: 66 IN | WEIGHT: 170 LBS | RESPIRATION RATE: 19 BRPM | TEMPERATURE: 98.5 F

## 2023-01-12 DIAGNOSIS — R19.7 DIARRHEA, UNSPECIFIED TYPE: ICD-10-CM

## 2023-01-12 DIAGNOSIS — R73.9 HYPERGLYCEMIA: Primary | ICD-10-CM

## 2023-01-12 LAB
ALBUMIN SERPL-MCNC: 3.3 G/DL (ref 3.5–5)
ALBUMIN/GLOB SERPL: 1.2 (ref 1.1–2.2)
ALP SERPL-CCNC: 87 U/L (ref 45–117)
ALT SERPL-CCNC: 43 U/L (ref 12–78)
ANION GAP SERPL CALC-SCNC: 9 MMOL/L (ref 5–15)
AST SERPL W P-5'-P-CCNC: 13 U/L (ref 15–37)
BASOPHILS # BLD: 0 K/UL (ref 0–0.1)
BASOPHILS NFR BLD: 1 % (ref 0–1)
BILIRUB SERPL-MCNC: 0.3 MG/DL (ref 0.2–1)
BUN SERPL-MCNC: 19 MG/DL (ref 6–20)
BUN/CREAT SERPL: 16 (ref 12–20)
CA-I BLD-MCNC: 9.4 MG/DL (ref 8.5–10.1)
CHLORIDE SERPL-SCNC: 99 MMOL/L (ref 97–108)
CO2 SERPL-SCNC: 27 MMOL/L (ref 21–32)
CREAT SERPL-MCNC: 1.17 MG/DL (ref 0.55–1.02)
DIFFERENTIAL METHOD BLD: ABNORMAL
EOSINOPHIL # BLD: 0.6 K/UL (ref 0–0.4)
EOSINOPHIL NFR BLD: 9 % (ref 0–7)
ERYTHROCYTE [DISTWIDTH] IN BLOOD BY AUTOMATED COUNT: 13.2 % (ref 11.5–14.5)
GLOBULIN SER CALC-MCNC: 2.7 G/DL (ref 2–4)
GLUCOSE BLD STRIP.AUTO-MCNC: 393 MG/DL (ref 65–100)
GLUCOSE SERPL-MCNC: 485 MG/DL (ref 65–100)
HCT VFR BLD AUTO: 33.6 % (ref 35–47)
HGB BLD-MCNC: 11.1 G/DL (ref 11.5–16)
IMM GRANULOCYTES # BLD AUTO: 0 K/UL (ref 0–0.04)
IMM GRANULOCYTES NFR BLD AUTO: 1 % (ref 0–0.5)
LYMPHOCYTES # BLD: 1.6 K/UL (ref 0.8–3.5)
LYMPHOCYTES NFR BLD: 26 % (ref 12–49)
MCH RBC QN AUTO: 26.7 PG (ref 26–34)
MCHC RBC AUTO-ENTMCNC: 33 G/DL (ref 30–36.5)
MCV RBC AUTO: 81 FL (ref 80–99)
MONOCYTES # BLD: 0.4 K/UL (ref 0–1)
MONOCYTES NFR BLD: 6 % (ref 5–13)
NEUTS SEG # BLD: 3.6 K/UL (ref 1.8–8)
NEUTS SEG NFR BLD: 57 % (ref 32–75)
PERFORMED BY, TECHID: ABNORMAL
PLATELET # BLD AUTO: 196 K/UL (ref 150–400)
PMV BLD AUTO: 9.1 FL (ref 8.9–12.9)
POTASSIUM SERPL-SCNC: 4.5 MMOL/L (ref 3.5–5.1)
PROT SERPL-MCNC: 6 G/DL (ref 6.4–8.2)
RBC # BLD AUTO: 4.15 M/UL (ref 3.8–5.2)
SODIUM SERPL-SCNC: 135 MMOL/L (ref 136–145)
WBC # BLD AUTO: 6.1 K/UL (ref 3.6–11)

## 2023-01-12 PROCEDURE — 74011250637 HC RX REV CODE- 250/637: Performed by: NURSE PRACTITIONER

## 2023-01-12 PROCEDURE — 96361 HYDRATE IV INFUSION ADD-ON: CPT

## 2023-01-12 PROCEDURE — 74011250636 HC RX REV CODE- 250/636: Performed by: NURSE PRACTITIONER

## 2023-01-12 PROCEDURE — 82962 GLUCOSE BLOOD TEST: CPT

## 2023-01-12 PROCEDURE — 80053 COMPREHEN METABOLIC PANEL: CPT

## 2023-01-12 PROCEDURE — 36415 COLL VENOUS BLD VENIPUNCTURE: CPT

## 2023-01-12 PROCEDURE — 99284 EMERGENCY DEPT VISIT MOD MDM: CPT

## 2023-01-12 PROCEDURE — 85025 COMPLETE CBC W/AUTO DIFF WBC: CPT

## 2023-01-12 PROCEDURE — 96360 HYDRATION IV INFUSION INIT: CPT

## 2023-01-12 RX ORDER — LOPERAMIDE HYDROCHLORIDE 2 MG/1
2 CAPSULE ORAL
Qty: 15 CAPSULE | Refills: 0 | Status: SHIPPED | OUTPATIENT
Start: 2023-01-12 | End: 2023-01-15

## 2023-01-12 RX ORDER — LOPERAMIDE HYDROCHLORIDE 2 MG/1
4 CAPSULE ORAL
Status: COMPLETED | OUTPATIENT
Start: 2023-01-12 | End: 2023-01-12

## 2023-01-12 RX ADMIN — SODIUM CHLORIDE 1000 ML: 9 INJECTION, SOLUTION INTRAVENOUS at 14:30

## 2023-01-12 RX ADMIN — SODIUM CHLORIDE 1000 ML: 9 INJECTION, SOLUTION INTRAVENOUS at 15:42

## 2023-01-12 RX ADMIN — LOPERAMIDE HYDROCHLORIDE 4 MG: 2 CAPSULE ORAL at 14:29

## 2023-01-12 NOTE — ED NOTES
Pt concerned about godoy catheter placement. Keyla care completed. Godoy clean, dry, and intact. No presence of infection or bleeding. Pt provided with education regarding leg bag. Patient advised to follow up with urology.

## 2023-01-12 NOTE — Clinical Note
[address] EMERGENCY DEPARTMENT  [facility] [address]  [phone]    Work/School Note    Date: 1/12/2023    To Whom It May concern:      Oliverio Bray was seen and treated today in the emergency room by the following provider(s):  Nurse Practitioner: Llee Spencer NP. Oliverio Bray is excused from work/school on 01/12/23. She is clear to return to work/school on 01/13/23.         Sincerely,          Brandy Solis NP

## 2023-01-12 NOTE — DISCHARGE INSTRUCTIONS
Thank you! Thank you for allowing me to care for you in the emergency department. It is my goal to provide you with excellent care. If you have not received excellent quality care, please ask to speak to the nurse manager. Please fill out the survey that will come to you by mail or email since we listen to your feedback! Below you will find a list of your tests from today's visit. Should you have any questions, please do not hesitate to call the emergency department. Labs  Recent Results (from the past 12 hour(s))   CBC WITH AUTOMATED DIFF    Collection Time: 01/12/23  2:16 PM   Result Value Ref Range    WBC 6.1 3.6 - 11.0 K/uL    RBC 4.15 3.80 - 5.20 M/uL    HGB 11.1 (L) 11.5 - 16.0 g/dL    HCT 33.6 (L) 35.0 - 47.0 %    MCV 81.0 80.0 - 99.0 FL    MCH 26.7 26.0 - 34.0 PG    MCHC 33.0 30.0 - 36.5 g/dL    RDW 13.2 11.5 - 14.5 %    PLATELET 717 833 - 464 K/uL    MPV 9.1 8.9 - 12.9 FL    NEUTROPHILS 57 32 - 75 %    LYMPHOCYTES 26 12 - 49 %    MONOCYTES 6 5 - 13 %    EOSINOPHILS 9 (H) 0 - 7 %    BASOPHILS 1 0 - 1 %    IMMATURE GRANULOCYTES 1 (H) 0.0 - 0.5 %    ABS. NEUTROPHILS 3.6 1.8 - 8.0 K/UL    ABS. LYMPHOCYTES 1.6 0.8 - 3.5 K/UL    ABS. MONOCYTES 0.4 0.0 - 1.0 K/UL    ABS. EOSINOPHILS 0.6 (H) 0.0 - 0.4 K/UL    ABS. BASOPHILS 0.0 0.0 - 0.1 K/UL    ABS. IMM. GRANS. 0.0 0.00 - 0.04 K/UL    DF AUTOMATED     METABOLIC PANEL, COMPREHENSIVE    Collection Time: 01/12/23  2:16 PM   Result Value Ref Range    Sodium 135 (L) 136 - 145 mmol/L    Potassium 4.5 3.5 - 5.1 mmol/L    Chloride 99 97 - 108 mmol/L    CO2 27 21 - 32 mmol/L    Anion gap 9 5 - 15 mmol/L    Glucose 485 (H) 65 - 100 mg/dL    BUN 19 6 - 20 mg/dL    Creatinine 1.17 (H) 0.55 - 1.02 mg/dL    BUN/Creatinine ratio 16 12 - 20      eGFR 50 (L) >60 ml/min/1.73m2    Calcium 9.4 8.5 - 10.1 mg/dL    Bilirubin, total 0.3 0.2 - 1.0 mg/dL    AST (SGOT) 13 (L) 15 - 37 U/L    ALT (SGPT) 43 12 - 78 U/L    Alk.  phosphatase 87 45 - 117 U/L    Protein, total 6.0 (L) 6.4 - 8.2 g/dL    Albumin 3.3 (L) 3.5 - 5.0 g/dL    Globulin 2.7 2.0 - 4.0 g/dL    A-G Ratio 1.2 1.1 - 2.2     GLUCOSE, POC    Collection Time: 01/12/23  4:55 PM   Result Value Ref Range    Glucose (POC) 393 (H) 65 - 100 mg/dL    Performed by Jenny Lopez        Radiologic Studies  No orders to display     CT Results  (Last 48 hours)      None          CXR Results  (Last 48 hours)      None          ------------------------------------------------------------------------------------------------------------  The exam and treatment you received in the Emergency Department were for an urgent problem and are not intended as complete care. It is important that you follow-up with a doctor, nurse practitioner, or physician assistant to:  (1) confirm your diagnosis,  (2) re-evaluation of changes in your illness and treatment, and  (3) for ongoing care. Please take your discharge instructions with you when you go to your follow-up appointment. If you have any problem arranging a follow-up appointment, contact the Emergency Department. If your symptoms become worse or you do not improve as expected and you are unable to reach your health care provider, please return to the Emergency Department. We are available 24 hours a day. If a prescription has been provided, please have it filled as soon as possible to prevent a delay in treatment. If you have any questions or reservations about taking the medication due to side effects or interactions with other medications, please call your primary care provider or contact the ER.

## 2023-01-12 NOTE — ED TRIAGE NOTES
Pt recently discharged for UTI on abx therapy, seen by [facility] who sent her to be tested for C.diff.  pt reports 4-5 episodes of diarrhea per day for the last 3 days

## 2023-01-12 NOTE — ED PROVIDER NOTES
EMERGENCY DEPARTMENT HISTORY AND PHYSICAL EXAM      Date: 1/12/2023  Patient Name: Lesli Buck    History of Presenting Illness     Chief Complaint   Patient presents with    Diarrhea       History Provided By: Patient    HPI: Lesli Buck, 71 y.o. female with bladder cancer with godoy leg bag, dementia, diabetes, hypertension, cva who complains of watery diarrhea for the past 4 days and excoriation to rectum. She reports being evaluated by dermatologist who advised her of need to perform C. difficile testing due to recent use of antibiotics for UTI. She reports initially stool started out as formed now watery last used earlier today. Accompanied with generalized abdominal tightness when using the bathroom. Denies any fever, chills, chest pain, shortness of breath, lightheaded, dizziness, blood in stool  There are no other complaints, changes, or physical findings at this time. Pt is poor historian     Past History   Past Medical History:  Past Medical History:   Diagnosis Date    At risk for infection associated with Godoy catheter     Bladder cancer ([address]     Dementia ([address]     DKA (diabetic ketoacidosis) ([address] 04/06/2022    Essential hypertension 07/21/2020    MRSA (methicillin resistant staph aureus) culture positive 39/38/7708    Non-alcoholic fatty liver disease 07/21/2020    Right groin ulcer ([address] 04/22/2022       Past Surgical History:  Past Surgical History:   Procedure Laterality Date    HX CATARACT REMOVAL Left 07/23/2019    HX CHOLECYSTECTOMY      HX COLONOSCOPY  2002    HX HYSTERECTOMY      partial  20 yrs ago    HX TUBAL LIGATION         Family History:  Family History   Problem Relation Age of Onset    Diabetes Mother     Diabetes Maternal Grandmother     Other Maternal Grandmother         CHF    Diabetes Paternal Grandmother        Social History:  Social History     Tobacco Use    Smoking status: Former     Packs/day: 1.00     Years: 10.00     Pack years: 10.00     Types: Cigarettes     Quit date: 1980     Years since quittin.0    Smokeless tobacco: Never   Vaping Use    Vaping Use: Never used   Substance Use Topics    Alcohol use: Not Currently    Drug use: Never       Allergies: Allergies   Allergen Reactions    Adhesive Unknown (comments)    Penicillins Other (comments)    Sulfa (Sulfonamide Antibiotics) Unknown (comments)       PCP: Siddharth Dre, DO    Current Facility-Administered Medications   Medication Dose Route Frequency Provider Last Rate Last Admin    sodium chloride 0.9 % bolus infusion 1,000 mL  1,000 mL IntraVENous ONCE Don Nance Coad, NP         Current Outpatient Medications   Medication Sig Dispense Refill    amLODIPine (NORVASC) 5 mg tablet       insulin glargine (Lantus Solostar U-100 Insulin) 100 unit/mL (3 mL) inpn 26 Units by SubCUTAneous route nightly. metFORMIN (GLUCOPHAGE) 500 mg tablet Take 500 mg by mouth nightly. tamsulosin (FLOMAX) 0.4 mg capsule Take 0.4 mg by mouth daily. famotidine (Pepcid AC) 20 mg tablet Take 20 mg by mouth nightly. metFORMIN ER (GLUCOPHAGE XR) 500 mg tablet Take 2 Tablets by mouth two (2) times a day. (Patient taking differently: Take 1,000 mg by mouth daily.) 360 Tablet 1    atorvastatin (LIPITOR) 20 mg tablet Take 1 Tablet by mouth daily. (Patient taking differently: Take 20 mg by mouth two (2) times a day.) 90 Tablet 1    potassium chloride (KLOR-CON M10) 10 mEq tablet Take 1 Tablet by mouth daily. Take 1 tab by mouth daily for low potassium. (Patient not taking: Reported on 2022) 90 Tablet 1    ERGOCALCIFEROL, VITAMIN D2, PO Take 500 Units by mouth two (2) times a day. (Patient not taking: Reported on 2022)      calcium-cholecalciferol, d3, 500 mg-10 mcg (400 unit) chew Calcium 500 + D 500 mg-10 mcg (400 unit) chewable tablet   Take by oral route. glucose blood VI test strips (OneTouch Ultra Test) strip Use to check blood glucose before meals and at bedtime.  120 Strip 11    aspirin delayed-release 81 mg tablet Take 1 Tablet by mouth daily. Indications: treatment to prevent a heart attack 90 Tablet 3    alcohol swabs (Alcohol Wipes) padm Use to check blood glucose once daily. 100 Pad 3     Review of Systems   Review of Systems   Constitutional:  Negative for chills and fever. HENT:  Negative for congestion, sinus pressure and sinus pain. Respiratory:  Negative for cough and shortness of breath. Cardiovascular:  Negative for chest pain and leg swelling. Gastrointestinal:  Positive for diarrhea. Negative for abdominal pain, blood in stool, nausea and vomiting. Excoriation to rectum    Genitourinary:  Negative for dysuria, frequency and urgency. Musculoskeletal:  Negative for arthralgias and myalgias. Skin: Negative. Neurological:  Negative for dizziness, weakness, light-headedness, numbness and headaches. Psychiatric/Behavioral: Negative. All other systems reviewed and are negative. Physical Exam   Physical Exam  Vitals and nursing note reviewed. Constitutional:       General: She is not in acute distress. Appearance: Normal appearance. She is normal weight. She is not ill-appearing or toxic-appearing. HENT:      Head: Normocephalic and atraumatic. Right Ear: Hearing normal.      Left Ear: Hearing normal.      Nose: Nose normal.      Mouth/Throat:      Mouth: Mucous membranes are moist.   Eyes:      General: Lids are normal.      Extraocular Movements: Extraocular movements intact. Pupils: Pupils are equal, round, and reactive to light. Cardiovascular:      Rate and Rhythm: Normal rate and regular rhythm. Pulses: Normal pulses. Radial pulses are 2+ on the right side and 2+ on the left side. Dorsalis pedis pulses are 2+ on the right side and 2+ on the left side. Pulmonary:      Effort: Pulmonary effort is normal. No accessory muscle usage or respiratory distress. Breath sounds: Normal breath sounds. No wheezing or rhonchi. Abdominal:      General: Bowel sounds are normal.      Palpations: Abdomen is soft. Tenderness: There is no abdominal tenderness. There is no right CVA tenderness or left CVA tenderness. Comments: [place] leg bag noted to right leg    Musculoskeletal:      Cervical back: Normal range of motion and neck supple. No muscular tenderness. Right lower leg: No edema. Left lower leg: No edema. Feet:      Right foot:      Skin integrity: No skin breakdown. Left foot:      Skin integrity: No skin breakdown. Skin:     General: Skin is warm and dry. Capillary Refill: Capillary refill takes less than 2 seconds. Findings: No abrasion, bruising, ecchymosis, erythema or signs of injury. Neurological:      Mental Status: She is alert and oriented to person, place, and time. GCS: GCS eye subscore is 4. GCS verbal subscore is 5. GCS motor subscore is 6. Sensory: Sensation is intact. Psychiatric:         Attention and Perception: Attention normal.         Mood and Affect: Mood normal.         Behavior: Behavior normal. Behavior is cooperative. Cognition and Memory: Cognition normal.     Lab and Diagnostic Study Results   Labs -     Recent Results (from the past 12 hour(s))   CBC WITH AUTOMATED DIFF    Collection Time: 01/12/23  2:16 PM   Result Value Ref Range    WBC 6.1 3.6 - 11.0 K/uL    RBC 4.15 3.80 - 5.20 M/uL    HGB 11.1 (L) 11.5 - 16.0 g/dL    HCT 33.6 (L) 35.0 - 47.0 %    MCV 81.0 80.0 - 99.0 FL    MCH 26.7 26.0 - 34.0 PG    MCHC 33.0 30.0 - 36.5 g/dL    RDW 13.2 11.5 - 14.5 %    PLATELET 181 687 - 703 K/uL    MPV 9.1 8.9 - 12.9 FL    NEUTROPHILS 57 32 - 75 %    LYMPHOCYTES 26 12 - 49 %    MONOCYTES 6 5 - 13 %    EOSINOPHILS 9 (H) 0 - 7 %    BASOPHILS 1 0 - 1 %    IMMATURE GRANULOCYTES 1 (H) 0.0 - 0.5 %    ABS. NEUTROPHILS 3.6 1.8 - 8.0 K/UL    ABS. LYMPHOCYTES 1.6 0.8 - 3.5 K/UL    ABS. MONOCYTES 0.4 0.0 - 1.0 K/UL    ABS. EOSINOPHILS 0.6 (H) 0.0 - 0.4 K/UL    ABS. BASOPHILS 0.0 0.0 - 0.1 K/UL    ABS. IMM. GRANS. 0.0 0.00 - 0.04 K/UL    DF AUTOMATED     METABOLIC PANEL, COMPREHENSIVE    Collection Time: 01/12/23  2:16 PM   Result Value Ref Range    Sodium 135 (L) 136 - 145 mmol/L    Potassium 4.5 3.5 - 5.1 mmol/L    Chloride 99 97 - 108 mmol/L    CO2 27 21 - 32 mmol/L    Anion gap 9 5 - 15 mmol/L    Glucose 485 (H) 65 - 100 mg/dL    BUN 19 6 - 20 mg/dL    Creatinine 1.17 (H) 0.55 - 1.02 mg/dL    BUN/Creatinine ratio 16 12 - 20      eGFR 50 (L) >60 ml/min/1.73m2    Calcium 9.4 8.5 - 10.1 mg/dL    Bilirubin, total 0.3 0.2 - 1.0 mg/dL    AST (SGOT) 13 (L) 15 - 37 U/L    ALT (SGPT) 43 12 - 78 U/L    Alk. phosphatase 87 45 - 117 U/L    Protein, total 6.0 (L) 6.4 - 8.2 g/dL    Albumin 3.3 (L) 3.5 - 5.0 g/dL    Globulin 2.7 2.0 - 4.0 g/dL    A-G Ratio 1.2 1.1 - 2.2         Radiologic Studies -   [unfilled]  CT Results  (Last 48 hours)      None          CXR Results  (Last 48 hours)      None            Medical Decision Making and ED Course   Differential Diagnosis & Medical Decision Making Provider Note:   Patient is a 71 y.o. female presenting for diarrhea. Vitals reveal tachycardia at 102 and htn and physical exam reveals no significant abnormalities. Based on the history, physical exam, risk factors, and vitals signs, differential includes: gastroenteritis, functional diarrhea, mild dehydration, C-diff. This well-appearing patient presents with diarrhea with low suspicion for serious pathology given nontoxic appearance and benign physical examination. Differential also includes:    - Dehydration or electrolyte abnormalities: Patient has no symptoms, physical exam signs or vital sign findings to suggest clinically significant dehydration.  - Abdominal Pathologies: No symptoms or physical exam findings of abdominal tenderness or guarding to suggest intraabdominal pathology like appendicitis, obstruction, or pancreatitis.   - UTI: Patient is currently on abx for UTI   Basic labs performed with mild elevation in creatinine however currently at patient's baseline. IV hydration given due to hyperglycemia blood sugar noted in the 480s. No signs of DKA noted. IV hydration given with improvement in blood sugar. Patient unable to provide stool sample while in emergency department. Advised to follow-up with PCP for outpatient sample for C. difficile patient is nontoxic-appearing no other complaints at this time. Rx for Imodium given patient advised to follow-up with PCP regarding C. difficile outpatient and follow-up for hyperglycemia. She was advised signs symptoms to return to emergency department. Verbalized understanding stable at time of discharge. See ED Course and Reassessment for clinical evaluation and interpretations. - I am the first and primary provider for this patient. I reviewed the vital signs, available nursing notes, past medical history, past surgical history, family history and social history. The patient's presenting problems have been discussed, and the staff are in agreement with the care plan formulated and outlined with them. I have encouraged them to ask questions as they arise throughout their visit. Vital Signs-Reviewed the patient's vital signs. Patient Vitals for the past 12 hrs:   Temp Pulse Resp BP SpO2   01/12/23 1309 98.5 °F (36.9 °C) (!) 102 19 (!) 147/72 100 %         ED Course:        Procedures and Critical Care         John Harmon NP    Disposition   Disposition: DC- Adult Discharges: All of the diagnostic tests were reviewed and questions answered. Diagnosis, care plan and treatment options were discussed. The patient understands the instructions and will follow up as directed. The patients results have been reviewed with them. They have been counseled regarding their diagnosis.   The patient verbally convey understanding and agreement of the signs, symptoms, diagnosis, treatment and prognosis and additionally agrees to follow up as recommended with their PCP in 24 - 48 hours. They also agree with the care-plan and convey that all of their questions have been answered. I have also put together some discharge instructions for them that include: 1) educational information regarding their diagnosis, 2) how to care for their diagnosis at home, as well a 3) list of reasons why they would want to return to the ED prior to their follow-up appointment, should their condition change. DISCHARGE PLAN:  1. Current Discharge Medication List        CONTINUE these medications which have NOT CHANGED    Details   amLODIPine (NORVASC) 5 mg tablet       insulin glargine (Lantus Solostar U-100 Insulin) 100 unit/mL (3 mL) inpn 26 Units by SubCUTAneous route nightly. metFORMIN (GLUCOPHAGE) 500 mg tablet Take 500 mg by mouth nightly. tamsulosin (FLOMAX) 0.4 mg capsule Take 0.4 mg by mouth daily. famotidine (Pepcid AC) 20 mg tablet Take 20 mg by mouth nightly. metFORMIN ER (GLUCOPHAGE XR) 500 mg tablet Take 2 Tablets by mouth two (2) times a day. Qty: 360 Tablet, Refills: 1    Associated Diagnoses: Uncontrolled type 2 diabetes mellitus with hyperglycemia (HCC)      atorvastatin (LIPITOR) 20 mg tablet Take 1 Tablet by mouth daily. Qty: 90 Tablet, Refills: 1    Associated Diagnoses: Uncontrolled type 2 diabetes mellitus with hyperglycemia (HCC)      potassium chloride (KLOR-CON M10) 10 mEq tablet Take 1 Tablet by mouth daily. Take 1 tab by mouth daily for low potassium. Qty: 90 Tablet, Refills: 1    Associated Diagnoses: Hypokalemia      ERGOCALCIFEROL, VITAMIN D2, PO Take 500 Units by mouth two (2) times a day. calcium-cholecalciferol, d3, 500 mg-10 mcg (400 unit) chew Calcium 500 + D 500 mg-10 mcg (400 unit) chewable tablet   Take by oral route. glucose blood VI test strips (OneTouch Ultra Test) strip Use to check blood glucose before meals and at bedtime.   Qty: 120 Strip, Refills: 11    Associated Diagnoses: Type 2 diabetes mellitus with hyperglycemia, with long-term current use of insulin (HCC)      aspirin delayed-release 81 mg tablet Take 1 Tablet by mouth daily. Indications: treatment to prevent a heart attack  Qty: 90 Tablet, Refills: 3    Associated Diagnoses: Type 2 diabetes mellitus with hyperglycemia, unspecified whether long term insulin use ([facility])      alcohol swabs (Alcohol Wipes) padm Use to check blood glucose once daily. Qty: 100 Pad, Refills: 3    Associated Diagnoses: Type 2 diabetes mellitus with hyperglycemia, unspecified whether long term insulin use ([facility]           2. Follow-up Information    None       3. Return to ED if worse   4. Current Discharge Medication List            Diagnosis/Clinical Impression     Clinical Impression:   1. Hyperglycemia    2. Diarrhea, unspecified type        Attestations: Markell CALVILLO NP, am the primary clinician of record. Please note that this dictation was completed with [employer], the [employer] voice recognition software. Quite often unanticipated grammatical, syntax, homophones, and other interpretive errors are inadvertently transcribed by the computer software. Please disregard these errors. Please excuse any errors that have escaped final proofreading. Thank you.

## 2023-01-16 ENCOUNTER — HOSPITAL ENCOUNTER (EMERGENCY)
Age: 73
Discharge: HOME | End: 2023-01-17
Attending: EMERGENCY MEDICINE
Payer: MEDICARE

## 2023-01-16 DIAGNOSIS — R81 GLUCOSURIA: ICD-10-CM

## 2023-01-16 DIAGNOSIS — T83.9XXA PROBLEM WITH FOLEY CATHETER, INITIAL ENCOUNTER (HCC): Primary | ICD-10-CM

## 2023-01-16 DIAGNOSIS — N30.00 ACUTE CYSTITIS WITHOUT HEMATURIA: ICD-10-CM

## 2023-01-16 LAB
APPEARANCE UR: ABNORMAL
BACTERIA URNS QL MICRO: ABNORMAL /HPF
BILIRUB UR QL: NEGATIVE
COLOR UR: YELLOW
EPITH CASTS URNS QL MICRO: ABNORMAL /LPF
GLUCOSE UR STRIP.AUTO-MCNC: >1000 MG/DL
HGB UR QL STRIP: ABNORMAL
KETONES UR QL STRIP.AUTO: NEGATIVE MG/DL
LEUKOCYTE ESTERASE UR QL STRIP.AUTO: ABNORMAL
NITRITE UR QL STRIP.AUTO: NEGATIVE
PH UR STRIP: 6 (ref 5–8)
PROT UR STRIP-MCNC: 30 MG/DL
RBC #/AREA URNS HPF: ABNORMAL /HPF (ref 0–5)
SP GR UR REFRACTOMETRY: 1.01 (ref 1–1.03)
UA: UC IF INDICATED,UAUC: ABNORMAL
UROBILINOGEN UR QL STRIP.AUTO: 0.1 EU/DL (ref 0.2–1)
WBC URNS QL MICRO: ABNORMAL /HPF (ref 0–4)

## 2023-01-16 PROCEDURE — 87077 CULTURE AEROBIC IDENTIFY: CPT

## 2023-01-16 PROCEDURE — 99283 EMERGENCY DEPT VISIT LOW MDM: CPT

## 2023-01-16 PROCEDURE — 87086 URINE CULTURE/COLONY COUNT: CPT

## 2023-01-16 PROCEDURE — 81001 URINALYSIS AUTO W/SCOPE: CPT

## 2023-01-16 RX ORDER — CEFDINIR 300 MG/1
600 CAPSULE ORAL ONCE
Status: DISCONTINUED | OUTPATIENT
Start: 2023-01-17 | End: 2023-01-17 | Stop reason: HOSPADM

## 2023-01-17 VITALS
SYSTOLIC BLOOD PRESSURE: 123 MMHG | HEART RATE: 89 BPM | HEIGHT: 66 IN | OXYGEN SATURATION: 100 % | RESPIRATION RATE: 18 BRPM | TEMPERATURE: 98.1 F | DIASTOLIC BLOOD PRESSURE: 64 MMHG | WEIGHT: 170 LBS | BODY MASS INDEX: 27.32 KG/M2

## 2023-01-17 RX ORDER — CEFDINIR 300 MG/1
300 CAPSULE ORAL 2 TIMES DAILY
Qty: 14 CAPSULE | Refills: 0 | Status: SHIPPED | OUTPATIENT
Start: 2023-01-17 | End: 2023-01-24

## 2023-01-17 NOTE — DISCHARGE INSTRUCTIONS
Continue your home medications. Monitor your blood sugar closely at home. Follow-up with the urologist, I have given you this information if you do not have one. Follow-up with your primary care provider soon as possible. I have prescribed you antibiotics to take for a urine infection.

## 2023-01-17 NOTE — ED NOTES
Pt has a godoy that the bag has a tear and is leaking.  Pt unsure as to who she is seeing and when the godoy was replaced last.

## 2023-01-17 NOTE — ED PROVIDER NOTES
EMERGENCY DEPARTMENT HISTORY AND PHYSICAL EXAM      Date: 1/16/2023  Patient Name: Marily Caldwell      History of Presenting Illness     Chief Complaint   Patient presents with    Urinary Catheter Problem       History Provided By: Patient  Location/Duration/Severity/Modifying factors   Is a 78-year-old female presenting for leaking Monroe catheter bag. History is limited as patient is development of dementia. She is alert and conversant but she cannot recall when the Monroe was put in. She states we put it in however later she said that it was not done here. She also goes to [facility] and other emergency room's. She has had this issue numerous times in the past.  She denies any other complaints. She is unknown if she follows with a urologist currently or not. She states started leaking over the past day or so. Had a similar event about 2 weeks ago. Reports it is draining very well but it is leaking. There are no other complaints, changes, or physical findings at this time. PCP: Jose Gautam, DO    Current Outpatient Medications   Medication Sig Dispense Refill    cefdinir (OMNICEF) 300 mg capsule Take 1 Capsule by mouth two (2) times a day for 7 days. 14 Capsule 0    amLODIPine (NORVASC) 5 mg tablet       insulin glargine (Lantus Solostar U-100 Insulin) 100 unit/mL (3 mL) inpn 26 Units by SubCUTAneous route nightly. metFORMIN (GLUCOPHAGE) 500 mg tablet Take 500 mg by mouth nightly. tamsulosin (FLOMAX) 0.4 mg capsule Take 0.4 mg by mouth daily. famotidine (Pepcid AC) 20 mg tablet Take 20 mg by mouth nightly. metFORMIN ER (GLUCOPHAGE XR) 500 mg tablet Take 2 Tablets by mouth two (2) times a day. (Patient taking differently: Take 1,000 mg by mouth daily.) 360 Tablet 1    atorvastatin (LIPITOR) 20 mg tablet Take 1 Tablet by mouth daily.  (Patient taking differently: Take 20 mg by mouth two (2) times a day.) 90 Tablet 1    potassium chloride (KLOR-CON M10) 10 mEq tablet Take 1 Tablet by mouth daily. Take 1 tab by mouth daily for low potassium. (Patient not taking: Reported on 2022) 90 Tablet 1    ERGOCALCIFEROL, VITAMIN D2, PO Take 500 Units by mouth two (2) times a day. (Patient not taking: Reported on 2022)      calcium-cholecalciferol, d3, 500 mg-10 mcg (400 unit) chew Calcium 500 + D 500 mg-10 mcg (400 unit) chewable tablet   Take by oral route. glucose blood VI test strips (OneTouch Ultra Test) strip Use to check blood glucose before meals and at bedtime. 120 Strip 11    aspirin delayed-release 81 mg tablet Take 1 Tablet by mouth daily. Indications: treatment to prevent a heart attack 90 Tablet 3    alcohol swabs (Alcohol Wipes) padm Use to check blood glucose once daily.  100 Pad 3       Past History     Past Medical History:  Past Medical History:   Diagnosis Date    At risk for infection associated with Monroe catheter     Bladder cancer ([address]     Dementia ([facility]     DKA (diabetic ketoacidosis) ([address] 2022    Essential hypertension 2020    MRSA (methicillin resistant staph aureus) culture positive     Non-alcoholic fatty liver disease 2020    Right groin ulcer ([address] 2022       Past Surgical History:  Past Surgical History:   Procedure Laterality Date    HX CATARACT REMOVAL Left 2019    HX CHOLECYSTECTOMY      HX COLONOSCOPY  2002    HX HYSTERECTOMY      partial  20 yrs ago    HX TUBAL LIGATION         Family History:  Family History   Problem Relation Age of Onset    Diabetes Mother     Diabetes Maternal Grandmother     Other Maternal Grandmother         CHF    Diabetes Paternal Grandmother        Social History:  Social History     Tobacco Use    Smoking status: Former     Packs/day: 1.00     Years: 10.00     Pack years: 10.00     Types: Cigarettes     Quit date:      Years since quittin.0    Smokeless tobacco: Never   Vaping Use    Vaping Use: Never used   Substance Use Topics    Alcohol use: Not Currently    Drug use: Never       Allergies: Allergies   Allergen Reactions    Adhesive Unknown (comments)    Penicillins Other (comments)    Sulfa (Sulfonamide Antibiotics) Unknown (comments)       Medications:  Current Outpatient Medications   Medication Sig Dispense Refill    cefdinir (OMNICEF) 300 mg capsule Take 1 Capsule by mouth two (2) times a day for 7 days. 14 Capsule 0    amLODIPine (NORVASC) 5 mg tablet       insulin glargine (Lantus Solostar U-100 Insulin) 100 unit/mL (3 mL) inpn 26 Units by SubCUTAneous route nightly. metFORMIN (GLUCOPHAGE) 500 mg tablet Take 500 mg by mouth nightly. tamsulosin (FLOMAX) 0.4 mg capsule Take 0.4 mg by mouth daily. famotidine (Pepcid AC) 20 mg tablet Take 20 mg by mouth nightly. metFORMIN ER (GLUCOPHAGE XR) 500 mg tablet Take 2 Tablets by mouth two (2) times a day. (Patient taking differently: Take 1,000 mg by mouth daily.) 360 Tablet 1    atorvastatin (LIPITOR) 20 mg tablet Take 1 Tablet by mouth daily. (Patient taking differently: Take 20 mg by mouth two (2) times a day.) 90 Tablet 1    potassium chloride (KLOR-CON M10) 10 mEq tablet Take 1 Tablet by mouth daily. Take 1 tab by mouth daily for low potassium. (Patient not taking: Reported on 12/25/2022) 90 Tablet 1    ERGOCALCIFEROL, VITAMIN D2, PO Take 500 Units by mouth two (2) times a day. (Patient not taking: Reported on 12/25/2022)      calcium-cholecalciferol, d3, 500 mg-10 mcg (400 unit) chew Calcium 500 + D 500 mg-10 mcg (400 unit) chewable tablet   Take by oral route. glucose blood VI test strips (OneTouch Ultra Test) strip Use to check blood glucose before meals and at bedtime. 120 Strip 11    aspirin delayed-release 81 mg tablet Take 1 Tablet by mouth daily. Indications: treatment to prevent a heart attack 90 Tablet 3    alcohol swabs (Alcohol Wipes) padm Use to check blood glucose once daily.  100 Pad 3       Social Determinants of Health:  Social Determinants of Health     Tobacco Use: Medium Risk Smoking Tobacco Use: Former    Smokeless Tobacco Use: Never    Passive Exposure: Not on file   Alcohol Use: Not At Risk    Frequency of Alcohol Consumption: Never    Average Number of Drinks: 1 or 2    Frequency of Binge Drinking: Never   Financial Resource Strain: Medium Risk    Difficulty of Paying Living Expenses: Somewhat hard   Food Insecurity: No Food Insecurity    Worried About Running Out of Food in the Last Year: Never true    Ran Out of Food in the Last Year: Never true   Transportation Needs: No Transportation Needs    Lack of Transportation (Medical): No    Lack of Transportation (Non-Medical): No   Physical Activity: Not on file   Stress: No Stress Concern Present    Feeling of Stress : Not at all   Social Connections: Not on file   Intimate Partner Violence: Not on file   Depression: Not at risk    PHQ-2 Score: 0   Housing Stability: Low Risk     Unable to Pay for Housing in the Last Year: No    Number of Places Lived in the Last Year: 1    Unstable Housing in the Last Year: No     Patient has a primary care provider assigned however she has limited follow-up, and unknown if she truly follows with a urologist.  She states \"I have no one to take care of me\". Frequent ER visits. Review of Systems     Review of Systems   Constitutional:  Negative for fever. HENT:  Negative for congestion. Respiratory:  Negative for shortness of breath. Cardiovascular:  Negative for chest pain. Genitourinary:  Negative for difficulty urinating, dysuria and frequency. Musculoskeletal:  Negative for back pain and neck pain. Physical Exam     Physical Exam  Constitutional:       Appearance: Normal appearance. HENT:      Head: Normocephalic and atraumatic. Nose: Nose normal.   Eyes:      Conjunctiva/sclera: Conjunctivae normal.   Cardiovascular:      Pulses: Normal pulses. Pulmonary:      Effort: Pulmonary effort is normal.   Abdominal:      General: Abdomen is flat.    Genitourinary:     Comments: Monroe catheter in place, full bag of urine, leaking from near the cath. There is significant mount of sediment within the urine appears somewhat cloudy and there is a lot of residue in the tubing  Musculoskeletal:         General: Normal range of motion. Cervical back: Neck supple. Skin:     General: Skin is warm and dry. Neurological:      General: No focal deficit present. Mental Status: She is alert. Mental status is at baseline. Cranial Nerves: No cranial nerve deficit. Sensory: No sensory deficit. Lab and Diagnostic Study Results     Labs -  Recent Results (from the past 24 hour(s))   URINALYSIS W/ REFLEX CULTURE    Collection Time: 01/16/23 11:40 PM    Specimen: Urine   Result Value Ref Range    Color Yellow      Appearance Hazy (A) Clear      Specific gravity 1.015 1.003 - 1.030      pH (UA) 6.0 5.0 - 8.0      Protein 30 (A) Negative mg/dL    Glucose >1,000 (A) Negative mg/dL    Ketone Negative Negative mg/dL    Bilirubin Negative Negative      Blood Small (A) Negative      Urobilinogen 0.1 (L) 0.2 - 1.0 EU/dL    Nitrites Negative Negative      Leukocyte Esterase Large (A) Negative      WBC  0 - 4 /hpf    RBC 0-5 0 - 5 /hpf    Epithelial cells Few Few /lpf    Bacteria 2+ (A) Negative /hpf    UA:UC IF INDICATED Urine Culture Ordered (A) Culture not indicated by UA result           Radiologic Studies -   No orders to display         Medical Decision Making and ED Course   - I am the first and primary provider for this patient AND AM THE PRIMARY PROVIDER OF RECORD. - I reviewed the vital signs, available nursing notes, past medical history, past surgical history, family history and social history. - Initial assessment performed. The patients presenting problems have been discussed, and the staff are in agreement with the care plan formulated and outlined with them. I have encouraged them to ask questions as they arise throughout their visit.     Vital Signs-Reviewed the patient's vital signs. Patient Vitals for the past 24 hrs:   Temp Pulse Resp BP SpO2   01/17/23 0023 98.1 °F (36.7 °C) 89 18 123/64 100 %   01/16/23 2004 98 °F (36.7 °C) 91 18 122/68 99 %         Nursing notes and pertinent past medical history including imaging and labs have been reviewed (if available)      Provider Notes (Medical Decision Making):     MDM  Number of Diagnoses or Management Options  Acute cystitis without hematuria  Glucosuria  Problem with Monroe catheter, initial encounter [facility])  Diagnosis management comments: Patient presents with leaking Monroe catheter bag. Plan will be to replace the Monroe catheter we will check a urine given her history of diabetes, to rule out significant ketonuria. She has no complaints otherwise. Results reviewed and patient reassessed. Her urine reflects likely urine infection which we will treat her with antibiotics for. Culture will be ordered. Urinary catheter was changed without difficulty and patient ready for discharge. Her urine reflects a UTI but also glucosuria, and nearly every case that she has had a urine checked it is reflected significant glucosuria. She states her blood sugar has been running at her typical between 0 100 at home and she has no symptoms of DKA to speak of her to be concerned with. Will discharge home, follow-up with her primary care provider and return to emergency room if worsening. We will give her urology instruction for follow-up as well. ED Course:          _________________________________________________________________    At this time, patient is stable and appropriate for discharge home. Patient demonstrates understanding of current diagnoses and is in agreement with the treatment plan. They are advised that while the likelihood of serious underlying condition is low at this point given the evaluation performed today, we cannot fully rule it out.   They are advised to immediately return with any new symptoms or worsening of current condition. Any Incidental findings were noted on the patient's discharge paperwork as well as verbally directly to the patient, and the appropriate follow-up was given to the patient as far as instructions on testing needed as well as the timeframe. All questions have been answered. Patient is given educational material regarding their diagnoses, including danger symptoms and when to return to the ED. This note was dictated utilizing Dragon voice recognition software. Unfortunately this leads to occasional typographical errors. I apologize in advance if the situation occurs. If questions occur please do not hesitate to contact me directly. Dalila Flynn DO          Procedures and Critical Care   Performed by: Dalila Flynn DO  Procedures         Dalila Flynn DO      Diagnosis:   1. Problem with Monroe catheter, initial encounter ([facility] [address]    2. Acute cystitis without hematuria    3. Glucosuria          Disposition: Discharge    Follow-up Information       Follow up With Specialties Details Why Contact Info    Willa Metzger DO Family Medicine Call today  [address] [facility]  [phone]      [address] [place] [address] Emergency Medicine  As needed, If symptoms worsen; or [facility] Emergency Department [address] [place] [address]    Christine Bell MD Urology Call today  [address] [facility]  [phone]              Discharge Medication List as of 1/17/2023 12:09 AM        START taking these medications    Details   cefdinir (OMNICEF) 300 mg capsule Take 1 Capsule by mouth two (2) times a day for 7 days. , Normal, Disp-14 Capsule, R-0           CONTINUE these medications which have NOT CHANGED    Details   amLODIPine (NORVASC) 5 mg tablet Historical Med      insulin glargine (Lantus Solostar U-100 Insulin) 100 unit/mL (3 mL) inpn 26 Units by SubCUTAneous route nightly. , Historical Med      metFORMIN (GLUCOPHAGE) 500 mg tablet Take 500 mg by mouth nightly., Historical Med      tamsulosin (FLOMAX) 0.4 mg capsule Take 0.4 mg by mouth daily. , Historical Med      famotidine (Pepcid AC) 20 mg tablet Take 20 mg by mouth nightly., Historical Med      metFORMIN ER (GLUCOPHAGE XR) 500 mg tablet Take 2 Tablets by mouth two (2) times a day., Normal, Disp-360 Tablet, R-1      atorvastatin (LIPITOR) 20 mg tablet Take 1 Tablet by mouth daily. , Normal, Disp-90 Tablet, R-1      potassium chloride (KLOR-CON M10) 10 mEq tablet Take 1 Tablet by mouth daily. Take 1 tab by mouth daily for low potassium. , Normal, Disp-90 Tablet, R-1      ERGOCALCIFEROL, VITAMIN D2, PO Take 500 Units by mouth two (2) times a day., Historical Med      calcium-cholecalciferol, d3, 500 mg-10 mcg (400 unit) chew Calcium 500 + D 500 mg-10 mcg (400 unit) chewable tablet   Take by oral route., Historical Med      glucose blood VI test strips (OneTouch Ultra Test) strip Use to check blood glucose before meals and at bedtime., Normal, Disp-120 Strip, R-11      aspirin delayed-release 81 mg tablet Take 1 Tablet by mouth daily. Indications: treatment to prevent a heart attack, Normal, Disp-90 Tablet, R-3      alcohol swabs (Alcohol Wipes) padm Use to check blood glucose once daily. , Normal, Disp-100 Pad, R-3           STOP taking these medications       loperamide (IMODIUM) 2 mg capsule Comments:   Reason for Stopping:               Patient seen in the context of the Novel Coronavirus (COVID19) pandemic, utilizing contemporary protocols and evidence based on the most up to date available evidence, understanding that the current evidence has the potential to change as additional information becomes available. This note is dictated utilizing Dragon voice recognition software. Unfortunately this leads to occasional typographical errors using the voice recognition. I apologize in advance if the situation occurs.  If questions occur please do not hesitate to contact me directly.     Stanford Bishop, DO

## 2023-01-17 NOTE — ED NOTES
Monroe bag switched to leg bag. Assisted pt with new set of clothes underwear and socks with discharge.

## 2023-01-17 NOTE — ED TRIAGE NOTES
Pt c/o godoy catheter leaking; hx chronic urinary godoy catheter; multiple ED visits for catheter problems

## 2023-01-19 LAB
BACTERIA SPEC CULT: ABNORMAL
COLONY COUNT,CNT: ABNORMAL
COLONY COUNT,CNT: ABNORMAL
SPECIAL REQUESTS,SREQ: ABNORMAL

## 2023-01-23 ENCOUNTER — TELEPHONE (OUTPATIENT)
Dept: FAMILY MEDICINE CLINIC | Age: 73
End: 2023-01-23

## 2023-01-24 ENCOUNTER — TELEPHONE (OUTPATIENT)
Dept: FAMILY MEDICINE CLINIC | Age: 73
End: 2023-01-24

## 2023-01-24 PROBLEM — N39.0 UTI (URINARY TRACT INFECTION): Status: RESOLVED | Noted: 2022-12-25 | Resolved: 2023-01-24

## 2023-01-24 NOTE — TELEPHONE ENCOUNTER
Patient and daughter called returning clinical staff calls. Please call patient back at [phone].

## 2023-01-24 NOTE — TELEPHONE ENCOUNTER
I called patient to inquire if she is currently on levemir as received a prior auth for it. It shows is our records patient is on lantus and not levemir. No answer and vm was left for a return call. I also called and spoke with the pharmacy and they stated that levemir is an old script that had never been filled. I advised that she is not on levemir.

## 2023-01-27 ENCOUNTER — HOSPITAL ENCOUNTER (EMERGENCY)
Age: 73
Discharge: HOME | End: 2023-01-27
Attending: EMERGENCY MEDICINE | Admitting: EMERGENCY MEDICINE
Payer: MEDICARE

## 2023-01-27 VITALS
TEMPERATURE: 98.7 F | BODY MASS INDEX: 26.68 KG/M2 | HEIGHT: 67 IN | DIASTOLIC BLOOD PRESSURE: 76 MMHG | OXYGEN SATURATION: 99 % | RESPIRATION RATE: 18 BRPM | WEIGHT: 170 LBS | SYSTOLIC BLOOD PRESSURE: 131 MMHG | HEART RATE: 94 BPM

## 2023-01-27 DIAGNOSIS — T83.511A URINARY TRACT INFECTION ASSOCIATED WITH INDWELLING URETHRAL CATHETER, INITIAL ENCOUNTER (HCC): ICD-10-CM

## 2023-01-27 DIAGNOSIS — T83.9XXA PROBLEM WITH FOLEY CATHETER, INITIAL ENCOUNTER (HCC): Primary | ICD-10-CM

## 2023-01-27 DIAGNOSIS — R11.0 NAUSEA WITHOUT VOMITING: ICD-10-CM

## 2023-01-27 DIAGNOSIS — N39.0 URINARY TRACT INFECTION ASSOCIATED WITH INDWELLING URETHRAL CATHETER, INITIAL ENCOUNTER (HCC): ICD-10-CM

## 2023-01-27 LAB
APPEARANCE UR: ABNORMAL
BACTERIA URNS QL MICRO: ABNORMAL /HPF
BILIRUB UR QL: NEGATIVE
COLOR UR: YELLOW
EPITH CASTS URNS QL MICRO: ABNORMAL /LPF
GLUCOSE BLD STRIP.AUTO-MCNC: 227 MG/DL (ref 65–100)
GLUCOSE UR STRIP.AUTO-MCNC: 250 MG/DL
HGB UR QL STRIP: ABNORMAL
KETONES UR QL STRIP.AUTO: 15 MG/DL
LEUKOCYTE ESTERASE UR QL STRIP.AUTO: ABNORMAL
NITRITE UR QL STRIP.AUTO: NEGATIVE
PERFORMED BY, TECHID: ABNORMAL
PH UR STRIP: 6 (ref 5–8)
PROT UR STRIP-MCNC: 100 MG/DL
RBC #/AREA URNS HPF: ABNORMAL /HPF (ref 0–5)
SP GR UR REFRACTOMETRY: 1.02 (ref 1–1.03)
UROBILINOGEN UR QL STRIP.AUTO: 0.1 EU/DL (ref 0.2–1)
WBC URNS QL MICRO: ABNORMAL /HPF (ref 0–4)
YEAST URNS QL MICRO: PRESENT

## 2023-01-27 PROCEDURE — 74011250637 HC RX REV CODE- 250/637: Performed by: EMERGENCY MEDICINE

## 2023-01-27 PROCEDURE — 81001 URINALYSIS AUTO W/SCOPE: CPT

## 2023-01-27 PROCEDURE — 82962 GLUCOSE BLOOD TEST: CPT

## 2023-01-27 PROCEDURE — 99283 EMERGENCY DEPT VISIT LOW MDM: CPT

## 2023-01-27 PROCEDURE — 74011250636 HC RX REV CODE- 250/636: Performed by: EMERGENCY MEDICINE

## 2023-01-27 RX ORDER — ONDANSETRON 4 MG/1
4 TABLET, ORALLY DISINTEGRATING ORAL
Status: COMPLETED | OUTPATIENT
Start: 2023-01-27 | End: 2023-01-27

## 2023-01-27 RX ORDER — POTASSIUM CHLORIDE 750 MG/1
TABLET, FILM COATED, EXTENDED RELEASE ORAL
COMMUNITY
End: 2023-01-27

## 2023-01-27 RX ORDER — LEVOFLOXACIN 250 MG/1
500 TABLET ORAL ONCE
Status: COMPLETED | OUTPATIENT
Start: 2023-01-27 | End: 2023-01-27

## 2023-01-27 RX ORDER — LEVOFLOXACIN 500 MG/1
500 TABLET, FILM COATED ORAL DAILY
Qty: 7 TABLET | Refills: 0 | Status: SHIPPED | OUTPATIENT
Start: 2023-01-27 | End: 2023-02-03

## 2023-01-27 RX ORDER — ONDANSETRON 4 MG/1
4 TABLET, FILM COATED ORAL
Qty: 12 TABLET | Refills: 0 | Status: SHIPPED | OUTPATIENT
Start: 2023-01-27 | End: 2023-01-31

## 2023-01-27 RX ORDER — DULOXETIN HYDROCHLORIDE 30 MG/1
CAPSULE, DELAYED RELEASE ORAL
COMMUNITY

## 2023-01-27 RX ORDER — ASPIRIN 81 MG/1
TABLET ORAL
COMMUNITY
End: 2023-01-27

## 2023-01-27 RX ADMIN — ONDANSETRON 4 MG: 4 TABLET, ORALLY DISINTEGRATING ORAL at 19:28

## 2023-01-27 RX ADMIN — LEVOFLOXACIN 500 MG: 250 TABLET, FILM COATED ORAL at 20:33

## 2023-01-28 NOTE — ED PROVIDER NOTES
EMERGENCY DEPARTMENT HISTORY AND PHYSICAL EXAM      Date: 1/27/2023  Patient Name: Joshua Moe    History of Presenting Illness     Chief Complaint   Patient presents with    Urinary Catheter Problem       History Provided By: Patient    HPI: Joshua Moe, 71 y.o. female   presents to the ED with cc of Monroe catheter problem. Patient with a chronic indwelling Monroe catheter came to emergency room complaining of possible catheter obstruction. Patient states there is a decrease of urine output and bag is not filling up. No abdominal pain. Patient complains of mild nausea without vomiting or diarrhea. No fever or chills. No back pain. PCP: Lynette Ruiz DO    No current facility-administered medications on file prior to encounter. Current Outpatient Medications on File Prior to Encounter   Medication Sig Dispense Refill    DULoxetine (CYMBALTA) 30 mg capsule 1 cap(s)      insulin NPH (NovoLIN N NPH U-100 Insulin) 100 unit/mL injection Novolin N NPH U-100 Insulin      [DISCONTINUED] aspirin delayed-release (Aspir-81) 81 mg tablet       [DISCONTINUED] potassium chloride SR (KLOR-CON 10) 10 mEq tablet one      [DISCONTINUED] calcium carbonate (CALCIUM 600 PO) Calcium 600   qd      [DISCONTINUED] amlodipine besylate (AMLODIPINE PO) amlodipine      amLODIPine (NORVASC) 5 mg tablet       insulin glargine (Lantus Solostar U-100 Insulin) 100 unit/mL (3 mL) inpn 26 Units by SubCUTAneous route nightly. metFORMIN (GLUCOPHAGE) 500 mg tablet Take 500 mg by mouth nightly. tamsulosin (FLOMAX) 0.4 mg capsule Take 0.4 mg by mouth daily. famotidine (Pepcid AC) 20 mg tablet Take 20 mg by mouth nightly. metFORMIN ER (GLUCOPHAGE XR) 500 mg tablet Take 2 Tablets by mouth two (2) times a day. (Patient taking differently: Take 1,000 mg by mouth daily.) 360 Tablet 1    atorvastatin (LIPITOR) 20 mg tablet Take 1 Tablet by mouth daily.  (Patient taking differently: Take 20 mg by mouth two (2) times a day.) 90 Tablet 1    potassium chloride (KLOR-CON M10) 10 mEq tablet Take 1 Tablet by mouth daily. Take 1 tab by mouth daily for low potassium. (Patient not taking: Reported on 2022) 90 Tablet 1    ERGOCALCIFEROL, VITAMIN D2, PO Take 500 Units by mouth two (2) times a day. (Patient not taking: Reported on 2022)      calcium-cholecalciferol, d3, 500 mg-10 mcg (400 unit) chew Calcium 500 + D 500 mg-10 mcg (400 unit) chewable tablet   Take by oral route. glucose blood VI test strips (OneTouch Ultra Test) strip Use to check blood glucose before meals and at bedtime. 120 Strip 11    aspirin delayed-release 81 mg tablet Take 1 Tablet by mouth daily. Indications: treatment to prevent a heart attack 90 Tablet 3    alcohol swabs (Alcohol Wipes) padm Use to check blood glucose once daily.  100 Pad 3       Past History     Past Medical History:  Past Medical History:   Diagnosis Date    At risk for infection associated with Monroe catheter     Bladder cancer ([facility] [address]     Dementia ([facility] [address]     DKA (diabetic ketoacidosis) ([facility] [address] 2022    Essential hypertension 2020    MRSA (methicillin resistant staph aureus) culture positive     Non-alcoholic fatty liver disease 2020    Right groin ulcer ([facility] [address] 2022       Past Surgical History:  Past Surgical History:   Procedure Laterality Date    HX CATARACT REMOVAL Left 2019    HX CHOLECYSTECTOMY      HX COLONOSCOPY  2002    HX HYSTERECTOMY      partial  20 yrs ago    HX TUBAL LIGATION         Family History:  Family History   Problem Relation Age of Onset    Diabetes Mother     Diabetes Maternal Grandmother     Other Maternal Grandmother         CHF    Diabetes Paternal Grandmother        Social History:  Social History     Tobacco Use    Smoking status: Former     Packs/day: 1.00     Years: 10.00     Pack years: 10.00     Types: Cigarettes     Quit date:      Years since quittin.1    Smokeless tobacco: Never   Vaping Use    Vaping Use: Never used   Substance Use Topics    Alcohol use: Not Currently    Drug use: Not Currently       Allergies: Allergies   Allergen Reactions    Adhesive Unknown (comments)    Penicillins Other (comments)    Sulfa (Sulfonamide Antibiotics) Unknown (comments)         Review of Systems   Review of Systems   Constitutional:  Negative for chills and fever. HENT:  Negative for sore throat. Eyes:  Negative for discharge. Respiratory:  Negative for shortness of breath. Cardiovascular:  Negative for chest pain. Gastrointestinal:  Negative for abdominal pain and vomiting. Genitourinary:  Negative for dysuria. Musculoskeletal:  Negative for back pain. Skin:  Negative for rash. Neurological:  Negative for headaches. Psychiatric/Behavioral:  Negative for suicidal ideas. All other systems reviewed and are negative. Physical Exam   Physical Exam  Vitals and nursing note reviewed. Constitutional:       General: She is not in acute distress. Appearance: Normal appearance. She is well-developed. She is not ill-appearing or toxic-appearing. HENT:      Head: Normocephalic and atraumatic. Nose: No congestion. Mouth/Throat:      Mouth: Mucous membranes are moist.   Eyes:      Conjunctiva/sclera: Conjunctivae normal.   Cardiovascular:      Rate and Rhythm: Regular rhythm. Heart sounds: Normal heart sounds. Pulmonary:      Effort: Pulmonary effort is normal.      Breath sounds: Normal breath sounds. Abdominal:      General: Bowel sounds are normal.      Palpations: Abdomen is soft. Tenderness: There is no abdominal tenderness. There is no guarding or rebound. Musculoskeletal:         General: No deformity. Cervical back: Neck supple. Skin:     General: Skin is warm and dry. Neurological:      General: No focal deficit present. Mental Status: She is alert.    Psychiatric:         Mood and Affect: Mood normal.       Diagnostic Study Results     Labs -     Recent Results (from the past 12 hour(s))   GLUCOSE, POC    Collection Time: 01/27/23  7:31 PM   Result Value Ref Range    Glucose (POC) 227 (H) 65 - 100 mg/dL    Performed by Sadie Lawrence    URINALYSIS W/ RFLX MICROSCOPIC    Collection Time: 01/27/23  8:01 PM   Result Value Ref Range    Color Yellow      Appearance Cloudy (A) Clear      Specific gravity 1.020 1.003 - 1.030      pH (UA) 6.0 5.0 - 8.0      Protein 100 (A) Negative mg/dL    Glucose 250 (A) Negative mg/dL    Ketone 15 (A) Negative mg/dL    Bilirubin Negative Negative      Blood Small (A) Negative      Urobilinogen 0.1 (L) 0.2 - 1.0 EU/dL    Nitrites Negative Negative      Leukocyte Esterase Large (A) Negative     URINE MICROSCOPIC    Collection Time: 01/27/23  8:01 PM   Result Value Ref Range    WBC  0 - 4 /hpf    RBC 0-5 0 - 5 /hpf    Epithelial cells Few Few /lpf    Bacteria 1+ (A) Negative /hpf    Yeast Present (A) Negative         Radiologic Studies -   No orders to display     CT Results  (Last 48 hours)      None          CXR Results  (Last 48 hours)      None              Medical Decision Making   I am the first provider for this patient. I reviewed the vital signs, available nursing notes, past medical history, past surgical history, family history and social history. Vital Signs-Reviewed the patient's vital signs. Patient Vitals for the past 12 hrs:   Temp Pulse Resp BP SpO2   01/27/23 1858 98.7 °F (37.1 °C) 94 18 131/76 99 %       Records Reviewed:     Provider Notes (Medical Decision Making):   Patient present with complaint of Monroe catheter obstruction. Nurse irrigated the Monroe and replaced the leg bag which should resolve the issue. Patient had yellow clear urine in the bag.  UA is consistent with UTI associate with a catheter. Clinically patient nontoxic-appearing and afebrile. Abdomen soft nontender. Patient was given dose of Levaquin p.o. and Zofran ODT for nausea with improvement.   Patient was discharged improved and stable condition. ED Course:   Initial assessment performed. The patients presenting problems have been discussed, and they are in agreement with the care plan formulated and outlined with them. I have encouraged them to ask questions as they arise throughout their visit. No vomiting      PROCEDURES      Disposition: Condition stable   DC- Adult Discharges: All of the diagnostic tests were reviewed and questions answered. Diagnosis, care plan and treatment options were discussed. understand instructions and will follow up as directed. The patients results have been reviewed with them. They have been counseled regarding their diagnosis. The patient verbally convey understanding and agreement of the signs, symptoms, diagnosis, treatment and prognosis and additionally agrees to follow up as recommended. They also agree with the care-plan and convey that all of their questions have been answered. I have also put together some discharge instructions for them that include: 1) educational information regarding their diagnosis, 2) how to care for their diagnosis at home, as well a 3) list of reasons why they would want to return to the ED prior to their follow-up appointment, should their condition change. PLAN:  1. Discharge Medication List as of 1/27/2023  8:29 PM        START taking these medications    Details   levoFLOXacin (Levaquin) 500 mg tablet Take 1 Tablet by mouth daily for 7 days. , Normal, Disp-7 Tablet, R-0      ondansetron hcl (Zofran) 4 mg tablet Take 1 Tablet by mouth every eight (8) hours as needed for Nausea or Vomiting for up to 4 days. , Normal, Disp-12 Tablet, R-0           CONTINUE these medications which have NOT CHANGED    Details   DULoxetine (CYMBALTA) 30 mg capsule 1 cap(s), Historical Med      insulin NPH (NovoLIN N NPH U-100 Insulin) 100 unit/mL injection Novolin N NPH U-100 Insulin, Historical Med      amLODIPine (NORVASC) 5 mg tablet Historical Med      insulin glargine (Lantus Solostar U-100 Insulin) 100 unit/mL (3 mL) inpn 26 Units by SubCUTAneous route nightly., Historical Med      metFORMIN (GLUCOPHAGE) 500 mg tablet Take 500 mg by mouth nightly., Historical Med      tamsulosin (FLOMAX) 0.4 mg capsule Take 0.4 mg by mouth daily. , Historical Med      famotidine (Pepcid AC) 20 mg tablet Take 20 mg by mouth nightly., Historical Med      metFORMIN ER (GLUCOPHAGE XR) 500 mg tablet Take 2 Tablets by mouth two (2) times a day., Normal, Disp-360 Tablet, R-1      atorvastatin (LIPITOR) 20 mg tablet Take 1 Tablet by mouth daily. , Normal, Disp-90 Tablet, R-1      potassium chloride (KLOR-CON M10) 10 mEq tablet Take 1 Tablet by mouth daily. Take 1 tab by mouth daily for low potassium. , Normal, Disp-90 Tablet, R-1      ERGOCALCIFEROL, VITAMIN D2, PO Take 500 Units by mouth two (2) times a day., Historical Med      calcium-cholecalciferol, d3, 500 mg-10 mcg (400 unit) chew Calcium 500 + D 500 mg-10 mcg (400 unit) chewable tablet   Take by oral route., Historical Med      glucose blood VI test strips (OneTouch Ultra Test) strip Use to check blood glucose before meals and at bedtime., Normal, Disp-120 Strip, R-11      aspirin delayed-release 81 mg tablet Take 1 Tablet by mouth daily. Indications: treatment to prevent a heart attack, Normal, Disp-90 Tablet, R-3      alcohol swabs (Alcohol Wipes) padm Use to check blood glucose once daily. , Normal, Disp-100 Pad, R-3           2. Follow-up Information       Follow up With Specialties Details Why Contact Info    Follow up with your primary care physician  Schedule an appointment as soon as possible for a visit in 3 days As needed           Return to ED if worse     Diagnosis     Clinical Impression:   1. Problem with Monroe catheter, initial encounter ([facility] [address]    2. Urinary tract infection associated with indwelling urethral catheter, initial encounter ([facility] [address]    3.  Nausea without vomiting        Please note that this dictation was completed with [employer] computer voice recognition software. Quite often unanticipated grammatical, syntax, homophones, and other interpretive errors are inadvertently transcribed by the computer software. Please disregard these errors. Please excuse any errors that have escaped final proofreading. Thank you.

## 2023-01-29 ENCOUNTER — HOSPITAL ENCOUNTER (EMERGENCY)
Age: 73
Discharge: HOME | End: 2023-01-29
Attending: EMERGENCY MEDICINE
Payer: MEDICARE

## 2023-01-29 VITALS
WEIGHT: 170 LBS | SYSTOLIC BLOOD PRESSURE: 130 MMHG | OXYGEN SATURATION: 100 % | DIASTOLIC BLOOD PRESSURE: 63 MMHG | RESPIRATION RATE: 20 BRPM | HEIGHT: 66 IN | BODY MASS INDEX: 27.32 KG/M2 | HEART RATE: 89 BPM | TEMPERATURE: 98.9 F

## 2023-01-29 DIAGNOSIS — T83.9XXA FOLEY CATHETER PROBLEM, INITIAL ENCOUNTER (HCC): Primary | ICD-10-CM

## 2023-01-29 PROCEDURE — 99282 EMERGENCY DEPT VISIT SF MDM: CPT

## 2023-01-30 NOTE — ED PROVIDER NOTES
EMERGENCY DEPARTMENT HISTORY AND PHYSICAL EXAM      Date: 1/29/2023  Patient Name: Zuly Lema    History of Presenting Illness     Chief Complaint   Patient presents with    Urinary Catheter Problem       History Provided By: Patient    HPI: Zuly Lema, 71 y.o. female presents to the ED with CC of malfunctioning Monroe catheter. Patient's had multiple visits to the emergency room for adjustment of her Monroe catheter. She says when she says it sounds to be that she pees around it at this point time and does not feel she needs it was asking us to remove it. She says she does not know why she has it at this point. She denies any fever, chills, nausea, vomiting, chest pain, shortness of breath, rash, diarrhea, headache, night sweats, weight loss. Symptoms are mild to moderate. Patient denies SOB, chest pain, or any neurological symptoms. There are no other complaints, changes, or physical findings at this time. Past History     Past Medical History:  Past Medical History:   Diagnosis Date    At risk for infection associated with Monroe catheter     Bladder cancer ([facility] [address]     Dementia ([facility] [address]     DKA (diabetic ketoacidosis) ([facility] [address] 04/06/2022    Essential hypertension 07/21/2020    MRSA (methicillin resistant staph aureus) culture positive 89/41/2277    Non-alcoholic fatty liver disease 07/21/2020    Right groin ulcer ([facility] [address] 04/22/2022       Allergies: Allergies   Allergen Reactions    Adhesive Unknown (comments)    Penicillins Other (comments)    Sulfa (Sulfonamide Antibiotics) Unknown (comments)       Review of Systems   Vital signs and nursing notes reviewed  Review of Systems   Genitourinary:  Positive for decreased urine volume. Monroe catheter problem   All other systems reviewed and are negative.       Physical Exam   Visit Vitals  /63   Pulse 89   Temp 98.9 °F (37.2 °C)   Resp 20   Ht 5' 6\" (1.676 m)   Wt 77.1 kg (170 lb)   SpO2 100%   BMI 27.44 kg/m²     CONSTITUTIONAL: Alert, in no distress. Appears stated age. RESP: Normal with no work of breathing, speaking in full sentences. CV: Well perfused. : Monroe catheter in place uncomplicated  Medical Decision Making     ED Course:   Initial assessment performed. The patients presenting problems have been discussed, and they are in agreement with the care plan formulated and outlined with them. I have encouraged them to ask questions as they arise throughout their visit. At patient's request we are removing Monroe catheter we will have her follow-up with urology as an outpatient. She is able to urinate without it. Critical Care Time: None    Disposition:  DISCHARGE NOTE:  The pt is ready for discharge. The pt's signs, symptoms, diagnosis, and discharge instructions have been discussed and pt has conveyed their understanding. The pt is to follow up as recommended or return to ER should their symptoms worsen. Plan has been discussed and pt is in agreement. PLAN:  1. Current Discharge Medication List        2. Follow-up Information       Follow up With Specialties Details Why Contact Info    [address], Lyndsay Amado, DO Family Medicine Call in 2 days  [address] [place] [address]  [phone]      your urologist  Call in 2 days As needed, If symptoms worsen           3. Take Tylenol or Ibuprofen as needed  4. Drink plenty of fluids  5. Return to ED if worse especially if any shortness of breath, chest pain or altered mentation. Diagnosis     Clinical Impression:   1. Monroe catheter problem, initial encounter [facility])            Please note that this dictation was completed with [employer], the computer voice recognition software. Quite often unanticipated grammatical, syntax, homophones, and other interpretive errors are inadvertently transcribed by the computer software. Please   disregards these errors. Please excuse any errors that have escaped final proofreading.

## 2023-01-30 NOTE — ED TRIAGE NOTES
Pt seen 2 nights ago in ED [place] irrigated, now leaking around urethra as described by patient, on abx

## 2023-02-09 ENCOUNTER — OFFICE VISIT (OUTPATIENT)
Dept: FAMILY MEDICINE CLINIC | Age: 73
End: 2023-02-09

## 2023-02-09 VITALS
DIASTOLIC BLOOD PRESSURE: 62 MMHG | RESPIRATION RATE: 18 BRPM | HEART RATE: 67 BPM | TEMPERATURE: 97.5 F | SYSTOLIC BLOOD PRESSURE: 132 MMHG | WEIGHT: 184 LBS | BODY MASS INDEX: 29.57 KG/M2 | OXYGEN SATURATION: 99 % | HEIGHT: 66 IN

## 2023-02-09 DIAGNOSIS — N18.30 STAGE 3 CHRONIC KIDNEY DISEASE, UNSPECIFIED WHETHER STAGE 3A OR 3B CKD (HCC): ICD-10-CM

## 2023-02-09 DIAGNOSIS — N63.0 MASS OF BREAST, UNSPECIFIED LATERALITY: ICD-10-CM

## 2023-02-09 DIAGNOSIS — D64.9 ANEMIA, UNSPECIFIED TYPE: ICD-10-CM

## 2023-02-09 DIAGNOSIS — Z86.73 HISTORY OF STROKE: ICD-10-CM

## 2023-02-09 DIAGNOSIS — Z91.14 NON COMPLIANCE W MEDICATION REGIMEN: ICD-10-CM

## 2023-02-09 DIAGNOSIS — F33.9 RECURRENT MAJOR DEPRESSIVE DISORDER, REMISSION STATUS UNSPECIFIED (HCC): ICD-10-CM

## 2023-02-09 DIAGNOSIS — I10 ESSENTIAL HYPERTENSION: Primary | ICD-10-CM

## 2023-02-09 DIAGNOSIS — E03.9 ACQUIRED HYPOTHYROIDISM: ICD-10-CM

## 2023-02-09 DIAGNOSIS — Z76.89 ENCOUNTER TO ESTABLISH CARE WITH NEW DOCTOR: ICD-10-CM

## 2023-02-09 DIAGNOSIS — E11.65 UNCONTROLLED TYPE 2 DIABETES MELLITUS WITH HYPERGLYCEMIA (HCC): ICD-10-CM

## 2023-02-09 DIAGNOSIS — I48.0 PAROXYSMAL ATRIAL FIBRILLATION (HCC): ICD-10-CM

## 2023-02-09 DIAGNOSIS — C67.9 MALIGNANT NEOPLASM OF URINARY BLADDER, UNSPECIFIED SITE (HCC): ICD-10-CM

## 2023-02-09 DIAGNOSIS — E78.5 HYPERLIPIDEMIA, UNSPECIFIED HYPERLIPIDEMIA TYPE: ICD-10-CM

## 2023-02-09 PROBLEM — N18.31 STAGE 3A CHRONIC KIDNEY DISEASE (HCC): Status: ACTIVE | Noted: 2023-02-09

## 2023-02-09 PROBLEM — R07.9 CHEST PAIN: Status: ACTIVE | Noted: 2021-01-18

## 2023-02-09 PROBLEM — E11.9 TYPE 2 DIABETES MELLITUS (HCC): Status: RESOLVED | Noted: 2021-01-18 | Resolved: 2023-02-09

## 2023-02-09 PROBLEM — R00.2 PALPITATIONS: Status: ACTIVE | Noted: 2021-01-18

## 2023-02-09 PROBLEM — M25.561 RECURRENT PAIN OF RIGHT KNEE: Status: RESOLVED | Noted: 2022-04-06 | Resolved: 2023-02-09

## 2023-02-09 PROBLEM — N18.31 STAGE 3A CHRONIC KIDNEY DISEASE (HCC): Status: RESOLVED | Noted: 2023-02-09 | Resolved: 2023-02-09

## 2023-02-09 PROBLEM — I47.10 SUPRAVENTRICULAR TACHYCARDIA: Status: RESOLVED | Noted: 2021-01-18 | Resolved: 2023-02-09

## 2023-02-09 PROBLEM — R07.9 CHEST PAIN: Status: RESOLVED | Noted: 2021-01-18 | Resolved: 2023-02-09

## 2023-02-09 PROBLEM — I47.1 SUPRAVENTRICULAR TACHYCARDIA (HCC): Status: RESOLVED | Noted: 2021-01-18 | Resolved: 2023-02-09

## 2023-02-09 PROBLEM — I47.10 SUPRAVENTRICULAR TACHYCARDIA: Status: ACTIVE | Noted: 2021-01-18

## 2023-02-09 PROBLEM — Z91.89 AT RISK FOR UTI RELATED TO INDWELLING CATHETER: Status: RESOLVED | Noted: 2022-04-22 | Resolved: 2023-02-09

## 2023-02-09 PROBLEM — R00.2 PALPITATIONS: Status: RESOLVED | Noted: 2021-01-18 | Resolved: 2023-02-09

## 2023-02-09 PROBLEM — I63.9 CEREBRAL INFARCTION (HCC): Status: ACTIVE | Noted: 2021-01-18

## 2023-02-09 PROBLEM — I16.0 HYPERTENSIVE URGENCY: Status: RESOLVED | Noted: 2022-12-25 | Resolved: 2023-02-09

## 2023-02-09 PROBLEM — I47.1 SUPRAVENTRICULAR TACHYCARDIA (HCC): Status: ACTIVE | Noted: 2021-01-18

## 2023-02-09 PROBLEM — E11.9 TYPE 2 DIABETES MELLITUS (HCC): Status: ACTIVE | Noted: 2021-01-18

## 2023-02-09 RX ORDER — MUPIROCIN 20 MG/G
OINTMENT TOPICAL
COMMUNITY

## 2023-02-09 RX ORDER — FLUCONAZOLE 100 MG/1
100 TABLET ORAL DAILY
COMMUNITY
Start: 2022-12-31

## 2023-02-09 RX ORDER — DULAGLUTIDE 0.75 MG/.5ML
INJECTION, SOLUTION SUBCUTANEOUS AS DIRECTED
COMMUNITY
Start: 2022-10-20

## 2023-02-09 RX ORDER — CLOPIDOGREL BISULFATE 75 MG/1
TABLET ORAL
COMMUNITY

## 2023-02-09 RX ORDER — PANTOPRAZOLE SODIUM 40 MG/1
TABLET, DELAYED RELEASE ORAL
COMMUNITY
Start: 2022-12-27

## 2023-02-09 RX ORDER — INSULIN DETEMIR 100 [IU]/ML
INJECTION, SOLUTION SUBCUTANEOUS
COMMUNITY

## 2023-02-09 RX ORDER — VENLAFAXINE HYDROCHLORIDE 37.5 MG/1
CAPSULE, EXTENDED RELEASE ORAL
COMMUNITY

## 2023-02-09 RX ORDER — LEVOTHYROXINE SODIUM 25 UG/1
TABLET ORAL
COMMUNITY

## 2023-02-09 RX ORDER — OFLOXACIN 3 MG/ML
SOLUTION/ DROPS OPHTHALMIC
COMMUNITY

## 2023-02-09 RX ORDER — LINEZOLID 600 MG/1
TABLET, FILM COATED ORAL
COMMUNITY

## 2023-02-09 RX ORDER — METOPROLOL SUCCINATE 50 MG/1
TABLET, EXTENDED RELEASE ORAL
COMMUNITY

## 2023-02-09 RX ORDER — MIDODRINE HYDROCHLORIDE 5 MG/1
TABLET ORAL
COMMUNITY

## 2023-02-09 RX ORDER — FERROUS FUMARATE 324(106)MG
1 TABLET ORAL DAILY
COMMUNITY
Start: 2022-12-27

## 2023-02-09 RX ORDER — ACETAMINOPHEN 500 MG
TABLET ORAL
COMMUNITY

## 2023-02-09 RX ORDER — PRAVASTATIN SODIUM 40 MG/1
TABLET ORAL
COMMUNITY
End: 2023-02-09

## 2023-02-09 RX ORDER — ROSUVASTATIN CALCIUM 20 MG/1
TABLET, COATED ORAL
COMMUNITY
End: 2023-02-09

## 2023-02-09 RX ORDER — NITROFURANTOIN MACROCRYSTALS 50 MG/1
CAPSULE ORAL
COMMUNITY
Start: 2023-01-22

## 2023-02-09 NOTE — PROGRESS NOTES
Subjective  Chief Complaint   Patient presents with    [facility] Care     HPI:  Kevin Pizarro is a 71 y.o. female with medical problems as listed below who presents to establish care. Former patient of Cristela Chowdhury NP. Ghulam Andino, her grandson, was supposed to come with her to appointment, but he couldn't make it. Spoke with Ellie Levi, patient's daughter who now lives in [place]. Patient is a very poor historian and knows very little of her medical history/medications. Past medical history: HTN, paroxysmal atrial fibrillation s/p Watchman , NAFLD, hypothyroidism? (Patient denies), DM2, history of \"mini\" stroke (~2020), history of bladder cancer, CKD, anemia? (patient denies), chronic lower back pain, HLD, depression? (patient denies), apparent medication noncompliance    Medications: Patient is not sure what medicine she is taking. She did not bring them with her to this appointment and she does not have a list with her. Allergies: PCN, sulfa drugs (unknown reaction)  Specialists: Dr. Joann Shah (Urology in [place]). Patient says she doesn't see a heart doctor or Oncology. She does see Endocrinology in [place] but she is uncertain who. She cannot say when she saw them last. [facility] Cardiology. Dermatology. Surgical history: cataract surgery, cholecystectomy, hysterectomy, tubal ligation  Family history: DM (mom). DM (matGM). DM (patGM). Social history: Former smoker, quit in [facility], 10 pack-year history. DM2: Last A1c >14. Checks glucose in morning and sometimes at night. She says the numbers are mostly under 200. Taking 28 units of unknown insulin at night, she cannot say whether its long-acting or short-acting. She also takes metformin 1g BID. It sounds like she has picked up Trulicity but she says it's just sitting in her fridge. She doesn't know how to use it. This morning, glucose was 196. She does have FreeStyle Betty. Patient says she doesn't eat 3 meals a day. She eats 1-2 meals per day.   Endocrinology follow up appointment next month. She last saw them in November. DKA in 2022. Daughter states that Monique Radford (grandson) helps her with her medications. Urinary retention: Had indwelling Monroe catheter but it is now out. Sees Urology next Thursday. Breast mass: Daughter states that patient has a breast mass and she is supposed to see a breast surgeon for further evaluation, but they have not arranged this yet.       Patient Active Problem List   Diagnosis Code    Essential hypertension T59    Non-alcoholic fatty liver disease K76.0    Uncontrolled type 2 diabetes mellitus with hyperglycemia ([facility] [address] E11.65    Major depressive disorder, recurrent, unspecified F33.9    At high risk for falls Z91.81    Acquired hypothyroidism E03.9    Multilevel spondylosis M47.819    Chronic low back pain without sciatica M54.50, G89.29    Bladder cancer (HCC) C67.9    Anemia D64.9    Microcytosis R71.8    Dysphagia R13.10    Non compliance w medication regimen Z91.14    UTI (urinary tract infection) N39.0    History of stroke Z86.73    Hyperlipidemia E78.5    Paroxysmal atrial fibrillation (HCC) I48.0    Chronic renal disease, stage III N18.30    Breast mass N63.0     Family History   Problem Relation Age of Onset    Diabetes Mother     Diabetes Maternal Grandmother     Other Maternal Grandmother         CHF    Diabetes Paternal Grandmother      Social History     Tobacco Use    Smoking status: Former     Packs/day: 1.00     Years: 10.00     Pack years: 10.00     Types: Cigarettes     Quit date:      Years since quittin.2    Smokeless tobacco: Never   Vaping Use    Vaping Use: Never used   Substance Use Topics    Alcohol use: Not Currently    Drug use: Not Currently     Current Outpatient Medications on File Prior to Visit   Medication Sig Dispense Refill    cholecalciferol (VITAMIN D3) (2,000 UNITS /50 MCG) cap capsule 1 cap(s) (Patient not taking: Reported on 2023)      levothyroxine (SYNTHROID) 25 mcg tablet Euthyrox 25 mcg tablet (Patient not taking: Reported on 2/24/2023)      insulin NPH (NOVOLIN N, HUMULIN N) 100 unit/mL injection Novolin N NPH U-100 Insulin (Patient not taking: Reported on 2/24/2023)      amLODIPine (NORVASC) 5 mg tablet  (Patient not taking: Reported on 2/24/2023)      insulin glargine (LANTUS,BASAGLAR) 100 unit/mL (3 mL) inpn 26 Units by SubCUTAneous route nightly. (Patient not taking: Reported on 2/24/2023)      famotidine (PEPCID) 20 mg tablet Take 20 mg by mouth nightly. (Patient not taking: Reported on 2/24/2023)      metFORMIN ER (GLUCOPHAGE XR) 500 mg tablet Take 2 Tablets by mouth two (2) times a day. 360 Tablet 1    atorvastatin (LIPITOR) 20 mg tablet Take 1 Tablet by mouth daily. (Patient not taking: Reported on 2/24/2023) 90 Tablet 1    potassium chloride (KLOR-CON M10) 10 mEq tablet Take 1 Tablet by mouth daily. Take 1 tab by mouth daily for low potassium. (Patient not taking: Reported on 2/24/2023) 90 Tablet 1    ERGOCALCIFEROL, VITAMIN D2, PO Take 500 Units by mouth two (2) times a day. (Patient not taking: Reported on 2/24/2023)      glucose blood VI test strips (OneTouch Ultra Test) strip Use to check blood glucose before meals and at bedtime. (Patient not taking: Reported on 2/24/2023) 120 Strip 11    aspirin delayed-release 81 mg tablet Take 1 Tablet by mouth daily. Indications: treatment to prevent a heart attack (Patient not taking: Reported on 2/24/2023) 90 Tablet 3    alcohol swabs (Alcohol Wipes) padm Use to check blood glucose once daily. (Patient not taking: Reported on 2/24/2023) 100 Pad 3    clopidogreL (PLAVIX) 75 mg tab Plavix 75 mg tablet   Take 1 tablet every day by oral route. (Patient not taking: No sig reported)      dulaglutide (Trulicity) 6.22 NB/9.3 mL sub-q pen as directed. (Patient not taking: No sig reported)      Ferretts 325 mg (106 mg iron) tab Take 1 Tablet by mouth daily.  (Patient not taking: No sig reported)      fluconazole (DIFLUCAN) 100 mg tablet Take 100 mg by mouth daily. (Patient not taking: No sig reported)      insulin detemir U-100 (Levemir FlexTouch U-100 Insuln) 100 unit/mL (3 mL) inpn       linezolid (ZYVOX) 600 mg tablet linezolid 600 mg tablet   TAKE 1 TABLET BY MOUTH EVERY 12 HOURS (Patient not taking: No sig reported)      metoprolol succinate (TOPROL-XL) 50 mg XL tablet metoprolol succinate ER 50 mg tablet,extended release 24 hr   TAKE 1 TABLET BY MOUTH ONCE DAILY (Patient not taking: No sig reported)      midodrine (PROAMATINE) 5 mg tablet midodrine 5 mg tablet   TAKE 1/2 (ONE-HALF) TABLET BY MOUTH ONCE DAILY (Patient not taking: No sig reported)      mupirocin (BACTROBAN) 2 % ointment mupirocin 2 % topical ointment (Patient not taking: No sig reported)      nitrofurantoin (MACRODANTIN) 50 mg capsule TAKE 1 CAPSULE BY MOUTH AT BEDTIME (Patient not taking: No sig reported)      ofloxacin (OCUFLOX) 0.3 % ophthalmic solution ofloxacin 0.3 % eye drops (Patient not taking: No sig reported)      pantoprazole (PROTONIX) 40 mg tablet TAKE 1 TABLET BY MOUTH IN THE MORNING AND WAIT 30 MINUTES BEFORE EATING OR TAKING OTHER MEDICATIONS (Patient not taking: No sig reported)      venlafaxine-SR (EFFEXOR-XR) 37.5 mg capsule venlafaxine ER 37.5 mg capsule,extended release 24 hr   TAKE 1 CAPSULE BY MOUTH ONCE DAILY (Patient not taking: No sig reported)      DULoxetine (CYMBALTA) 30 mg capsule 1 cap(s) (Patient not taking: No sig reported)      calcium-cholecalciferol, d3, 500 mg-10 mcg (400 unit) chew Calcium 500 + D 500 mg-10 mcg (400 unit) chewable tablet   Take by oral route. (Patient not taking: No sig reported)       No current facility-administered medications on file prior to visit. Allergies   Allergen Reactions    Adhesive Unknown (comments)    Penicillins Other (comments)    Sulfa (Sulfonamide Antibiotics) Unknown (comments)     Review of Systems   Constitutional: Negative. Cardiovascular: Negative.       Objective  Visit Vitals  /62 (BP 1 Location: Left upper arm, BP Patient Position: Sitting, BP Cuff Size: Adult)   Pulse 67   Temp 97.5 °F (36.4 °C) (Temporal)   Resp 18   Ht 5' 6\" (1.676 m)   Wt 184 lb (83.5 kg)   SpO2 99%   BMI 29.70 kg/m²     Physical Exam  Constitutional:       General: She is not in acute distress. Appearance: Normal appearance. HENT:      Head: Normocephalic and atraumatic. Eyes:      Extraocular Movements: Extraocular movements intact. Conjunctiva/sclera: Conjunctivae normal.   Cardiovascular:      Rate and Rhythm: Normal rate and regular rhythm. Heart sounds: Normal heart sounds. Pulmonary:      Effort: Pulmonary effort is normal. No respiratory distress. Breath sounds: Normal breath sounds. Abdominal:      Palpations: Abdomen is soft. Musculoskeletal:         General: Normal range of motion. Cervical back: Normal range of motion. Skin:     General: Skin is warm and dry. Neurological:      General: No focal deficit present. Mental Status: She is alert. Assessment & Plan    ICD-10-CM ICD-9-CM    1. Essential hypertension  I10 401.9       2. Recurrent major depressive disorder, remission status unspecified ([facility])  F33.9 296.30       3. Malignant neoplasm of urinary bladder, unspecified site ([facility])  C67.9 188.9       4. Paroxysmal atrial fibrillation ([facility])  I48.0 427.31       5. Acquired hypothyroidism  E03.9 244.9       6. Uncontrolled type 2 diabetes mellitus with hyperglycemia ([facility])  E11.65 250.02       7. Stage 3 chronic kidney disease, unspecified whether stage 3a or 3b CKD ([facility])  N18.30 585.3       8. Anemia, unspecified type  D64.9 285.9       9. Hyperlipidemia, unspecified hyperlipidemia type  E78.5 272.4       10. Non compliance w medication regimen  Z91.14 V15.81       11. History of stroke  Z86.73 V12.54       12. Mass of breast, unspecified laterality  N63.0 611.72       13.  Encounter to establish care with new doctor  Z76.89 V65.8         Diagnoses and all orders for this visit: 1. Essential hypertension  Well controlled. Continue antihypertensive regimen. Will need to call pharmacy to get medication list. Metoprolol, amlodipine are listed. 2. Recurrent major depressive disorder, remission status unspecified ([facility] [address]  Possibly taking venlafaxine. Will need to call pharmacy to get accurate med list.   3. Malignant neoplasm of urinary bladder, unspecified site [facility])  Managed by Urology according to daughter. 4. Paroxysmal atrial fibrillation [facility])  Daughter states patient has Watchman device. Metoprolol on medication list.   5. Acquired hypothyroidism  No TSH in EMR. Likely managed by Endocrinology. Continue levothyroxine as prescribed. 6. Uncontrolled type 2 diabetes mellitus with hyperglycemia ([facility] [address]  Managed by Endocrinology. Last A1c >14. Patient has history of medication noncompliance. Manish Bautista is reportedly helping her with her medications. Continue insulin, metformin, Trulicity as prescribed. Encouraged patient to follow up with her endocrinologist. Records request form completed and faxed. 7. Stage 3 chronic kidney disease, unspecified whether stage 3a or 3b CKD (HCC)  Last Cr 1.17 (01/2023). 8. Anemia, unspecified type  Last Hgb 11.1 (01/2023). 9. Hyperlipidemia, unspecified hyperlipidemia type  Continue statin as prescribed. Patient is uncertain which one she is taking. 10. Non compliance w medication regimen  Will need to obtain fill history from pharmacy. Patient likely has dementia. Will need to deep dive through charts to weave together accurate medical history. 11. History of stroke  Continue ASA and statin. 12. Breast mass  Encouraged daughter to arrange appointment with breast surgeon. 13. Encounter to establish care with new doctor  History collected from patient and in part, from daughter over the phone. Will need to have patient return to finish piecing together medical history.  I am concerned that patient is not able to manage her medications at home and given that her grandson is not present at this appointment, I am concerned that she does not have the help at home that she might need. Follow-up and Dispositions    Return in about 2 weeks (around 2/23/2023) for Chronic problems (30 minutes at least).        Rosa Soriano, DO

## 2023-02-09 NOTE — PROGRESS NOTES
1. Have you been to the ER, urgent care clinic since your last visit? Hospitalized since your last visit? Yes, [id number] [facility] problem    2. Have you seen or consulted any other health care providers outside of the [address] [place] [address] since your last visit? Include any pap smears or colon screening.  No      Chief Complaint   Patient presents with    Establish Care     Visit Vitals  /62 (BP 1 Location: Left upper arm, BP Patient Position: Sitting, BP Cuff Size: Adult)   Pulse 67   Temp 97.5 °F (36.4 °C) (Temporal)   Resp 18   Ht 5' 6\" (1.676 m)   Wt 184 lb (83.5 kg)   SpO2 99%   BMI 29.70 kg/m²

## 2023-02-24 ENCOUNTER — OFFICE VISIT (OUTPATIENT)
Dept: FAMILY MEDICINE CLINIC | Age: 73
End: 2023-02-24
Payer: MEDICARE

## 2023-02-24 VITALS
HEIGHT: 66 IN | BODY MASS INDEX: 29.73 KG/M2 | TEMPERATURE: 97.9 F | SYSTOLIC BLOOD PRESSURE: 127 MMHG | OXYGEN SATURATION: 99 % | HEART RATE: 88 BPM | RESPIRATION RATE: 18 BRPM | WEIGHT: 185 LBS | DIASTOLIC BLOOD PRESSURE: 70 MMHG

## 2023-02-24 DIAGNOSIS — Z78.0 ASYMPTOMATIC MENOPAUSAL STATE: Primary | ICD-10-CM

## 2023-02-24 DIAGNOSIS — N63.0 MASS OF BREAST, UNSPECIFIED LATERALITY: ICD-10-CM

## 2023-02-24 DIAGNOSIS — C67.9 MALIGNANT NEOPLASM OF URINARY BLADDER, UNSPECIFIED SITE (HCC): ICD-10-CM

## 2023-02-24 PROBLEM — N18.30 CHRONIC RENAL DISEASE, STAGE III (HCC): Status: ACTIVE | Noted: 2023-02-24

## 2023-02-24 PROCEDURE — 1101F PT FALLS ASSESS-DOCD LE1/YR: CPT | Performed by: FAMILY MEDICINE

## 2023-02-24 PROCEDURE — G8417 CALC BMI ABV UP PARAM F/U: HCPCS | Performed by: FAMILY MEDICINE

## 2023-02-24 PROCEDURE — G8399 PT W/DXA RESULTS DOCUMENT: HCPCS | Performed by: FAMILY MEDICINE

## 2023-02-24 PROCEDURE — 99213 OFFICE O/P EST LOW 20 MIN: CPT | Performed by: FAMILY MEDICINE

## 2023-02-24 PROCEDURE — G9717 DOC PT DX DEP/BP F/U NT REQ: HCPCS | Performed by: FAMILY MEDICINE

## 2023-02-24 PROCEDURE — 1123F ACP DISCUSS/DSCN MKR DOCD: CPT | Performed by: FAMILY MEDICINE

## 2023-02-24 PROCEDURE — 3078F DIAST BP <80 MM HG: CPT | Performed by: FAMILY MEDICINE

## 2023-02-24 PROCEDURE — 3074F SYST BP LT 130 MM HG: CPT | Performed by: FAMILY MEDICINE

## 2023-02-24 PROCEDURE — 3017F COLORECTAL CA SCREEN DOC REV: CPT | Performed by: FAMILY MEDICINE

## 2023-02-24 PROCEDURE — G8536 NO DOC ELDER MAL SCRN: HCPCS | Performed by: FAMILY MEDICINE

## 2023-02-24 PROCEDURE — G8427 DOCREV CUR MEDS BY ELIG CLIN: HCPCS | Performed by: FAMILY MEDICINE

## 2023-02-24 PROCEDURE — 1090F PRES/ABSN URINE INCON ASSESS: CPT | Performed by: FAMILY MEDICINE

## 2023-02-24 NOTE — PROGRESS NOTES
Subjective  Chief Complaint   Patient presents with    Diabetes    Follow-up     HPI:  Reagan Macias is a 71 y.o. female with medical problems as listed below who presents for follow up to \[facility]\"" care visit. Grandson whom patient is living with currently is moving to [place] per patient. One of her daughters, Rachel Carlin, is in [place] who helps to manage the delivery of her medications from [facility]. The other one lives nearby and works from home. She also has one other daughter who has helped in the past. Patient says she is not worried about managing her medicines though she cannot recall what medications she's taking. Patient will go back to living with  who also has memory problems when grandson leaves. After calling pharmacy, levemir and metformin are the only meds that she has filled recently. Jayshree Woodson (grandson) [phone]. Surgical history: tubal ligation, hysterectomy, cholecystectomy, cataract  Family history: DM (mom). DM, CHF (matGM). DM (patGM). Social history: Former smoker, quit in [facility]. 10 pack-year history. No alcohol, no drugs. No regular exercise. She does report weak legs. Colon cancer screening: Last colonoscopy approximately 20 years ago. Patient does not wish to be referred at this time. Breast cancer screening: Last mammogram last year? Patient says mass was identified. She was supposed to follow up with breast surgeon. Osteoporosis screening: Never done? DM2: Patient just saw Endocrinology last week. Her grandson went to that appointment with her. She was taught how to administer Trulicity so she just started that. Breast mass: Got mammogram at [facility], and a mass of R breast was identified. Patient was supposed to go to a breast surgeon, but never followed up. She has a Urology appointment on Monday.      Patient Active Problem List   Diagnosis Code    Essential hypertension W50    Non-alcoholic fatty liver disease K76.0    Uncontrolled type 2 diabetes mellitus with hyperglycemia ([facility] [address] E11.65    Major depressive disorder, recurrent, unspecified F33.9    At high risk for falls Z91.81    Acquired hypothyroidism E03.9    Multilevel spondylosis M47.819    Chronic low back pain without sciatica M54.50, G89.29    Bladder cancer (HCC) C67.9    Anemia D64.9    Microcytosis R71.8    Dysphagia R13.10    Non compliance w medication regimen Z91.14    History of stroke Z86.73    Hyperlipidemia E78.5    Paroxysmal atrial fibrillation (HCC) I48.0    Chronic renal disease, stage III N18.30    Breast mass N63.0     Family History   Problem Relation Age of Onset    Diabetes Mother     Diabetes Maternal Grandmother     Other Maternal Grandmother         CHF    Diabetes Paternal Grandmother      Social History     Tobacco Use    Smoking status: Former     Packs/day: 1.00     Years: 10.00     Pack years: 10.00     Types: Cigarettes     Quit date:      Years since quittin.2    Smokeless tobacco: Never   Vaping Use    Vaping Use: Never used   Substance Use Topics    Alcohol use: Not Currently    Drug use: Not Currently     Current Outpatient Medications on File Prior to Visit   Medication Sig Dispense Refill    insulin detemir U-100 (Levemir FlexTouch U-100 Insuln) 100 unit/mL (3 mL) inpn       metFORMIN ER (GLUCOPHAGE XR) 500 mg tablet Take 2 Tablets by mouth two (2) times a day. 360 Tablet 1    cholecalciferol (VITAMIN D3) (2,000 UNITS /50 MCG) cap capsule 1 cap(s) (Patient not taking: Reported on 2023)      clopidogreL (PLAVIX) 75 mg tab Plavix 75 mg tablet   Take 1 tablet every day by oral route. (Patient not taking: No sig reported)      dulaglutide (Trulicity) 6.13 FI/6.6 mL sub-q pen as directed. (Patient not taking: No sig reported)      Ferretts 325 mg (106 mg iron) tab Take 1 Tablet by mouth daily. (Patient not taking: No sig reported)      fluconazole (DIFLUCAN) 100 mg tablet Take 100 mg by mouth daily.  (Patient not taking: No sig reported)      levothyroxine (SYNTHROID) 25 mcg tablet Euthyrox 25 mcg tablet (Patient not taking: Reported on 2/24/2023)      linezolid (ZYVOX) 600 mg tablet linezolid 600 mg tablet   TAKE 1 TABLET BY MOUTH EVERY 12 HOURS (Patient not taking: No sig reported)      metoprolol succinate (TOPROL-XL) 50 mg XL tablet metoprolol succinate ER 50 mg tablet,extended release 24 hr   TAKE 1 TABLET BY MOUTH ONCE DAILY (Patient not taking: No sig reported)      midodrine (PROAMATINE) 5 mg tablet midodrine 5 mg tablet   TAKE 1/2 (ONE-HALF) TABLET BY MOUTH ONCE DAILY (Patient not taking: No sig reported)      mupirocin (BACTROBAN) 2 % ointment mupirocin 2 % topical ointment (Patient not taking: No sig reported)      nitrofurantoin (MACRODANTIN) 50 mg capsule TAKE 1 CAPSULE BY MOUTH AT BEDTIME (Patient not taking: No sig reported)      ofloxacin (OCUFLOX) 0.3 % ophthalmic solution ofloxacin 0.3 % eye drops (Patient not taking: No sig reported)      pantoprazole (PROTONIX) 40 mg tablet TAKE 1 TABLET BY MOUTH IN THE MORNING AND WAIT 30 MINUTES BEFORE EATING OR TAKING OTHER MEDICATIONS (Patient not taking: No sig reported)      venlafaxine-SR (EFFEXOR-XR) 37.5 mg capsule venlafaxine ER 37.5 mg capsule,extended release 24 hr   TAKE 1 CAPSULE BY MOUTH ONCE DAILY (Patient not taking: No sig reported)      DULoxetine (CYMBALTA) 30 mg capsule 1 cap(s) (Patient not taking: No sig reported)      insulin NPH (NOVOLIN N, HUMULIN N) 100 unit/mL injection Novolin N NPH U-100 Insulin (Patient not taking: Reported on 2/24/2023)      amLODIPine (NORVASC) 5 mg tablet  (Patient not taking: Reported on 2/24/2023)      insulin glargine (LANTUS,BASAGLAR) 100 unit/mL (3 mL) inpn 26 Units by SubCUTAneous route nightly. (Patient not taking: Reported on 2/24/2023)      famotidine (PEPCID) 20 mg tablet Take 20 mg by mouth nightly. (Patient not taking: Reported on 2/24/2023)      atorvastatin (LIPITOR) 20 mg tablet Take 1 Tablet by mouth daily.  (Patient not taking: Reported on 2/24/2023) 90 Tablet 1    potassium chloride (KLOR-CON M10) 10 mEq tablet Take 1 Tablet by mouth daily. Take 1 tab by mouth daily for low potassium. (Patient not taking: Reported on 2/24/2023) 90 Tablet 1    ERGOCALCIFEROL, VITAMIN D2, PO Take 500 Units by mouth two (2) times a day. (Patient not taking: Reported on 2/24/2023)      calcium-cholecalciferol, d3, 500 mg-10 mcg (400 unit) chew Calcium 500 + D 500 mg-10 mcg (400 unit) chewable tablet   Take by oral route. (Patient not taking: No sig reported)      glucose blood VI test strips (OneTouch Ultra Test) strip Use to check blood glucose before meals and at bedtime. (Patient not taking: Reported on 2/24/2023) 120 Strip 11    aspirin delayed-release 81 mg tablet Take 1 Tablet by mouth daily. Indications: treatment to prevent a heart attack (Patient not taking: Reported on 2/24/2023) 90 Tablet 3    alcohol swabs (Alcohol Wipes) padm Use to check blood glucose once daily. (Patient not taking: Reported on 2/24/2023) 100 Pad 3     No current facility-administered medications on file prior to visit. Allergies   Allergen Reactions    Adhesive Unknown (comments)    Penicillins Other (comments)    Sulfa (Sulfonamide Antibiotics) Unknown (comments)     Review of Systems   Constitutional: Negative. Respiratory: Negative. Cardiovascular: Negative. Gastrointestinal: Negative. Objective  Visit Vitals  /70 (BP 1 Location: Left upper arm, BP Patient Position: Sitting, BP Cuff Size: Large adult)   Pulse 88   Temp 97.9 °F (36.6 °C) (Temporal)   Resp 18   Ht 5' 6\" (1.676 m)   Wt 185 lb (83.9 kg)   SpO2 99%   BMI 29.86 kg/m²     Physical Exam  Constitutional:       General: She is not in acute distress. Appearance: Normal appearance. HENT:      Head: Normocephalic and atraumatic. Eyes:      Extraocular Movements: Extraocular movements intact. Conjunctiva/sclera: Conjunctivae normal.   Pulmonary:      Effort: Pulmonary effort is normal. No respiratory distress. Musculoskeletal:         General: Normal range of motion. Cervical back: Normal range of motion. Skin:     General: Skin is warm and dry. Neurological:      General: No focal deficit present. Mental Status: She is alert. Gait: Gait normal.        Assessment & Plan    ICD-10-CM ICD-9-CM    1. Asymptomatic menopausal state  Z78.0 V49.81 DEXA BONE DENSITY STUDY AXIAL      2. Malignant neoplasm of urinary bladder, unspecified site (HCC)  C67.9 188.9       3. Mass of breast, unspecified laterality  N63.0 611.72         Diagnoses and all orders for this visit:    1. Asymptomatic menopausal state  -     DEXA BONE DENSITY STUDY AXIAL; Future  2. Malignant neoplasm of urinary bladder, unspecified site [facility])  Patient has follow up with Urology on Monday. 3. Mass of breast, unspecified laterality  Discussed with daughter, Sandeep Luna, over the phone. She plans to set up appointment with breast surgeon for biopsy. Will attempt to obtain record of mammogram from [facility].      Records request form signed and faxed to specialists including Urology and [facility] for mammogram.     Gene Seek, DO

## 2023-02-24 NOTE — PROGRESS NOTES
1. Have you been to the ER, urgent care clinic since your last visit? Hospitalized since your last visit? No    2. Have you seen or consulted any other health care providers outside of the [address] [place] [address] since your last visit? Include any pap smears or colon screening.  No      Chief Complaint   Patient presents with    Diabetes    Follow-up     Visit Vitals  /70 (BP 1 Location: Left upper arm, BP Patient Position: Sitting, BP Cuff Size: Large adult)   Pulse 88   Temp 97.9 °F (36.6 °C) (Temporal)   Resp 18   Ht 5' 6\" (1.676 m)   Wt 185 lb (83.9 kg)   SpO2 99%   BMI 29.86 kg/m²

## 2023-03-10 ENCOUNTER — TELEPHONE (OUTPATIENT)
Dept: FAMILY MEDICINE CLINIC | Age: 73
End: 2023-03-10

## 2023-03-10 NOTE — TELEPHONE ENCOUNTER
Patients daughter called and stated patient was diagnosed with breast cancer last week and a scan was done and now there is a bone density test scheduled. Patient would like to know if this test needs to be done or can use the other scan. Can the clinical staff please call ravi Huang at [phone].

## 2023-03-10 NOTE — TELEPHONE ENCOUNTER
I spoke with the patients daughter who wasn't aware that she needed a bone density scan , made her aware and gave her the number to scheduling.

## 2023-03-12 PROBLEM — N63.0 BREAST MASS: Status: ACTIVE | Noted: 2023-03-12

## 2023-03-12 PROBLEM — R41.82 ALTERED MENTAL STATUS: Status: RESOLVED | Noted: 2022-04-22 | Resolved: 2023-03-12

## 2023-03-22 ENCOUNTER — HOSPITAL ENCOUNTER (OUTPATIENT)
Dept: MAMMOGRAPHY | Age: 73
Discharge: HOME | End: 2023-03-22
Attending: FAMILY MEDICINE
Payer: MEDICARE

## 2023-03-22 DIAGNOSIS — Z78.0 ASYMPTOMATIC MENOPAUSAL STATE: ICD-10-CM

## 2023-03-22 PROCEDURE — 77080 DXA BONE DENSITY AXIAL: CPT

## 2023-03-23 ENCOUNTER — TRANSCRIBE ORDER (OUTPATIENT)
Dept: SCHEDULING | Age: 73
End: 2023-03-23

## 2023-03-23 DIAGNOSIS — N63.0 LUMP OR MASS IN BREAST: Primary | ICD-10-CM

## 2023-03-26 PROBLEM — N39.0 UTI (URINARY TRACT INFECTION): Status: RESOLVED | Noted: 2022-12-25 | Resolved: 2023-03-26

## 2023-05-25 RX ORDER — FLUCONAZOLE 100 MG/1
100 TABLET ORAL DAILY
COMMUNITY
Start: 2022-12-31 | End: 2023-07-19

## 2023-05-25 RX ORDER — AMLODIPINE BESYLATE 5 MG/1
TABLET ORAL
COMMUNITY
Start: 2022-10-27 | End: 2023-07-19

## 2023-05-25 RX ORDER — ASPIRIN 81 MG/1
81 TABLET ORAL DAILY
COMMUNITY
Start: 2022-03-22

## 2023-05-25 RX ORDER — METFORMIN HYDROCHLORIDE 500 MG/1
500 TABLET, EXTENDED RELEASE ORAL 2 TIMES DAILY
COMMUNITY
Start: 2022-10-27

## 2023-05-25 RX ORDER — INSULIN GLARGINE 100 [IU]/ML
26 INJECTION, SOLUTION SUBCUTANEOUS
COMMUNITY
End: 2023-07-19

## 2023-05-25 RX ORDER — DULAGLUTIDE 0.75 MG/.5ML
0.75 INJECTION, SOLUTION SUBCUTANEOUS WEEKLY
COMMUNITY
Start: 2022-10-20

## 2023-05-25 RX ORDER — LINEZOLID 600 MG/1
TABLET, FILM COATED ORAL
COMMUNITY
End: 2023-07-19

## 2023-05-25 RX ORDER — NITROFURANTOIN MACROCRYSTALS 50 MG/1
1 CAPSULE ORAL NIGHTLY
COMMUNITY
Start: 2023-01-22 | End: 2023-07-19

## 2023-05-25 RX ORDER — ATORVASTATIN CALCIUM 20 MG/1
20 TABLET, FILM COATED ORAL DAILY
COMMUNITY
Start: 2022-07-14 | End: 2023-07-19

## 2023-05-25 RX ORDER — FAMOTIDINE 20 MG/1
20 TABLET, FILM COATED ORAL NIGHTLY
COMMUNITY
End: 2023-07-19

## 2023-05-25 RX ORDER — CLOPIDOGREL BISULFATE 75 MG/1
TABLET ORAL
COMMUNITY
End: 2023-07-19

## 2023-05-25 RX ORDER — LEVOTHYROXINE SODIUM 0.03 MG/1
TABLET ORAL
COMMUNITY
End: 2023-07-19

## 2023-05-25 RX ORDER — PANTOPRAZOLE SODIUM 40 MG/1
TABLET, DELAYED RELEASE ORAL
COMMUNITY
Start: 2022-12-27 | End: 2023-07-19

## 2023-05-25 RX ORDER — VENLAFAXINE HYDROCHLORIDE 37.5 MG/1
CAPSULE, EXTENDED RELEASE ORAL
COMMUNITY
End: 2023-07-19

## 2023-05-25 RX ORDER — METOPROLOL SUCCINATE 50 MG/1
TABLET, EXTENDED RELEASE ORAL
COMMUNITY
End: 2023-07-19

## 2023-05-25 RX ORDER — MIDODRINE HYDROCHLORIDE 5 MG/1
TABLET ORAL
COMMUNITY
End: 2023-07-19

## 2023-05-25 RX ORDER — DULOXETIN HYDROCHLORIDE 30 MG/1
CAPSULE, DELAYED RELEASE ORAL
COMMUNITY
End: 2023-07-19

## 2023-05-25 RX ORDER — CALCIUM CARBONATE/VITAMIN D3 500 MG-10
TABLET,CHEWABLE ORAL
COMMUNITY
End: 2023-07-19

## 2023-05-25 RX ORDER — OFLOXACIN 3 MG/ML
SOLUTION/ DROPS OPHTHALMIC
COMMUNITY
End: 2023-07-19

## 2023-05-25 RX ORDER — POTASSIUM CHLORIDE 750 MG/1
10 TABLET, EXTENDED RELEASE ORAL DAILY
COMMUNITY
Start: 2022-07-14 | End: 2023-07-19

## 2023-07-11 ENCOUNTER — HOSPITAL ENCOUNTER (OUTPATIENT)
Facility: HOSPITAL | Age: 73
Discharge: HOME | End: 2023-07-11
Attending: RADIOLOGY | Admitting: RADIOLOGY
Payer: MEDICARE

## 2023-07-11 VITALS
BODY MASS INDEX: 28.28 KG/M2 | OXYGEN SATURATION: 100 % | WEIGHT: 176 LBS | HEIGHT: 66 IN | TEMPERATURE: 97.8 F | RESPIRATION RATE: 27 BRPM | SYSTOLIC BLOOD PRESSURE: 146 MMHG | HEART RATE: 84 BPM | DIASTOLIC BLOOD PRESSURE: 59 MMHG

## 2023-07-11 DIAGNOSIS — C50.911 MALIGNANT NEOPLASM OF RIGHT FEMALE BREAST, UNSPECIFIED ESTROGEN RECEPTOR STATUS, UNSPECIFIED SITE OF BREAST (HCC): ICD-10-CM

## 2023-07-11 DIAGNOSIS — C67.3 MALIGNANT NEOPLASM OF ANTERIOR WALL OF URINARY BLADDER (HCC): ICD-10-CM

## 2023-07-11 LAB
ECHO BSA: 1.93 M2
GLUCOSE BLD STRIP.AUTO-MCNC: 204 MG/DL (ref 65–117)
SERVICE CMNT-IMP: ABNORMAL

## 2023-07-11 PROCEDURE — 6360000002 HC RX W HCPCS: Performed by: RADIOLOGY

## 2023-07-11 PROCEDURE — C1894 INTRO/SHEATH, NON-LASER: HCPCS | Performed by: RADIOLOGY

## 2023-07-11 PROCEDURE — C1788 PORT, INDWELLING, IMP: HCPCS | Performed by: RADIOLOGY

## 2023-07-11 PROCEDURE — 82962 GLUCOSE BLOOD TEST: CPT

## 2023-07-11 PROCEDURE — 2500000003 HC RX 250 WO HCPCS: Performed by: RADIOLOGY

## 2023-07-11 PROCEDURE — 77002 NEEDLE LOCALIZATION BY XRAY: CPT

## 2023-07-11 PROCEDURE — 36561 INSERT TUNNELED CV CATH: CPT

## 2023-07-11 PROCEDURE — C1769 GUIDE WIRE: HCPCS | Performed by: RADIOLOGY

## 2023-07-11 PROCEDURE — 2709999900 HC NON-CHARGEABLE SUPPLY: Performed by: RADIOLOGY

## 2023-07-11 RX ORDER — CEFAZOLIN SODIUM 1 G/3ML
INJECTION, POWDER, FOR SOLUTION INTRAMUSCULAR; INTRAVENOUS PRN
Status: COMPLETED | OUTPATIENT
Start: 2023-07-11 | End: 2023-07-11

## 2023-07-11 RX ORDER — LIDOCAINE HYDROCHLORIDE 10 MG/ML
INJECTION, SOLUTION EPIDURAL; INFILTRATION; INTRACAUDAL; PERINEURAL PRN
Status: COMPLETED | OUTPATIENT
Start: 2023-07-11 | End: 2023-07-11

## 2023-07-11 RX ORDER — HEPARIN SODIUM 100 [USP'U]/ML
INJECTION, SOLUTION INTRAVENOUS PRN
Status: COMPLETED | OUTPATIENT
Start: 2023-07-11 | End: 2023-07-11

## 2023-07-11 RX ORDER — FENTANYL CITRATE 50 UG/ML
INJECTION, SOLUTION INTRAMUSCULAR; INTRAVENOUS PRN
Status: COMPLETED | OUTPATIENT
Start: 2023-07-11 | End: 2023-07-11

## 2023-07-11 RX ORDER — LIDOCAINE HYDROCHLORIDE AND EPINEPHRINE BITARTRATE 20; .01 MG/ML; MG/ML
INJECTION, SOLUTION SUBCUTANEOUS PRN
Status: COMPLETED | OUTPATIENT
Start: 2023-07-11 | End: 2023-07-11

## 2023-07-11 RX ORDER — MIDAZOLAM HYDROCHLORIDE 2 MG/2ML
INJECTION, SOLUTION INTRAMUSCULAR; INTRAVENOUS PRN
Status: COMPLETED | OUTPATIENT
Start: 2023-07-11 | End: 2023-07-11

## 2023-07-11 RX ADMIN — FENTANYL CITRATE 50 MCG: 50 INJECTION, SOLUTION INTRAMUSCULAR; INTRAVENOUS at 11:38

## 2023-07-11 RX ADMIN — LIDOCAINE HYDROCHLORIDE 10 ML: 10 INJECTION, SOLUTION EPIDURAL; INFILTRATION; INTRACAUDAL; PERINEURAL at 11:55

## 2023-07-11 RX ADMIN — MIDAZOLAM HYDROCHLORIDE 1 MG: 1 INJECTION, SOLUTION INTRAMUSCULAR; INTRAVENOUS at 12:01

## 2023-07-11 RX ADMIN — CEFAZOLIN SODIUM 2000 MG: 1 POWDER, FOR SOLUTION INTRAMUSCULAR; INTRAVENOUS at 11:50

## 2023-07-11 RX ADMIN — HEPARIN 500 UNITS: 100 SYRINGE at 12:27

## 2023-07-11 RX ADMIN — FENTANYL CITRATE 50 MCG: 50 INJECTION, SOLUTION INTRAMUSCULAR; INTRAVENOUS at 12:14

## 2023-07-11 RX ADMIN — LIDOCAINE HYDROCHLORIDE,EPINEPHRINE BITARTRATE 9 ML: 20; .01 INJECTION, SOLUTION INFILTRATION; PERINEURAL at 11:56

## 2023-07-11 RX ADMIN — MIDAZOLAM HYDROCHLORIDE 1 MG: 1 INJECTION, SOLUTION INTRAMUSCULAR; INTRAVENOUS at 11:53

## 2023-07-11 NOTE — H&P
7/11/2023    Ar Automatic Reconciliation   Dulaglutide (TRULICITY) 7.82 RY/0.4SS SOPN as directed.  10/20/22   Ar Automatic Reconciliation   DULoxetine (CYMBALTA) 30 MG extended release capsule 1 cap(s)  Patient not taking: Reported on 7/11/2023    Ar Automatic Reconciliation   famotidine (PEPCID) 20 MG tablet Take 1 tablet by mouth nightly  Patient not taking: Reported on 7/11/2023    Ar Automatic Reconciliation   Ferrous Fumarate 325 (106 Fe) MG TABS Take 1 tablet by mouth daily 12/27/22   Ar Automatic Reconciliation   fluconazole (DIFLUCAN) 100 MG tablet Take 1 tablet by mouth daily  Patient not taking: Reported on 7/11/2023 12/31/22   Ar Automatic Reconciliation   insulin detemir (LEVEMIR) 100 UNIT/ML injection pen ceived the following from [facility]: Outside name: insulin detemir U-100 (Levemir FlexTouch U-100 Insuln) 100 unit/mL (3 mL) inpn  Patient not taking: Reported on 7/11/2023    Ar Automatic Reconciliation   insulin glargine (LANTUS SOLOSTAR) 100 UNIT/ML injection pen Inject 26 Units into the skin    Ar Automatic Reconciliation   insulin NPH (HUMULIN N;NOVOLIN N) 100 UNIT/ML injection vial Novolin N NPH U-100 Insulin  Patient not taking: Reported on 7/11/2023    Ar Automatic Reconciliation   levothyroxine (SYNTHROID) 25 MCG tablet Euthyrox 25 mcg tablet  Patient not taking: Reported on 7/11/2023    Ar Automatic Reconciliation   linezolid (ZYVOX) 600 MG tablet linezolid 600 mg tablet   TAKE 1 TABLET BY MOUTH EVERY 12 HOURS  Patient not taking: Reported on 7/11/2023    Ar Automatic Reconciliation   metFORMIN (GLUCOPHAGE-XR) 500 MG extended release tablet Take 2 tablets by mouth 2 times daily 10/27/22   Ar Automatic Reconciliation   metoprolol succinate (TOPROL XL) 50 MG extended release tablet metoprolol succinate ER 50 mg tablet,extended release 24 hr   TAKE 1 TABLET BY MOUTH ONCE DAILY  Patient not taking: Reported on 7/11/2023    Ar Automatic Reconciliation   midodrine (PROAMATINE) 5

## 2023-07-11 NOTE — PROGRESS NOTES
10:35 AM  Patient arrived. ID and allergies verified verbally with patient. Pt voices understanding of procedure to be performed. Consent obtained. Pt prepped for procedure. 11:31 AM  TRANSFER - OUT REPORT:    Verbal report given to Kenney Hook on Tab Castaneda  being transferred to [facility]io for ordered procedure       Report consisted of patient's Situation, Background, Assessment and   Recommendations(SBAR). Information from the following report(s) Nurse Handoff Report was reviewed with the receiving nurse. Lines:   Peripheral IV 07/11/23 Proximal;Anterior; Left Forearm (Active)        Opportunity for questions and clarification was provided. Patient transported with:  Tech    12:35 PM  TRANSFER - IN REPORT:    Verbal report received from [facility] on [employer]  being received from [facility] for routine post-op      Report consisted of patient's Situation, Background, Assessment and   Recommendations(SBAR). Information from the following report(s) Nurse Handoff Report was reviewed with the receiving nurse. Opportunity for questions and clarification was provided. Assessment completed upon patient's arrival to unit and care assumed. 1:15 PM  Discharge instructions reviewed with patient and family. No questions or concerns at this time     1:20 PM  Pt sat on side of bed then ambulated around unit and to restroom. Voided. Returned to chair. Left chest site dressing dry and intact. No bleeding or hematoma. Pt voices no complaints. 1:40 PM  Pt discharged via wheelchair with daughter. Personal belongings with patient upon discharge.

## 2023-07-17 ENCOUNTER — HOSPITAL ENCOUNTER (EMERGENCY)
Facility: HOSPITAL | Age: 73
Discharge: HOME | End: 2023-07-17
Attending: STUDENT IN AN ORGANIZED HEALTH CARE EDUCATION/TRAINING PROGRAM
Payer: MEDICARE

## 2023-07-17 VITALS
BODY MASS INDEX: 28.28 KG/M2 | DIASTOLIC BLOOD PRESSURE: 99 MMHG | TEMPERATURE: 98.4 F | WEIGHT: 176 LBS | RESPIRATION RATE: 16 BRPM | HEART RATE: 94 BPM | HEIGHT: 66 IN | OXYGEN SATURATION: 99 % | SYSTOLIC BLOOD PRESSURE: 151 MMHG

## 2023-07-17 DIAGNOSIS — E11.65 UNCONTROLLED TYPE 2 DIABETES MELLITUS WITH HYPERGLYCEMIA (HCC): Primary | ICD-10-CM

## 2023-07-17 LAB
ALBUMIN SERPL-MCNC: 3.3 G/DL (ref 3.5–5)
ALBUMIN/GLOB SERPL: 1.1 (ref 1.1–2.2)
ALP SERPL-CCNC: 76 U/L (ref 45–117)
ALT SERPL-CCNC: 72 U/L (ref 12–78)
ANION GAP SERPL CALC-SCNC: 11 MMOL/L (ref 5–15)
APPEARANCE UR: CLEAR
AST SERPL W P-5'-P-CCNC: 6 U/L (ref 15–37)
BACTERIA URNS QL MICRO: NEGATIVE /HPF
BASOPHILS # BLD: 0 K/UL (ref 0–0.1)
BASOPHILS NFR BLD: 0 % (ref 0–1)
BILIRUB SERPL-MCNC: 0.3 MG/DL (ref 0.2–1)
BILIRUB UR QL: NEGATIVE
BUN SERPL-MCNC: 22 MG/DL (ref 6–20)
BUN/CREAT SERPL: 19 (ref 12–20)
CA-I BLD-MCNC: 8.9 MG/DL (ref 8.5–10.1)
CHLORIDE SERPL-SCNC: 105 MMOL/L (ref 97–108)
CO2 SERPL-SCNC: 19 MMOL/L (ref 21–32)
COLOR UR: ABNORMAL
CREAT SERPL-MCNC: 1.18 MG/DL (ref 0.55–1.02)
DIFFERENTIAL METHOD BLD: ABNORMAL
EOSINOPHIL # BLD: 0 K/UL (ref 0–0.4)
EOSINOPHIL NFR BLD: 0 % (ref 0–7)
EPITH CASTS URNS QL MICRO: ABNORMAL /LPF
ERYTHROCYTE [DISTWIDTH] IN BLOOD BY AUTOMATED COUNT: 13.4 % (ref 11.5–14.5)
GLOBULIN SER CALC-MCNC: 3 G/DL (ref 2–4)
GLUCOSE BLD STRIP.AUTO-MCNC: 413 MG/DL (ref 65–100)
GLUCOSE BLD STRIP.AUTO-MCNC: 564 MG/DL (ref 65–100)
GLUCOSE SERPL-MCNC: 585 MG/DL (ref 65–100)
GLUCOSE UR STRIP.AUTO-MCNC: >300 MG/DL
HCT VFR BLD AUTO: 34.4 % (ref 35–47)
HGB BLD-MCNC: 10.9 G/DL (ref 11.5–16)
HGB UR QL STRIP: NEGATIVE
IMM GRANULOCYTES # BLD AUTO: 0 K/UL (ref 0–0.04)
IMM GRANULOCYTES NFR BLD AUTO: 1 % (ref 0–0.5)
KETONES UR QL STRIP.AUTO: 5 MG/DL
LACTATE SERPL-SCNC: 3.4 MMOL/L (ref 0.4–2)
LACTATE SERPL-SCNC: 5.2 MMOL/L (ref 0.4–2)
LEUKOCYTE ESTERASE UR QL STRIP.AUTO: NEGATIVE
LYMPHOCYTES # BLD: 0.7 K/UL (ref 0.8–3.5)
LYMPHOCYTES NFR BLD: 10 % (ref 12–49)
MAGNESIUM SERPL-MCNC: 1.7 MG/DL (ref 1.6–2.4)
MCH RBC QN AUTO: 26.2 PG (ref 26–34)
MCHC RBC AUTO-ENTMCNC: 31.7 G/DL (ref 30–36.5)
MCV RBC AUTO: 82.7 FL (ref 80–99)
MONOCYTES # BLD: 0.1 K/UL (ref 0–1)
MONOCYTES NFR BLD: 2 % (ref 5–13)
NEUTS SEG # BLD: 5.8 K/UL (ref 1.8–8)
NEUTS SEG NFR BLD: 87 % (ref 32–75)
NITRITE UR QL STRIP.AUTO: NEGATIVE
NRBC # BLD: 0 K/UL (ref 0–0.01)
NRBC BLD-RTO: 0 PER 100 WBC
PERFORMED BY:: ABNORMAL
PERFORMED BY:: ABNORMAL
PH UR STRIP: 5 (ref 5–8)
PLATELET # BLD AUTO: 199 K/UL (ref 150–400)
PMV BLD AUTO: 9.8 FL (ref 8.9–12.9)
POTASSIUM SERPL-SCNC: 4.5 MMOL/L (ref 3.5–5.1)
PROT SERPL-MCNC: 6.3 G/DL (ref 6.4–8.2)
PROT UR STRIP-MCNC: 30 MG/DL
RBC # BLD AUTO: 4.16 M/UL (ref 3.8–5.2)
RBC #/AREA URNS HPF: ABNORMAL /HPF (ref 0–5)
SODIUM SERPL-SCNC: 135 MMOL/L (ref 136–145)
SP GR UR REFRACTOMETRY: 1.02 (ref 1–1.03)
TROPONIN I SERPL HS-MCNC: 47 NG/L (ref 0–51)
URINE CULTURE IF INDICATED: ABNORMAL
UROBILINOGEN UR QL STRIP.AUTO: 0.1 EU/DL (ref 0.1–1)
WBC # BLD AUTO: 6.6 K/UL (ref 3.6–11)
WBC URNS QL MICRO: ABNORMAL /HPF (ref 0–4)

## 2023-07-17 PROCEDURE — 96361 HYDRATE IV INFUSION ADD-ON: CPT

## 2023-07-17 PROCEDURE — 6370000000 HC RX 637 (ALT 250 FOR IP): Performed by: STUDENT IN AN ORGANIZED HEALTH CARE EDUCATION/TRAINING PROGRAM

## 2023-07-17 PROCEDURE — 85025 COMPLETE CBC W/AUTO DIFF WBC: CPT

## 2023-07-17 PROCEDURE — 81001 URINALYSIS AUTO W/SCOPE: CPT

## 2023-07-17 PROCEDURE — 99284 EMERGENCY DEPT VISIT MOD MDM: CPT

## 2023-07-17 PROCEDURE — 80053 COMPREHEN METABOLIC PANEL: CPT

## 2023-07-17 PROCEDURE — 96374 THER/PROPH/DIAG INJ IV PUSH: CPT

## 2023-07-17 PROCEDURE — 83605 ASSAY OF LACTIC ACID: CPT

## 2023-07-17 PROCEDURE — 84484 ASSAY OF TROPONIN QUANT: CPT

## 2023-07-17 PROCEDURE — 83735 ASSAY OF MAGNESIUM: CPT

## 2023-07-17 PROCEDURE — 2580000003 HC RX 258: Performed by: STUDENT IN AN ORGANIZED HEALTH CARE EDUCATION/TRAINING PROGRAM

## 2023-07-17 PROCEDURE — 82962 GLUCOSE BLOOD TEST: CPT

## 2023-07-17 PROCEDURE — 36415 COLL VENOUS BLD VENIPUNCTURE: CPT

## 2023-07-17 RX ORDER — SODIUM CHLORIDE, SODIUM LACTATE, POTASSIUM CHLORIDE, AND CALCIUM CHLORIDE .6; .31; .03; .02 G/100ML; G/100ML; G/100ML; G/100ML
1000 INJECTION, SOLUTION INTRAVENOUS ONCE
Status: COMPLETED | OUTPATIENT
Start: 2023-07-17 | End: 2023-07-17

## 2023-07-17 RX ADMIN — SODIUM CHLORIDE, POTASSIUM CHLORIDE, SODIUM LACTATE AND CALCIUM CHLORIDE 1000 ML: 600; 310; 30; 20 INJECTION, SOLUTION INTRAVENOUS at 17:41

## 2023-07-17 RX ADMIN — INSULIN HUMAN 8 UNITS: 100 INJECTION, SOLUTION PARENTERAL at 17:40

## 2023-07-17 ASSESSMENT — LIFESTYLE VARIABLES
HOW OFTEN DO YOU HAVE A DRINK CONTAINING ALCOHOL: NEVER
HOW MANY STANDARD DRINKS CONTAINING ALCOHOL DO YOU HAVE ON A TYPICAL DAY: PATIENT DOES NOT DRINK

## 2023-07-17 ASSESSMENT — PAIN - FUNCTIONAL ASSESSMENT: PAIN_FUNCTIONAL_ASSESSMENT: 0-10

## 2023-07-17 ASSESSMENT — PAIN SCALES - GENERAL: PAINLEVEL_OUTOF10: 0

## 2023-07-17 NOTE — ED TRIAGE NOTES
Pt sent over from 's office for high blood sugar. Pt blood sugar 564 in triage. Pt recently prescribed steroids prior to starting chemo treatment for newly diagnosed breat cancer.

## 2023-07-17 NOTE — ED PROVIDER NOTES
to do well, currently has no complaints. Her labs revealed no anion gap making DKA unlikely. She is sitting up on the bed, appears comfortable, no sign of respiratory distress. She would like to go home. Vitals have remained stable. Her daughter is going to give her her metformin and insulin dose when they get home, and they have a follow-up appointment with endocrinology in the morning. I advised the patient to check your blood sugar more frequently and to avoid high sugar containing foods. Return precautions provided, they agree with plan and expressed understanding. Safe for discharge. [BD]      ED Course User Index  [BD] Sonny Howell MD       Disposition Considerations (Tests not done, Shared Decision Making, Pt Expectation of Test or Treatment.): See ED Course    Patient was given the following medications:  Medications   lactated ringers bolus (0 mLs IntraVENous Stopped 7/17/23 2053)   insulin regular (HUMULIN R;NOVOLIN R) injection 8 Units (8 Units IntraVENous Given 7/17/23 1740)       CONSULTS: (Who and What was discussed)  None     Social Determinants affecting Dx or Tx: None    Smoking Cessation: Not Applicable    PROCEDURES   Unless otherwise noted above, none  Procedures      CRITICAL CARE TIME   CRITICAL CARE NOTE :    3:35 AM    IMPENDING DETERIORATION -Metabolic  ASSOCIATED RISK FACTORS - Metabolic changes  MANAGEMENT- Bedside Assessment and Supervision of Care  INTERPRETATION -  Labs  INTERVENTIONS - Metabolic interventions  CASE REVIEW - ED  TREATMENT RESPONSE -Improved and Stable  PERFORMED BY - Self    NOTES   :  I have spent 15 minutes of critical care time involved in lab review, consultations with specialist, family decision- making, bedside attention and documentation. This time excludes time spent in any separate billed procedures. During this entire length of time I was immediately available to the patient . Sonny Howell MD    ED FINAL IMPRESSION     1.  Uncontrolled

## 2023-07-18 ENCOUNTER — OFFICE VISIT (OUTPATIENT)
Facility: CLINIC | Age: 73
End: 2023-07-18
Payer: MEDICARE

## 2023-07-18 VITALS
OXYGEN SATURATION: 98 % | BODY MASS INDEX: 28.87 KG/M2 | TEMPERATURE: 97.8 F | DIASTOLIC BLOOD PRESSURE: 69 MMHG | WEIGHT: 179.6 LBS | HEIGHT: 66 IN | SYSTOLIC BLOOD PRESSURE: 119 MMHG | HEART RATE: 84 BPM

## 2023-07-18 DIAGNOSIS — I48.0 PAROXYSMAL ATRIAL FIBRILLATION (HCC): ICD-10-CM

## 2023-07-18 DIAGNOSIS — Z91.81 AT HIGH RISK FOR FALLS: ICD-10-CM

## 2023-07-18 DIAGNOSIS — R53.1 GENERALIZED WEAKNESS: ICD-10-CM

## 2023-07-18 DIAGNOSIS — I10 ESSENTIAL (PRIMARY) HYPERTENSION: ICD-10-CM

## 2023-07-18 DIAGNOSIS — F41.9 ANXIETY: Primary | ICD-10-CM

## 2023-07-18 DIAGNOSIS — F33.0 MAJOR DEPRESSIVE DISORDER, RECURRENT, MILD (HCC): ICD-10-CM

## 2023-07-18 PROCEDURE — 3074F SYST BP LT 130 MM HG: CPT | Performed by: NURSE PRACTITIONER

## 2023-07-18 PROCEDURE — 1036F TOBACCO NON-USER: CPT | Performed by: NURSE PRACTITIONER

## 2023-07-18 PROCEDURE — G8399 PT W/DXA RESULTS DOCUMENT: HCPCS | Performed by: NURSE PRACTITIONER

## 2023-07-18 PROCEDURE — G8419 CALC BMI OUT NRM PARAM NOF/U: HCPCS | Performed by: NURSE PRACTITIONER

## 2023-07-18 PROCEDURE — 99215 OFFICE O/P EST HI 40 MIN: CPT | Performed by: NURSE PRACTITIONER

## 2023-07-18 PROCEDURE — G8427 DOCREV CUR MEDS BY ELIG CLIN: HCPCS | Performed by: NURSE PRACTITIONER

## 2023-07-18 PROCEDURE — 1124F ACP DISCUSS-NO DSCNMKR DOCD: CPT | Performed by: NURSE PRACTITIONER

## 2023-07-18 PROCEDURE — 1090F PRES/ABSN URINE INCON ASSESS: CPT | Performed by: NURSE PRACTITIONER

## 2023-07-18 PROCEDURE — 3017F COLORECTAL CA SCREEN DOC REV: CPT | Performed by: NURSE PRACTITIONER

## 2023-07-18 PROCEDURE — 3078F DIAST BP <80 MM HG: CPT | Performed by: NURSE PRACTITIONER

## 2023-07-18 RX ORDER — CALCIUM CARBONATE 160(400)MG
1 TABLET,CHEWABLE ORAL DAILY
Qty: 1 EACH | Refills: 0 | Status: SHIPPED | OUTPATIENT
Start: 2023-07-18

## 2023-07-18 RX ORDER — PROPRANOLOL HYDROCHLORIDE 10 MG/1
TABLET ORAL
Qty: 90 TABLET | Refills: 5 | Status: SHIPPED | OUTPATIENT
Start: 2023-07-18

## 2023-07-18 RX ORDER — ERGOCALCIFEROL 1.25 MG/1
1000 CAPSULE ORAL DAILY
COMMUNITY

## 2023-07-18 NOTE — DISCHARGE INSTRUCTIONS
Thank you! Thank you for allowing me to care for you in the emergency department. It is my goal to provide you with excellent care. If you have not received excellent quality care, please ask to speak to the nurse manager. Please fill out the survey that will come to you by mail or email since we listen to your feedback! Below you will find a list of your tests from today's visit. Should you have any questions, please do not hesitate to call the emergency department.     Labs  Recent Results (from the past 12 hour(s))   POCT Glucose    Collection Time: 07/17/23  5:00 PM   Result Value Ref Range    POC Glucose 564 (H) 65 - 100 mg/dL    Performed by: Leroy Ware    CBC with Auto Differential    Collection Time: 07/17/23  5:40 PM   Result Value Ref Range    WBC 6.6 3.6 - 11.0 K/uL    RBC 4.16 3.80 - 5.20 M/uL    Hemoglobin 10.9 (L) 11.5 - 16.0 g/dL    Hematocrit 34.4 (L) 35.0 - 47.0 %    MCV 82.7 80.0 - 99.0 FL    MCH 26.2 26.0 - 34.0 PG    MCHC 31.7 30.0 - 36.5 g/dL    RDW 13.4 11.5 - 14.5 %    Platelets 830 422 - 533 K/uL    MPV 9.8 8.9 - 12.9 FL    Nucleated RBCs 0.0 0.0  WBC    nRBC 0.00 0.00 - 0.01 K/uL    Neutrophils % 87 (H) 32 - 75 %    Lymphocytes % 10 (L) 12 - 49 %    Monocytes % 2 (L) 5 - 13 %    Eosinophils % 0 0 - 7 %    Basophils % 0 0 - 1 %    Immature Granulocytes 1 (H) 0 - 0.5 %    Neutrophils Absolute 5.8 1.8 - 8.0 K/UL    Lymphocytes Absolute 0.7 (L) 0.8 - 3.5 K/UL    Monocytes Absolute 0.1 0.0 - 1.0 K/UL    Eosinophils Absolute 0.0 0.0 - 0.4 K/UL    Basophils Absolute 0.0 0.0 - 0.1 K/UL    Absolute Immature Granulocyte 0.0 0.00 - 0.04 K/UL    Differential Type AUTOMATED     CMP    Collection Time: 07/17/23  5:40 PM   Result Value Ref Range    Sodium 135 (L) 136 - 145 mmol/L    Potassium 4.5 3.5 - 5.1 mmol/L    Chloride 105 97 - 108 mmol/L    CO2 19 (L) 21 - 32 mmol/L    Anion Gap 11 5 - 15 mmol/L    Glucose 585 (H) 65 - 100 mg/dL    BUN 22 (H) 6 - 20 mg/dL    Creatinine 1.18 (H)

## 2023-07-19 RX ORDER — GINGER ROOT/GINGER ROOT EXT 262.5 MG
1 CAPSULE ORAL DAILY
COMMUNITY

## 2023-07-19 RX ORDER — INSULIN ASPART 100 [IU]/ML
4 INJECTION, SOLUTION INTRAVENOUS; SUBCUTANEOUS
COMMUNITY

## 2023-07-26 ENCOUNTER — HOSPITAL ENCOUNTER (OUTPATIENT)
Facility: HOSPITAL | Age: 73
Discharge: HOME | End: 2023-07-26
Attending: STUDENT IN AN ORGANIZED HEALTH CARE EDUCATION/TRAINING PROGRAM | Admitting: STUDENT IN AN ORGANIZED HEALTH CARE EDUCATION/TRAINING PROGRAM
Payer: MEDICARE

## 2023-07-26 VITALS
WEIGHT: 179 LBS | DIASTOLIC BLOOD PRESSURE: 65 MMHG | HEIGHT: 66 IN | SYSTOLIC BLOOD PRESSURE: 147 MMHG | RESPIRATION RATE: 16 BRPM | HEART RATE: 75 BPM | BODY MASS INDEX: 28.77 KG/M2 | TEMPERATURE: 98.5 F | OXYGEN SATURATION: 99 %

## 2023-07-26 DIAGNOSIS — R94.39 ABNORMAL STRESS TEST: ICD-10-CM

## 2023-07-26 PROBLEM — I25.10 CAD, MULTIPLE VESSEL: Status: ACTIVE | Noted: 2023-07-26

## 2023-07-26 LAB
ACT BLD: 215 SEC (ref 74–125)
ALBUMIN SERPL-MCNC: 3 G/DL (ref 3.5–5)
ALBUMIN/GLOB SERPL: 1.1 (ref 1.1–2.2)
ALP SERPL-CCNC: 59 U/L (ref 45–117)
ALT SERPL-CCNC: 89 U/L (ref 12–78)
ANION GAP SERPL CALC-SCNC: 7 MMOL/L (ref 5–15)
APTT PPP: 23 SEC (ref 21.2–34.1)
AST SERPL W P-5'-P-CCNC: 14 U/L (ref 15–37)
BILIRUB SERPL-MCNC: 0.4 MG/DL (ref 0.2–1)
BUN SERPL-MCNC: 36 MG/DL (ref 6–20)
BUN/CREAT SERPL: 31 (ref 12–20)
CA-I BLD-MCNC: 8.6 MG/DL (ref 8.5–10.1)
CHLORIDE SERPL-SCNC: 111 MMOL/L (ref 97–108)
CO2 SERPL-SCNC: 24 MMOL/L (ref 21–32)
CREAT SERPL-MCNC: 1.15 MG/DL (ref 0.55–1.02)
ERYTHROCYTE [DISTWIDTH] IN BLOOD BY AUTOMATED COUNT: 14.2 % (ref 11.5–14.5)
GLOBULIN SER CALC-MCNC: 2.7 G/DL (ref 2–4)
GLUCOSE BLD STRIP.AUTO-MCNC: 216 MG/DL (ref 65–100)
GLUCOSE BLD STRIP.AUTO-MCNC: 256 MG/DL (ref 65–100)
GLUCOSE SERPL-MCNC: 273 MG/DL (ref 65–100)
HCT VFR BLD AUTO: 32.3 % (ref 35–47)
HGB BLD-MCNC: 10.3 G/DL (ref 11.5–16)
INR PPP: 1 (ref 0.9–1.1)
MCH RBC QN AUTO: 26.1 PG (ref 26–34)
MCHC RBC AUTO-ENTMCNC: 31.9 G/DL (ref 30–36.5)
MCV RBC AUTO: 82 FL (ref 80–99)
NRBC # BLD: 0 K/UL (ref 0–0.01)
NRBC BLD-RTO: 0 PER 100 WBC
PERFORMED BY:: ABNORMAL
PLATELET # BLD AUTO: 209 K/UL (ref 150–400)
PMV BLD AUTO: 10.4 FL (ref 8.9–12.9)
POTASSIUM SERPL-SCNC: 4.4 MMOL/L (ref 3.5–5.1)
PROT SERPL-MCNC: 5.7 G/DL (ref 6.4–8.2)
PROTHROMBIN TIME: 13.5 SEC (ref 11.9–14.6)
RBC # BLD AUTO: 3.94 M/UL (ref 3.8–5.2)
SODIUM SERPL-SCNC: 142 MMOL/L (ref 136–145)
THERAPEUTIC RANGE: NORMAL SEC (ref 82–109)
WBC # BLD AUTO: 5 K/UL (ref 3.6–11)

## 2023-07-26 PROCEDURE — 2709999900 HC NON-CHARGEABLE SUPPLY: Performed by: STUDENT IN AN ORGANIZED HEALTH CARE EDUCATION/TRAINING PROGRAM

## 2023-07-26 PROCEDURE — 2580000003 HC RX 258: Performed by: STUDENT IN AN ORGANIZED HEALTH CARE EDUCATION/TRAINING PROGRAM

## 2023-07-26 PROCEDURE — C1894 INTRO/SHEATH, NON-LASER: HCPCS | Performed by: STUDENT IN AN ORGANIZED HEALTH CARE EDUCATION/TRAINING PROGRAM

## 2023-07-26 PROCEDURE — 85027 COMPLETE CBC AUTOMATED: CPT

## 2023-07-26 PROCEDURE — 85730 THROMBOPLASTIN TIME PARTIAL: CPT

## 2023-07-26 PROCEDURE — 6360000004 HC RX CONTRAST MEDICATION: Performed by: STUDENT IN AN ORGANIZED HEALTH CARE EDUCATION/TRAINING PROGRAM

## 2023-07-26 PROCEDURE — 80053 COMPREHEN METABOLIC PANEL: CPT

## 2023-07-26 PROCEDURE — 7100000010 HC PHASE II RECOVERY - FIRST 15 MIN: Performed by: STUDENT IN AN ORGANIZED HEALTH CARE EDUCATION/TRAINING PROGRAM

## 2023-07-26 PROCEDURE — C1887 CATHETER, GUIDING: HCPCS | Performed by: STUDENT IN AN ORGANIZED HEALTH CARE EDUCATION/TRAINING PROGRAM

## 2023-07-26 PROCEDURE — 7100000011 HC PHASE II RECOVERY - ADDTL 15 MIN: Performed by: STUDENT IN AN ORGANIZED HEALTH CARE EDUCATION/TRAINING PROGRAM

## 2023-07-26 PROCEDURE — 6360000002 HC RX W HCPCS: Performed by: STUDENT IN AN ORGANIZED HEALTH CARE EDUCATION/TRAINING PROGRAM

## 2023-07-26 PROCEDURE — 2500000003 HC RX 250 WO HCPCS: Performed by: STUDENT IN AN ORGANIZED HEALTH CARE EDUCATION/TRAINING PROGRAM

## 2023-07-26 PROCEDURE — 82962 GLUCOSE BLOOD TEST: CPT

## 2023-07-26 PROCEDURE — 99152 MOD SED SAME PHYS/QHP 5/>YRS: CPT | Performed by: STUDENT IN AN ORGANIZED HEALTH CARE EDUCATION/TRAINING PROGRAM

## 2023-07-26 PROCEDURE — 85610 PROTHROMBIN TIME: CPT

## 2023-07-26 PROCEDURE — 6370000000 HC RX 637 (ALT 250 FOR IP): Performed by: STUDENT IN AN ORGANIZED HEALTH CARE EDUCATION/TRAINING PROGRAM

## 2023-07-26 PROCEDURE — 7100000000 HC PACU RECOVERY - FIRST 15 MIN: Performed by: STUDENT IN AN ORGANIZED HEALTH CARE EDUCATION/TRAINING PROGRAM

## 2023-07-26 PROCEDURE — 7100000001 HC PACU RECOVERY - ADDTL 15 MIN: Performed by: STUDENT IN AN ORGANIZED HEALTH CARE EDUCATION/TRAINING PROGRAM

## 2023-07-26 PROCEDURE — C9600 PERC DRUG-EL COR STENT SING: HCPCS | Performed by: STUDENT IN AN ORGANIZED HEALTH CARE EDUCATION/TRAINING PROGRAM

## 2023-07-26 PROCEDURE — C1769 GUIDE WIRE: HCPCS | Performed by: STUDENT IN AN ORGANIZED HEALTH CARE EDUCATION/TRAINING PROGRAM

## 2023-07-26 PROCEDURE — 85347 COAGULATION TIME ACTIVATED: CPT

## 2023-07-26 PROCEDURE — 93005 ELECTROCARDIOGRAM TRACING: CPT | Performed by: STUDENT IN AN ORGANIZED HEALTH CARE EDUCATION/TRAINING PROGRAM

## 2023-07-26 PROCEDURE — C1874 STENT, COATED/COV W/DEL SYS: HCPCS | Performed by: STUDENT IN AN ORGANIZED HEALTH CARE EDUCATION/TRAINING PROGRAM

## 2023-07-26 PROCEDURE — 36415 COLL VENOUS BLD VENIPUNCTURE: CPT

## 2023-07-26 PROCEDURE — 99153 MOD SED SAME PHYS/QHP EA: CPT | Performed by: STUDENT IN AN ORGANIZED HEALTH CARE EDUCATION/TRAINING PROGRAM

## 2023-07-26 PROCEDURE — 93458 L HRT ARTERY/VENTRICLE ANGIO: CPT | Performed by: STUDENT IN AN ORGANIZED HEALTH CARE EDUCATION/TRAINING PROGRAM

## 2023-07-26 DEVICE — STENT ONYXNG27515UX ONYX 2.75X15RX
Type: IMPLANTABLE DEVICE | Status: FUNCTIONAL
Brand: ONYX FRONTIER™

## 2023-07-26 RX ORDER — NITROGLYCERIN 20 MG/100ML
INJECTION INTRAVENOUS PRN
Status: DISCONTINUED | OUTPATIENT
Start: 2023-07-26 | End: 2023-07-26 | Stop reason: HOSPADM

## 2023-07-26 RX ORDER — HEPARIN SODIUM 1000 [USP'U]/ML
INJECTION, SOLUTION INTRAVENOUS; SUBCUTANEOUS PRN
Status: DISCONTINUED | OUTPATIENT
Start: 2023-07-26 | End: 2023-07-26 | Stop reason: HOSPADM

## 2023-07-26 RX ORDER — LIDOCAINE HYDROCHLORIDE 10 MG/ML
INJECTION, SOLUTION INFILTRATION; PERINEURAL PRN
Status: DISCONTINUED | OUTPATIENT
Start: 2023-07-26 | End: 2023-07-26 | Stop reason: HOSPADM

## 2023-07-26 RX ORDER — SODIUM CHLORIDE 0.9 % (FLUSH) 0.9 %
5-40 SYRINGE (ML) INJECTION PRN
Status: DISCONTINUED | OUTPATIENT
Start: 2023-07-26 | End: 2023-07-26 | Stop reason: HOSPADM

## 2023-07-26 RX ORDER — VERAPAMIL HYDROCHLORIDE 2.5 MG/ML
INJECTION, SOLUTION INTRAVENOUS PRN
Status: DISCONTINUED | OUTPATIENT
Start: 2023-07-26 | End: 2023-07-26 | Stop reason: HOSPADM

## 2023-07-26 RX ORDER — SODIUM CHLORIDE 9 MG/ML
INJECTION, SOLUTION INTRAVENOUS CONTINUOUS
Status: DISCONTINUED | OUTPATIENT
Start: 2023-07-26 | End: 2023-07-26 | Stop reason: HOSPADM

## 2023-07-26 RX ORDER — SODIUM CHLORIDE 0.9 % (FLUSH) 0.9 %
5-40 SYRINGE (ML) INJECTION EVERY 12 HOURS SCHEDULED
Status: DISCONTINUED | OUTPATIENT
Start: 2023-07-26 | End: 2023-07-26 | Stop reason: HOSPADM

## 2023-07-26 RX ORDER — MIDAZOLAM HYDROCHLORIDE 1 MG/ML
INJECTION INTRAMUSCULAR; INTRAVENOUS PRN
Status: DISCONTINUED | OUTPATIENT
Start: 2023-07-26 | End: 2023-07-26 | Stop reason: HOSPADM

## 2023-07-26 RX ORDER — HEPARIN SODIUM 200 [USP'U]/100ML
INJECTION, SOLUTION INTRAVENOUS CONTINUOUS PRN
Status: COMPLETED | OUTPATIENT
Start: 2023-07-26 | End: 2023-07-26

## 2023-07-26 RX ORDER — ACETAMINOPHEN 325 MG/1
650 TABLET ORAL EVERY 4 HOURS PRN
Status: DISCONTINUED | OUTPATIENT
Start: 2023-07-26 | End: 2023-07-26 | Stop reason: HOSPADM

## 2023-07-26 RX ORDER — CLOPIDOGREL BISULFATE 75 MG/1
75 TABLET ORAL DAILY
Status: DISCONTINUED | OUTPATIENT
Start: 2023-07-27 | End: 2023-07-26 | Stop reason: HOSPADM

## 2023-07-26 RX ORDER — CLOPIDOGREL BISULFATE 75 MG/1
75 TABLET ORAL DAILY
Qty: 60 TABLET | Refills: 3 | Status: SHIPPED | OUTPATIENT
Start: 2023-07-27

## 2023-07-26 RX ORDER — SODIUM CHLORIDE 9 MG/ML
INJECTION, SOLUTION INTRAVENOUS PRN
Status: DISCONTINUED | OUTPATIENT
Start: 2023-07-26 | End: 2023-07-26 | Stop reason: HOSPADM

## 2023-07-26 RX ORDER — ASPIRIN 325 MG
TABLET ORAL PRN
Status: DISCONTINUED | OUTPATIENT
Start: 2023-07-26 | End: 2023-07-26 | Stop reason: HOSPADM

## 2023-07-26 RX ORDER — CLOPIDOGREL 300 MG/1
TABLET, FILM COATED ORAL PRN
Status: DISCONTINUED | OUTPATIENT
Start: 2023-07-26 | End: 2023-07-26 | Stop reason: HOSPADM

## 2023-07-26 RX ORDER — FENTANYL CITRATE 50 UG/ML
INJECTION, SOLUTION INTRAMUSCULAR; INTRAVENOUS PRN
Status: DISCONTINUED | OUTPATIENT
Start: 2023-07-26 | End: 2023-07-26 | Stop reason: HOSPADM

## 2023-07-26 RX ADMIN — SODIUM CHLORIDE: 9 INJECTION, SOLUTION INTRAVENOUS at 12:20

## 2023-07-26 ASSESSMENT — PAIN SCALES - GENERAL: PAINLEVEL_OUTOF10: 0

## 2023-07-26 ASSESSMENT — PAIN - FUNCTIONAL ASSESSMENT: PAIN_FUNCTIONAL_ASSESSMENT: NONE - DENIES PAIN

## 2023-07-26 NOTE — PROGRESS NOTES
Pt. Transferred to [facility] via stretcher in stable condition. Bedside report given, SBAR reviewed, sites viewed. Post cath. Orders reviewed. Spoke to pt.'s daughter and updated.

## 2023-07-27 LAB
EKG ATRIAL RATE: 71 BPM
EKG ATRIAL RATE: 74 BPM
EKG DIAGNOSIS: NORMAL
EKG DIAGNOSIS: NORMAL
EKG P AXIS: 33 DEGREES
EKG P AXIS: 38 DEGREES
EKG P-R INTERVAL: 136 MS
EKG P-R INTERVAL: 144 MS
EKG Q-T INTERVAL: 388 MS
EKG Q-T INTERVAL: 396 MS
EKG QRS DURATION: 76 MS
EKG QRS DURATION: 78 MS
EKG QTC CALCULATION (BAZETT): 430 MS
EKG QTC CALCULATION (BAZETT): 430 MS
EKG R AXIS: -31 DEGREES
EKG R AXIS: -37 DEGREES
EKG T AXIS: 46 DEGREES
EKG T AXIS: 56 DEGREES
EKG VENTRICULAR RATE: 71 BPM
EKG VENTRICULAR RATE: 74 BPM

## 2023-09-24 ENCOUNTER — APPOINTMENT (OUTPATIENT)
Facility: HOSPITAL | Age: 73
End: 2023-09-24
Payer: MEDICARE

## 2023-09-24 ENCOUNTER — HOSPITAL ENCOUNTER (EMERGENCY)
Facility: HOSPITAL | Age: 73
Discharge: HOME | End: 2023-09-24
Attending: STUDENT IN AN ORGANIZED HEALTH CARE EDUCATION/TRAINING PROGRAM
Payer: MEDICARE

## 2023-09-24 VITALS
BODY MASS INDEX: 28.93 KG/M2 | OXYGEN SATURATION: 100 % | TEMPERATURE: 98.7 F | RESPIRATION RATE: 18 BRPM | WEIGHT: 180 LBS | HEART RATE: 85 BPM | HEIGHT: 66 IN | DIASTOLIC BLOOD PRESSURE: 67 MMHG | SYSTOLIC BLOOD PRESSURE: 145 MMHG

## 2023-09-24 DIAGNOSIS — N39.0 URINARY TRACT INFECTION IN FEMALE: Primary | ICD-10-CM

## 2023-09-24 LAB
ALBUMIN SERPL-MCNC: 3.2 G/DL (ref 3.5–5)
ALBUMIN/GLOB SERPL: 1.3 (ref 1.1–2.2)
ALP SERPL-CCNC: 65 U/L (ref 45–117)
ALT SERPL-CCNC: 50 U/L (ref 12–78)
ANION GAP SERPL CALC-SCNC: 10 MMOL/L (ref 5–15)
APPEARANCE UR: ABNORMAL
AST SERPL W P-5'-P-CCNC: 19 U/L (ref 15–37)
BACTERIA URNS QL MICRO: ABNORMAL /HPF
BASOPHILS # BLD: 0 K/UL (ref 0–0.1)
BASOPHILS NFR BLD: 0 % (ref 0–1)
BILIRUB SERPL-MCNC: 0.6 MG/DL (ref 0.2–1)
BILIRUB UR QL CFM: NEGATIVE
BILIRUB UR QL: ABNORMAL
BUN SERPL-MCNC: 16 MG/DL (ref 6–20)
BUN/CREAT SERPL: 15 (ref 12–20)
CA-I BLD-MCNC: 9.1 MG/DL (ref 8.5–10.1)
CHLORIDE SERPL-SCNC: 101 MMOL/L (ref 97–108)
CO2 SERPL-SCNC: 25 MMOL/L (ref 21–32)
COLOR UR: YELLOW
CREAT SERPL-MCNC: 1.07 MG/DL (ref 0.55–1.02)
DIFFERENTIAL METHOD BLD: ABNORMAL
EOSINOPHIL # BLD: 0 K/UL (ref 0–0.4)
EOSINOPHIL NFR BLD: 2 % (ref 0–7)
EPITH CASTS URNS QL MICRO: ABNORMAL /LPF
ERYTHROCYTE [DISTWIDTH] IN BLOOD BY AUTOMATED COUNT: 16.9 % (ref 11.5–14.5)
GLOBULIN SER CALC-MCNC: 2.5 G/DL (ref 2–4)
GLUCOSE SERPL-MCNC: 253 MG/DL (ref 65–100)
GLUCOSE UR STRIP.AUTO-MCNC: NEGATIVE MG/DL
HCT VFR BLD AUTO: 27.3 % (ref 35–47)
HGB BLD-MCNC: 9.2 G/DL (ref 11.5–16)
HGB UR QL STRIP: ABNORMAL
IMM GRANULOCYTES # BLD AUTO: 0 K/UL (ref 0–0.04)
IMM GRANULOCYTES NFR BLD AUTO: 0 % (ref 0–0.5)
KETONES UR QL STRIP.AUTO: 15 MG/DL
LEUKOCYTE ESTERASE UR QL STRIP.AUTO: ABNORMAL
LYMPHOCYTES # BLD: 1.1 K/UL (ref 0.8–3.5)
LYMPHOCYTES NFR BLD: 47 % (ref 12–49)
MAGNESIUM SERPL-MCNC: 1.6 MG/DL (ref 1.6–2.4)
MCH RBC QN AUTO: 28.8 PG (ref 26–34)
MCHC RBC AUTO-ENTMCNC: 33.7 G/DL (ref 30–36.5)
MCV RBC AUTO: 85.3 FL (ref 80–99)
MONOCYTES # BLD: 0.1 K/UL (ref 0–1)
MONOCYTES NFR BLD: 6 % (ref 5–13)
NEUTS SEG # BLD: 1 K/UL (ref 1.8–8)
NEUTS SEG NFR BLD: 45 % (ref 32–75)
NITRITE UR QL STRIP.AUTO: NEGATIVE
PH UR STRIP: 5 (ref 5–8)
PLATELET # BLD AUTO: 265 K/UL (ref 150–400)
PMV BLD AUTO: 9.2 FL (ref 8.9–12.9)
POTASSIUM SERPL-SCNC: 4.3 MMOL/L (ref 3.5–5.1)
PROT SERPL-MCNC: 5.7 G/DL (ref 6.4–8.2)
PROT UR STRIP-MCNC: 100 MG/DL
RBC # BLD AUTO: 3.2 M/UL (ref 3.8–5.2)
RBC #/AREA URNS HPF: ABNORMAL /HPF (ref 0–5)
SODIUM SERPL-SCNC: 136 MMOL/L (ref 136–145)
SP GR UR REFRACTOMETRY: 1.02 (ref 1–1.03)
TROPONIN I SERPL HS-MCNC: 7 NG/L (ref 0–51)
URINE CULTURE IF INDICATED: ABNORMAL
UROBILINOGEN UR QL STRIP.AUTO: 0.1 EU/DL (ref 0.2–1)
WBC # BLD AUTO: 2.3 K/UL (ref 3.6–11)
WBC URNS QL MICRO: ABNORMAL /HPF (ref 0–4)

## 2023-09-24 PROCEDURE — 96374 THER/PROPH/DIAG INJ IV PUSH: CPT

## 2023-09-24 PROCEDURE — 80053 COMPREHEN METABOLIC PANEL: CPT

## 2023-09-24 PROCEDURE — 2580000003 HC RX 258: Performed by: STUDENT IN AN ORGANIZED HEALTH CARE EDUCATION/TRAINING PROGRAM

## 2023-09-24 PROCEDURE — 96361 HYDRATE IV INFUSION ADD-ON: CPT

## 2023-09-24 PROCEDURE — 87086 URINE CULTURE/COLONY COUNT: CPT

## 2023-09-24 PROCEDURE — 96375 TX/PRO/DX INJ NEW DRUG ADDON: CPT

## 2023-09-24 PROCEDURE — 6370000000 HC RX 637 (ALT 250 FOR IP): Performed by: STUDENT IN AN ORGANIZED HEALTH CARE EDUCATION/TRAINING PROGRAM

## 2023-09-24 PROCEDURE — 93005 ELECTROCARDIOGRAM TRACING: CPT | Performed by: STUDENT IN AN ORGANIZED HEALTH CARE EDUCATION/TRAINING PROGRAM

## 2023-09-24 PROCEDURE — 6360000002 HC RX W HCPCS: Performed by: STUDENT IN AN ORGANIZED HEALTH CARE EDUCATION/TRAINING PROGRAM

## 2023-09-24 PROCEDURE — 71045 X-RAY EXAM CHEST 1 VIEW: CPT

## 2023-09-24 PROCEDURE — 84484 ASSAY OF TROPONIN QUANT: CPT

## 2023-09-24 PROCEDURE — 99285 EMERGENCY DEPT VISIT HI MDM: CPT

## 2023-09-24 PROCEDURE — 83735 ASSAY OF MAGNESIUM: CPT

## 2023-09-24 PROCEDURE — 87077 CULTURE AEROBIC IDENTIFY: CPT

## 2023-09-24 PROCEDURE — 81001 URINALYSIS AUTO W/SCOPE: CPT

## 2023-09-24 PROCEDURE — 87186 SC STD MICRODIL/AGAR DIL: CPT

## 2023-09-24 PROCEDURE — 70450 CT HEAD/BRAIN W/O DYE: CPT

## 2023-09-24 PROCEDURE — 85025 COMPLETE CBC W/AUTO DIFF WBC: CPT

## 2023-09-24 RX ORDER — CEPHALEXIN 500 MG/1
500 CAPSULE ORAL 2 TIMES DAILY
Qty: 14 CAPSULE | Refills: 0 | Status: SHIPPED | OUTPATIENT
Start: 2023-09-24 | End: 2023-10-01

## 2023-09-24 RX ORDER — SODIUM CHLORIDE, SODIUM LACTATE, POTASSIUM CHLORIDE, AND CALCIUM CHLORIDE .6; .31; .03; .02 G/100ML; G/100ML; G/100ML; G/100ML
1000 INJECTION, SOLUTION INTRAVENOUS
Status: COMPLETED | OUTPATIENT
Start: 2023-09-24 | End: 2023-09-24

## 2023-09-24 RX ORDER — ONDANSETRON 2 MG/ML
4 INJECTION INTRAMUSCULAR; INTRAVENOUS ONCE
Status: COMPLETED | OUTPATIENT
Start: 2023-09-24 | End: 2023-09-24

## 2023-09-24 RX ORDER — ACETAMINOPHEN 325 MG/1
650 TABLET ORAL
Status: COMPLETED | OUTPATIENT
Start: 2023-09-24 | End: 2023-09-24

## 2023-09-24 RX ORDER — UREA 10 %
500 LOTION (ML) TOPICAL DAILY
Status: DISCONTINUED | OUTPATIENT
Start: 2023-09-24 | End: 2023-09-24 | Stop reason: HOSPADM

## 2023-09-24 RX ADMIN — CEFTRIAXONE SODIUM 1000 MG: 1 INJECTION, POWDER, FOR SOLUTION INTRAMUSCULAR; INTRAVENOUS at 14:11

## 2023-09-24 RX ADMIN — ACETAMINOPHEN 650 MG: 325 TABLET ORAL at 14:00

## 2023-09-24 RX ADMIN — SODIUM CHLORIDE, POTASSIUM CHLORIDE, SODIUM LACTATE AND CALCIUM CHLORIDE 1000 ML: 600; 310; 30; 20 INJECTION, SOLUTION INTRAVENOUS at 14:34

## 2023-09-24 RX ADMIN — ONDANSETRON 4 MG: 2 INJECTION INTRAMUSCULAR; INTRAVENOUS at 14:08

## 2023-09-24 NOTE — ED PROVIDER NOTES
[facility] EMERGENCY DEPT  EMERGENCY DEPARTMENT HISTORY AND PHYSICAL EXAM      Date: 9/24/2023  Patient Name: Luanne Ash  MRN: [id number]  [address] 1950  Date of evaluation: 9/24/2023  Provider: Hannah Delatorre DO   Note Started: 2:20 PM EDT 9/24/23    HISTORY OF PRESENT ILLNESS     Chief Complaint   Patient presents with    Urinary Frequency       History Provided By: Patient    HPI: Luanne Ash is a 68 y.o. female with history as reviewed below presents emerged department complaining of urinary frequency since yesterday. Patient's daughter at bedside states that the patient had to get up multiple times over the night to use the restroom which is abnormal for the patient. Notes the patient sustained a fall yesterday, EMS was called to their house however the patient was not transported to be seen at that time. Patient describes that she suddenly passed out yesterday causing her to fall and strike her head on the floor. Denies any loss consciousness, able to ambulate thereafter. Daughter at bedside states that the patient has seemed more confused and not answering questions appropriately intermittently. Denies any fever, vomiting, abdominal pain, chest pain or shortness of breath. Denies any prodromal symptoms prior to fall. No other symptoms or complaints.     PAST MEDICAL HISTORY   Past Medical History:  Past Medical History:   Diagnosis Date    Arrhythmia     At risk for infection associated with Javier catheter     Bladder cancer ([address])     Breast cancer ([address])     right    Cerebral artery occlusion with cerebral infarction ([address]) 2018    no deficits    Dementia ([address])     DKA (diabetic ketoacidosis) ([address]) 04/06/2022    Essential hypertension 07/21/2020    Hyperlipidemia     MRSA (methicillin resistant staph aureus) culture positive 01/61/5562    Non-alcoholic fatty liver disease 07/21/2020    Right groin ulcer ([address]) 04/22/2022    Sleep apnea     does not use CPAP    Thyroid disease        Past Surgical

## 2023-09-24 NOTE — DISCHARGE INSTRUCTIONS
Thank you! Thank you for allowing me to care for you in the emergency department. It is my goal to provide you with excellent care. If you have not received excellent quality care, please ask to speak to the nurse manager. Please fill out the survey that will come to you by mail or email since we listen to your feedback! Below you will find a list of your tests from today's visit. Should you have any questions, please do not hesitate to call the emergency department.     Labs  Recent Results (from the past 12 hour(s))   Urinalysis with Reflex to Culture    Collection Time: 09/24/23  1:30 PM    Specimen: Urine   Result Value Ref Range    Color, UA Yellow      Appearance Cloudy (A) Clear      Specific Gravity, UA 1.020 1.003 - 1.030      pH, Urine 5.0 5.0 - 8.0      Protein,  (A) Negative mg/dL    Glucose, UA Negative Negative mg/dL    Ketones, Urine 15 (A) Negative mg/dL    Bilirubin Urine Small (A) Negative      Blood, Urine Trace (A) Negative      Urobilinogen, Urine 0.1 (L) 0.2 - 1.0 EU/dL    Nitrite, Urine Negative Negative      Leukocyte Esterase, Urine Trace (A) Negative      Bilirubin Confirmation, UA Negative Negative      WBC, UA 10-20 0 - 4 /hpf    RBC, UA 0-5 0 - 5 /hpf    Epithelial Cells UA Many (A) Few /lpf    BACTERIA, URINE 4+ (A) Negative /hpf    Urine Culture if Indicated Urine Culture Ordered (A) Culture not indicated by UA result     CBC with Auto Differential    Collection Time: 09/24/23  2:07 PM   Result Value Ref Range    WBC 2.3 (L) 3.6 - 11.0 K/uL    RBC 3.20 (L) 3.80 - 5.20 M/uL    Hemoglobin 9.2 (L) 11.5 - 16.0 g/dL    Hematocrit 27.3 (L) 35.0 - 47.0 %    MCV 85.3 80.0 - 99.0 FL    MCH 28.8 26.0 - 34.0 PG    MCHC 33.7 30.0 - 36.5 g/dL    RDW 16.9 (H) 11.5 - 14.5 %    Platelets 145 437 - 012 K/uL    MPV 9.2 8.9 - 12.9 FL    Neutrophils % 45 32 - 75 %    Lymphocytes % 47 12 - 49 %    Monocytes % 6 5 - 13 %    Eosinophils % 2 0 - 7 %    Basophils % 0 0 - 1 %    Immature

## 2023-09-25 LAB
EKG ATRIAL RATE: 78 BPM
EKG DIAGNOSIS: NORMAL
EKG P AXIS: 41 DEGREES
EKG P-R INTERVAL: 138 MS
EKG Q-T INTERVAL: 378 MS
EKG QRS DURATION: 76 MS
EKG QTC CALCULATION (BAZETT): 430 MS
EKG R AXIS: -21 DEGREES
EKG T AXIS: 68 DEGREES
EKG VENTRICULAR RATE: 78 BPM

## 2023-09-27 LAB
BACTERIA SPEC CULT: ABNORMAL
COLONY COUNT, CNT: ABNORMAL
Lab: ABNORMAL

## 2023-10-04 ENCOUNTER — HOSPITAL ENCOUNTER (INPATIENT)
Facility: HOSPITAL | Age: 73
LOS: 3 days | Discharge: NURSING, REHAB OR LONG-TERM CARE | DRG: 641 | End: 2023-10-08
Attending: EMERGENCY MEDICINE | Admitting: INTERNAL MEDICINE
Payer: MEDICARE

## 2023-10-04 ENCOUNTER — APPOINTMENT (OUTPATIENT)
Facility: HOSPITAL | Age: 73
DRG: 641 | End: 2023-10-04
Payer: MEDICARE

## 2023-10-04 DIAGNOSIS — R26.2 CANNOT WALK: ICD-10-CM

## 2023-10-04 DIAGNOSIS — R53.1 GENERAL WEAKNESS: Primary | ICD-10-CM

## 2023-10-04 DIAGNOSIS — E86.0 DEHYDRATION: ICD-10-CM

## 2023-10-04 LAB
ALBUMIN SERPL-MCNC: 3.4 G/DL (ref 3.5–5)
ALBUMIN/GLOB SERPL: 1.3 (ref 1.1–2.2)
ALP SERPL-CCNC: 77 U/L (ref 45–117)
ALT SERPL-CCNC: 51 U/L (ref 12–78)
ANION GAP SERPL CALC-SCNC: 8 MMOL/L (ref 5–15)
APPEARANCE UR: CLEAR
AST SERPL W P-5'-P-CCNC: 15 U/L (ref 15–37)
BACTERIA URNS QL MICRO: NEGATIVE /HPF
BASOPHILS # BLD: 0 K/UL (ref 0–0.1)
BASOPHILS NFR BLD: 1 % (ref 0–1)
BILIRUB SERPL-MCNC: 0.6 MG/DL (ref 0.2–1)
BILIRUB UR QL: NEGATIVE
BUN SERPL-MCNC: 28 MG/DL (ref 6–20)
BUN/CREAT SERPL: 24 (ref 12–20)
CA-I BLD-MCNC: 8.9 MG/DL (ref 8.5–10.1)
CHLORIDE SERPL-SCNC: 102 MMOL/L (ref 97–108)
CO2 SERPL-SCNC: 25 MMOL/L (ref 21–32)
COLOR UR: ABNORMAL
CREAT SERPL-MCNC: 1.18 MG/DL (ref 0.55–1.02)
DIFFERENTIAL METHOD BLD: ABNORMAL
EOSINOPHIL # BLD: 0 K/UL (ref 0–0.4)
EOSINOPHIL NFR BLD: 1 % (ref 0–7)
EPITH CASTS URNS QL MICRO: ABNORMAL /LPF
ERYTHROCYTE [DISTWIDTH] IN BLOOD BY AUTOMATED COUNT: 18.4 % (ref 11.5–14.5)
GLOBULIN SER CALC-MCNC: 2.6 G/DL (ref 2–4)
GLUCOSE SERPL-MCNC: 281 MG/DL (ref 65–100)
GLUCOSE UR STRIP.AUTO-MCNC: 150 MG/DL
HCT VFR BLD AUTO: 30 % (ref 35–47)
HGB BLD-MCNC: 10.1 G/DL (ref 11.5–16)
HGB UR QL STRIP: NEGATIVE
IMM GRANULOCYTES # BLD AUTO: 0 K/UL (ref 0–0.04)
IMM GRANULOCYTES NFR BLD AUTO: 1 % (ref 0–0.5)
KETONES UR QL STRIP.AUTO: NEGATIVE MG/DL
LEUKOCYTE ESTERASE UR QL STRIP.AUTO: NEGATIVE
LYMPHOCYTES # BLD: 1.2 K/UL (ref 0.8–3.5)
LYMPHOCYTES NFR BLD: 31 % (ref 12–49)
MCH RBC QN AUTO: 28.5 PG (ref 26–34)
MCHC RBC AUTO-ENTMCNC: 33.7 G/DL (ref 30–36.5)
MCV RBC AUTO: 84.5 FL (ref 80–99)
MONOCYTES # BLD: 0.3 K/UL (ref 0–1)
MONOCYTES NFR BLD: 7 % (ref 5–13)
MUCOUS THREADS URNS QL MICRO: ABNORMAL /LPF
NEUTS SEG # BLD: 2.2 K/UL (ref 1.8–8)
NEUTS SEG NFR BLD: 59 % (ref 32–75)
NITRITE UR QL STRIP.AUTO: NEGATIVE
NRBC # BLD: 0 K/UL (ref 0–0.01)
NRBC BLD-RTO: 0 PER 100 WBC
PH UR STRIP: 6 (ref 5–8)
PLATELET # BLD AUTO: 206 K/UL (ref 150–400)
PMV BLD AUTO: 9.7 FL (ref 8.9–12.9)
POTASSIUM SERPL-SCNC: 4.4 MMOL/L (ref 3.5–5.1)
PROT SERPL-MCNC: 6 G/DL (ref 6.4–8.2)
PROT UR STRIP-MCNC: 100 MG/DL
RBC # BLD AUTO: 3.55 M/UL (ref 3.8–5.2)
RBC #/AREA URNS HPF: ABNORMAL /HPF (ref 0–5)
SODIUM SERPL-SCNC: 135 MMOL/L (ref 136–145)
SP GR UR REFRACTOMETRY: 1.01 (ref 1–1.03)
TROPONIN I SERPL HS-MCNC: 9 NG/L (ref 0–51)
URINE CULTURE IF INDICATED: ABNORMAL
UROBILINOGEN UR QL STRIP.AUTO: 0.1 EU/DL (ref 0.1–1)
WBC # BLD AUTO: 3.7 K/UL (ref 3.6–11)
WBC URNS QL MICRO: ABNORMAL /HPF (ref 0–4)

## 2023-10-04 PROCEDURE — 93005 ELECTROCARDIOGRAM TRACING: CPT | Performed by: EMERGENCY MEDICINE

## 2023-10-04 PROCEDURE — 80053 COMPREHEN METABOLIC PANEL: CPT

## 2023-10-04 PROCEDURE — 81001 URINALYSIS AUTO W/SCOPE: CPT

## 2023-10-04 PROCEDURE — 94761 N-INVAS EAR/PLS OXIMETRY MLT: CPT

## 2023-10-04 PROCEDURE — 74177 CT ABD & PELVIS W/CONTRAST: CPT

## 2023-10-04 PROCEDURE — 85025 COMPLETE CBC W/AUTO DIFF WBC: CPT

## 2023-10-04 PROCEDURE — 6360000004 HC RX CONTRAST MEDICATION: Performed by: EMERGENCY MEDICINE

## 2023-10-04 PROCEDURE — 96360 HYDRATION IV INFUSION INIT: CPT

## 2023-10-04 PROCEDURE — 2580000003 HC RX 258: Performed by: EMERGENCY MEDICINE

## 2023-10-04 PROCEDURE — 84484 ASSAY OF TROPONIN QUANT: CPT

## 2023-10-04 PROCEDURE — 72125 CT NECK SPINE W/O DYE: CPT

## 2023-10-04 PROCEDURE — 99285 EMERGENCY DEPT VISIT HI MDM: CPT

## 2023-10-04 PROCEDURE — 70450 CT HEAD/BRAIN W/O DYE: CPT

## 2023-10-04 PROCEDURE — 36415 COLL VENOUS BLD VENIPUNCTURE: CPT

## 2023-10-04 RX ORDER — 0.9 % SODIUM CHLORIDE 0.9 %
1000 INTRAVENOUS SOLUTION INTRAVENOUS ONCE
Status: COMPLETED | OUTPATIENT
Start: 2023-10-04 | End: 2023-10-05

## 2023-10-04 RX ADMIN — IOPAMIDOL 100 ML: 755 INJECTION, SOLUTION INTRAVENOUS at 23:48

## 2023-10-04 RX ADMIN — SODIUM CHLORIDE 1000 ML: 9 INJECTION, SOLUTION INTRAVENOUS at 22:51

## 2023-10-04 ASSESSMENT — PAIN SCALES - GENERAL: PAINLEVEL_OUTOF10: 8

## 2023-10-04 ASSESSMENT — PAIN DESCRIPTION - LOCATION: LOCATION: KNEE;BACK

## 2023-10-04 ASSESSMENT — PAIN DESCRIPTION - ORIENTATION: ORIENTATION: LEFT;RIGHT

## 2023-10-04 ASSESSMENT — PAIN - FUNCTIONAL ASSESSMENT: PAIN_FUNCTIONAL_ASSESSMENT: 0-10

## 2023-10-04 NOTE — ED TRIAGE NOTES
Patient arrives via EMS for GLF x3 - hit head, no LOC. Hx breast ca, receiving chemo . Patient takes ASA daily. Patient states she lost balance.

## 2023-10-05 PROBLEM — R62.7 FAILURE TO THRIVE IN ADULT: Status: ACTIVE | Noted: 2023-10-05

## 2023-10-05 LAB
EKG ATRIAL RATE: 86 BPM
EKG DIAGNOSIS: NORMAL
EKG P AXIS: 34 DEGREES
EKG P-R INTERVAL: 152 MS
EKG Q-T INTERVAL: 392 MS
EKG QRS DURATION: 78 MS
EKG QTC CALCULATION (BAZETT): 469 MS
EKG R AXIS: -20 DEGREES
EKG T AXIS: 74 DEGREES
EKG VENTRICULAR RATE: 86 BPM
GLUCOSE BLD STRIP.AUTO-MCNC: 217 MG/DL (ref 65–100)
GLUCOSE BLD STRIP.AUTO-MCNC: 264 MG/DL (ref 65–100)
GLUCOSE BLD STRIP.AUTO-MCNC: 272 MG/DL (ref 65–100)
GLUCOSE BLD STRIP.AUTO-MCNC: 290 MG/DL (ref 65–100)
PERFORMED BY:: ABNORMAL
TROPONIN I SERPL HS-MCNC: 9 NG/L (ref 0–51)

## 2023-10-05 PROCEDURE — 82962 GLUCOSE BLOOD TEST: CPT

## 2023-10-05 PROCEDURE — 2580000003 HC RX 258: Performed by: INTERNAL MEDICINE

## 2023-10-05 PROCEDURE — 6360000002 HC RX W HCPCS: Performed by: INTERNAL MEDICINE

## 2023-10-05 PROCEDURE — 97530 THERAPEUTIC ACTIVITIES: CPT

## 2023-10-05 PROCEDURE — 6370000000 HC RX 637 (ALT 250 FOR IP): Performed by: INTERNAL MEDICINE

## 2023-10-05 PROCEDURE — 97161 PT EVAL LOW COMPLEX 20 MIN: CPT

## 2023-10-05 PROCEDURE — 94761 N-INVAS EAR/PLS OXIMETRY MLT: CPT

## 2023-10-05 PROCEDURE — 1100000000 HC RM PRIVATE

## 2023-10-05 RX ORDER — ASPIRIN 81 MG
1 TABLET, DELAYED RELEASE (ENTERIC COATED) ORAL DAILY
Status: DISCONTINUED | OUTPATIENT
Start: 2023-10-05 | End: 2023-10-08 | Stop reason: HOSPADM

## 2023-10-05 RX ORDER — SODIUM CHLORIDE 0.9 % (FLUSH) 0.9 %
5-40 SYRINGE (ML) INJECTION PRN
Status: DISCONTINUED | OUTPATIENT
Start: 2023-10-05 | End: 2023-10-08 | Stop reason: HOSPADM

## 2023-10-05 RX ORDER — ONDANSETRON 2 MG/ML
4 INJECTION INTRAMUSCULAR; INTRAVENOUS EVERY 6 HOURS PRN
Status: DISCONTINUED | OUTPATIENT
Start: 2023-10-05 | End: 2023-10-08 | Stop reason: HOSPADM

## 2023-10-05 RX ORDER — FERROUS SULFATE 325(65) MG
325 TABLET ORAL DAILY
Status: DISCONTINUED | OUTPATIENT
Start: 2023-10-05 | End: 2023-10-08 | Stop reason: HOSPADM

## 2023-10-05 RX ORDER — ONDANSETRON 4 MG/1
4 TABLET, ORALLY DISINTEGRATING ORAL EVERY 8 HOURS PRN
Status: DISCONTINUED | OUTPATIENT
Start: 2023-10-05 | End: 2023-10-08 | Stop reason: HOSPADM

## 2023-10-05 RX ORDER — INSULIN LISPRO 100 [IU]/ML
0-8 INJECTION, SOLUTION INTRAVENOUS; SUBCUTANEOUS
Status: DISCONTINUED | OUTPATIENT
Start: 2023-10-05 | End: 2023-10-08 | Stop reason: HOSPADM

## 2023-10-05 RX ORDER — ACETAMINOPHEN 325 MG/1
650 TABLET ORAL EVERY 6 HOURS PRN
Status: DISCONTINUED | OUTPATIENT
Start: 2023-10-05 | End: 2023-10-08 | Stop reason: HOSPADM

## 2023-10-05 RX ORDER — INSULIN LISPRO 100 [IU]/ML
0-4 INJECTION, SOLUTION INTRAVENOUS; SUBCUTANEOUS NIGHTLY
Status: DISCONTINUED | OUTPATIENT
Start: 2023-10-05 | End: 2023-10-08 | Stop reason: HOSPADM

## 2023-10-05 RX ORDER — VITAMIN B COMPLEX
1000 TABLET ORAL DAILY
Status: DISCONTINUED | OUTPATIENT
Start: 2023-10-05 | End: 2023-10-08 | Stop reason: HOSPADM

## 2023-10-05 RX ORDER — ACETAMINOPHEN 650 MG/1
650 SUPPOSITORY RECTAL EVERY 6 HOURS PRN
Status: DISCONTINUED | OUTPATIENT
Start: 2023-10-05 | End: 2023-10-08 | Stop reason: HOSPADM

## 2023-10-05 RX ORDER — SODIUM CHLORIDE 9 MG/ML
INJECTION, SOLUTION INTRAVENOUS PRN
Status: DISCONTINUED | OUTPATIENT
Start: 2023-10-05 | End: 2023-10-08 | Stop reason: HOSPADM

## 2023-10-05 RX ORDER — LOSARTAN POTASSIUM 50 MG/1
25 TABLET ORAL DAILY
Status: DISCONTINUED | OUTPATIENT
Start: 2023-10-05 | End: 2023-10-08 | Stop reason: HOSPADM

## 2023-10-05 RX ORDER — POLYETHYLENE GLYCOL 3350 17 G/17G
17 POWDER, FOR SOLUTION ORAL DAILY PRN
Status: DISCONTINUED | OUTPATIENT
Start: 2023-10-05 | End: 2023-10-08 | Stop reason: HOSPADM

## 2023-10-05 RX ORDER — CLOPIDOGREL BISULFATE 75 MG/1
75 TABLET ORAL DAILY
Status: DISCONTINUED | OUTPATIENT
Start: 2023-10-05 | End: 2023-10-05

## 2023-10-05 RX ORDER — SODIUM CHLORIDE 0.9 % (FLUSH) 0.9 %
5-40 SYRINGE (ML) INJECTION EVERY 12 HOURS SCHEDULED
Status: DISCONTINUED | OUTPATIENT
Start: 2023-10-05 | End: 2023-10-08 | Stop reason: HOSPADM

## 2023-10-05 RX ORDER — ASPIRIN 81 MG/1
81 TABLET ORAL DAILY
Status: DISCONTINUED | OUTPATIENT
Start: 2023-10-05 | End: 2023-10-08 | Stop reason: HOSPADM

## 2023-10-05 RX ORDER — MEGESTROL ACETATE 40 MG/ML
200 SUSPENSION ORAL DAILY
Status: DISCONTINUED | OUTPATIENT
Start: 2023-10-05 | End: 2023-10-08 | Stop reason: HOSPADM

## 2023-10-05 RX ORDER — ENOXAPARIN SODIUM 100 MG/ML
40 INJECTION SUBCUTANEOUS DAILY
Status: DISCONTINUED | OUTPATIENT
Start: 2023-10-05 | End: 2023-10-08 | Stop reason: HOSPADM

## 2023-10-05 RX ADMIN — ENOXAPARIN SODIUM 40 MG: 100 INJECTION SUBCUTANEOUS at 09:11

## 2023-10-05 RX ADMIN — MEGESTROL ACETATE 200 MG: 40 SUSPENSION ORAL at 09:10

## 2023-10-05 RX ADMIN — Medication 1000 UNITS: at 09:12

## 2023-10-05 RX ADMIN — SODIUM CHLORIDE, PRESERVATIVE FREE 10 ML: 5 INJECTION INTRAVENOUS at 21:32

## 2023-10-05 RX ADMIN — LOSARTAN POTASSIUM 25 MG: 50 TABLET, FILM COATED ORAL at 09:11

## 2023-10-05 RX ADMIN — ASPIRIN 81 MG: 81 TABLET, COATED ORAL at 09:11

## 2023-10-05 RX ADMIN — INSULIN LISPRO 4 UNITS: 100 INJECTION, SOLUTION INTRAVENOUS; SUBCUTANEOUS at 09:11

## 2023-10-05 RX ADMIN — INSULIN HUMAN 18 UNITS: 100 INJECTION, SUSPENSION SUBCUTANEOUS at 17:48

## 2023-10-05 RX ADMIN — INSULIN LISPRO 4 UNITS: 100 INJECTION, SOLUTION INTRAVENOUS; SUBCUTANEOUS at 12:47

## 2023-10-05 RX ADMIN — ACETAMINOPHEN 650 MG: 325 TABLET ORAL at 21:32

## 2023-10-05 RX ADMIN — INSULIN HUMAN 18 UNITS: 100 INJECTION, SUSPENSION SUBCUTANEOUS at 09:10

## 2023-10-05 RX ADMIN — ONDANSETRON 4 MG: 2 INJECTION INTRAMUSCULAR; INTRAVENOUS at 17:47

## 2023-10-05 RX ADMIN — FERROUS SULFATE TAB 325 MG (65 MG ELEMENTAL FE) 325 MG: 325 (65 FE) TAB at 09:11

## 2023-10-05 ASSESSMENT — PAIN SCALES - GENERAL
PAINLEVEL_OUTOF10: 0
PAINLEVEL_OUTOF10: 0

## 2023-10-05 NOTE — PLAN OF CARE
Problem: Discharge Planning  Goal: Discharge to home or other facility with appropriate resources  Outcome: Progressing  Flowsheets (Taken 10/5/2023 0523 by Anup David RN)  Discharge to home or other facility with appropriate resources: Identify barriers to discharge with patient and caregiver     Problem: Chronic Conditions and Co-morbidities  Goal: Patient's chronic conditions and co-morbidity symptoms are monitored and maintained or improved  Outcome: Progressing

## 2023-10-05 NOTE — WOUND CARE
IP WOUND CONSULT    Bess Pyle  MEDICAL RECORD NUMBER:  [id number]  AGE: 68 y.o.    GENDER: female  : 1950  TODAY'S DATE:  10/5/2023    GENERAL     [] Follow-up   [x] New Consult    Bess Pyle is a 68 y.o. female referred by:   [] Physician  [x] Nursing  [] Other:         PAST MEDICAL HISTORY    Past Medical History:   Diagnosis Date    Arrhythmia     At risk for infection associated with Javier catheter     Bladder cancer ([address])     Breast cancer ([address])     right    Cerebral artery occlusion with cerebral infarction ([address])     no deficits    Dementia ([address])     DKA (diabetic ketoacidosis) ([address]) 2022    Essential hypertension 2020    Hyperlipidemia     MRSA (methicillin resistant staph aureus) culture positive     Non-alcoholic fatty liver disease 2020    Right groin ulcer ([address]) 2022    Sleep apnea     does not use CPAP    Thyroid disease         PAST SURGICAL HISTORY    Past Surgical History:   Procedure Laterality Date    CARDIAC PROCEDURE N/A 2023    Left heart cath / coronary angiography performed by Devon Recinos MD at [address] [place] [address] N/A 2023    Percutaneous coronary intervention performed by Devon Recinos MD at [address] [place] [address] N/A 2023    Insert stent benny coronary performed by Devon Recinos MD at [facility] CATH LAB    CATARACT REMOVAL Bilateral     CHOLECYSTECTOMY      COLONOSCOPY  2002    HYSTERECTOMY (CERVIX STATUS UNKNOWN)      partial  20 yrs ago    IR PORT PLACEMENT EQUAL OR GREATER THAN 5 YEARS  2023    IR PORT PLACEMENT EQUAL OR GREATER THAN 5 YEARS 2023 [facility] CARDIAC CATH/EP/IR LAB    OTHER SURGICAL HISTORY      Watchman    TUBAL LIGATION         FAMILY HISTORY    Family History   Problem Relation Age of Onset    Diabetes Paternal Grandmother     Diabetes Maternal Grandmother     Other Maternal Grandmother         CHF    Diabetes Mother          ALLERGIES    Allergies   Allergen with:   [] Patient           [] Family member       [] Caregiver          [] Nursing  [] Other    Patient/Caregiver Teaching:  Level of patient/caregiver understanding able to:   [] Indicates understanding       [] Needs reinforcement  [] Unsuccessful      [] Verbal Understanding  [] Demonstrated understanding       [] No evidence of learning  [] Refused teaching         [] N/A       Electronically signed by Sara Delgado RN on 10/5/2023 at 1:20 PM

## 2023-10-05 NOTE — ED NOTES
Pt's ex , Yin Torres ([phone]), called with concerns regarding the pt's welfare. Provider made aware of concerns and stated that we cannot admit to a SNF through the ED. Mr Corina Yadav was informed of her status at this time and given resources to help the pt find extra help.      Dash Rice RN  10/05/23 0040

## 2023-10-05 NOTE — CONSULTS
SURGICAL HISTORY      Watchman    TUBAL LIGATION        Family History   Problem Relation Age of Onset    Diabetes Paternal Grandmother     Diabetes Maternal Grandmother     Other Maternal Grandmother         CHF    Diabetes Mother      Social History     Tobacco Use    Smoking status: Former     Packs/day: 1     Types: Cigarettes     Quit date: 1980     Years since quittin.7    Smokeless tobacco: Never   Substance Use Topics    Alcohol use: Not Currently      Current Facility-Administered Medications   Medication Dose Route Frequency Provider Last Rate Last Admin    aspirin EC tablet 81 mg  81 mg Oral Daily Sebastian Basilio MD   81 mg at 10/05/23 0911    calcium carb-cholecalciferol 600-20 MG-MCG per tab 1 tablet  1 tablet Oral Daily Radha Shaffer MD        ferrous sulfate (IRON 325) tablet 325 mg  325 mg Oral Daily Radha Shaffer MD   325 mg at 10/05/23 09    Vitamin D (CHOLECALCIFEROL) tablet 1,000 Units  1,000 Units Oral Daily Sebastian Basilio MD   1,000 Units at 10/05/23 0912    sodium chloride flush 0.9 % injection 5-40 mL  5-40 mL IntraVENous 2 times per day Sebastian Basilio MD        sodium chloride flush 0.9 % injection 5-40 mL  5-40 mL IntraVENous PRN Sebastian Basilio MD        0.9 % sodium chloride infusion   IntraVENous PRN Sebastian Basilio MD        enoxaparin (LOVENOX) injection 40 mg  40 mg SubCUTAneous Daily Sebastian Basilio MD   40 mg at 10/05/23 09    ondansetron (ZOFRAN-ODT) disintegrating tablet 4 mg  4 mg Oral Q8H PRN Sebastian Basilio MD        Or    ondansetron (ZOFRAN) injection 4 mg  4 mg IntraVENous Q6H PRN Sebastian Basilio MD   4 mg at 10/05/23 1747    polyethylene glycol (GLYCOLAX) packet 17 g  17 g Oral Daily PRN Sebastian Basilio MD        acetaminophen (TYLENOL) tablet 650 mg  650 mg Oral Q6H PRN Sebastian Basilio MD        Or    acetaminophen (TYLENOL) suppository 650 mg  650 mg Rectal Q6H PRN Sebastian Basilio MD        insulin NPH (HumuLIN N;NovoLIN N) injection vial 18 Units  18 Units Verbalizes understanding of our discussions today. Lab/Data Review:  Recent Labs     10/04/23  2148   WBC 3.7   HGB 10.1*   HCT 30.0*        Recent Labs     10/04/23  2148   *   K 4.4      CO2 25   BUN 28*   ALT 51     No results for input(s): \"PH\", \"PCO2\", \"PO2\", \"HCO3\", \"FIO2\" in the last 72 hours.   Recent Results (from the past 24 hour(s))   Urinalysis with Reflex to Culture    Collection Time: 10/04/23  9:48 PM    Specimen: Urine   Result Value Ref Range    Color, UA Yellow/Straw      Appearance Clear Clear      Specific Gravity, UA 1.011 1.003 - 1.030      pH, Urine 6.0 5.0 - 8.0      Protein,  (A) Negative mg/dL    Glucose,  (A) Negative mg/dL    Ketones, Urine Negative Negative mg/dL    Bilirubin Urine Negative Negative      Blood, Urine Negative Negative      Urobilinogen, Urine 0.1 0.1 - 1.0 EU/dL    Nitrite, Urine Negative Negative      Leukocyte Esterase, Urine Negative Negative      WBC, UA 0-4 0 - 4 /hpf    RBC, UA 0-5 0 - 5 /hpf    Epithelial Cells UA Few Few /lpf    BACTERIA, URINE Negative Negative /hpf    Urine Culture if Indicated Culture not indicated by UA result Culture not indicated by UA result      Mucus, UA Trace (A) Negative /lpf   CBC with Auto Differential    Collection Time: 10/04/23  9:48 PM   Result Value Ref Range    WBC 3.7 3.6 - 11.0 K/uL    RBC 3.55 (L) 3.80 - 5.20 M/uL    Hemoglobin 10.1 (L) 11.5 - 16.0 g/dL    Hematocrit 30.0 (L) 35.0 - 47.0 %    MCV 84.5 80.0 - 99.0 FL    MCH 28.5 26.0 - 34.0 PG    MCHC 33.7 30.0 - 36.5 g/dL    RDW 18.4 (H) 11.5 - 14.5 %    Platelets 385 495 - 416 K/uL    MPV 9.7 8.9 - 12.9 FL    Nucleated RBCs 0.0 0.0  WBC    nRBC 0.00 0.00 - 0.01 K/uL    Neutrophils % 59 32 - 75 %    Lymphocytes % 31 12 - 49 %    Monocytes % 7 5 - 13 %    Eosinophils % 1 0 - 7 %    Basophils % 1 0 - 1 %    Immature Granulocytes 1 (H) 0 - 0.5 %    Neutrophils Absolute 2.2 1.8 - 8.0 K/UL    Lymphocytes Absolute 1.2 0.8 - 3.5 K/UL

## 2023-10-05 NOTE — ED PROVIDER NOTES
[facility] EMERGENCY DEPT  EMERGENCY DEPARTMENT HISTORY AND PHYSICAL EXAM      Date: 10/4/2023  Patient Name: Candis Pyle  MRN: [id number]  YOB: 1950  Date of evaluation: 10/4/2023  Provider: Nubia Pierre DO   Note Started: 11:38 PM EDT 10/4/23    HISTORY OF PRESENT ILLNESS     Chief Complaint   Patient presents with    Fall       History Provided By: Patient    HPI: Candis Pyle is a 68 y.o. female with past medical history significant for the below presenting to the emergency department for evaluation of fall. Patient states that she fell approximately 3 times today. States that she has lost her balance. Denies preceding chest pain, shortness of breath, dizziness or lightheadedness. Reports history of frequent falls. Ambulates with rollator at baseline.     PAST MEDICAL HISTORY   Past Medical History:  Past Medical History:   Diagnosis Date    Arrhythmia     At risk for infection associated with Javier catheter     Bladder cancer ([address])     Breast cancer ([address])     right    Cerebral artery occlusion with cerebral infarction ([address]) 2018    no deficits    Dementia ([address])     DKA (diabetic ketoacidosis) ([address]) 04/06/2022    Essential hypertension 07/21/2020    Hyperlipidemia     MRSA (methicillin resistant staph aureus) culture positive 97/72/0907    Non-alcoholic fatty liver disease 07/21/2020    Right groin ulcer ([address]) 04/22/2022    Sleep apnea     does not use CPAP    Thyroid disease        Past Surgical History:  Past Surgical History:   Procedure Laterality Date    CARDIAC PROCEDURE N/A 7/26/2023    Left heart cath / coronary angiography performed by Alanya Tinajero MD at [address] [place] [address] N/A 7/26/2023    Percutaneous coronary intervention performed by Alayna Tinajero MD at [address] [place] [address] N/A 7/26/2023    Insert stent benny coronary performed by Alayna Tinajero MD at [facility] CATH LAB    CATARACT REMOVAL Bilateral     CHOLECYSTECTOMY      COLONOSCOPY  2002 needed for anxiety up to 3 times daily     Trulicity 8.93 BN/6.2XU Sopn  Generic drug: Dulaglutide     vitamin D 1.25 MG (87496 UT) Caps capsule  Commonly known as: ERGOCALCIFEROL     vitamin D 50 MCG (2000 UT) Caps capsule  Commonly known as: CHOLECALCIFEROL           * This list has 2 medication(s) that are the same as other medications prescribed for you. Read the directions carefully, and ask your doctor or other care provider to review them with you. DISCONTINUED MEDICATIONS:  Current Discharge Medication List          I am the Primary Clinician of Record: Bobbye Primrose, DO (electronically signed)    (Please note that parts of this dictation were completed with voice recognition software. Quite often unanticipated grammatical, syntax, homophones, and other interpretive errors are inadvertently transcribed by the computer software. Please disregards these errors.  Please excuse any errors that have escaped final proofreading.)     Bobbye Primrose, DO  10/05/23 1109

## 2023-10-05 NOTE — PLAN OF CARE
PHYSICAL THERAPY EVALUATION  Patient: Sachi Moore (78 y.o. female)  Date: 10/5/2023  Primary Diagnosis: Dehydration [E86.0]  Cannot walk [R26.2]  General weakness [R53.1]  Failure to thrive in adult [R62.7]       Precautions: Fall Risk                Recommendations for nursing mobility: Out of bed to chair for meals, Annalisa Silas for bed to chair transfers , and Assist x1    In place during session:   ASSESSMENT  Pt is a 68 y.o. female admitted on 10/4/2023 dehydration/FTT/inability to ambulate presents to PT with decreased bed mob, transfers, LE strength, gt, balance, activity tolerance, and overall functional mobility. Pt semi supine in bed upon PT arrival, agreeable to evaluation. Pt A&O x 4. Pt did have code fall yesterday in ER and was assisted to floor by nursing per nursing report. Pt states her legs gave out and she is very weak. Based on the objective data described below, the patient currently presents with impaired functional mobility, decreased ROM, impaired strength, decreased activity tolerance, poor safety awareness, and impaired balance. (See below for objective details and assist levels). Overall pt tolerated session fair today with overall CGA with bed mob and EOB transfers. Pt was able to stand at EOB with min Ax1, but very unsteady. Pt performed 2 sit to stand trials, able to take 1 step at EOB but was incontinent of urine both attempts at standing and then unable to ambulate due to needing to get cleaned up. Pt not able to stand and maintain static standing balance to get cleaned up, but was able to sit EOB and and able to wipe her Les down with wipes. Pt will benefit from continued skilled PT to address above deficits and return to PLOF. Potential barriers for safe discharge: pt lives alone and pt is a high fall risk. Current PT DC recommendation [address] once medically appropriate as she lives alone and has had multiple falls recently.       GOALS:  FUNCTIONAL STATUS Sandro Prince MD at [facility] CATH LAB    CATARACT REMOVAL Bilateral     CHOLECYSTECTOMY      COLONOSCOPY  2002    HYSTERECTOMY (CERVIX STATUS UNKNOWN)      partial  20 yrs ago    IR PORT PLACEMENT EQUAL OR GREATER THAN 5 YEARS  07/11/2023    IR PORT PLACEMENT EQUAL OR GREATER THAN 5 YEARS 7/11/2023 [facility] CARDIAC CATH/EP/IR LAB    OTHER SURGICAL HISTORY      Watchman    TUBAL LIGATION         Home Situation:  Social/Functional History  Lives With: Alone  Type of Home: House  Home Layout: One level  Home Access: Ramped entrance  ADL Assistance: Independent  Homemaking Assistance: Independent  Ambulation Assistance: Independent  Transfer Assistance: Independent  Active : Yes  Mode of Transportation: Car  Occupation: Retired    Cognitive/Behavioral Status:  Orientation  Overall Orientation Status: Within Normal Limits  Orientation Level: Oriented X4       Skin: intact where exposed    Edema: none noted    Strength:    Strength: Generally decreased, functional B LE grossly 4-/5    Range Of Motion:  AROM: Generally decreased, functional B LE         Tone & Sensation:      Sensation: Intact to LT B LE      Functional Mobility:  Bed Mobility:     Bed Mobility Training  Bed Mobility Training: Yes  Overall Level of Assistance: Contact-guard assistance  Rolling: Contact-guard assistance  Supine to Sit: Contact-guard assistance  Sit to Supine: Contact-guard assistance  Scooting: Contact-guard assistance  Transfers:     Transfer Training  Transfer Training: Yes  Overall Level of Assistance: Minimum assistance  Sit to Stand: Minimum assistance  Stand to Sit: Minimum assistance  Stand Pivot Transfers: Minimum assistance  Balance:     Balance  Sitting: Intact  Standing: Impaired  Standing - Static: Constant support; Fair  Standing - Dynamic: Constant support; Fair    Ambulation/Gait Training:  Pt performed 2 sit to stand trials, able to take 1 step at EOB but was incontinent of urine both attempts at standing and then unable to

## 2023-10-05 NOTE — ED NOTES
Pt taken to ambulate with gait belt and pt was too weak to complete. Pt back  in bed with fresh linens, brief, and chris pad. Pure wick placed and hooked up to suction, bed locked and in lowest  position with side rails  raised. Pt on bedside monitor and lights dim. Pt resting comfortably and plan of care ongoing at this time.      Carlo Salazar RN  10/05/23 3135

## 2023-10-05 NOTE — H&P
History & Physical    Primary Care Provider: Dionisio Dozier DO  Source of Information: Patient/family     Chief complaint:   Chief Complaint   Patient presents with    Fall        History of Presenting Illness:   Joshua Vargas is a 68 y.o. female with PMH of breast cancer on chemo, CVA, diabetes, hypertension and other medical problems as below. Presented to the ED with chief complaint of frequent fall. She reports fall due to generalized weakness, especially lower extremities. Denies focal neurological symptoms or vertigo prior to fall. No LOC. Otherwise denies fever chills, no UTI symptoms. She reports has been having loss of appetite and has not been eating much. Patient lives alone. Chemotherapy every Tuesday, patient does not know her oncologist.    In the ED, Vital signs stable. Lab work showed no leukocytosis, mild LUCIE. UA not indicative of UTI. CT of the abdomen showed no acute process, breast cancer again noted. No acute findings on CT head and neck.       Chart review: none         Past Medical History:   Diagnosis Date    Arrhythmia     At risk for infection associated with Javier catheter     Bladder cancer ([address])     Breast cancer ([address])     right    Cerebral artery occlusion with cerebral infarction ([address]) 2018    no deficits    Dementia ([address])     DKA (diabetic ketoacidosis) ([address]) 04/06/2022    Essential hypertension 07/21/2020    Hyperlipidemia     MRSA (methicillin resistant staph aureus) culture positive 97/59/3201    Non-alcoholic fatty liver disease 07/21/2020    Right groin ulcer ([address]) 04/22/2022    Sleep apnea     does not use CPAP    Thyroid disease         Past Surgical History:   Procedure Laterality Date    CARDIAC PROCEDURE N/A 7/26/2023    Left heart cath / coronary angiography performed by Miquel Dao MD at [facility] N/A 7/26/2023    Percutaneous coronary intervention performed by Miquel Dao MD at [address] [facility]

## 2023-10-06 ENCOUNTER — APPOINTMENT (OUTPATIENT)
Facility: HOSPITAL | Age: 73
DRG: 641 | End: 2023-10-06
Payer: MEDICARE

## 2023-10-06 LAB
ANION GAP SERPL CALC-SCNC: 5 MMOL/L (ref 5–15)
BASOPHILS # BLD: 0 K/UL (ref 0–0.1)
BASOPHILS NFR BLD: 1 % (ref 0–1)
BUN SERPL-MCNC: 26 MG/DL (ref 6–20)
BUN/CREAT SERPL: 22 (ref 12–20)
CA-I BLD-MCNC: 8.9 MG/DL (ref 8.5–10.1)
CHLORIDE SERPL-SCNC: 106 MMOL/L (ref 97–108)
CO2 SERPL-SCNC: 25 MMOL/L (ref 21–32)
CREAT SERPL-MCNC: 1.17 MG/DL (ref 0.55–1.02)
DIFFERENTIAL METHOD BLD: ABNORMAL
EOSINOPHIL # BLD: 0.1 K/UL (ref 0–0.4)
EOSINOPHIL NFR BLD: 2 % (ref 0–7)
ERYTHROCYTE [DISTWIDTH] IN BLOOD BY AUTOMATED COUNT: 18.5 % (ref 11.5–14.5)
GLUCOSE BLD STRIP.AUTO-MCNC: 184 MG/DL (ref 65–100)
GLUCOSE BLD STRIP.AUTO-MCNC: 203 MG/DL (ref 65–100)
GLUCOSE BLD STRIP.AUTO-MCNC: 203 MG/DL (ref 65–100)
GLUCOSE BLD STRIP.AUTO-MCNC: 212 MG/DL (ref 65–100)
GLUCOSE SERPL-MCNC: 213 MG/DL (ref 65–100)
HCT VFR BLD AUTO: 30.3 % (ref 35–47)
HGB BLD-MCNC: 10.1 G/DL (ref 11.5–16)
IMM GRANULOCYTES # BLD AUTO: 0 K/UL (ref 0–0.04)
IMM GRANULOCYTES NFR BLD AUTO: 1 % (ref 0–0.5)
LYMPHOCYTES # BLD: 1 K/UL (ref 0.8–3.5)
LYMPHOCYTES NFR BLD: 26 % (ref 12–49)
MCH RBC QN AUTO: 28.5 PG (ref 26–34)
MCHC RBC AUTO-ENTMCNC: 33.3 G/DL (ref 30–36.5)
MCV RBC AUTO: 85.4 FL (ref 80–99)
MONOCYTES # BLD: 0.2 K/UL (ref 0–1)
MONOCYTES NFR BLD: 5 % (ref 5–13)
NEUTS SEG # BLD: 2.5 K/UL (ref 1.8–8)
NEUTS SEG NFR BLD: 65 % (ref 32–75)
NRBC # BLD: 0 K/UL (ref 0–0.01)
NRBC BLD-RTO: 0 PER 100 WBC
PERFORMED BY:: ABNORMAL
PLATELET # BLD AUTO: 219 K/UL (ref 150–400)
PMV BLD AUTO: 10.2 FL (ref 8.9–12.9)
POTASSIUM SERPL-SCNC: 4.2 MMOL/L (ref 3.5–5.1)
RBC # BLD AUTO: 3.55 M/UL (ref 3.8–5.2)
SODIUM SERPL-SCNC: 136 MMOL/L (ref 136–145)
WBC # BLD AUTO: 3.7 K/UL (ref 3.6–11)

## 2023-10-06 PROCEDURE — 73562 X-RAY EXAM OF KNEE 3: CPT

## 2023-10-06 PROCEDURE — 2580000003 HC RX 258: Performed by: INTERNAL MEDICINE

## 2023-10-06 PROCEDURE — 97530 THERAPEUTIC ACTIVITIES: CPT

## 2023-10-06 PROCEDURE — 1100000000 HC RM PRIVATE

## 2023-10-06 PROCEDURE — 73610 X-RAY EXAM OF ANKLE: CPT

## 2023-10-06 PROCEDURE — 73521 X-RAY EXAM HIPS BI 2 VIEWS: CPT

## 2023-10-06 PROCEDURE — 85025 COMPLETE CBC W/AUTO DIFF WBC: CPT

## 2023-10-06 PROCEDURE — 82962 GLUCOSE BLOOD TEST: CPT

## 2023-10-06 PROCEDURE — 51798 US URINE CAPACITY MEASURE: CPT

## 2023-10-06 PROCEDURE — 6370000000 HC RX 637 (ALT 250 FOR IP): Performed by: INTERNAL MEDICINE

## 2023-10-06 PROCEDURE — 80048 BASIC METABOLIC PNL TOTAL CA: CPT

## 2023-10-06 PROCEDURE — 6360000002 HC RX W HCPCS: Performed by: INTERNAL MEDICINE

## 2023-10-06 PROCEDURE — 36415 COLL VENOUS BLD VENIPUNCTURE: CPT

## 2023-10-06 RX ORDER — MEGESTROL ACETATE 40 MG/ML
200 SUSPENSION ORAL DAILY
Qty: 240 ML | Refills: 3 | Status: SHIPPED | OUTPATIENT
Start: 2023-10-07

## 2023-10-06 RX ORDER — LOSARTAN POTASSIUM 25 MG/1
25 TABLET ORAL DAILY
Qty: 30 TABLET | Refills: 3 | Status: SHIPPED | OUTPATIENT
Start: 2023-10-07

## 2023-10-06 RX ADMIN — LOSARTAN POTASSIUM 25 MG: 50 TABLET, FILM COATED ORAL at 08:21

## 2023-10-06 RX ADMIN — MEGESTROL ACETATE 200 MG: 40 SUSPENSION ORAL at 08:17

## 2023-10-06 RX ADMIN — INSULIN LISPRO 2 UNITS: 100 INJECTION, SOLUTION INTRAVENOUS; SUBCUTANEOUS at 08:15

## 2023-10-06 RX ADMIN — ASPIRIN 81 MG: 81 TABLET, COATED ORAL at 08:16

## 2023-10-06 RX ADMIN — SODIUM CHLORIDE, PRESERVATIVE FREE 10 ML: 5 INJECTION INTRAVENOUS at 20:41

## 2023-10-06 RX ADMIN — INSULIN HUMAN 18 UNITS: 100 INJECTION, SUSPENSION SUBCUTANEOUS at 08:15

## 2023-10-06 RX ADMIN — INSULIN HUMAN 18 UNITS: 100 INJECTION, SUSPENSION SUBCUTANEOUS at 18:23

## 2023-10-06 RX ADMIN — SODIUM CHLORIDE, PRESERVATIVE FREE 10 ML: 5 INJECTION INTRAVENOUS at 08:21

## 2023-10-06 RX ADMIN — Medication 1000 UNITS: at 08:16

## 2023-10-06 RX ADMIN — INSULIN LISPRO 2 UNITS: 100 INJECTION, SOLUTION INTRAVENOUS; SUBCUTANEOUS at 18:23

## 2023-10-06 RX ADMIN — Medication 1 TABLET: at 08:16

## 2023-10-06 RX ADMIN — ENOXAPARIN SODIUM 40 MG: 100 INJECTION SUBCUTANEOUS at 08:17

## 2023-10-06 RX ADMIN — FERROUS SULFATE TAB 325 MG (65 MG ELEMENTAL FE) 325 MG: 325 (65 FE) TAB at 08:17

## 2023-10-06 ASSESSMENT — PAIN SCALES - GENERAL: PAINLEVEL_OUTOF10: 0

## 2023-10-06 NOTE — DISCHARGE SUMMARY
Hospitalist Discharge Summary     Patient ID:    Semaj Elizabeth  [id number]  23 y.o.  1950    Admit date: 10/4/2023    Discharge date : 10/6/2023    Final Diagnoses:   Failure to thrive in adult  Mild dehydration  Frequent falls/weakness  Breast cancer  Diabetes insulin dependent  HTN    Reason for Hospitalization: Weakness and falls      Hospital Course:  Semaj Elizabeth is a 68 y.o. female with medical history of breast cancer on chemo, CVA, diabetes, hypertension that presented to the ED with chief complaint of frequent fall. She reports fall due to generalized weakness, especially lower extremities. Denies focal neurological symptoms or vertigo prior to fall. No LOC. Otherwise denies fever chills, no UTI symptoms. She reports has been having loss of appetite and has not been eating much. Patient lives alone. Chemotherapy every Tuesday, patient does not know her oncologist.     In the ED, Vital signs stable. Lab work showed no leukocytosis, mild LUCIE. UA not indicative of UTI. CT of the abdomen showed no acute process, breast cancer again noted. No acute findings on CT head and neck. Oncology consulted. Started on appetite stimulate. Spoke with CM and patient has been accepted to [facility] however needs 3 midnights.  Most likely discharge not till Sunday or Monday       Discharge Medications:      Medication List        START taking these medications      losartan 25 MG tablet  Commonly known as: COZAAR  Take 1 tablet by mouth daily  Start taking on: October 7, 2023     megestrol 40 MG/ML suspension  Commonly known as: MEGACE  Take 5 mLs by mouth daily  Start taking on: October 7, 2023            Balbir Marshall taking these medications      aspirin 81 MG EC tablet     calcium carb-cholecalciferol 600-20 MG-MCG Tabs     clopidogrel 75 MG tablet  Commonly known as: PLAVIX  Take 1 tablet by mouth daily     Ferrous Fumarate 325 (106 Fe) MG Tabs     FreeStyle [place] 2 Sensor at Discharge:  Stable  __________________________________________________________________    Disposition  SNF/LTC  ____________________________________________________________________    Code Status: Full Code  ___________________________________________________________________    Discharge Exam:  Patient seen and examined by me on discharge day. Pertinent Findings:  Gen:    Not in distress  Chest: Clear lungs  CVS:   Regular rhythm. No edema  Abd:  Soft, not distended, not tender  Neuro:  Alert    CONSULTATIONS: hematology/oncology    Significant Diagnostic Studies:   10/4/2023: BUN 28 mg/dL (H; Ref range: 6 - 20 mg/dL); CO2 25 mmol/L (Ref range: 21 - 32 mmol/L); Hematocrit 30.0 % (L; Ref range: 35.0 - 47.0 %); Hemoglobin 10.1 g/dL (L; Ref range: 11.5 - 16.0 g/dL); Potassium 4.4 mmol/L (Ref range: 3.5 - 5.1 mmol/L); Sodium 135 mmol/L (L; Ref range: 136 - 145 mmol/L)  Recent Labs     10/04/23  2148 10/06/23  0818   WBC 3.7 3.7   HGB 10.1* 10.1*   HCT 30.0* 30.3*    219     Recent Labs     10/04/23  2148 10/06/23  0818   * 136   K 4.4 4.2    106   CO2 25 25   BUN 28* 26*     Recent Labs     10/04/23  2148   ALT 51   GLOB 2.6     No results for input(s): \"INR\", \"APTT\" in the last 72 hours. Invalid input(s): \"PTP\"   No results for input(s): \"TIBC\", \"FERR\" in the last 72 hours. Invalid input(s): \"FE\", \"PSAT\"   No results for input(s): \"PH\", \"PCO2\", \"PO2\" in the last 72 hours. No results for input(s): \"CPK\", \"CKMB\", \"TROPONINI\" in the last 72 hours.   No results found for: \"GLUCPOC\"      Total Time: >30 min     Signed:  Leonidas King PA-C  10/6/2023  11:38 AM

## 2023-10-06 NOTE — CARE COORDINATION
CM met with patient at bedside regarding PT recs for SNF. Patient is agreeable to this plan. No preference of facilities. Choice signed. 10:45: Clinicals uploaded to [email or web address]. Awaiting response. 11:30: Patient accepted to [facility] care center. DC summary and DC order uploaded to [email or web address]. 11:49: Received following message : Vlad Canseco would have to come tomorrow for the 3 night stay requirement. .. Aliyah Redding but we can get her ready for admission tomorrow. :)\" Please send any updated notes and 7 day MAR.    13:00: Met with patient and asked if she would be interested in trying [facility] as [facility] cannot take her until Sunday due to 3 night stay requirement. She would prefer to wait for discharge to [facility] on Sunday.

## 2023-10-07 LAB
GLUCOSE BLD STRIP.AUTO-MCNC: 129 MG/DL (ref 65–100)
GLUCOSE BLD STRIP.AUTO-MCNC: 166 MG/DL (ref 65–100)
GLUCOSE BLD STRIP.AUTO-MCNC: 209 MG/DL (ref 65–100)
GLUCOSE BLD STRIP.AUTO-MCNC: 237 MG/DL (ref 65–100)
PERFORMED BY:: ABNORMAL

## 2023-10-07 PROCEDURE — 6370000000 HC RX 637 (ALT 250 FOR IP): Performed by: INTERNAL MEDICINE

## 2023-10-07 PROCEDURE — 82962 GLUCOSE BLOOD TEST: CPT

## 2023-10-07 PROCEDURE — 2580000003 HC RX 258: Performed by: INTERNAL MEDICINE

## 2023-10-07 PROCEDURE — 6360000002 HC RX W HCPCS: Performed by: INTERNAL MEDICINE

## 2023-10-07 PROCEDURE — 1100000000 HC RM PRIVATE

## 2023-10-07 RX ADMIN — INSULIN HUMAN 18 UNITS: 100 INJECTION, SUSPENSION SUBCUTANEOUS at 15:21

## 2023-10-07 RX ADMIN — INSULIN LISPRO 2 UNITS: 100 INJECTION, SOLUTION INTRAVENOUS; SUBCUTANEOUS at 15:21

## 2023-10-07 RX ADMIN — SODIUM CHLORIDE, PRESERVATIVE FREE 10 ML: 5 INJECTION INTRAVENOUS at 20:24

## 2023-10-07 RX ADMIN — Medication 1000 UNITS: at 08:38

## 2023-10-07 RX ADMIN — FERROUS SULFATE TAB 325 MG (65 MG ELEMENTAL FE) 325 MG: 325 (65 FE) TAB at 08:37

## 2023-10-07 RX ADMIN — LOSARTAN POTASSIUM 25 MG: 50 TABLET, FILM COATED ORAL at 08:37

## 2023-10-07 RX ADMIN — INSULIN LISPRO 2 UNITS: 100 INJECTION, SOLUTION INTRAVENOUS; SUBCUTANEOUS at 08:36

## 2023-10-07 RX ADMIN — MEGESTROL ACETATE 200 MG: 40 SUSPENSION ORAL at 08:37

## 2023-10-07 RX ADMIN — SODIUM CHLORIDE, PRESERVATIVE FREE 10 ML: 5 INJECTION INTRAVENOUS at 08:37

## 2023-10-07 RX ADMIN — INSULIN HUMAN 18 UNITS: 100 INJECTION, SUSPENSION SUBCUTANEOUS at 08:37

## 2023-10-07 RX ADMIN — ENOXAPARIN SODIUM 40 MG: 100 INJECTION SUBCUTANEOUS at 08:38

## 2023-10-07 RX ADMIN — ASPIRIN 81 MG: 81 TABLET, COATED ORAL at 08:37

## 2023-10-07 RX ADMIN — Medication 1 TABLET: at 08:37

## 2023-10-07 ASSESSMENT — PAIN SCALES - GENERAL: PAINLEVEL_OUTOF10: 0

## 2023-10-07 NOTE — DISCHARGE SUMMARY
Hospitalist Discharge Summary     Patient ID:    Luanne Ash  [id number]  3 y.o.  1950    Admit date: 10/4/2023    Discharge date : 10/7/2023    Final Diagnoses:   Failure to thrive in adult  Mild dehydration  Frequent falls/weakness  Breast cancer  Diabetes insulin dependent  HTN    Reason for Hospitalization: Weakness and falls      Hospital Course:  Luanne Ash is a 68 y.o. female with medical history of breast cancer on chemo, CVA, diabetes, hypertension that presented to the ED with chief complaint of frequent fall. She reports fall due to generalized weakness, especially lower extremities. Denies focal neurological symptoms or vertigo prior to fall. No LOC. Otherwise denies fever chills, no UTI symptoms. She reports has been having loss of appetite and has not been eating much. Patient lives alone. Chemotherapy every Tuesday, patient does not know her oncologist.     In the ED, Vital signs stable. Lab work showed no leukocytosis, mild LUCIE. UA not indicative of UTI. CT of the abdomen showed no acute process, breast cancer again noted. No acute findings on CT head and neck. Oncology consulted. Started on appetite stimulate. Spoke with CM and patient has been accepted to [facility] however needs 3 midnights. Most likely discharge not till Sunday or Monday     XR of hips, pelvis, knee, and ankle no fracture.        Discharge Medications:      Medication List        START taking these medications      losartan 25 MG tablet  Commonly known as: COZAAR  Take 1 tablet by mouth daily     megestrol 40 MG/ML suspension  Commonly known as: MEGACE  Take 5 mLs by mouth daily            CONTINUE taking these medications      aspirin 81 MG EC tablet     calcium carb-cholecalciferol 600-20 MG-MCG Tabs     clopidogrel 75 MG tablet  Commonly known as: PLAVIX  Take 1 tablet by mouth daily     Ferrous Fumarate 325 (106 Fe) MG Tabs     FreeStyle [place] 2 Sensor Misc     insulin aspart 100 UNIT/ML injection vial  Commonly known as: NOVOLOG     * insulin  UNIT/ML injection vial  Commonly known as: HumuLIN N;NovoLIN N     * insulin  UNIT/ML injection vial  Commonly known as: HumuLIN N;NovoLIN N     metFORMIN 500 MG extended release tablet  Commonly known as: GLUCOPHAGE-XR     propranolol 10 MG tablet  Commonly known as: INDERAL  Take 1-2 tabs as needed for anxiety up to 3 times daily     Rollator Ultra-Light Misc  1 each by Does not apply route daily     Trulicity 6.00 QP/6.9ZF Sopn  Generic drug: Dulaglutide     vitamin D 1.25 MG (35593 UT) Caps capsule  Commonly known as: ERGOCALCIFEROL     vitamin D 50 MCG (2000 UT) Caps capsule  Commonly known as: CHOLECALCIFEROL           * This list has 2 medication(s) that are the same as other medications prescribed for you. Read the directions carefully, and ask your doctor or other care provider to review them with you. Where to Get Your Medications        These medications were sent to [address], [address] [place] [address]      Phone: [phone]   losartan 25 MG tablet  megestrol 40 MG/ML suspension           Follow up Care:    1. Vj Cueto, DO in 1-2 weeks. Follow-up Information       Follow up With Specialties Details Why Contact Info    Ely Cha MD Hematology and Oncology Follow up in 1 week(s)  [address] [facility]      Vj Cueto DO Family Medicine Follow up in 3 week(s)  [address], [address], [facility] 100 [facility] [address] [place] [address]  [phone]              No follow-ups on file. Patient Follow Up Instructions:    Activity: activity as tolerated  Diet:  diabetic diet  Wound care:  None    Condition at Discharge:

## 2023-10-08 VITALS
SYSTOLIC BLOOD PRESSURE: 112 MMHG | OXYGEN SATURATION: 98 % | TEMPERATURE: 97.7 F | DIASTOLIC BLOOD PRESSURE: 66 MMHG | WEIGHT: 180 LBS | HEART RATE: 95 BPM | RESPIRATION RATE: 18 BRPM | HEIGHT: 66 IN | BODY MASS INDEX: 28.93 KG/M2

## 2023-10-08 LAB
GLUCOSE BLD STRIP.AUTO-MCNC: 199 MG/DL (ref 65–100)
PERFORMED BY:: ABNORMAL

## 2023-10-08 PROCEDURE — 82962 GLUCOSE BLOOD TEST: CPT

## 2023-10-08 PROCEDURE — 6370000000 HC RX 637 (ALT 250 FOR IP): Performed by: INTERNAL MEDICINE

## 2023-10-08 PROCEDURE — 97116 GAIT TRAINING THERAPY: CPT

## 2023-10-08 PROCEDURE — 6360000002 HC RX W HCPCS: Performed by: INTERNAL MEDICINE

## 2023-10-08 RX ADMIN — Medication 1000 UNITS: at 09:30

## 2023-10-08 RX ADMIN — ENOXAPARIN SODIUM 40 MG: 100 INJECTION SUBCUTANEOUS at 09:29

## 2023-10-08 RX ADMIN — FERROUS SULFATE TAB 325 MG (65 MG ELEMENTAL FE) 325 MG: 325 (65 FE) TAB at 09:30

## 2023-10-08 RX ADMIN — LOSARTAN POTASSIUM 25 MG: 50 TABLET, FILM COATED ORAL at 09:30

## 2023-10-08 RX ADMIN — MEGESTROL ACETATE 200 MG: 40 SUSPENSION ORAL at 09:30

## 2023-10-08 RX ADMIN — ASPIRIN 81 MG: 81 TABLET, COATED ORAL at 09:30

## 2023-10-08 RX ADMIN — Medication 1 TABLET: at 09:30

## 2023-10-08 RX ADMIN — INSULIN HUMAN 18 UNITS: 100 INJECTION, SUSPENSION SUBCUTANEOUS at 09:29

## 2023-10-08 NOTE — DISCHARGE SUMMARY
Hospitalist Discharge Summary     Patient ID:    Love Chen  [id number]  30 y.o.  1950    Admit date: 10/4/2023    Discharge date : 10/8/2023    Final Diagnoses:   Failure to thrive in adult  Mild dehydration  Frequent falls/weakness  Breast cancer  Diabetes insulin dependent  HTN    Reason for Hospitalization: Weakness and falls      Hospital Course:  Love Chen is a 68 y.o. female with medical history of breast cancer on chemo, CVA, diabetes, hypertension that presented to the ED with chief complaint of frequent fall. She reports fall due to generalized weakness, especially lower extremities. Denies focal neurological symptoms or vertigo prior to fall. No LOC. Otherwise denies fever chills, no UTI symptoms. She reports has been having loss of appetite and has not been eating much. Patient lives alone. Chemotherapy every Tuesday, patient does not know her oncologist.     In the ED, Vital signs stable. Lab work showed no leukocytosis, mild LUCIE. UA not indicative of UTI. CT of the abdomen showed no acute process, breast cancer again noted. No acute findings on CT head and neck. Oncology consulted. Started on appetite stimulate. Spoke with CM and patient has been accepted to [facility] however needs 3 midnights. Most likely discharge not till Sunday or Monday     XR of hips, pelvis, knee, and ankle no fracture. Discharge delayed due to placement.        Discharge Medications:      Medication List        START taking these medications      losartan 25 MG tablet  Commonly known as: COZAAR  Take 1 tablet by mouth daily     megestrol 40 MG/ML suspension  Commonly known as: MEGACE  Take 5 mLs by mouth daily            CONTINUE taking these medications      aspirin 81 MG EC tablet     calcium carb-cholecalciferol 600-20 MG-MCG Tabs     clopidogrel 75 MG tablet  Commonly known as: PLAVIX  Take 1 tablet by mouth daily     Ferrous Fumarate 325 (106 Fe) MG Tabs FreeStyle [place] 2 Sensor Misc     insulin aspart 100 UNIT/ML injection vial  Commonly known as: NOVOLOG     * insulin  UNIT/ML injection vial  Commonly known as: HumuLIN N;NovoLIN N     * insulin  UNIT/ML injection vial  Commonly known as: HumuLIN N;NovoLIN N     metFORMIN 500 MG extended release tablet  Commonly known as: GLUCOPHAGE-XR     propranolol 10 MG tablet  Commonly known as: INDERAL  Take 1-2 tabs as needed for anxiety up to 3 times daily     Rollator Ultra-Light Misc  1 each by Does not apply route daily     Trulicity 8.41 TP/4.7SG Sopn  Generic drug: Dulaglutide     vitamin D 1.25 MG (94306 UT) Caps capsule  Commonly known as: ERGOCALCIFEROL     vitamin D 50 MCG (2000 UT) Caps capsule  Commonly known as: CHOLECALCIFEROL           * This list has 2 medication(s) that are the same as other medications prescribed for you. Read the directions carefully, and ask your doctor or other care provider to review them with you. Where to Get Your Medications        These medications were sent to [address] [facility], [address] [place] [address]  [address] [address]      Phone: [phone]   losartan 25 MG tablet  megestrol 40 MG/ML suspension           Follow up Care:    1. Mary Kay Canales DO in 1-2 weeks. Follow-up Information       Follow up With Specialties Details Why Contact Info    Arleen Flores MD Hematology and Oncology Follow up in 1 week(s)  [address] 900 [facility]      Mary Kay Canales DO Family Medicine Follow up in 3 week(s)  [address] [facility] [id number] [place] [address] 285, [address] [facility], 727 [facility] 100 [facility] [address] [place] [address]  [phone]              No follow-ups on file. Patient Follow Up Instructions:    Activity: activity as tolerated  Diet:  diabetic diet  Wound

## 2023-10-08 NOTE — CARE COORDINATION
4324: Chart reviewed. Discharge summary/order to SNF noted and attached in [address] with MAR and labs. CM has requested bed assignment from [facility]. Patient will need transportation via family/friend. 1008: When transportation available, patient to discharge to:    [facility]  [address] [facility], [address] [place] [address]    Room #217-B    Report to be called to [phone]. CM met with patient at bedside to provide above information in writing. CM also explained insurance would not pay for medical transportation to SNF. Patient is calling her daughters for transportation and clothes. Nurse updated. Discharge Checklist Completed. Transition of Care Plan:    RUR: \"Calculating\"   Prior Level of Functioning: Independent  Disposition: SNF  If SNF or IPR: Date FOC offered: 10/06/2023  Date [address] received: 10/06/2023  Accepting facility: [facility]  Date authorization started with reference number: N/A  Date authorization received and expires: N/A   Follow up appointments: Discharge Summary  DME needed: None  Transportation at discharge: Family   IM/IMM Medicare/ letter given: 10/06/2023  Is patient a  and connected with [facility]? No  If yes, was [employer] transfer form completed and [facility] notified? N/A  Caregiver Contact: Patient Independent  Discharge Caregiver contacted prior to discharge? Patient Independent  Care Conference needed?  No  Barriers to discharge:  None

## 2023-10-08 NOTE — PLAN OF CARE
PHYSICAL THERAPY TREATMENT     Patient: Nivia Roblero (85 y.o. female)  Date: 10/8/2023  Diagnosis: Dehydration [E86.0]  Cannot walk [R26.2]  General weakness [R53.1]  Failure to thrive in adult [R62.7] Failure to thrive in adult      Precautions:  Fall Risk                Recommendations for nursing mobility: Out of bed to chair for meals, Encourage HEP in prep for ADLs/mobility; see handout for details, Use of bed/chair alarm for safety, AD and gt belt for bed to chair , and Amb to bathroom with AD and gait belt    In place during session: None  Chart, physical therapy assessment, plan of care and goals were reviewed. ASSESSMENT  Patient continues with skilled PT services and is progressing towards goals. Pt sitting inrecliner upon PT arrival, agreeable to session. Pt A&O x 4. (See below for objective details and assist levels). Overall pt tolerated session well today with PT. Participated with seated therex, reviewed HEP, pt verbalized understanding. Occ rest breaks required with exercises. Noted limited active dorsiflexion of R foot due to weakness. Increased amb distance. Slow concepción, mild unsteadiness noted, however no overt LOB noted. Pt with decreased safety awareness with transfers. Noted uncontrolled descent with stand-sit transfer into recliner. Will continue to benefit from skilled PT services, and will continue to progress as tolerated. Potential barriers for safe discharge: pt is a high fall risk and pt is not safe to be alone. Current PT DC recommendation [address] once medically appropriate. Start of Session End of Session   SPO2 (%) 100,, RA   Heart Rate (BPM) 101 103     GOALS:    Problem: Physical Therapy - Adult  Goal: By Discharge: Performs mobility at highest level of function for planned discharge setting. See evaluation for individualized goals.   Description: FUNCTIONAL STATUS PRIOR TO ADMISSION: Patient was modified independent using a rollator for

## 2023-11-09 ENCOUNTER — HOSPITAL ENCOUNTER (EMERGENCY)
Facility: HOSPITAL | Age: 73
Discharge: HOME | End: 2023-11-09
Attending: EMERGENCY MEDICINE
Payer: MEDICARE

## 2023-11-09 ENCOUNTER — APPOINTMENT (OUTPATIENT)
Facility: HOSPITAL | Age: 73
End: 2023-11-09
Payer: MEDICARE

## 2023-11-09 VITALS
WEIGHT: 176 LBS | OXYGEN SATURATION: 98 % | DIASTOLIC BLOOD PRESSURE: 87 MMHG | BODY MASS INDEX: 28.28 KG/M2 | HEART RATE: 93 BPM | RESPIRATION RATE: 20 BRPM | SYSTOLIC BLOOD PRESSURE: 165 MMHG | TEMPERATURE: 98.5 F | HEIGHT: 66 IN

## 2023-11-09 DIAGNOSIS — G62.9 NEUROPATHY: ICD-10-CM

## 2023-11-09 DIAGNOSIS — R06.02 SHORTNESS OF BREATH: Primary | ICD-10-CM

## 2023-11-09 LAB
ALBUMIN SERPL-MCNC: 3 G/DL (ref 3.5–5)
ALBUMIN/GLOB SERPL: 0.9 (ref 1.1–2.2)
ALP SERPL-CCNC: 91 U/L (ref 45–117)
ALT SERPL-CCNC: 55 U/L (ref 12–78)
ANION GAP SERPL CALC-SCNC: 10 MMOL/L (ref 5–15)
AST SERPL W P-5'-P-CCNC: 21 U/L (ref 15–37)
BASOPHILS # BLD: 0 K/UL (ref 0–0.1)
BASOPHILS NFR BLD: 1 % (ref 0–1)
BILIRUB SERPL-MCNC: 0.3 MG/DL (ref 0.2–1)
BNP SERPL-MCNC: 281 PG/ML
BUN SERPL-MCNC: 23 MG/DL (ref 6–20)
BUN/CREAT SERPL: 23 (ref 12–20)
CA-I BLD-MCNC: 9.2 MG/DL (ref 8.5–10.1)
CHLORIDE SERPL-SCNC: 103 MMOL/L (ref 97–108)
CO2 SERPL-SCNC: 22 MMOL/L (ref 21–32)
CREAT SERPL-MCNC: 1 MG/DL (ref 0.55–1.02)
DIFFERENTIAL METHOD BLD: ABNORMAL
EOSINOPHIL # BLD: 0.3 K/UL (ref 0–0.4)
EOSINOPHIL NFR BLD: 5 % (ref 0–7)
ERYTHROCYTE [DISTWIDTH] IN BLOOD BY AUTOMATED COUNT: 16.1 % (ref 11.5–14.5)
GLOBULIN SER CALC-MCNC: 3.3 G/DL (ref 2–4)
GLUCOSE SERPL-MCNC: 262 MG/DL (ref 65–100)
HCT VFR BLD AUTO: 32.2 % (ref 35–47)
HGB BLD-MCNC: 10.7 G/DL (ref 11.5–16)
IMM GRANULOCYTES # BLD AUTO: 0 K/UL (ref 0–0.04)
IMM GRANULOCYTES NFR BLD AUTO: 0 % (ref 0–0.5)
LYMPHOCYTES # BLD: 1.4 K/UL (ref 0.8–3.5)
LYMPHOCYTES NFR BLD: 26 % (ref 12–49)
MCH RBC QN AUTO: 30.1 PG (ref 26–34)
MCHC RBC AUTO-ENTMCNC: 33.2 G/DL (ref 30–36.5)
MCV RBC AUTO: 90.7 FL (ref 80–99)
MONOCYTES # BLD: 0.4 K/UL (ref 0–1)
MONOCYTES NFR BLD: 7 % (ref 5–13)
NEUTS SEG # BLD: 3.1 K/UL (ref 1.8–8)
NEUTS SEG NFR BLD: 61 % (ref 32–75)
PLATELET # BLD AUTO: 203 K/UL (ref 150–400)
PMV BLD AUTO: 8.9 FL (ref 8.9–12.9)
POTASSIUM SERPL-SCNC: 4.5 MMOL/L (ref 3.5–5.1)
PROT SERPL-MCNC: 6.3 G/DL (ref 6.4–8.2)
RBC # BLD AUTO: 3.55 M/UL (ref 3.8–5.2)
SODIUM SERPL-SCNC: 135 MMOL/L (ref 136–145)
TROPONIN I SERPL HS-MCNC: 13 NG/L (ref 0–51)
WBC # BLD AUTO: 5.2 K/UL (ref 3.6–11)

## 2023-11-09 PROCEDURE — 83880 ASSAY OF NATRIURETIC PEPTIDE: CPT

## 2023-11-09 PROCEDURE — 80053 COMPREHEN METABOLIC PANEL: CPT

## 2023-11-09 PROCEDURE — 85025 COMPLETE CBC W/AUTO DIFF WBC: CPT

## 2023-11-09 PROCEDURE — 84484 ASSAY OF TROPONIN QUANT: CPT

## 2023-11-09 PROCEDURE — 71045 X-RAY EXAM CHEST 1 VIEW: CPT

## 2023-11-09 PROCEDURE — 6370000000 HC RX 637 (ALT 250 FOR IP): Performed by: EMERGENCY MEDICINE

## 2023-11-09 PROCEDURE — 93005 ELECTROCARDIOGRAM TRACING: CPT | Performed by: EMERGENCY MEDICINE

## 2023-11-09 PROCEDURE — 99285 EMERGENCY DEPT VISIT HI MDM: CPT

## 2023-11-09 PROCEDURE — 36415 COLL VENOUS BLD VENIPUNCTURE: CPT

## 2023-11-09 RX ORDER — TRAMADOL HYDROCHLORIDE 50 MG/1
50 TABLET ORAL
Status: COMPLETED | OUTPATIENT
Start: 2023-11-09 | End: 2023-11-09

## 2023-11-09 RX ADMIN — TRAMADOL HYDROCHLORIDE 50 MG: 50 TABLET ORAL at 07:28

## 2023-11-09 ASSESSMENT — PAIN DESCRIPTION - LOCATION: LOCATION: LEG

## 2023-11-09 ASSESSMENT — PAIN SCALES - GENERAL
PAINLEVEL_OUTOF10: 10
PAINLEVEL_OUTOF10: 10

## 2023-11-09 ASSESSMENT — PAIN DESCRIPTION - ORIENTATION: ORIENTATION: RIGHT

## 2023-11-09 ASSESSMENT — LIFESTYLE VARIABLES
HOW MANY STANDARD DRINKS CONTAINING ALCOHOL DO YOU HAVE ON A TYPICAL DAY: PATIENT DOES NOT DRINK
HOW OFTEN DO YOU HAVE A DRINK CONTAINING ALCOHOL: NEVER

## 2023-11-09 ASSESSMENT — PAIN - FUNCTIONAL ASSESSMENT: PAIN_FUNCTIONAL_ASSESSMENT: 0-10

## 2023-11-09 ASSESSMENT — PAIN DESCRIPTION - DESCRIPTORS: DESCRIPTORS: ACHING

## 2023-11-09 NOTE — ED PROVIDER NOTES
[facility] EMERGENCY DEPT  EMERGENCY DEPARTMENT HISTORY AND PHYSICAL EXAM      Date: 11/9/2023  Patient Name: Lina Martinez  MRN: [id number]  [address] [place] 1950  Date of evaluation: 11/9/2023  Provider: Adelina Renee MD   Note Started: 6:19 AM EST 11/9/23    HISTORY OF PRESENT ILLNESS     Chief Complaint   Patient presents with    Breathing Problem    Toe Pain       History Provided By: Patient    HPI: Lina Martinez is a 68 y.o. female with history of breast cancer, chemotherapy every Tuesday, CVA, diabetes, thyroid disease, status post recent admission in October for failure to thrive and discharged to rehab presents with reported shortness of breath and pain in her toe brought in by EMS. Per EMS pt oxygen is 100% on RA. She denies chest pain. Pt states she has had SOB for awhile but not all the time. Her bigger concern is her  chronic leg pain. She states she was given gabapentin and Tylenol prior to leaving but states it doesn't help. She initially denied being SOB but then stated she sometimes is short of breath. She reports that she wants to go home (from rehab) but cannot walk. She states her legs  always hurt and this is not new. She is not sure why she cannot walk. She denies leg swelling.     PAST MEDICAL HISTORY   Past Medical History:  Past Medical History:   Diagnosis Date    Arrhythmia     At risk for infection associated with Javier catheter     Bladder cancer ([address])     Breast cancer ([address])     right    Cerebral artery occlusion with cerebral infarction ([address]) 2018    no deficits    Dementia ([address])     DKA (diabetic ketoacidosis) ([address]) 04/06/2022    Essential hypertension 07/21/2020    Hyperlipidemia     MRSA (methicillin resistant staph aureus) culture positive 44/70/1778    Non-alcoholic fatty liver disease 07/21/2020    Right groin ulcer ([address]) 04/22/2022    Sleep apnea     does not use CPAP    Thyroid disease        Past Surgical History:  Past Surgical History:   Procedure Laterality Date    CARDIAC PROCEDURE N/A

## 2023-11-09 NOTE — ED TRIAGE NOTES
Pt called ems with a chief complaint of SOB, pt in no distress at triage, sating at 100% on room air. Pt also complaining of pain from a \"spot\" on her right great toe. States was given gabapentin and tylenol shortly before she came.

## 2023-11-09 NOTE — ED NOTES
TRANSFER - OUT REPORT:    Verbal report given to ROSALINA Valero on [employer]  being transferred to [facility]. for routine progression of patient care       Report consisted of patient's Situation, Background, Assessment and   Recommendations(SBAR). Information from the following report(s) ED SBAR was reviewed with the receiving nurse. [place] Fall Assessment:    Presents to emergency department  because of falls (Syncope, seizure, or loss of consciousness): No  Age > 70: No  Altered Mental Status, Intoxication with alcohol or substance confusion (Disorientation, impaired judgment, poor safety awaremess, or inability to follow instructions): No  Impaired Mobility: Ambulates or transfers with assistive devices or assistance; Unable to ambulate or transer.: No  Nursing Judgement: No          Lines:   Peripheral IV 11/09/23 Posterior;Right Wrist (Active)        Opportunity for questions and clarification was provided.       Patient transported with:  Jen Barillas staff x2          Shruthi Oh RN  11/09/23 0103

## 2023-11-10 LAB
EKG ATRIAL RATE: 96 BPM
EKG DIAGNOSIS: NORMAL
EKG P AXIS: 36 DEGREES
EKG P-R INTERVAL: 128 MS
EKG Q-T INTERVAL: 350 MS
EKG QRS DURATION: 72 MS
EKG QTC CALCULATION (BAZETT): 442 MS
EKG R AXIS: -36 DEGREES
EKG T AXIS: 69 DEGREES
EKG VENTRICULAR RATE: 96 BPM

## 2023-11-13 ENCOUNTER — TELEPHONE (OUTPATIENT)
Facility: CLINIC | Age: 73
End: 2023-11-13

## 2023-11-13 NOTE — TELEPHONE ENCOUNTER
Page John with [address] [phone] would like to know if Dr. Rishabh Hardin will follow patient for home health. Please have clinical staff call her back and advise.

## 2023-11-14 ENCOUNTER — HOSPITAL ENCOUNTER (INPATIENT)
Facility: HOSPITAL | Age: 73
LOS: 3 days | Discharge: NURSING, REHAB OR LONG-TERM CARE | DRG: 884 | End: 2023-11-18
Attending: STUDENT IN AN ORGANIZED HEALTH CARE EDUCATION/TRAINING PROGRAM | Admitting: INTERNAL MEDICINE
Payer: MEDICARE

## 2023-11-14 ENCOUNTER — APPOINTMENT (OUTPATIENT)
Facility: HOSPITAL | Age: 73
DRG: 884 | End: 2023-11-14
Payer: MEDICARE

## 2023-11-14 DIAGNOSIS — R41.82 ALTERED MENTAL STATUS, UNSPECIFIED ALTERED MENTAL STATUS TYPE: Primary | ICD-10-CM

## 2023-11-14 LAB
ALBUMIN SERPL-MCNC: 3.2 G/DL (ref 3.5–5)
ALBUMIN/GLOB SERPL: 1.1 (ref 1.1–2.2)
ALP SERPL-CCNC: 104 U/L (ref 45–117)
ALT SERPL-CCNC: 63 U/L (ref 12–78)
ANION GAP SERPL CALC-SCNC: 8 MMOL/L (ref 5–15)
AST SERPL W P-5'-P-CCNC: 9 U/L (ref 15–37)
BASOPHILS # BLD: 0 K/UL (ref 0–0.1)
BASOPHILS NFR BLD: 1 % (ref 0–1)
BILIRUB SERPL-MCNC: 0.5 MG/DL (ref 0.2–1)
BUN SERPL-MCNC: 20 MG/DL (ref 6–20)
BUN/CREAT SERPL: 24 (ref 12–20)
CA-I BLD-MCNC: 9 MG/DL (ref 8.5–10.1)
CHLORIDE SERPL-SCNC: 106 MMOL/L (ref 97–108)
CO2 SERPL-SCNC: 23 MMOL/L (ref 21–32)
CREAT SERPL-MCNC: 0.85 MG/DL (ref 0.55–1.02)
DIFFERENTIAL METHOD BLD: ABNORMAL
EOSINOPHIL # BLD: 0.1 K/UL (ref 0–0.4)
EOSINOPHIL NFR BLD: 1 % (ref 0–7)
ERYTHROCYTE [DISTWIDTH] IN BLOOD BY AUTOMATED COUNT: 15.4 % (ref 11.5–14.5)
ETHANOL SERPL-MCNC: <10 MG/DL (ref 0–0.08)
GLOBULIN SER CALC-MCNC: 2.9 G/DL (ref 2–4)
GLUCOSE SERPL-MCNC: 272 MG/DL (ref 65–100)
HCT VFR BLD AUTO: 34.5 % (ref 35–47)
HGB BLD-MCNC: 11.6 G/DL (ref 11.5–16)
IMM GRANULOCYTES # BLD AUTO: 0 K/UL (ref 0–0.04)
IMM GRANULOCYTES NFR BLD AUTO: 1 % (ref 0–0.5)
LACTATE BLD-SCNC: 1.77 MMOL/L (ref 0.4–2)
LYMPHOCYTES # BLD: 1 K/UL (ref 0.8–3.5)
LYMPHOCYTES NFR BLD: 17 % (ref 12–49)
MAGNESIUM SERPL-MCNC: 1.6 MG/DL (ref 1.6–2.4)
MCH RBC QN AUTO: 29.5 PG (ref 26–34)
MCHC RBC AUTO-ENTMCNC: 33.6 G/DL (ref 30–36.5)
MCV RBC AUTO: 87.8 FL (ref 80–99)
MONOCYTES # BLD: 0.4 K/UL (ref 0–1)
MONOCYTES NFR BLD: 7 % (ref 5–13)
NEUTS SEG # BLD: 4.5 K/UL (ref 1.8–8)
NEUTS SEG NFR BLD: 73 % (ref 32–75)
NRBC # BLD: 0 K/UL (ref 0–0.01)
NRBC BLD-RTO: 0 PER 100 WBC
PERFORMED BY:: NORMAL
PLATELET # BLD AUTO: 213 K/UL (ref 150–400)
PMV BLD AUTO: 9.5 FL (ref 8.9–12.9)
POTASSIUM SERPL-SCNC: 3.8 MMOL/L (ref 3.5–5.1)
PROT SERPL-MCNC: 6.1 G/DL (ref 6.4–8.2)
RBC # BLD AUTO: 3.93 M/UL (ref 3.8–5.2)
SODIUM SERPL-SCNC: 137 MMOL/L (ref 136–145)
TROPONIN I SERPL HS-MCNC: 11 NG/L (ref 0–51)
TSH SERPL DL<=0.05 MIU/L-ACNC: 40.1 UIU/ML (ref 0.36–3.74)
WBC # BLD AUTO: 6.1 K/UL (ref 3.6–11)

## 2023-11-14 PROCEDURE — 83605 ASSAY OF LACTIC ACID: CPT

## 2023-11-14 PROCEDURE — 96361 HYDRATE IV INFUSION ADD-ON: CPT

## 2023-11-14 PROCEDURE — 2580000003 HC RX 258: Performed by: STUDENT IN AN ORGANIZED HEALTH CARE EDUCATION/TRAINING PROGRAM

## 2023-11-14 PROCEDURE — 93005 ELECTROCARDIOGRAM TRACING: CPT | Performed by: STUDENT IN AN ORGANIZED HEALTH CARE EDUCATION/TRAINING PROGRAM

## 2023-11-14 PROCEDURE — 96376 TX/PRO/DX INJ SAME DRUG ADON: CPT

## 2023-11-14 PROCEDURE — 99285 EMERGENCY DEPT VISIT HI MDM: CPT

## 2023-11-14 PROCEDURE — 96375 TX/PRO/DX INJ NEW DRUG ADDON: CPT

## 2023-11-14 PROCEDURE — 80053 COMPREHEN METABOLIC PANEL: CPT

## 2023-11-14 PROCEDURE — 71045 X-RAY EXAM CHEST 1 VIEW: CPT

## 2023-11-14 PROCEDURE — 94761 N-INVAS EAR/PLS OXIMETRY MLT: CPT

## 2023-11-14 PROCEDURE — 96374 THER/PROPH/DIAG INJ IV PUSH: CPT

## 2023-11-14 PROCEDURE — 84484 ASSAY OF TROPONIN QUANT: CPT

## 2023-11-14 PROCEDURE — 83735 ASSAY OF MAGNESIUM: CPT

## 2023-11-14 PROCEDURE — 85025 COMPLETE CBC W/AUTO DIFF WBC: CPT

## 2023-11-14 PROCEDURE — 82077 ASSAY SPEC XCP UR&BREATH IA: CPT

## 2023-11-14 PROCEDURE — 84443 ASSAY THYROID STIM HORMONE: CPT

## 2023-11-14 PROCEDURE — 36415 COLL VENOUS BLD VENIPUNCTURE: CPT

## 2023-11-14 PROCEDURE — 70450 CT HEAD/BRAIN W/O DYE: CPT

## 2023-11-14 RX ORDER — 0.9 % SODIUM CHLORIDE 0.9 %
1000 INTRAVENOUS SOLUTION INTRAVENOUS ONCE
Status: COMPLETED | OUTPATIENT
Start: 2023-11-14 | End: 2023-11-15

## 2023-11-14 RX ADMIN — SODIUM CHLORIDE 1000 ML: 9 INJECTION, SOLUTION INTRAVENOUS at 22:56

## 2023-11-14 ASSESSMENT — LIFESTYLE VARIABLES
HOW MANY STANDARD DRINKS CONTAINING ALCOHOL DO YOU HAVE ON A TYPICAL DAY: PATIENT DOES NOT DRINK
HOW OFTEN DO YOU HAVE A DRINK CONTAINING ALCOHOL: NEVER
HOW OFTEN DO YOU HAVE A DRINK CONTAINING ALCOHOL: NEVER

## 2023-11-14 ASSESSMENT — PAIN - FUNCTIONAL ASSESSMENT: PAIN_FUNCTIONAL_ASSESSMENT: NONE - DENIES PAIN

## 2023-11-14 NOTE — TELEPHONE ENCOUNTER
I [facility] with [facility] and [employer] and advised that  will follow home health

## 2023-11-15 ENCOUNTER — APPOINTMENT (OUTPATIENT)
Facility: HOSPITAL | Age: 73
DRG: 884 | End: 2023-11-15
Payer: MEDICARE

## 2023-11-15 PROBLEM — Z86.73 HISTORY OF STROKE: Status: ACTIVE | Noted: 2021-01-18

## 2023-11-15 PROBLEM — C67.9 BLADDER CANCER (HCC): Status: ACTIVE | Noted: 2022-04-22

## 2023-11-15 PROBLEM — G93.40 ACUTE ENCEPHALOPATHY: Status: ACTIVE | Noted: 2023-11-15

## 2023-11-15 PROBLEM — C50.919 TRIPLE NEGATIVE BREAST CANCER (HCC): Status: ACTIVE | Noted: 2023-11-15

## 2023-11-15 PROBLEM — I10 ESSENTIAL HYPERTENSION: Status: ACTIVE | Noted: 2020-07-21

## 2023-11-15 PROBLEM — E03.9 ACQUIRED HYPOTHYROIDISM: Status: ACTIVE | Noted: 2022-04-06

## 2023-11-15 PROBLEM — F33.9 MAJOR DEPRESSIVE DISORDER, RECURRENT, UNSPECIFIED (HCC): Status: ACTIVE | Noted: 2022-03-22

## 2023-11-15 PROBLEM — K76.0 NON-ALCOHOLIC FATTY LIVER DISEASE: Status: ACTIVE | Noted: 2020-07-21

## 2023-11-15 PROBLEM — E11.65 UNCONTROLLED TYPE 2 DIABETES MELLITUS WITH HYPERGLYCEMIA (HCC): Status: ACTIVE | Noted: 2022-03-22

## 2023-11-15 LAB
AMMONIA PLAS-SCNC: <10 UMOL/L
APPEARANCE UR: CLEAR
BACTERIA URNS QL MICRO: ABNORMAL /HPF
BILIRUB UR QL: NEGATIVE
COLOR UR: ABNORMAL
EKG ATRIAL RATE: 103 BPM
EKG DIAGNOSIS: NORMAL
EKG P AXIS: 82 DEGREES
EKG P-R INTERVAL: 126 MS
EKG Q-T INTERVAL: 342 MS
EKG QRS DURATION: 66 MS
EKG QTC CALCULATION (BAZETT): 448 MS
EKG R AXIS: -40 DEGREES
EKG T AXIS: 63 DEGREES
EKG VENTRICULAR RATE: 103 BPM
EPITH CASTS URNS QL MICRO: ABNORMAL /LPF
GLUCOSE BLD STRIP.AUTO-MCNC: 273 MG/DL (ref 65–100)
GLUCOSE UR STRIP.AUTO-MCNC: 150 MG/DL
HGB UR QL STRIP: NEGATIVE
KETONES UR QL STRIP.AUTO: 5 MG/DL
LACTATE SERPL-SCNC: 1.5 MMOL/L (ref 0.4–2)
LEUKOCYTE ESTERASE UR QL STRIP.AUTO: NEGATIVE
MUCOUS THREADS URNS QL MICRO: ABNORMAL /LPF
NITRITE UR QL STRIP.AUTO: NEGATIVE
PERFORMED BY:: ABNORMAL
PH UR STRIP: 5 (ref 5–8)
PROT UR STRIP-MCNC: 100 MG/DL
RBC #/AREA URNS HPF: ABNORMAL /HPF (ref 0–5)
SP GR UR REFRACTOMETRY: 1.02 (ref 1–1.03)
URINE CULTURE IF INDICATED: ABNORMAL
UROBILINOGEN UR QL STRIP.AUTO: 0.1 EU/DL (ref 0.1–1)
WBC URNS QL MICRO: ABNORMAL /HPF (ref 0–4)

## 2023-11-15 PROCEDURE — 51798 US URINE CAPACITY MEASURE: CPT

## 2023-11-15 PROCEDURE — 96375 TX/PRO/DX INJ NEW DRUG ADDON: CPT

## 2023-11-15 PROCEDURE — 6360000002 HC RX W HCPCS: Performed by: STUDENT IN AN ORGANIZED HEALTH CARE EDUCATION/TRAINING PROGRAM

## 2023-11-15 PROCEDURE — 2580000003 HC RX 258: Performed by: INTERNAL MEDICINE

## 2023-11-15 PROCEDURE — 82962 GLUCOSE BLOOD TEST: CPT

## 2023-11-15 PROCEDURE — 96374 THER/PROPH/DIAG INJ IV PUSH: CPT

## 2023-11-15 PROCEDURE — 6360000004 HC RX CONTRAST MEDICATION: Performed by: INTERNAL MEDICINE

## 2023-11-15 PROCEDURE — 1100000000 HC RM PRIVATE

## 2023-11-15 PROCEDURE — 82140 ASSAY OF AMMONIA: CPT

## 2023-11-15 PROCEDURE — 6370000000 HC RX 637 (ALT 250 FOR IP): Performed by: INTERNAL MEDICINE

## 2023-11-15 PROCEDURE — 81001 URINALYSIS AUTO W/SCOPE: CPT

## 2023-11-15 PROCEDURE — 87186 SC STD MICRODIL/AGAR DIL: CPT

## 2023-11-15 PROCEDURE — 70553 MRI BRAIN STEM W/O & W/DYE: CPT

## 2023-11-15 PROCEDURE — 6370000000 HC RX 637 (ALT 250 FOR IP): Performed by: STUDENT IN AN ORGANIZED HEALTH CARE EDUCATION/TRAINING PROGRAM

## 2023-11-15 PROCEDURE — 87077 CULTURE AEROBIC IDENTIFY: CPT

## 2023-11-15 PROCEDURE — 87086 URINE CULTURE/COLONY COUNT: CPT

## 2023-11-15 PROCEDURE — 36415 COLL VENOUS BLD VENIPUNCTURE: CPT

## 2023-11-15 PROCEDURE — 83605 ASSAY OF LACTIC ACID: CPT

## 2023-11-15 PROCEDURE — 6360000002 HC RX W HCPCS: Performed by: INTERNAL MEDICINE

## 2023-11-15 PROCEDURE — 96376 TX/PRO/DX INJ SAME DRUG ADON: CPT

## 2023-11-15 PROCEDURE — A9577 INJ MULTIHANCE: HCPCS | Performed by: INTERNAL MEDICINE

## 2023-11-15 PROCEDURE — 94761 N-INVAS EAR/PLS OXIMETRY MLT: CPT

## 2023-11-15 PROCEDURE — 2500000003 HC RX 250 WO HCPCS: Performed by: INTERNAL MEDICINE

## 2023-11-15 RX ORDER — LOSARTAN POTASSIUM 50 MG/1
25 TABLET ORAL DAILY
Status: DISCONTINUED | OUTPATIENT
Start: 2023-11-15 | End: 2023-11-18 | Stop reason: HOSPADM

## 2023-11-15 RX ORDER — ONDANSETRON 2 MG/ML
4 INJECTION INTRAMUSCULAR; INTRAVENOUS EVERY 6 HOURS PRN
Status: DISCONTINUED | OUTPATIENT
Start: 2023-11-15 | End: 2023-11-18 | Stop reason: HOSPADM

## 2023-11-15 RX ORDER — ASPIRIN 81 MG/1
81 TABLET ORAL DAILY
Status: DISCONTINUED | OUTPATIENT
Start: 2023-11-15 | End: 2023-11-18 | Stop reason: HOSPADM

## 2023-11-15 RX ORDER — ENOXAPARIN SODIUM 100 MG/ML
40 INJECTION SUBCUTANEOUS DAILY
Status: DISCONTINUED | OUTPATIENT
Start: 2023-11-15 | End: 2023-11-18 | Stop reason: HOSPADM

## 2023-11-15 RX ORDER — HYDROXYZINE PAMOATE 25 MG/1
25 CAPSULE ORAL 3 TIMES DAILY PRN
Status: DISCONTINUED | OUTPATIENT
Start: 2023-11-15 | End: 2023-11-18 | Stop reason: HOSPADM

## 2023-11-15 RX ORDER — SODIUM CHLORIDE 9 MG/ML
INJECTION, SOLUTION INTRAVENOUS PRN
Status: DISCONTINUED | OUTPATIENT
Start: 2023-11-15 | End: 2023-11-18 | Stop reason: HOSPADM

## 2023-11-15 RX ORDER — HYDROMORPHONE HYDROCHLORIDE 1 MG/ML
0.5 INJECTION, SOLUTION INTRAMUSCULAR; INTRAVENOUS; SUBCUTANEOUS ONCE
Status: COMPLETED | OUTPATIENT
Start: 2023-11-15 | End: 2023-11-15

## 2023-11-15 RX ORDER — ONDANSETRON 2 MG/ML
4 INJECTION INTRAMUSCULAR; INTRAVENOUS ONCE
Status: COMPLETED | OUTPATIENT
Start: 2023-11-15 | End: 2023-11-15

## 2023-11-15 RX ORDER — LORAZEPAM 0.5 MG/1
0.5 TABLET ORAL
Status: COMPLETED | OUTPATIENT
Start: 2023-11-15 | End: 2023-11-15

## 2023-11-15 RX ORDER — ONDANSETRON 4 MG/1
4 TABLET, ORALLY DISINTEGRATING ORAL EVERY 8 HOURS PRN
Status: DISCONTINUED | OUTPATIENT
Start: 2023-11-15 | End: 2023-11-18 | Stop reason: HOSPADM

## 2023-11-15 RX ORDER — SODIUM CHLORIDE 9 MG/ML
INJECTION, SOLUTION INTRAVENOUS CONTINUOUS
Status: DISPENSED | OUTPATIENT
Start: 2023-11-15 | End: 2023-11-15

## 2023-11-15 RX ORDER — HALOPERIDOL 5 MG/ML
2 INJECTION INTRAMUSCULAR EVERY 6 HOURS PRN
Status: DISCONTINUED | OUTPATIENT
Start: 2023-11-15 | End: 2023-11-15

## 2023-11-15 RX ORDER — KETOROLAC TROMETHAMINE 15 MG/ML
15 INJECTION, SOLUTION INTRAMUSCULAR; INTRAVENOUS
Status: DISCONTINUED | OUTPATIENT
Start: 2023-11-15 | End: 2023-11-15

## 2023-11-15 RX ORDER — SODIUM CHLORIDE 0.9 % (FLUSH) 0.9 %
5-40 SYRINGE (ML) INJECTION EVERY 12 HOURS SCHEDULED
Status: DISCONTINUED | OUTPATIENT
Start: 2023-11-15 | End: 2023-11-18 | Stop reason: HOSPADM

## 2023-11-15 RX ORDER — SODIUM CHLORIDE 0.9 % (FLUSH) 0.9 %
5-40 SYRINGE (ML) INJECTION PRN
Status: DISCONTINUED | OUTPATIENT
Start: 2023-11-15 | End: 2023-11-18 | Stop reason: HOSPADM

## 2023-11-15 RX ORDER — LANOLIN ALCOHOL/MO/W.PET/CERES
3 CREAM (GRAM) TOPICAL NIGHTLY
Status: DISCONTINUED | OUTPATIENT
Start: 2023-11-15 | End: 2023-11-18 | Stop reason: HOSPADM

## 2023-11-15 RX ORDER — OXYCODONE HYDROCHLORIDE AND ACETAMINOPHEN 5; 325 MG/1; MG/1
1 TABLET ORAL EVERY 4 HOURS PRN
Status: DISCONTINUED | OUTPATIENT
Start: 2023-11-15 | End: 2023-11-18 | Stop reason: HOSPADM

## 2023-11-15 RX ORDER — POLYETHYLENE GLYCOL 3350 17 G/17G
17 POWDER, FOR SOLUTION ORAL DAILY PRN
Status: DISCONTINUED | OUTPATIENT
Start: 2023-11-15 | End: 2023-11-18 | Stop reason: HOSPADM

## 2023-11-15 RX ORDER — CLOPIDOGREL BISULFATE 75 MG/1
75 TABLET ORAL DAILY
Status: DISCONTINUED | OUTPATIENT
Start: 2023-11-15 | End: 2023-11-18 | Stop reason: HOSPADM

## 2023-11-15 RX ORDER — CHOLECALCIFEROL (VITAMIN D3) 125 MCG
5 CAPSULE ORAL
Status: COMPLETED | OUTPATIENT
Start: 2023-11-15 | End: 2023-11-15

## 2023-11-15 RX ADMIN — ONDANSETRON 4 MG: 2 INJECTION INTRAMUSCULAR; INTRAVENOUS at 01:15

## 2023-11-15 RX ADMIN — HYDROMORPHONE HYDROCHLORIDE 0.5 MG: 1 INJECTION, SOLUTION INTRAMUSCULAR; INTRAVENOUS; SUBCUTANEOUS at 12:16

## 2023-11-15 RX ADMIN — SODIUM CHLORIDE, PRESERVATIVE FREE 10 ML: 5 INJECTION INTRAVENOUS at 20:39

## 2023-11-15 RX ADMIN — HALOPERIDOL LACTATE 2 MG: 5 INJECTION, SOLUTION INTRAMUSCULAR at 04:41

## 2023-11-15 RX ADMIN — ENOXAPARIN SODIUM 40 MG: 100 INJECTION SUBCUTANEOUS at 12:24

## 2023-11-15 RX ADMIN — LORAZEPAM 0.5 MG: 0.5 TABLET ORAL at 12:16

## 2023-11-15 RX ADMIN — ASPIRIN 81 MG: 81 TABLET, COATED ORAL at 12:24

## 2023-11-15 RX ADMIN — SODIUM CHLORIDE, PRESERVATIVE FREE 10 ML: 5 INJECTION INTRAVENOUS at 12:24

## 2023-11-15 RX ADMIN — SODIUM CHLORIDE: 9 INJECTION, SOLUTION INTRAVENOUS at 12:17

## 2023-11-15 RX ADMIN — GADOBENATE DIMEGLUMINE 15 ML: 529 INJECTION, SOLUTION INTRAVENOUS at 16:25

## 2023-11-15 RX ADMIN — CLOPIDOGREL BISULFATE 75 MG: 75 TABLET ORAL at 12:16

## 2023-11-15 RX ADMIN — Medication 5 MG: at 02:52

## 2023-11-15 RX ADMIN — Medication 3 MG: at 20:38

## 2023-11-15 RX ADMIN — ONDANSETRON 4 MG: 2 INJECTION INTRAMUSCULAR; INTRAVENOUS at 06:45

## 2023-11-15 RX ADMIN — LOSARTAN POTASSIUM 25 MG: 50 TABLET, FILM COATED ORAL at 12:16

## 2023-11-15 ASSESSMENT — PAIN SCALES - GENERAL
PAINLEVEL_OUTOF10: 8
PAINLEVEL_OUTOF10: 10

## 2023-11-15 ASSESSMENT — PAIN DESCRIPTION - LOCATION: LOCATION: LEG

## 2023-11-15 ASSESSMENT — PAIN DESCRIPTION - ORIENTATION: ORIENTATION: RIGHT;LEFT

## 2023-11-15 NOTE — ED TRIAGE NOTES
Daughter that lives in [address] [place] [address] called PD for welfare check, patient was found half on/half off of bed in home covered in feces. Projectile vomited on ems pta.  pta and 4mg of zofran given pta.

## 2023-11-15 NOTE — ED NOTES
Assumed care of patient, patient resting in bed no acute distress     Carmela ROSALINA Severino  11/15/23 6843

## 2023-11-15 NOTE — CARE COORDINATION
11/15/23 0919   Service Assessment   Patient Orientation Alert and Oriented  (Pt sleeping during visit.)   Cognition Alert   History Provided By Child/Family  Niki Borges)   Primary Caregiver Family   Accompanied By/Relationship Pt alone during visit. Support Systems Children;Family Members   Patient's Healthcare Decision Maker is: Legal Next of Kin   PCP Verified by CM Yes  (Torsten Puentes - seen 2 mos ago.)   Last Visit to PCP Within last 3 months   Prior Functional Level Assistance with the following:;Mobility; Bathing;Dressing; Toileting;Cooking;Housework; Shopping; Independent in ADLs/IADLs  (W/C/Daughters assist when needed.)   Current Functional Level Independent in ADLs/IADLs;Assistance with the following:;Bathing;Dressing; Toileting;Cooking;Housework; Shopping;Mobility  (/C/Daughters assist when needed.)   Can patient return to prior living arrangement Unknown at present   Ability to make needs known: Fair   Family able to assist with home care needs: Yes   Would you like for me to discuss the discharge plan with any other family members/significant others, and if so, who?  Yes  (Daughters Berl Jim Gomez/Sophie Kellogg & Minor.)   Financial Resources [employer] None   Social/Functional History   Lives With Alone   Type of [address] [place] [address]  One level   Home Access Ramped entrance   Bathroom Shower/Tub Tub/Shower unit   Bathroom Toilet Handicap height   [address] bars in shower;Grab bars around toilet   [address] Needs assistance   [address] Needs assistance   [facility] Responsibilities Yes   [address] assistance  (W/C)   Transfer Assistance Needs assistance   Active  No   Occupation Retired   Discharge Planning   Type of [address] [place] [address]  Prior To Admission Transportation

## 2023-11-15 NOTE — ASSESSMENT & PLAN NOTE
Chronic condition. The patient is on losartan 25 mg daily for her hypertension. Plan  1.   Continue outpatient medication

## 2023-11-15 NOTE — ASSESSMENT & PLAN NOTE
There are numerous notes in the chart that referenced a history of bladder cancer. The patient does not recall this. I also note in the chart that she has had a Javier catheter in the past although she does not have 1 currently. Her bladder appears distended and her exam reveals suprapubic pain on palpation. If she is having urinary retention, this could potentially cause her to be delirious. Of note her renal function is normal.    Plan  1. Bladder scan  2.   If bladder scan shows evidence of urinary retention, will place a Javier

## 2023-11-15 NOTE — ASSESSMENT & PLAN NOTE
Chronic condition. I am still trying to ascertain her home medications but by her own report she is not compliant with them anyway. Plan  1. Review home medications and restart as needed  2.   Outpatient follow-up

## 2023-11-15 NOTE — ED NOTES
Patient hollering out that \"I'm going to puke! \" Nurse spoke to provider about ordering another dose of zofran for patient. Patient had slid down in the bed so this nurse with the help of another got her moved up. This nurse placed patient back on cardiac monitor and is connected to a purewick. Placed a mepilex on patient's stage 1 sacral wound for protection.      Lazara Bangura RN  11/15/23 6493

## 2023-11-15 NOTE — ED NOTES
POC lactic 1.77. Provider notified.      Maribell Soto, [address] [place] [address]  11/14/23 3246

## 2023-11-15 NOTE — ASSESSMENT & PLAN NOTE
Patient is presenting with acute encephalopathy. History is difficult to obtain as she seems to be having some word finding difficulties and some issues with recall. I did attempt to call both of her daughters but neither one answered. 1 went to voicemail but her voice mailbox was full. The only notable exam findings are that the patient seems to have some tenderness in the suprapubic region with what feels like a distended bladder. She also is complaining of bilateral leg pain. The remainder of the work-up does not reveal any other clear explanation for her encephalopathy. Urinalysis is not consistent with a UTI. CT brain was unremarkable. The patient is not on any narcotics. Plan  1. Bladder scan to see if there is urine retention. She does have a history of bladder cancer so this is a possibility. If there is urine retention, Place catheter  2. Address pain.

## 2023-11-15 NOTE — ASSESSMENT & PLAN NOTE
Patient was recently diagnosed with triple negative right-sided breast cancer and is undergoing neoadjuvant chemotherapy with Dr. Phillip Manzo. Labs are currently unremarkable. Plan  1.   Outpatient follow-up

## 2023-11-15 NOTE — CARE COORDINATION
11/15/23 Per David pt accepted to [facility] @ [phone] upon discharge. Est SOC: 11/18/23.

## 2023-11-15 NOTE — H&P
V2.0  History and Physical      Name:  Marietta Rivas /Age/Sex: 1950  (68 y.o. female)   MRN & CSN:  [id number] & [id number] Encounter Date/Time: 11/15/2023 /    Location:  405/01 PCP: Vj Cueto, [address] [place] [address] Day: 2    Assessment and Plan:   Marietta Rivas is a 68 y.o. female with a pmh of hypothyroidism, diabetes, bladder cancer, breast cancer, prior stroke, nonalcoholic fatty liver disease, and coronary artery disease who presents with Acute encephalopathy    Hospital Problems             Last Modified POA    * (Principal) Acute encephalopathy 11/15/2023 Yes    Uncontrolled type 2 diabetes mellitus with hyperglycemia ([address]) 11/15/2023 Yes    Acquired hypothyroidism 11/15/2023 Yes    Bladder cancer ([address]) 11/15/2023 Yes    Triple negative breast cancer ([address]) 11/15/2023 Yes    Essential hypertension 11/15/2023 Yes    Non-alcoholic fatty liver disease 11/15/2023 Yes    Major depressive disorder, recurrent, unspecified ([address]) 11/15/2023 Yes    History of stroke 11/15/2023 Yes    CAD, multiple vessel 11/15/2023 Yes       Plan:  Acute encephalopathy  Patient is presenting with acute encephalopathy. History is difficult to obtain as she seems to be having some word finding difficulties and some issues with recall. I did attempt to call both of her daughters but neither one answered. 1 went to voicemail but her voice mailbox was full. The only notable exam findings are that the patient seems to have some tenderness in the suprapubic region with what feels like a distended bladder. She also is complaining of bilateral leg pain. The remainder of the work-up does not reveal any other clear explanation for her encephalopathy. Urinalysis is not consistent with a UTI. CT brain was unremarkable. The patient is not on any narcotics. Plan  1. Bladder scan to see if there is urine retention. She does have a history of bladder cancer so this is a possibility.   If there is urine retention, Place results found for: \"TROPONINT\"  Lactic Acid: No results for input(s): \"LACTA\" in the last 72 hours. BNP: No results for input(s): \"PROBNP\" in the last 72 hours. UA:  Lab Results   Component Value Date/Time    NITRU Negative 11/15/2023 01:38 AM    COLORU Yellow/Straw 11/15/2023 01:38 AM    PHUR 6.0 01/27/2023 08:01 PM    WBCUA 0-4 11/15/2023 01:38 AM    RBCUA 0-5 11/15/2023 01:38 AM    MUCUS Trace 11/15/2023 01:38 AM    YEAST Present 01/27/2023 08:01 PM    BACTERIA 4+ 11/15/2023 01:38 AM    CLARITYU Cloudy 01/27/2023 08:01 PM    SPECGRAV 1.016 11/15/2023 01:38 AM    LEUKOCYTESUR Negative 11/15/2023 01:38 AM    UROBILINOGEN 0.1 11/15/2023 01:38 AM    BILIRUBINUR Negative 11/15/2023 01:38 AM    BILIRUBINUR Negative 01/27/2023 08:01 PM    BLOODU Negative 11/15/2023 01:38 AM    GLUCOSEU 150 11/15/2023 01:38 AM    KETUA 5 11/15/2023 01:38 AM    AMORPHOUS 3+ 06/30/2022 08:45 PM     Urine Cultures:   Lab Results   Component Value Date/Time    LABURIN KLEBSIELLA PNEUMONIAE 07/21/2020 11:53 AM     Blood Cultures: No results found for: \"BC\"  No results found for: \"BLOODCULT2\"  Organism: No results found for: \"ORG\"    Imaging/Diagnostics Last 24 Hours   CT HEAD WO CONTRAST    Result Date: 11/14/2023  EXAM: CT HEAD WO CONTRAST INDICATION: Word finding difficulty, AMS, assess for CVA COMPARISON: CT 10/4/2023. CONTRAST: None. TECHNIQUE: Unenhanced CT of the head was performed using 5 mm images. Brain and bone windows were generated. Coronal and sagittal reformats. CT dose reduction was achieved through use of a standardized protocol tailored for this examination and automatic exposure control for dose modulation. FINDINGS: There is no acute intracranial hemorrhage, mass, mass effect or herniation. No significant change in focal chronic left frontal lobe encephalomalacia. Ventricles and sulci show no significant change in proportionate and symmetric prominence.   There is no significant change in pattern of periventricular white

## 2023-11-15 NOTE — ASSESSMENT & PLAN NOTE
This is a chronic condition. The patient is on insulin NPH twice daily. Plan  1.   Convert to Lantus/lispro while in the hospital

## 2023-11-15 NOTE — ED PROVIDER NOTES
[facility] EMERGENCY DEPT  EMERGENCY DEPARTMENT HISTORY AND PHYSICAL EXAM      Date: 11/14/2023  Patient Name: Wesly Danielle  MRN: [id number]  [address] 1950  Date of evaluation: 11/14/2023  Provider: Euel Gowers, MD   Note Started: 10:16 PM EST 11/14/23    HISTORY OF PRESENT ILLNESS     Chief Complaint   Patient presents with    Altered Mental Status       History Provided By: Patient    HPI: Wesly Danielle is a 68 y.o. female history including atrial fibrillation, diabetes, hypertension, dementia per chart review, presents with concern for altered mental status. Patient's daughter lives in [place] and became concerned about her mother, therefore called the police for a welfare check. Upon arrival at her home, the patient was FDC off of her bed, covered in feces, had 1 episode of projectile vomiting. When questioned about how she feels, the patient says \"I just ain't. \"  She says she hurts all over. She otherwise does not specify any particular complaints, but she does demonstrate word finding difficulty throughout our encounter.     PAST MEDICAL HISTORY   Past Medical History:  Past Medical History:   Diagnosis Date    Arrhythmia     At risk for infection associated with Javier catheter     Bladder cancer [facility])     Breast cancer ([address])     right    Cerebral artery occlusion with cerebral infarction ([address]) 2018    no deficits    Dementia ([address])     DKA (diabetic ketoacidosis) ([address]) 04/06/2022    Essential hypertension 07/21/2020    Hyperlipidemia     MRSA (methicillin resistant staph aureus) culture positive 35/71/8614    Non-alcoholic fatty liver disease 07/21/2020    Right groin ulcer ([address]) 04/22/2022    Sleep apnea     does not use CPAP    Thyroid disease        Past Surgical History:  Past Surgical History:   Procedure Laterality Date    CARDIAC PROCEDURE N/A 7/26/2023    Left heart cath / coronary angiography performed by Bernardo Villanueva MD at [address] [place] [address] N/A 7/26/2023 13 15   Temp:       TempSrc:       SpO2: 100% 100%     Weight:       Height:            ED COURSE  ED Course as of 11/15/23 0815   Tue Nov 14, 2023   2332 EKG reveals sinus tachycardia, ventricular rate 103, no STEMI, independently interpreted by myself. [BD]   Wed Nov 15, 2023   0013 CT head IMPRESSION:  No acute intracranial hemorrhage, mass or infarct. Small area of  encephalomalacia in the left frontal lobe is unchanged. Stable pattern of  atrophy and chronic white matter change most compatible with small vessel  ischemic and/or senescent change. Intracranial atherosclerosis. [BD]   0013 Chest x-ray unremarkable. [BD]   0145 No UTI. TSH 40. Blood glucose 272, bicarb 23, therefore unlikely DKA. Troponin 11. Patient continues to be altered, potentially delirium. Hospital medicine will admit. [BD]      ED Course User Index  [BD] Jose Vergara MD       Disposition Considerations (Tests not done, Shared Decision Making, Pt Expectation of Test or Treatment.): See ED Course    Patient was given the following medications:  Medications   haloperidol lactate (HALDOL) injection 2 mg (2 mg IntraVENous Given 11/15/23 0441)   sodium chloride 0.9 % bolus 1,000 mL (0 mLs IntraVENous Stopped 11/15/23 0135)   ondansetron (ZOFRAN) injection 4 mg (4 mg IntraVENous Given 11/15/23 0115)   melatonin tablet 5 mg (5 mg Oral Given 11/15/23 0252)   ondansetron (ZOFRAN) injection 4 mg (4 mg IntraVENous Given 11/15/23 0645)       CONSULTS: (Who and What was discussed)  None     Social Determinants affecting Dx or Tx: Patient lacks support at home or lives alone. Smoking Cessation: Not Applicable    PROCEDURES   Unless otherwise noted above, none  Procedures      CRITICAL CARE TIME   Patient does not meet Critical Care Time, 0 minutes    ED FINAL IMPRESSION     1.  Altered mental status, unspecified altered mental status type          DISPOSITION/PLAN   DISPOSITION Admitted 11/15/2023 07:38:40 AM    Admit Note: Pt is being

## 2023-11-15 NOTE — ASSESSMENT & PLAN NOTE
Chronic condition. Patient has a known history of hypothyroidism and is on levothyroxine at home. She acknowledges that she is not always compliant with her medications. Her TSH is currently 40. Plan  1.   Resume levothyroxine at the current dose since she is acknowledging she is noncompliant

## 2023-11-15 NOTE — ED NOTES
Patient is pulling off monitoring equipment, calling out to anyone that walks by saying \"help me!!\" When asking patient what staff can do for her, she replies \"you know what's wrong; I just can't lay here, I just can't. \"     Eileen Rosenthal RN  11/15/23 9864

## 2023-11-15 NOTE — ED NOTES
Patient hollering out \"nurse please help me! \" When nurse assesses patient, patient is unable to explain how to help her. Patient is experiencing some expressive aphasia when asked questions about what is going on. Remains to pull off monitoring equipment.      La Gonzalez  11/15/23 9565

## 2023-11-16 LAB
ALBUMIN SERPL-MCNC: 2.9 G/DL (ref 3.5–5)
ALBUMIN/GLOB SERPL: 0.9 (ref 1.1–2.2)
ALP SERPL-CCNC: 99 U/L (ref 45–117)
ALT SERPL-CCNC: 54 U/L (ref 12–78)
ANION GAP SERPL CALC-SCNC: 9 MMOL/L (ref 5–15)
AST SERPL W P-5'-P-CCNC: 8 U/L (ref 15–37)
BASOPHILS # BLD: 0 K/UL (ref 0–0.1)
BASOPHILS NFR BLD: 1 % (ref 0–1)
BILIRUB SERPL-MCNC: 0.4 MG/DL (ref 0.2–1)
BUN SERPL-MCNC: 14 MG/DL (ref 6–20)
BUN/CREAT SERPL: 16 (ref 12–20)
CA-I BLD-MCNC: 9.1 MG/DL (ref 8.5–10.1)
CHLORIDE SERPL-SCNC: 101 MMOL/L (ref 97–108)
CK SERPL-CCNC: 47 U/L (ref 26–192)
CO2 SERPL-SCNC: 24 MMOL/L (ref 21–32)
CREAT SERPL-MCNC: 0.87 MG/DL (ref 0.55–1.02)
DIFFERENTIAL METHOD BLD: ABNORMAL
EOSINOPHIL # BLD: 0 K/UL (ref 0–0.4)
EOSINOPHIL NFR BLD: 0 % (ref 0–7)
ERYTHROCYTE [DISTWIDTH] IN BLOOD BY AUTOMATED COUNT: 15.4 % (ref 11.5–14.5)
GLOBULIN SER CALC-MCNC: 3.1 G/DL (ref 2–4)
GLUCOSE BLD STRIP.AUTO-MCNC: 221 MG/DL (ref 65–100)
GLUCOSE BLD STRIP.AUTO-MCNC: 262 MG/DL (ref 65–100)
GLUCOSE BLD STRIP.AUTO-MCNC: 287 MG/DL (ref 65–100)
GLUCOSE BLD STRIP.AUTO-MCNC: 324 MG/DL (ref 65–100)
GLUCOSE SERPL-MCNC: 302 MG/DL (ref 65–100)
HCT VFR BLD AUTO: 32.8 % (ref 35–47)
HGB BLD-MCNC: 10.9 G/DL (ref 11.5–16)
IMM GRANULOCYTES # BLD AUTO: 0.1 K/UL (ref 0–0.04)
IMM GRANULOCYTES NFR BLD AUTO: 1 % (ref 0–0.5)
LYMPHOCYTES # BLD: 1.5 K/UL (ref 0.8–3.5)
LYMPHOCYTES NFR BLD: 29 % (ref 12–49)
MCH RBC QN AUTO: 29.3 PG (ref 26–34)
MCHC RBC AUTO-ENTMCNC: 33.2 G/DL (ref 30–36.5)
MCV RBC AUTO: 88.2 FL (ref 80–99)
MONOCYTES # BLD: 0.6 K/UL (ref 0–1)
MONOCYTES NFR BLD: 11 % (ref 5–13)
NEUTS SEG # BLD: 2.9 K/UL (ref 1.8–8)
NEUTS SEG NFR BLD: 58 % (ref 32–75)
NRBC # BLD: 0 K/UL (ref 0–0.01)
NRBC BLD-RTO: 0 PER 100 WBC
PERFORMED BY:: ABNORMAL
PLATELET # BLD AUTO: 193 K/UL (ref 150–400)
PMV BLD AUTO: 9.9 FL (ref 8.9–12.9)
POTASSIUM SERPL-SCNC: 3.7 MMOL/L (ref 3.5–5.1)
PROT SERPL-MCNC: 6 G/DL (ref 6.4–8.2)
RBC # BLD AUTO: 3.72 M/UL (ref 3.8–5.2)
SODIUM SERPL-SCNC: 134 MMOL/L (ref 136–145)
T3FREE SERPL-MCNC: 1.1 PG/ML (ref 2.2–4)
T4 FREE SERPL-MCNC: 0.7 NG/DL (ref 0.8–1.5)
TSH SERPL DL<=0.05 MIU/L-ACNC: 56.8 UIU/ML (ref 0.36–3.74)
WBC # BLD AUTO: 5 K/UL (ref 3.6–11)

## 2023-11-16 PROCEDURE — 6360000002 HC RX W HCPCS: Performed by: INTERNAL MEDICINE

## 2023-11-16 PROCEDURE — 1100000000 HC RM PRIVATE

## 2023-11-16 PROCEDURE — 6370000000 HC RX 637 (ALT 250 FOR IP): Performed by: INTERNAL MEDICINE

## 2023-11-16 PROCEDURE — 2580000003 HC RX 258: Performed by: INTERNAL MEDICINE

## 2023-11-16 PROCEDURE — 6370000000 HC RX 637 (ALT 250 FOR IP): Performed by: HOSPITALIST

## 2023-11-16 PROCEDURE — 82962 GLUCOSE BLOOD TEST: CPT

## 2023-11-16 PROCEDURE — 84443 ASSAY THYROID STIM HORMONE: CPT

## 2023-11-16 PROCEDURE — 80053 COMPREHEN METABOLIC PANEL: CPT

## 2023-11-16 PROCEDURE — 36415 COLL VENOUS BLD VENIPUNCTURE: CPT

## 2023-11-16 PROCEDURE — 84481 FREE ASSAY (FT-3): CPT

## 2023-11-16 PROCEDURE — 82550 ASSAY OF CK (CPK): CPT

## 2023-11-16 PROCEDURE — 84439 ASSAY OF FREE THYROXINE: CPT

## 2023-11-16 PROCEDURE — 85025 COMPLETE CBC W/AUTO DIFF WBC: CPT

## 2023-11-16 PROCEDURE — 6370000000 HC RX 637 (ALT 250 FOR IP): Performed by: STUDENT IN AN ORGANIZED HEALTH CARE EDUCATION/TRAINING PROGRAM

## 2023-11-16 RX ORDER — FERROUS SULFATE 325(65) MG
325 TABLET ORAL 2 TIMES DAILY WITH MEALS
Status: DISCONTINUED | OUTPATIENT
Start: 2023-11-16 | End: 2023-11-18 | Stop reason: HOSPADM

## 2023-11-16 RX ORDER — LEVOTHYROXINE SODIUM 0.07 MG/1
75 TABLET ORAL DAILY
Status: DISCONTINUED | OUTPATIENT
Start: 2023-11-16 | End: 2023-11-18 | Stop reason: HOSPADM

## 2023-11-16 RX ORDER — HYDRALAZINE HYDROCHLORIDE 20 MG/ML
10 INJECTION INTRAMUSCULAR; INTRAVENOUS EVERY 6 HOURS PRN
Status: DISCONTINUED | OUTPATIENT
Start: 2023-11-16 | End: 2023-11-18 | Stop reason: HOSPADM

## 2023-11-16 RX ORDER — CARVEDILOL 3.12 MG/1
6.25 TABLET ORAL 2 TIMES DAILY WITH MEALS
Status: DISCONTINUED | OUTPATIENT
Start: 2023-11-16 | End: 2023-11-18 | Stop reason: HOSPADM

## 2023-11-16 RX ORDER — INSULIN LISPRO 100 [IU]/ML
0-4 INJECTION, SOLUTION INTRAVENOUS; SUBCUTANEOUS NIGHTLY
Status: DISCONTINUED | OUTPATIENT
Start: 2023-11-16 | End: 2023-11-18 | Stop reason: HOSPADM

## 2023-11-16 RX ORDER — INSULIN LISPRO 100 [IU]/ML
0-8 INJECTION, SOLUTION INTRAVENOUS; SUBCUTANEOUS
Status: DISCONTINUED | OUTPATIENT
Start: 2023-11-16 | End: 2023-11-18 | Stop reason: HOSPADM

## 2023-11-16 RX ADMIN — ASPIRIN 81 MG: 81 TABLET, COATED ORAL at 09:16

## 2023-11-16 RX ADMIN — OXYCODONE AND ACETAMINOPHEN 1 TABLET: 325; 5 TABLET ORAL at 21:00

## 2023-11-16 RX ADMIN — OXYCODONE AND ACETAMINOPHEN 1 TABLET: 325; 5 TABLET ORAL at 09:16

## 2023-11-16 RX ADMIN — CLOPIDOGREL BISULFATE 75 MG: 75 TABLET ORAL at 09:16

## 2023-11-16 RX ADMIN — CARVEDILOL 6.25 MG: 3.12 TABLET, FILM COATED ORAL at 18:22

## 2023-11-16 RX ADMIN — HYDROXYZINE PAMOATE 25 MG: 25 CAPSULE ORAL at 09:37

## 2023-11-16 RX ADMIN — FERROUS SULFATE TAB 325 MG (65 MG ELEMENTAL FE) 325 MG: 325 (65 FE) TAB at 18:22

## 2023-11-16 RX ADMIN — INSULIN LISPRO 6 UNITS: 100 INJECTION, SOLUTION INTRAVENOUS; SUBCUTANEOUS at 09:37

## 2023-11-16 RX ADMIN — SODIUM CHLORIDE, PRESERVATIVE FREE 10 ML: 5 INJECTION INTRAVENOUS at 21:00

## 2023-11-16 RX ADMIN — INSULIN LISPRO 4 UNITS: 100 INJECTION, SOLUTION INTRAVENOUS; SUBCUTANEOUS at 12:52

## 2023-11-16 RX ADMIN — Medication 3 MG: at 21:00

## 2023-11-16 RX ADMIN — SODIUM CHLORIDE, PRESERVATIVE FREE 10 ML: 5 INJECTION INTRAVENOUS at 09:00

## 2023-11-16 RX ADMIN — LEVOTHYROXINE SODIUM 75 MCG: 0.07 TABLET ORAL at 15:41

## 2023-11-16 RX ADMIN — HYDROXYZINE PAMOATE 25 MG: 25 CAPSULE ORAL at 00:34

## 2023-11-16 RX ADMIN — OXYCODONE AND ACETAMINOPHEN 1 TABLET: 325; 5 TABLET ORAL at 00:34

## 2023-11-16 RX ADMIN — OXYCODONE AND ACETAMINOPHEN 1 TABLET: 325; 5 TABLET ORAL at 04:13

## 2023-11-16 RX ADMIN — ENOXAPARIN SODIUM 40 MG: 100 INJECTION SUBCUTANEOUS at 09:16

## 2023-11-16 RX ADMIN — LOSARTAN POTASSIUM 25 MG: 50 TABLET, FILM COATED ORAL at 09:16

## 2023-11-16 RX ADMIN — HYDROXYZINE PAMOATE 25 MG: 25 CAPSULE ORAL at 15:16

## 2023-11-16 RX ADMIN — INSULIN LISPRO 2 UNITS: 100 INJECTION, SOLUTION INTRAVENOUS; SUBCUTANEOUS at 17:45

## 2023-11-16 RX ADMIN — CHOLECALCIFEROL TAB 125 MCG (5000 UNIT) 5000 UNITS: 125 TAB at 15:41

## 2023-11-16 ASSESSMENT — PAIN SCALES - GENERAL
PAINLEVEL_OUTOF10: 1
PAINLEVEL_OUTOF10: 0

## 2023-11-16 ASSESSMENT — ENCOUNTER SYMPTOMS
SHORTNESS OF BREATH: 0
CONSTIPATION: 0
ABDOMINAL PAIN: 0
DIARRHEA: 0
NAUSEA: 1
COUGH: 0

## 2023-11-16 NOTE — PROGRESS NOTES
Hospitalist Progress Note    NAME:   Sean Bay   : 1950   MRN: [id number]     Date/Time: 2023 1:44 PM  Patient PCP: Carey Cha DO    Estimated discharge date: 48 hours  Barriers: Encephalopathy, urinary retention, T4      HOSPITAL COURSE:     Sean Bay is a 68 y.o. female with a PMHx including atrial fibrillation, diabetes, hypertension, dementia presenting to the ED via EMS with cc AMS. Patient's daughter who lives in [place] became concerned about her mother and called the police for a welfare check. Upon police arrival, patient was senior living off the bed, covered in feces, with 1 episode of projectile vomiting. She was complaining of generalized body aches all over. It was reported that she had difficulty with word finding. In the ED, tachycardic otherwise hemodynamically stable. CBC and CMP unremarkable. Lactic acid within normal range. Ammonia level negative. TSH noted to be elevated at 40. CT head negative for acute intracranial process. MRI brain negative for acute hemorrhage or infarct. Patient admitted for encephalopathy. Noted to have urinary retention. Urinalysis is unremarkable for infection. Assessment / Plan:    Acute encephalopathy  - Etiology unclear  - Possibly related to untreated hypothyroidism ?   - Urinalysis unremarkable  - CXR negative  - MRI brain negative  - Ammonia normal  - Continue to monitor, could be baseline with hx dementia     Hypothyroidism  - New onset ?  Don't see mention on past PCP records.  - TSH >40  - Start on Synthroid 75 mcg daily  - Check T4, T3  - Close follow-up with PCP    Diabetes mellitus  - ISS and accu-checks  - A1c     Atrial fibrillation  - Not on AC  - Tachycardic, will start on low dose Coreg 6.25 mg BID    Hypertension  - Continue PTA losartan 25 mg daily  - Started on Coreg  - IV hydralazine PRN     Dementia   - Supportive care         Medical Decision Making:   I personally reviewed labs: CBC, CMP, ammonia  I

## 2023-11-17 LAB
ALBUMIN SERPL-MCNC: 2.9 G/DL (ref 3.5–5)
ALBUMIN/GLOB SERPL: 1 (ref 1.1–2.2)
ALP SERPL-CCNC: 98 U/L (ref 45–117)
ALT SERPL-CCNC: 52 U/L (ref 12–78)
ANION GAP SERPL CALC-SCNC: 6 MMOL/L (ref 5–15)
AST SERPL W P-5'-P-CCNC: 10 U/L (ref 15–37)
BILIRUB SERPL-MCNC: 0.4 MG/DL (ref 0.2–1)
BUN SERPL-MCNC: 19 MG/DL (ref 6–20)
BUN/CREAT SERPL: 15 (ref 12–20)
CA-I BLD-MCNC: 9 MG/DL (ref 8.5–10.1)
CHLORIDE SERPL-SCNC: 103 MMOL/L (ref 97–108)
CO2 SERPL-SCNC: 24 MMOL/L (ref 21–32)
CREAT SERPL-MCNC: 1.25 MG/DL (ref 0.55–1.02)
D DIMER PPP FEU-MCNC: 1.55 UG/ML(FEU)
ERYTHROCYTE [DISTWIDTH] IN BLOOD BY AUTOMATED COUNT: 15.3 % (ref 11.5–14.5)
GLOBULIN SER CALC-MCNC: 3 G/DL (ref 2–4)
GLUCOSE BLD STRIP.AUTO-MCNC: 222 MG/DL (ref 65–100)
GLUCOSE BLD STRIP.AUTO-MCNC: 263 MG/DL (ref 65–100)
GLUCOSE BLD STRIP.AUTO-MCNC: 268 MG/DL (ref 65–100)
GLUCOSE BLD STRIP.AUTO-MCNC: 289 MG/DL (ref 65–100)
GLUCOSE SERPL-MCNC: 323 MG/DL (ref 65–100)
HCT VFR BLD AUTO: 34.7 % (ref 35–47)
HGB BLD-MCNC: 11.1 G/DL (ref 11.5–16)
MCH RBC QN AUTO: 29.1 PG (ref 26–34)
MCHC RBC AUTO-ENTMCNC: 32 G/DL (ref 30–36.5)
MCV RBC AUTO: 90.8 FL (ref 80–99)
NRBC # BLD: 0 K/UL (ref 0–0.01)
NRBC BLD-RTO: 0 PER 100 WBC
PERFORMED BY:: ABNORMAL
PLATELET # BLD AUTO: 206 K/UL (ref 150–400)
PMV BLD AUTO: 9.2 FL (ref 8.9–12.9)
POTASSIUM SERPL-SCNC: 3.6 MMOL/L (ref 3.5–5.1)
PROT SERPL-MCNC: 5.9 G/DL (ref 6.4–8.2)
RBC # BLD AUTO: 3.82 M/UL (ref 3.8–5.2)
SODIUM SERPL-SCNC: 133 MMOL/L (ref 136–145)
WBC # BLD AUTO: 5.2 K/UL (ref 3.6–11)

## 2023-11-17 PROCEDURE — 6370000000 HC RX 637 (ALT 250 FOR IP): Performed by: INTERNAL MEDICINE

## 2023-11-17 PROCEDURE — 6370000000 HC RX 637 (ALT 250 FOR IP): Performed by: STUDENT IN AN ORGANIZED HEALTH CARE EDUCATION/TRAINING PROGRAM

## 2023-11-17 PROCEDURE — 2580000003 HC RX 258: Performed by: INTERNAL MEDICINE

## 2023-11-17 PROCEDURE — 6360000002 HC RX W HCPCS: Performed by: INTERNAL MEDICINE

## 2023-11-17 PROCEDURE — 80053 COMPREHEN METABOLIC PANEL: CPT

## 2023-11-17 PROCEDURE — 85379 FIBRIN DEGRADATION QUANT: CPT

## 2023-11-17 PROCEDURE — 1100000000 HC RM PRIVATE

## 2023-11-17 PROCEDURE — 82962 GLUCOSE BLOOD TEST: CPT

## 2023-11-17 PROCEDURE — 85027 COMPLETE CBC AUTOMATED: CPT

## 2023-11-17 PROCEDURE — 97162 PT EVAL MOD COMPLEX 30 MIN: CPT

## 2023-11-17 PROCEDURE — 36415 COLL VENOUS BLD VENIPUNCTURE: CPT

## 2023-11-17 PROCEDURE — 97530 THERAPEUTIC ACTIVITIES: CPT

## 2023-11-17 PROCEDURE — 6370000000 HC RX 637 (ALT 250 FOR IP): Performed by: HOSPITALIST

## 2023-11-17 RX ORDER — ALPRAZOLAM 0.5 MG/1
0.5 TABLET ORAL 3 TIMES DAILY PRN
Status: DISCONTINUED | OUTPATIENT
Start: 2023-11-17 | End: 2023-11-18 | Stop reason: HOSPADM

## 2023-11-17 RX ADMIN — CHOLECALCIFEROL TAB 125 MCG (5000 UNIT) 5000 UNITS: 125 TAB at 09:34

## 2023-11-17 RX ADMIN — OXYCODONE AND ACETAMINOPHEN 1 TABLET: 325; 5 TABLET ORAL at 20:23

## 2023-11-17 RX ADMIN — FERROUS SULFATE TAB 325 MG (65 MG ELEMENTAL FE) 325 MG: 325 (65 FE) TAB at 09:35

## 2023-11-17 RX ADMIN — INSULIN LISPRO 4 UNITS: 100 INJECTION, SOLUTION INTRAVENOUS; SUBCUTANEOUS at 09:35

## 2023-11-17 RX ADMIN — LOSARTAN POTASSIUM 25 MG: 50 TABLET, FILM COATED ORAL at 09:34

## 2023-11-17 RX ADMIN — CLOPIDOGREL BISULFATE 75 MG: 75 TABLET ORAL at 09:35

## 2023-11-17 RX ADMIN — FERROUS SULFATE TAB 325 MG (65 MG ELEMENTAL FE) 325 MG: 325 (65 FE) TAB at 18:01

## 2023-11-17 RX ADMIN — SODIUM CHLORIDE, PRESERVATIVE FREE 10 ML: 5 INJECTION INTRAVENOUS at 20:24

## 2023-11-17 RX ADMIN — SODIUM CHLORIDE, PRESERVATIVE FREE 10 ML: 5 INJECTION INTRAVENOUS at 09:35

## 2023-11-17 RX ADMIN — ASPIRIN 81 MG: 81 TABLET, COATED ORAL at 09:34

## 2023-11-17 RX ADMIN — INSULIN LISPRO 2 UNITS: 100 INJECTION, SOLUTION INTRAVENOUS; SUBCUTANEOUS at 18:01

## 2023-11-17 RX ADMIN — Medication 3 MG: at 20:23

## 2023-11-17 RX ADMIN — ENOXAPARIN SODIUM 40 MG: 100 INJECTION SUBCUTANEOUS at 09:37

## 2023-11-17 RX ADMIN — LEVOTHYROXINE SODIUM 75 MCG: 0.07 TABLET ORAL at 09:34

## 2023-11-17 RX ADMIN — CARVEDILOL 6.25 MG: 3.12 TABLET, FILM COATED ORAL at 09:34

## 2023-11-17 RX ADMIN — CARVEDILOL 6.25 MG: 3.12 TABLET, FILM COATED ORAL at 18:01

## 2023-11-17 RX ADMIN — INSULIN LISPRO 4 UNITS: 100 INJECTION, SOLUTION INTRAVENOUS; SUBCUTANEOUS at 13:05

## 2023-11-17 ASSESSMENT — ENCOUNTER SYMPTOMS
ABDOMINAL PAIN: 0
CONSTIPATION: 0
NAUSEA: 1
SHORTNESS OF BREATH: 0
DIARRHEA: 0
COUGH: 0

## 2023-11-17 ASSESSMENT — PAIN SCALES - GENERAL
PAINLEVEL_OUTOF10: 8
PAINLEVEL_OUTOF10: 0

## 2023-11-17 ASSESSMENT — PAIN DESCRIPTION - DESCRIPTORS: DESCRIPTORS: ACHING

## 2023-11-17 ASSESSMENT — PAIN DESCRIPTION - ORIENTATION: ORIENTATION: MID

## 2023-11-17 ASSESSMENT — PAIN DESCRIPTION - LOCATION: LOCATION: ABDOMEN

## 2023-11-17 ASSESSMENT — PAIN - FUNCTIONAL ASSESSMENT: PAIN_FUNCTIONAL_ASSESSMENT: ACTIVITIES ARE NOT PREVENTED

## 2023-11-17 ASSESSMENT — PAIN SCALES - WONG BAKER: WONGBAKER_NUMERICALRESPONSE: 0

## 2023-11-17 NOTE — PROGRESS NOTES
Hospitalist Progress Note    NAME:   Rj Ramírez   : 1950   MRN: [id number]     Date/Time: 2023 11:00 AM  Patient PCP: Peter Staples DO    Estimated discharge date: 48 hours  Barriers: Encephalopathy, urinary retention, T4      HOSPITAL COURSE:     Rj Ramírez is a 68 y.o. female with a PMHx including atrial fibrillation, diabetes, hypertension, dementia, and triple negative breast cancer recently started on chemotherapy? presenting to the ED via EMS with cc AMS. Patient's daughter who lives in [place] became concerned about her mother and called the police for a welfare check. Upon police arrival, patient was alf off the bed, covered in feces, with 1 episode of projectile vomiting. She was complaining of generalized body aches all over. It was reported that she had difficulty with word finding. In the ED, tachycardic otherwise hemodynamically stable. CBC and CMP unremarkable. Lactic acid within normal range. Ammonia level negative. TSH noted to be elevated at 40. CT head negative for acute intracranial process. MRI brain negative for acute hemorrhage or infarct. Patient admitted for encephalopathy. Noted to have urinary retention. Urinalysis is unremarkable for infection. TSH 56.8, T4 0.7, T3 1.1. Patient started on oral Synthroid. Patient advised to closely follow-up with PCP to repeat thyroid studies in 1 month. Patient confused, appears to be baseline dementia. She is forgetting to take home medications. Reports difficulty ambulating around home even with walker. She does live alone. PT/OT evaluation for placement. Case management for further discharge assistance. Assessment / Plan:    Acute encephalopathy  - Appears to be not acute process. Rather patient has baseline dementia, failure to thrive.    - Possibly related to untreated hypothyroidism ?   - Urinalysis unremarkable  - CXR negative  - MRI brain negative  - Ammonia normal  - PT/OT

## 2023-11-17 NOTE — PROGRESS NOTES
PHYSICAL THERAPY EVALUATION  Patient: Sean Bay (4 y.o. female)  Date: 11/17/2023  Primary Diagnosis: Acute encephalopathy [G93.40]  Altered mental status, unspecified altered mental status type [R41.82]       Precautions: Restrictions/Precautions  Restrictions/Precautions: Fall Risk, Contact Precautions     Recommendations for nursing mobility: Frequent repositioning to prevent skin breakdown, Use of bed/chair alarm for safety, [facility] for bed to chair transfers , and Assist x2    In place during session: External Catheter    ASSESSMENT  Pt is a 68 y.o. female admitted on 11/14/2023 for confusion and generalized pain; pt currently being treated for acute encephalopathy, uncontrolled DMII, acquired hypothyroidism, h/o bladder CA. Pt semi-supine in bed upon PT arrival, agreeable to evaluation. Pt A&O x person and place only. Based on the objective data described below, the patient currently presents with impaired functional mobility, decreased independence in ADLs, decreased ROM, impaired strength, decreased activity tolerance, impaired cognition, decreased command following, poor attention/concentration, impaired balance, impaired posture, and increased pain levels. (See below for objective details and assist levels). Overall pt tolerated session fair today with c/o 10/10 abdominal pain throughout session. Pt required mod to max A for bed mobility and transfers. Pt unable to amb at this time due to inability to advance feet with RW, gt belt and max to total A; demonstrates WBOS with unsteadiness throughout session. Pt required bilateral UE support for EOB balance and CGA to min A. Pt demonstrates poor safety awareness, poor command following, and impulsiveness throughout session; pt required verbal and tactile cues for all mobility requiring increased time for all mobility. Pt will benefit from continued skilled PT to address above deficits and return to PLOF.  Potential barriers for safe discharge: pt assistance  Active : No  Occupation: Retired    Cognitive/Behavioral Status:  Orientation  Orientation Level: Oriented to person;Oriented to place; Disoriented to situation;Disoriented to time  Cognition  Overall Cognitive Status: Exceptions  Following Commands: Inconsistently follows commands  Safety Judgement: Decreased awareness of need for assistance;Decreased awareness of need for safety  Insights: Decreased awareness of deficits  Initiation: Requires cues for all  Sequencing: Requires cues for all    Skin: intact where visible    Edema: none noted    Hearing:   Hearing  Hearing: Within functional limits    Vision/Perceptual:          Vision  Vision: Within Functional Limits       Strength:    Strength: Generally decreased, functional    Range Of Motion:  AROM: Generally decreased, functional  PROM: Generally decreased, functional    Functional Mobility:  Bed Mobility:     Bed Mobility Training  Bed Mobility Training: Yes  Interventions: Verbal cues; Tactile cues  Rolling: Moderate assistance; Additional time  Supine to Sit: Moderate assistance; Additional time  Sit to Supine: Moderate assistance; Additional time  Scooting: Moderate assistance; Additional time  Transfers:     Transfer Training  Transfer Training: Yes  Interventions: Verbal cues; Safety awareness training  Sit to Stand: Maximum assistance; Total assistance; Additional time  Stand to Sit: Maximum assistance; Total assistance; Additional time  Balance:               Balance  Sitting: Impaired  Sitting - Static: Fair (occasional)  Sitting - Dynamic: Fair (occasional)  Standing: Impaired  Standing - Static: Fair;Constant support;Poor  Standing - Dynamic: Not tested  Ambulation/Gait Training:     Gait Training:  (unable to advance feet at this time)                                              Therapeutic Exercises:   Pt would benefit from LE HEP to improve overall LE AROM/strength and can be further educated in next treatment session.     Functional

## 2023-11-17 NOTE — CARE COORDINATION
Patient has been accepted at [facility]. Patient will go to Room 136A tomorrow. Patient cannot discharge until tomorrow because of insurance guidelines. Weekend CM can reach [address] [place] [address] liaConcepcion james, at [phone]. PROCEDURES:  Partial amputation of third ray of right foot by open approach 11-Feb-2021 09:09:26  Cesar Ngo

## 2023-11-17 NOTE — CARE COORDINATION
Chart reviewed, DCP remains for patient to d/c from  to home with [facility],[address], accepted with [facility].  continues to follow and monitor for needs.

## 2023-11-17 NOTE — DISCHARGE SUMMARY
Discharge Summary    Name: Ana Wilkerson  [id number]  YOB: 1950 (Age: 68 y.o.)   Date of Admission: 11/14/2023  Date of Discharge: 11/17/2023  Attending Physician: Ophelia Trevino MD    Discharge Diagnosis:   Principal Problem:    Acute encephalopathy  Active Problems:    Acquired hypothyroidism  Resolved Problems:    * No resolved hospital problems. *       Consultations:  IP WOUND CARE NURSE CONSULT TO EVAL      Brief Admission History/Reason for Admission Per Selma Hardwick MD:   [address] Course by Main Problems:   Ana Wilkerson is a 68 y.o. female with a PMHx including atrial fibrillation, diabetes, hypertension, dementia, and triple negative breast cancer recently started on chemotherapy? presenting to the ED via EMS with cc AMS. Patient's daughter who lives in [place] became concerned about her mother and called the police for a welfare check. Upon police arrival, patient was FPC off the bed, covered in feces, with 1 episode of projectile vomiting. She was complaining of generalized body aches all over. It was reported that she had difficulty with word finding. In the ED, tachycardic otherwise hemodynamically stable. CBC and CMP unremarkable. Lactic acid within normal range. Ammonia level negative. TSH noted to be elevated at 40. CT head negative for acute intracranial process. MRI brain negative for acute hemorrhage or infarct. Patient admitted for encephalopathy. Noted to have urinary retention. Urinalysis is unremarkable for infection. TSH 56.8, T4 0.7, T3 1.1. Patient started on oral Synthroid. No metabolic disturbances on labs, urinalysis unremarkable. Patient appears to have failure to thrive with baseline dementia. Patient advised to closely follow-up with PCP to repeat thyroid studies in 1 month. Patient confused, appears to be baseline dementia. She is forgetting to take home medications.  Reports difficulty ambulating around losartan 25 MG tablet  Commonly known as: COZAAR  Take 1 tablet by mouth daily     megestrol 40 MG/ML suspension  Commonly known as: MEGACE  Take 5 mLs by mouth daily     metFORMIN 500 MG extended release tablet  Commonly known as: GLUCOPHAGE-XR     propranolol 10 MG tablet  Commonly known as: INDERAL  Take 1-2 tabs as needed for anxiety up to 3 times daily     Rollator Ultra-Light Misc  1 each by Does not apply route daily     Trulicity 3.39 LS/7.7HJ Sopn  Generic drug: Dulaglutide     vitamin D 1.25 MG (92946 UT) Caps capsule  Commonly known as: ERGOCALCIFEROL     vitamin D 50 MCG (2000 UT) Caps capsule  Commonly known as: CHOLECALCIFEROL           * This list has 2 medication(s) that are the same as other medications prescribed for you. Read the directions carefully, and ask your doctor or other care provider to review them with you.                     Disposition: SNF  Code status: Full  Recommended diet: cardiac diet and diabetic diet  Recommended activity: activity as tolerated  Wound care: None      Follow up with:   PCP : Azam Castillo DO  [address] [facility]  [address] [facility] [address]  [phone]    Schedule an appointment as soon as possible for a visit in 4 week(s)  Repeat thyroid function tests    [address] [place] [address]  [phone]  Follow up  As needed, If symptoms worsen          Total time in minutes spent coordinating this discharge (includes going over instructions, follow-up, prescriptions, and preparing report for sign off to her PCP) :  35 minutes

## 2023-11-17 NOTE — CARE COORDINATION
MANJULA attempted to contact patient's daughter, Kamaljit Lee [phone], no answer at this time, VM left requesting return call. CM contacted patient's secondary Camtoya Das [phone], she stated that her sister's Kia and Rodolfo are MPOA and that she lives in [place]. She states that patient was recently at [facility] and other sisters had not arranged for 24/7 care and she believes patient is not safe to return home alone at this time. MANJULA contacted, Rodolfo [phone], she confirmed that she and Kia MPOA at this time. MANJULA and Ms. Barrios discussed DCP and options as patient will need 24/7 care. She asked about patient going back to [facility] for SNF and her coverage with Medicare, MANJULA explained coverage and that we would have to check and see if she has days available, she is agreeable to referral being sent to [facility]. MANJULA and Ms. Reynolds discussed how this would be a short term solution however they would need to discuss LTC options as a family. MANJULA explained LTC with Medicaid, PCA with Medicaid, and family providing care personally or paying for care. Referral sent via [facility] to [facility] to inquire about possible acceptance and days. CM continues to follow and monitor for needs, not cleared for dc at this time, also awaiting PT/OT reccs.

## 2023-11-18 VITALS
HEART RATE: 99 BPM | SYSTOLIC BLOOD PRESSURE: 124 MMHG | OXYGEN SATURATION: 100 % | RESPIRATION RATE: 20 BRPM | DIASTOLIC BLOOD PRESSURE: 63 MMHG | TEMPERATURE: 98.4 F | BODY MASS INDEX: 28.28 KG/M2 | WEIGHT: 176 LBS | HEIGHT: 66 IN

## 2023-11-18 LAB
ANION GAP SERPL CALC-SCNC: 7 MMOL/L (ref 5–15)
BUN SERPL-MCNC: 20 MG/DL (ref 6–20)
BUN/CREAT SERPL: 19 (ref 12–20)
CA-I BLD-MCNC: 8.8 MG/DL (ref 8.5–10.1)
CHLORIDE SERPL-SCNC: 105 MMOL/L (ref 97–108)
CO2 SERPL-SCNC: 22 MMOL/L (ref 21–32)
CREAT SERPL-MCNC: 1.03 MG/DL (ref 0.55–1.02)
ERYTHROCYTE [DISTWIDTH] IN BLOOD BY AUTOMATED COUNT: 15.2 % (ref 11.5–14.5)
GLUCOSE BLD STRIP.AUTO-MCNC: 283 MG/DL (ref 65–100)
GLUCOSE BLD STRIP.AUTO-MCNC: 283 MG/DL (ref 65–100)
GLUCOSE SERPL-MCNC: 275 MG/DL (ref 65–100)
HCT VFR BLD AUTO: 32.8 % (ref 35–47)
HGB BLD-MCNC: 11 G/DL (ref 11.5–16)
MCH RBC QN AUTO: 29.3 PG (ref 26–34)
MCHC RBC AUTO-ENTMCNC: 33.5 G/DL (ref 30–36.5)
MCV RBC AUTO: 87.5 FL (ref 80–99)
NRBC # BLD: 0 K/UL (ref 0–0.01)
NRBC BLD-RTO: 0 PER 100 WBC
PERFORMED BY:: ABNORMAL
PERFORMED BY:: ABNORMAL
PLATELET # BLD AUTO: 232 K/UL (ref 150–400)
PMV BLD AUTO: 9.4 FL (ref 8.9–12.9)
POTASSIUM SERPL-SCNC: 3.7 MMOL/L (ref 3.5–5.1)
RBC # BLD AUTO: 3.75 M/UL (ref 3.8–5.2)
SODIUM SERPL-SCNC: 134 MMOL/L (ref 136–145)
WBC # BLD AUTO: 6.2 K/UL (ref 3.6–11)

## 2023-11-18 PROCEDURE — 85027 COMPLETE CBC AUTOMATED: CPT

## 2023-11-18 PROCEDURE — 6370000000 HC RX 637 (ALT 250 FOR IP): Performed by: INTERNAL MEDICINE

## 2023-11-18 PROCEDURE — 6370000000 HC RX 637 (ALT 250 FOR IP): Performed by: STUDENT IN AN ORGANIZED HEALTH CARE EDUCATION/TRAINING PROGRAM

## 2023-11-18 PROCEDURE — 2580000003 HC RX 258: Performed by: INTERNAL MEDICINE

## 2023-11-18 PROCEDURE — 82962 GLUCOSE BLOOD TEST: CPT

## 2023-11-18 PROCEDURE — 80048 BASIC METABOLIC PNL TOTAL CA: CPT

## 2023-11-18 PROCEDURE — 6360000002 HC RX W HCPCS: Performed by: INTERNAL MEDICINE

## 2023-11-18 PROCEDURE — 36415 COLL VENOUS BLD VENIPUNCTURE: CPT

## 2023-11-18 RX ADMIN — ENOXAPARIN SODIUM 40 MG: 100 INJECTION SUBCUTANEOUS at 08:38

## 2023-11-18 RX ADMIN — CHOLECALCIFEROL TAB 125 MCG (5000 UNIT) 5000 UNITS: 125 TAB at 08:39

## 2023-11-18 RX ADMIN — CARVEDILOL 6.25 MG: 3.12 TABLET, FILM COATED ORAL at 08:38

## 2023-11-18 RX ADMIN — CLOPIDOGREL BISULFATE 75 MG: 75 TABLET ORAL at 08:39

## 2023-11-18 RX ADMIN — ASPIRIN 81 MG: 81 TABLET, COATED ORAL at 08:39

## 2023-11-18 RX ADMIN — FERROUS SULFATE TAB 325 MG (65 MG ELEMENTAL FE) 325 MG: 325 (65 FE) TAB at 08:39

## 2023-11-18 RX ADMIN — SODIUM CHLORIDE, PRESERVATIVE FREE 10 ML: 5 INJECTION INTRAVENOUS at 08:43

## 2023-11-18 RX ADMIN — LEVOTHYROXINE SODIUM 75 MCG: 0.07 TABLET ORAL at 07:09

## 2023-11-18 RX ADMIN — INSULIN LISPRO 4 UNITS: 100 INJECTION, SOLUTION INTRAVENOUS; SUBCUTANEOUS at 08:37

## 2023-11-18 RX ADMIN — INSULIN LISPRO 4 UNITS: 100 INJECTION, SOLUTION INTRAVENOUS; SUBCUTANEOUS at 11:41

## 2023-11-18 RX ADMIN — LOSARTAN POTASSIUM 25 MG: 50 TABLET, FILM COATED ORAL at 08:39

## 2023-11-18 NOTE — DISCHARGE SUMMARY
Discharge Summary    Name: Marietta Rivas  [id number]  YOB: 1950 (Age: 68 y.o.)   Date of Admission: 11/14/2023  Date of Discharge: 11/18/2023  Attending Physician: Jerson Slade MD    Discharge Diagnosis:   Principal Problem:    Acute encephalopathy  Active Problems:    Acquired hypothyroidism  Resolved Problems:    * No resolved hospital problems. *       Consultations:  IP WOUND CARE NURSE CONSULT TO EVAL      Brief Admission History/Reason for Admission Per Megan Yadav MD:   [address] Course by Main Problems:   Marietta Rivas is a 68 y.o. female with a PMHx including atrial fibrillation, diabetes, hypertension, dementia, and triple negative breast cancer recently started on chemotherapy? presenting to the ED via EMS with cc AMS. Patient's daughter who lives in [place] became concerned about her mother and called the police for a welfare check. Upon police arrival, patient was intermediate off the bed, covered in feces, with 1 episode of projectile vomiting. She was complaining of generalized body aches all over. It was reported that she had difficulty with word finding. In the ED, tachycardic otherwise hemodynamically stable. CBC and CMP unremarkable. Lactic acid within normal range. Ammonia level negative. TSH noted to be elevated at 40. CT head negative for acute intracranial process. MRI brain negative for acute hemorrhage or infarct. Patient admitted for encephalopathy. Noted to have urinary retention. Urinalysis is unremarkable for infection. TSH 56.8, T4 0.7, T3 1.1. Patient started on oral Synthroid. No metabolic disturbances on labs, urinalysis unremarkable. Patient appears to have failure to thrive with baseline dementia. Patient advised to closely follow-up with PCP to repeat thyroid studies in 1 month. Patient confused, appears to be baseline dementia. She is forgetting to take home medications.  Reports difficulty ambulating around

## 2023-11-18 NOTE — PLAN OF CARE
Problem: Discharge Planning  Goal: Discharge to home or other facility with appropriate resources  Outcome: Progressing     Problem: Skin/Tissue Integrity  Goal: Absence of new skin breakdown  Description: 1. Monitor for areas of redness and/or skin breakdown  2. Assess vascular access sites hourly  3. Every 4-6 hours minimum:  Change oxygen saturation probe site  4. Every 4-6 hours:  If on nasal continuous positive airway pressure, respiratory therapy assess nares and determine need for appliance change or resting period. Outcome: Progressing     Problem: Safety - Adult  Goal: Free from fall injury  Outcome: Progressing     Problem: Chronic Conditions and Co-morbidities  Goal: Patient's chronic conditions and co-morbidity symptoms are monitored and maintained or improved  Outcome: Progressing     Problem: Pain  Goal: Verbalizes/displays adequate comfort level or baseline comfort level  Outcome: Progressing     Problem: Physical Therapy - Adult  Goal: By Discharge: Performs mobility at highest level of function for planned discharge setting. See evaluation for individualized goals. Description: FUNCTIONAL STATUS PRIOR TO ADMISSION: pt unable to provide PLOF at time of evaluation, per medical records pt resides at home alone was DC home from [facility] the Monday prior to admission on Tuesday. HOME SUPPORT PRIOR TO ADMISSION: The patient lived alone with daughter to provide assistance. Physical Therapy Goals  Initiated 11/17/2023  Pt stated goal: unable to verbalize due to confusion  Pt will be I with LE HEP in 7 days. Pt will perform bed mobility with Contact Guard Assist in 7 days. Pt will perform transfers with Contact Guard Assist in 7 days. Pt will amb 5-10 feet with LRAD safely with Contact Guard Assist in 7 days. Pt will demonstrate improvement in standing balance from Maximal Assist to Contact Guard Assist in 7 days.       11/17/2023 1420 by Jac Starks PT  Outcome: Progressing

## 2023-11-18 NOTE — PROGRESS NOTES
SBAR report called into Ada Obando at [facility]. Advised awaiting [facility] to transport patient to facility.

## 2023-11-18 NOTE — PROGRESS NOTES
CM received message from [facility] that patient can go to room 231 A this am. Nursing report can be called to [phone], [facility] unit. 1220 pm  Transition of Care Plan:    RUR: 21%  Prior Level of Functioning: dependent    Disposition: [facility]  If SNF or IPR: Date FOC offered: 11/15/23  Date [address] received: 11/15/23  Accepting facility: [facility]  Date authorization started with reference number: n/a  Date authorization received and expires: Follow up appointments: Saturday  DME needed: n/a  Transportation at discharge: ambulance  IM/IMM Medicare/ letter given: yes  Is patient a  and connected with [facility]? N/a   If yes, was [place] transfer form completed and [facility] notified? Caregiver Contact: Kia daughter [phone]  Discharge Caregiver contacted prior to discharge? Yes, left voicemail  Care Conference needed?  N/a  Barriers to discharge: n/a

## 2023-11-18 NOTE — PLAN OF CARE
Problem: Discharge Planning  Goal: Discharge to home or other facility with appropriate resources  11/18/2023 1047 by Yane Culver RN  Outcome: Adequate for Discharge  11/18/2023 0341 by Bridgett Lee RN  Outcome: Progressing     Problem: Skin/Tissue Integrity  Goal: Absence of new skin breakdown  Description: 1. Monitor for areas of redness and/or skin breakdown  2. Assess vascular access sites hourly  3. Every 4-6 hours minimum:  Change oxygen saturation probe site  4. Every 4-6 hours:  If on nasal continuous positive airway pressure, respiratory therapy assess nares and determine need for appliance change or resting period.   11/18/2023 1047 by Yane Culver RN  Outcome: Progressing  11/18/2023 0341 by Bridgett Lee RN  Outcome: Progressing     Problem: Safety - Adult  Goal: Free from fall injury  11/18/2023 1047 by Yane Culver RN  Outcome: Progressing  11/18/2023 0341 by Bridgett Lee RN  Outcome: Progressing     Problem: Chronic Conditions and Co-morbidities  Goal: Patient's chronic conditions and co-morbidity symptoms are monitored and maintained or improved  11/18/2023 1047 by Yane Culver RN  Outcome: Progressing  11/18/2023 0341 by Bridgett Lee RN  Outcome: Progressing

## 2023-11-19 LAB
BACTERIA SPEC CULT: ABNORMAL
COLONY COUNT, CNT: ABNORMAL
COLONY COUNT, CNT: ABNORMAL
Lab: ABNORMAL

## 2023-12-19 ENCOUNTER — HOSPITAL ENCOUNTER (INPATIENT)
Facility: HOSPITAL | Age: 73
LOS: 8 days | Discharge: NURSING, REHAB OR LONG-TERM CARE | DRG: 682 | End: 2023-12-27
Attending: EMERGENCY MEDICINE | Admitting: HOSPITALIST
Payer: MEDICARE

## 2023-12-19 DIAGNOSIS — N39.0 URINARY TRACT INFECTION WITHOUT HEMATURIA, SITE UNSPECIFIED: Primary | ICD-10-CM

## 2023-12-19 DIAGNOSIS — E86.0 DEHYDRATION: ICD-10-CM

## 2023-12-19 PROBLEM — N17.9 ACUTE KIDNEY INJURY (HCC): Status: ACTIVE | Noted: 2023-12-19

## 2023-12-19 PROBLEM — R41.82 ALTERED MENTAL STATUS: Status: ACTIVE | Noted: 2022-04-22

## 2023-12-19 LAB
ALBUMIN SERPL-MCNC: 2.9 G/DL (ref 3.5–5)
ALBUMIN/GLOB SERPL: 0.9 (ref 1.1–2.2)
ALP SERPL-CCNC: 70 U/L (ref 45–117)
ALT SERPL-CCNC: 62 U/L (ref 12–78)
ANION GAP SERPL CALC-SCNC: 10 MMOL/L (ref 5–15)
APPEARANCE UR: CLEAR
AST SERPL W P-5'-P-CCNC: 13 U/L (ref 15–37)
BACTERIA URNS QL MICRO: ABNORMAL /HPF
BASOPHILS # BLD: 0 K/UL (ref 0–0.1)
BASOPHILS NFR BLD: 0 % (ref 0–1)
BILIRUB SERPL-MCNC: 0.5 MG/DL (ref 0.2–1)
BILIRUB UR QL: NEGATIVE
BUN SERPL-MCNC: 57 MG/DL (ref 6–20)
BUN/CREAT SERPL: 41 (ref 12–20)
CA-I BLD-MCNC: 9.6 MG/DL (ref 8.5–10.1)
CHLORIDE SERPL-SCNC: 112 MMOL/L (ref 97–108)
CK SERPL-CCNC: 60 U/L (ref 26–192)
CO2 SERPL-SCNC: 17 MMOL/L (ref 21–32)
COLOR UR: ABNORMAL
CREAT SERPL-MCNC: 1.4 MG/DL (ref 0.55–1.02)
CRP SERPL-MCNC: 4 MG/DL (ref 0–0.6)
DIFFERENTIAL METHOD BLD: ABNORMAL
EOSINOPHIL # BLD: 0 K/UL (ref 0–0.4)
EOSINOPHIL NFR BLD: 0 % (ref 0–7)
EPITH CASTS URNS QL MICRO: ABNORMAL /LPF
ERYTHROCYTE [DISTWIDTH] IN BLOOD BY AUTOMATED COUNT: 14 % (ref 11.5–14.5)
GLOBULIN SER CALC-MCNC: 3.3 G/DL (ref 2–4)
GLUCOSE BLD STRIP.AUTO-MCNC: 87 MG/DL (ref 65–100)
GLUCOSE SERPL-MCNC: 91 MG/DL (ref 65–100)
GLUCOSE UR STRIP.AUTO-MCNC: NEGATIVE MG/DL
HCT VFR BLD AUTO: 38.7 % (ref 35–47)
HGB BLD-MCNC: 12.7 G/DL (ref 11.5–16)
HGB UR QL STRIP: NEGATIVE
IMM GRANULOCYTES # BLD AUTO: 0.1 K/UL (ref 0–0.04)
IMM GRANULOCYTES NFR BLD AUTO: 1 % (ref 0–0.5)
KETONES UR QL STRIP.AUTO: NEGATIVE MG/DL
LEUKOCYTE ESTERASE UR QL STRIP.AUTO: NEGATIVE
LYMPHOCYTES # BLD: 2 K/UL (ref 0.8–3.5)
LYMPHOCYTES NFR BLD: 22 % (ref 12–49)
MCH RBC QN AUTO: 29.7 PG (ref 26–34)
MCHC RBC AUTO-ENTMCNC: 32.8 G/DL (ref 30–36.5)
MCV RBC AUTO: 90.6 FL (ref 80–99)
MONOCYTES # BLD: 0.6 K/UL (ref 0–1)
MONOCYTES NFR BLD: 6 % (ref 5–13)
MUCOUS THREADS URNS QL MICRO: ABNORMAL /LPF
NEUTS SEG # BLD: 6.1 K/UL (ref 1.8–8)
NEUTS SEG NFR BLD: 71 % (ref 32–75)
NITRITE UR QL STRIP.AUTO: NEGATIVE
NRBC # BLD: 0.02 K/UL (ref 0–0.01)
NRBC BLD-RTO: 0.2 PER 100 WBC
PERFORMED BY:: NORMAL
PH UR STRIP: 5 (ref 5–8)
PLATELET # BLD AUTO: 258 K/UL (ref 150–400)
PMV BLD AUTO: 9.5 FL (ref 8.9–12.9)
POTASSIUM SERPL-SCNC: 5 MMOL/L (ref 3.5–5.1)
PROT SERPL-MCNC: 6.2 G/DL (ref 6.4–8.2)
PROT UR STRIP-MCNC: 30 MG/DL
RBC # BLD AUTO: 4.27 M/UL (ref 3.8–5.2)
RBC #/AREA URNS HPF: ABNORMAL /HPF (ref 0–5)
SODIUM SERPL-SCNC: 139 MMOL/L (ref 136–145)
SP GR UR REFRACTOMETRY: 1.02 (ref 1–1.03)
TSH SERPL DL<=0.05 MIU/L-ACNC: 17.1 UIU/ML (ref 0.36–3.74)
URATE SERPL-MCNC: 9.4 MG/DL (ref 2.6–6)
URINE CULTURE IF INDICATED: ABNORMAL
UROBILINOGEN UR QL STRIP.AUTO: 0.1 EU/DL (ref 0.1–1)
WBC # BLD AUTO: 8.7 K/UL (ref 3.6–11)
WBC URNS QL MICRO: ABNORMAL /HPF (ref 0–4)

## 2023-12-19 PROCEDURE — 99285 EMERGENCY DEPT VISIT HI MDM: CPT

## 2023-12-19 PROCEDURE — 81001 URINALYSIS AUTO W/SCOPE: CPT

## 2023-12-19 PROCEDURE — 84550 ASSAY OF BLOOD/URIC ACID: CPT

## 2023-12-19 PROCEDURE — 80053 COMPREHEN METABOLIC PANEL: CPT

## 2023-12-19 PROCEDURE — 85025 COMPLETE CBC W/AUTO DIFF WBC: CPT

## 2023-12-19 PROCEDURE — 1100000000 HC RM PRIVATE

## 2023-12-19 PROCEDURE — 2580000003 HC RX 258: Performed by: EMERGENCY MEDICINE

## 2023-12-19 PROCEDURE — 96374 THER/PROPH/DIAG INJ IV PUSH: CPT

## 2023-12-19 PROCEDURE — 6360000002 HC RX W HCPCS: Performed by: EMERGENCY MEDICINE

## 2023-12-19 PROCEDURE — 36415 COLL VENOUS BLD VENIPUNCTURE: CPT

## 2023-12-19 PROCEDURE — 6370000000 HC RX 637 (ALT 250 FOR IP): Performed by: HOSPITALIST

## 2023-12-19 PROCEDURE — 84443 ASSAY THYROID STIM HORMONE: CPT

## 2023-12-19 PROCEDURE — 82550 ASSAY OF CK (CPK): CPT

## 2023-12-19 PROCEDURE — 86140 C-REACTIVE PROTEIN: CPT

## 2023-12-19 PROCEDURE — 82962 GLUCOSE BLOOD TEST: CPT

## 2023-12-19 PROCEDURE — 96361 HYDRATE IV INFUSION ADD-ON: CPT

## 2023-12-19 PROCEDURE — 2580000003 HC RX 258: Performed by: HOSPITALIST

## 2023-12-19 PROCEDURE — 83036 HEMOGLOBIN GLYCOSYLATED A1C: CPT

## 2023-12-19 RX ORDER — CHOLECALCIFEROL (VITAMIN D3) 125 MCG
1000 CAPSULE ORAL DAILY
COMMUNITY
Start: 2023-09-28

## 2023-12-19 RX ORDER — ACETAMINOPHEN 325 MG/1
650 TABLET ORAL EVERY 6 HOURS PRN
Status: DISCONTINUED | OUTPATIENT
Start: 2023-12-19 | End: 2023-12-27 | Stop reason: HOSPADM

## 2023-12-19 RX ORDER — SODIUM CHLORIDE 9 MG/ML
INJECTION, SOLUTION INTRAVENOUS PRN
Status: DISCONTINUED | OUTPATIENT
Start: 2023-12-19 | End: 2023-12-27 | Stop reason: HOSPADM

## 2023-12-19 RX ORDER — MULTIVIT WITH MINERALS/LUTEIN
500 TABLET ORAL DAILY
Status: DISCONTINUED | OUTPATIENT
Start: 2023-12-20 | End: 2023-12-27 | Stop reason: HOSPADM

## 2023-12-19 RX ORDER — SODIUM CHLORIDE, SODIUM LACTATE, POTASSIUM CHLORIDE, AND CALCIUM CHLORIDE .6; .31; .03; .02 G/100ML; G/100ML; G/100ML; G/100ML
2000 INJECTION, SOLUTION INTRAVENOUS
Status: COMPLETED | OUTPATIENT
Start: 2023-12-19 | End: 2023-12-19

## 2023-12-19 RX ORDER — GABAPENTIN 100 MG/1
100 CAPSULE ORAL
COMMUNITY

## 2023-12-19 RX ORDER — PROPRANOLOL HYDROCHLORIDE 10 MG/1
10 TABLET ORAL 3 TIMES DAILY
Status: DISCONTINUED | OUTPATIENT
Start: 2023-12-19 | End: 2023-12-27 | Stop reason: HOSPADM

## 2023-12-19 RX ORDER — LEVOTHYROXINE SODIUM 0.1 MG/1
100 TABLET ORAL DAILY
Status: DISCONTINUED | OUTPATIENT
Start: 2023-12-20 | End: 2023-12-27 | Stop reason: HOSPADM

## 2023-12-19 RX ORDER — LEVOTHYROXINE SODIUM 0.1 MG/1
100 TABLET ORAL DAILY
COMMUNITY

## 2023-12-19 RX ORDER — CEPHALEXIN 500 MG/1
500 CAPSULE ORAL 2 TIMES DAILY
Qty: 14 CAPSULE | Refills: 0 | Status: SHIPPED | OUTPATIENT
Start: 2023-12-19 | End: 2023-12-24 | Stop reason: HOSPADM

## 2023-12-19 RX ORDER — LANOLIN ALCOHOL/MO/W.PET/CERES
1000 CREAM (GRAM) TOPICAL DAILY
Status: DISCONTINUED | OUTPATIENT
Start: 2023-12-20 | End: 2023-12-27 | Stop reason: HOSPADM

## 2023-12-19 RX ORDER — ASCORBIC ACID 500 MG
500 TABLET ORAL DAILY
COMMUNITY

## 2023-12-19 RX ORDER — INSULIN LISPRO 100 [IU]/ML
0-8 INJECTION, SOLUTION INTRAVENOUS; SUBCUTANEOUS
Status: DISCONTINUED | OUTPATIENT
Start: 2023-12-20 | End: 2023-12-23

## 2023-12-19 RX ORDER — ONDANSETRON 4 MG/1
4 TABLET, ORALLY DISINTEGRATING ORAL EVERY 8 HOURS PRN
Status: DISCONTINUED | OUTPATIENT
Start: 2023-12-19 | End: 2023-12-27 | Stop reason: HOSPADM

## 2023-12-19 RX ORDER — CLOPIDOGREL BISULFATE 75 MG/1
75 TABLET ORAL DAILY
Status: DISCONTINUED | OUTPATIENT
Start: 2023-12-20 | End: 2023-12-27 | Stop reason: HOSPADM

## 2023-12-19 RX ORDER — INSULIN LISPRO 100 [IU]/ML
0-4 INJECTION, SOLUTION INTRAVENOUS; SUBCUTANEOUS NIGHTLY
Status: DISCONTINUED | OUTPATIENT
Start: 2023-12-19 | End: 2023-12-23

## 2023-12-19 RX ORDER — ONDANSETRON 2 MG/ML
4 INJECTION INTRAMUSCULAR; INTRAVENOUS EVERY 6 HOURS PRN
Status: DISCONTINUED | OUTPATIENT
Start: 2023-12-19 | End: 2023-12-27 | Stop reason: HOSPADM

## 2023-12-19 RX ORDER — VIT B COMP NO.3/FOLIC/C/BIOTIN 1 MG-60 MG
1 TABLET ORAL
COMMUNITY

## 2023-12-19 RX ORDER — SODIUM CHLORIDE 9 MG/ML
INJECTION, SOLUTION INTRAVENOUS CONTINUOUS
Status: DISCONTINUED | OUTPATIENT
Start: 2023-12-19 | End: 2023-12-20

## 2023-12-19 RX ORDER — ACETAMINOPHEN 650 MG/1
650 SUPPOSITORY RECTAL EVERY 6 HOURS PRN
Status: DISCONTINUED | OUTPATIENT
Start: 2023-12-19 | End: 2023-12-27 | Stop reason: HOSPADM

## 2023-12-19 RX ORDER — DEXTROSE MONOHYDRATE 100 MG/ML
INJECTION, SOLUTION INTRAVENOUS CONTINUOUS PRN
Status: DISCONTINUED | OUTPATIENT
Start: 2023-12-19 | End: 2023-12-27 | Stop reason: HOSPADM

## 2023-12-19 RX ORDER — 0.9 % SODIUM CHLORIDE 0.9 %
1000 INTRAVENOUS SOLUTION INTRAVENOUS ONCE
Status: DISCONTINUED | OUTPATIENT
Start: 2023-12-19 | End: 2023-12-19

## 2023-12-19 RX ORDER — SODIUM CHLORIDE 0.9 % (FLUSH) 0.9 %
5-40 SYRINGE (ML) INJECTION EVERY 12 HOURS SCHEDULED
Status: DISCONTINUED | OUTPATIENT
Start: 2023-12-19 | End: 2023-12-27 | Stop reason: HOSPADM

## 2023-12-19 RX ORDER — SODIUM CHLORIDE 0.9 % (FLUSH) 0.9 %
5-40 SYRINGE (ML) INJECTION PRN
Status: DISCONTINUED | OUTPATIENT
Start: 2023-12-19 | End: 2023-12-27 | Stop reason: HOSPADM

## 2023-12-19 RX ORDER — NEPHROCAP 1 MG
1 CAP ORAL
Status: DISCONTINUED | OUTPATIENT
Start: 2023-12-20 | End: 2023-12-27 | Stop reason: HOSPADM

## 2023-12-19 RX ORDER — SODIUM CHLORIDE, SODIUM LACTATE, POTASSIUM CHLORIDE, CALCIUM CHLORIDE 600; 310; 30; 20 MG/100ML; MG/100ML; MG/100ML; MG/100ML
INJECTION, SOLUTION INTRAVENOUS CONTINUOUS
Status: DISCONTINUED | OUTPATIENT
Start: 2023-12-19 | End: 2023-12-20

## 2023-12-19 RX ORDER — ASPIRIN 81 MG/1
81 TABLET ORAL DAILY
Status: DISCONTINUED | OUTPATIENT
Start: 2023-12-20 | End: 2023-12-27 | Stop reason: HOSPADM

## 2023-12-19 RX ORDER — ERGOCALCIFEROL 1.25 MG/1
50000 CAPSULE ORAL DAILY
COMMUNITY
Start: 2023-11-19

## 2023-12-19 RX ADMIN — CEFTRIAXONE SODIUM 1000 MG: 1 INJECTION, POWDER, FOR SOLUTION INTRAMUSCULAR; INTRAVENOUS at 21:13

## 2023-12-19 RX ADMIN — PROPRANOLOL HYDROCHLORIDE 10 MG: 10 TABLET ORAL at 23:12

## 2023-12-19 RX ADMIN — SODIUM CHLORIDE, POTASSIUM CHLORIDE, SODIUM LACTATE AND CALCIUM CHLORIDE 2000 ML: 600; 310; 30; 20 INJECTION, SOLUTION INTRAVENOUS at 17:08

## 2023-12-19 RX ADMIN — SODIUM CHLORIDE: 9 INJECTION, SOLUTION INTRAVENOUS at 23:13

## 2023-12-19 NOTE — ED PROVIDER NOTES
Medications   Medication Sig Dispense Refill    cephALEXin (KEFLEX) 500 MG capsule Take 1 capsule by mouth 2 times daily for 7 days 14 capsule 0    gabapentin (NEURONTIN) 100 MG capsule Take 1 capsule by mouth nightly.  Max Daily Amount: 100 mg      levothyroxine (SYNTHROID) 100 MCG tablet Take 1 tablet by mouth Daily      ascorbic acid (VITAMIN C) 500 MG tablet Take 1 tablet by mouth daily      B complex-vitamin C-folic acid (NEPHRO-YORDY) 1 MG tablet Take 1 tablet by mouth daily (with breakfast)      ergocalciferol (ERGOCALCIFEROL) 1.25 MG (25773 UT) capsule Take 1 capsule by mouth daily      vitamin B-12 (CYANOCOBALAMIN) 500 MCG tablet Take 2 tablets by mouth daily      megestrol (MEGACE) 40 MG/ML suspension Take 5 mLs by mouth daily 240 mL 3    losartan (COZAAR) 25 MG tablet Take 1 tablet by mouth daily 30 tablet 3    clopidogrel (PLAVIX) 75 MG tablet Take 1 tablet by mouth daily 60 tablet 3    insulin NPH (HUMULIN N;NOVOLIN N) 100 UNIT/ML injection vial Inject 8 Units into the skin nightly      insulin aspart (NOVOLOG) 100 UNIT/ML injection vial Inject 4 Units into the skin 3 times daily (with meals) Sliding scale      calcium carb-cholecalciferol 600-20 MG-MCG TABS Take 1 tablet by mouth daily      Continuous Blood Gluc Sensor (FREESTYLE AIDAN 2 SENSOR) MISC change sensor every 14 days for 90 days      propranolol (INDERAL) 10 MG tablet Take 1-2 tabs as needed for anxiety up to 3 times daily (Patient taking differently: Take 1 tablet by mouth 3 times daily Take 1-2 tabs as needed for anxiety up to 3 times daily) 90 tablet 5    aspirin 81 MG EC tablet Take 1 tablet by mouth daily      Cholecalciferol 50 MCG (2000 UT) CAPS Take 1 capsule by mouth daily      Dulaglutide (TRULICITY) 7.37 SO/1.7JD SOPN Inject 0.75 mg into the skin once a week Every Wednesday      Ferrous Fumarate 325 (106 Fe) MG TABS Take 1 tablet by mouth daily      insulin NPH (HUMULIN N;NOVOLIN N) 100 UNIT/ML injection vial 20 Units every morning

## 2023-12-19 NOTE — ED TRIAGE NOTES
Per EMS, pt is being sent from facility due to abnormal labs that were drawn yesterday. On arrival pt complains of abdominal pain.

## 2023-12-20 LAB
ALBUMIN SERPL-MCNC: 2.7 G/DL (ref 3.5–5)
ANION GAP SERPL CALC-SCNC: 10 MMOL/L (ref 5–15)
BUN SERPL-MCNC: 49 MG/DL (ref 6–20)
BUN/CREAT SERPL: 44 (ref 12–20)
CA-I BLD-MCNC: 9.7 MG/DL (ref 8.5–10.1)
CHLORIDE SERPL-SCNC: 113 MMOL/L (ref 97–108)
CO2 SERPL-SCNC: 15 MMOL/L (ref 21–32)
CREAT SERPL-MCNC: 1.12 MG/DL (ref 0.55–1.02)
GLUCOSE BLD STRIP.AUTO-MCNC: 114 MG/DL (ref 65–100)
GLUCOSE BLD STRIP.AUTO-MCNC: 126 MG/DL (ref 65–100)
GLUCOSE BLD STRIP.AUTO-MCNC: 139 MG/DL (ref 65–100)
GLUCOSE BLD STRIP.AUTO-MCNC: 146 MG/DL (ref 65–100)
GLUCOSE SERPL-MCNC: 84 MG/DL (ref 65–100)
PERFORMED BY:: ABNORMAL
PHOSPHATE SERPL-MCNC: 2.6 MG/DL (ref 2.6–4.7)
POTASSIUM SERPL-SCNC: 4.3 MMOL/L (ref 3.5–5.1)
SODIUM SERPL-SCNC: 138 MMOL/L (ref 136–145)
T4 FREE SERPL-MCNC: 1.1 NG/DL (ref 0.8–1.5)

## 2023-12-20 PROCEDURE — 2500000003 HC RX 250 WO HCPCS: Performed by: STUDENT IN AN ORGANIZED HEALTH CARE EDUCATION/TRAINING PROGRAM

## 2023-12-20 PROCEDURE — 83970 ASSAY OF PARATHORMONE: CPT

## 2023-12-20 PROCEDURE — 82962 GLUCOSE BLOOD TEST: CPT

## 2023-12-20 PROCEDURE — 6370000000 HC RX 637 (ALT 250 FOR IP): Performed by: HOSPITALIST

## 2023-12-20 PROCEDURE — 6360000002 HC RX W HCPCS: Performed by: STUDENT IN AN ORGANIZED HEALTH CARE EDUCATION/TRAINING PROGRAM

## 2023-12-20 PROCEDURE — 6370000000 HC RX 637 (ALT 250 FOR IP): Performed by: STUDENT IN AN ORGANIZED HEALTH CARE EDUCATION/TRAINING PROGRAM

## 2023-12-20 PROCEDURE — 2580000003 HC RX 258: Performed by: HOSPITALIST

## 2023-12-20 PROCEDURE — 2580000003 HC RX 258: Performed by: STUDENT IN AN ORGANIZED HEALTH CARE EDUCATION/TRAINING PROGRAM

## 2023-12-20 PROCEDURE — 1100000000 HC RM PRIVATE

## 2023-12-20 PROCEDURE — 36415 COLL VENOUS BLD VENIPUNCTURE: CPT

## 2023-12-20 PROCEDURE — 82306 VITAMIN D 25 HYDROXY: CPT

## 2023-12-20 PROCEDURE — 80069 RENAL FUNCTION PANEL: CPT

## 2023-12-20 PROCEDURE — 84439 ASSAY OF FREE THYROXINE: CPT

## 2023-12-20 RX ORDER — METFORMIN HYDROCHLORIDE 750 MG/1
750 TABLET, EXTENDED RELEASE ORAL 2 TIMES DAILY
COMMUNITY

## 2023-12-20 RX ORDER — HYDRALAZINE HYDROCHLORIDE 20 MG/ML
10 INJECTION INTRAMUSCULAR; INTRAVENOUS EVERY 6 HOURS PRN
Status: DISCONTINUED | OUTPATIENT
Start: 2023-12-20 | End: 2023-12-27 | Stop reason: HOSPADM

## 2023-12-20 RX ORDER — SODIUM BICARBONATE 650 MG/1
650 TABLET ORAL 4 TIMES DAILY
Status: DISCONTINUED | OUTPATIENT
Start: 2023-12-20 | End: 2023-12-21

## 2023-12-20 RX ADMIN — SODIUM CHLORIDE, PRESERVATIVE FREE 10 ML: 5 INJECTION INTRAVENOUS at 14:07

## 2023-12-20 RX ADMIN — SODIUM BICARBONATE 650 MG: 650 TABLET ORAL at 22:19

## 2023-12-20 RX ADMIN — SODIUM CHLORIDE, PRESERVATIVE FREE 10 ML: 5 INJECTION INTRAVENOUS at 22:19

## 2023-12-20 RX ADMIN — CYANOCOBALAMIN TAB 1000 MCG 1000 MCG: 1000 TAB at 10:15

## 2023-12-20 RX ADMIN — CLOPIDOGREL BISULFATE 75 MG: 75 TABLET ORAL at 10:16

## 2023-12-20 RX ADMIN — NEPHROCAP 1 MG: 1 CAP ORAL at 10:15

## 2023-12-20 RX ADMIN — SODIUM BICARBONATE 650 MG: 650 TABLET ORAL at 17:21

## 2023-12-20 RX ADMIN — PROPRANOLOL HYDROCHLORIDE 10 MG: 10 TABLET ORAL at 22:19

## 2023-12-20 RX ADMIN — SODIUM BICARBONATE 650 MG: 650 TABLET ORAL at 14:03

## 2023-12-20 RX ADMIN — PROPRANOLOL HYDROCHLORIDE 10 MG: 10 TABLET ORAL at 10:15

## 2023-12-20 RX ADMIN — SODIUM BICARBONATE 650 MG: 650 TABLET ORAL at 10:15

## 2023-12-20 RX ADMIN — LEVOTHYROXINE SODIUM 100 MCG: 0.1 TABLET ORAL at 10:19

## 2023-12-20 RX ADMIN — SODIUM BICARBONATE: 84 INJECTION, SOLUTION INTRAVENOUS at 12:55

## 2023-12-20 RX ADMIN — PROPRANOLOL HYDROCHLORIDE 10 MG: 10 TABLET ORAL at 14:03

## 2023-12-20 RX ADMIN — Medication 500 MG: at 10:15

## 2023-12-20 RX ADMIN — ASPIRIN 81 MG: 81 TABLET, COATED ORAL at 10:15

## 2023-12-20 NOTE — DISCHARGE INSTRUCTIONS
Thank you! Thank you for allowing me to care for you in the emergency department. It is my goal to provide you with excellent care. If you have not received excellent quality care, please ask to speak to the nurse manager. Please fill out the survey that will come to you by mail or email since we listen to your feedback! Below you will find a list of your tests from today's visit. Should you have any questions, please do not hesitate to call the emergency department.     Labs  Recent Results (from the past 12 hour(s))   CBC with Auto Differential    Collection Time: 12/19/23  4:55 PM   Result Value Ref Range    WBC 8.7 3.6 - 11.0 K/uL    RBC 4.27 3.80 - 5.20 M/uL    Hemoglobin 12.7 11.5 - 16.0 g/dL    Hematocrit 38.7 35.0 - 47.0 %    MCV 90.6 80.0 - 99.0 FL    MCH 29.7 26.0 - 34.0 PG    MCHC 32.8 30.0 - 36.5 g/dL    RDW 14.0 11.5 - 14.5 %    Platelets 075 599 - 164 K/uL    MPV 9.5 8.9 - 12.9 FL    Nucleated RBCs 0.2 (H) 0.0  WBC    nRBC 0.02 (H) 0.00 - 0.01 K/uL    Neutrophils % 71 32 - 75 %    Lymphocytes % 22 12 - 49 %    Monocytes % 6 5 - 13 %    Eosinophils % 0 0 - 7 %    Basophils % 0 0 - 1 %    Immature Granulocytes 1 (H) 0 - 0.5 %    Neutrophils Absolute 6.1 1.8 - 8.0 K/UL    Lymphocytes Absolute 2.0 0.8 - 3.5 K/UL    Monocytes Absolute 0.6 0.0 - 1.0 K/UL    Eosinophils Absolute 0.0 0.0 - 0.4 K/UL    Basophils Absolute 0.0 0.0 - 0.1 K/UL    Absolute Immature Granulocyte 0.1 (H) 0.00 - 0.04 K/UL    Differential Type AUTOMATED     Comprehensive Metabolic Panel    Collection Time: 12/19/23  4:55 PM   Result Value Ref Range    Sodium 139 136 - 145 mmol/L    Potassium 5.0 3.5 - 5.1 mmol/L    Chloride 112 (H) 97 - 108 mmol/L    CO2 17 (L) 21 - 32 mmol/L    Anion Gap 10 5 - 15 mmol/L    Glucose 91 65 - 100 mg/dL    BUN 57 (H) 6 - 20 mg/dL    Creatinine 1.40 (H) 0.55 - 1.02 mg/dL    Bun/Cre Ratio 41 (H) 12 - 20      Est, Glom Filt Rate 40 (L) >60 ml/min/1.73m2    Calcium 9.6 8.5 - 10.1 mg/dL    Total

## 2023-12-20 NOTE — H&P
Hospitalist History & Physical Notes. [address] Name : Td Ascencio      MRN number : [id number]     YOB: 1950     Subjective :   Chief Complaint : Helena Pikes in due to abnormal labs from [address] of information : Patient unable to provide information due to dementia, most of the information is from the previous records, report from skilled nursing facility and from the emergency room physician. History of present illness:   Td Ascencio is  68 y.o. female with below mentioned past medical history and history of hypothyroidism recently started on supplementation month ago is brought to the emergency room as facility found that abnormal labs. BUN/creatinine are elevated from her baseline. Patient was indicating abdominal pain to the emergency room physician and not able but by the time I seen she was sleeping awake full but unable to give much information. She is with mild tachycardia on arrival to the emergency room otherwise stable hemodynamically, laboratory data with BUN/creatinine elevated from the baseline appears to be prerenal azotemia. Admitted for further management.     Past Medical History:   Diagnosis Date    Arrhythmia     At risk for infection associated with Javier catheter     Bladder cancer [facility])     Breast cancer ([address])     right    Cerebral artery occlusion with cerebral infarction ([address]) 2018    no deficits    Dementia ([address])     DKA (diabetic ketoacidosis) ([address]) 04/06/2022    Essential hypertension 07/21/2020    Hyperlipidemia     MRSA (methicillin resistant staph aureus) culture positive 15/02/1853    Non-alcoholic fatty liver disease 07/21/2020    Right groin ulcer ([address]) 04/22/2022    Sleep apnea     does not use CPAP    Thyroid disease      Past Surgical History:   Procedure Laterality Date    CARDIAC PROCEDURE N/A 7/26/2023    Left heart cath / coronary angiography performed by Diaz Adrian MD at [facility] CATH LAB    CARDIAC

## 2023-12-20 NOTE — ED NOTES
Care assumed and bedside SBAR report endorsed on Nery. Pt cardiac monitoring continued , spO2 Monitoring continued, IV reassed, call bell within reach, side rails up x2, resting comfortable but easily arousable, no signs of acute distress. , bed in lowest position, MAR reviewed, Labs reviewed

## 2023-12-20 NOTE — CARE COORDINATION
1415: This writer left voicemail for patient's daughter, Nelsy Abarca @ [phone] for assistance with completing DCP assessment.

## 2023-12-20 NOTE — ED NOTES
ED TO INPATIENT SBAR HANDOFF    Patient Name: Nikhil Gleason   Preferred Name: Beck Lyman  : 1950  68 y.o. Family/Caregiver Present: no   Code Status Order: Full Code  PO Status: REGULAR  Telemetry Order:   C-SSRS: Risk of Suicide: No Risk  Sitter no   Restraints: no  Sepsis Risk Score Sepsis Risk Score: 0.82    Situation  Chief Complaint   Patient presents with    Abnormal Lab     Brief Description of Patient's Condition: Here for abnormal labs. BUN and Creatinine. From [facility]. Mental Status: oriented  Arrived from:Assisted Living  Imaging:   No orders to display     Abnormal labs:   Abnormal Labs Reviewed   CBC WITH AUTO DIFFERENTIAL - Abnormal; Notable for the following components:       Result Value    Nucleated RBCs 0.2 (*)     nRBC 0.02 (*)     Immature Granulocytes 1 (*)     Absolute Immature Granulocyte 0.1 (*)     All other components within normal limits   COMPREHENSIVE METABOLIC PANEL - Abnormal; Notable for the following components:    Chloride 112 (*)     CO2 17 (*)     BUN 57 (*)     Creatinine 1.40 (*)     Bun/Cre Ratio 41 (*)     Est, Glom Filt Rate 40 (*)     AST 13 (*)     Total Protein 6.2 (*)     Albumin 2.9 (*)     Albumin/Globulin Ratio 0.9 (*)     All other components within normal limits   URINALYSIS WITH REFLEX TO CULTURE - Abnormal; Notable for the following components:    Protein, UA 30 (*)     BACTERIA, URINE 4+ (*)     Mucus, UA Trace (*)     All other components within normal limits   TSH - Abnormal; Notable for the following components:    TSH, 3RD GENERATION 17.10 (*)     All other components within normal limits   URIC ACID - Abnormal; Notable for the following components:    Uric Acid 9.4 (*)     All other components within normal limits   C-REACTIVE PROTEIN - Abnormal; Notable for the following components:    CRP 4.00 (*)     All other components within normal limits       Background  Allergies:    Allergies   Allergen Reactions    Adhesive Tape

## 2023-12-21 LAB
25(OH)D3 SERPL-MCNC: 65.2 NG/ML (ref 30–100)
ALBUMIN SERPL-MCNC: 2.2 G/DL (ref 3.5–5)
ALBUMIN/GLOB SERPL: 0.6 (ref 1.1–2.2)
ALP SERPL-CCNC: 63 U/L (ref 45–117)
ALT SERPL-CCNC: 46 U/L (ref 12–78)
ANION GAP SERPL CALC-SCNC: 9 MMOL/L (ref 5–15)
AST SERPL W P-5'-P-CCNC: 12 U/L (ref 15–37)
BASOPHILS # BLD: 0 K/UL (ref 0–0.1)
BASOPHILS NFR BLD: 0 % (ref 0–1)
BILIRUB SERPL-MCNC: 0.4 MG/DL (ref 0.2–1)
BUN SERPL-MCNC: 40 MG/DL (ref 6–20)
BUN/CREAT SERPL: 36 (ref 12–20)
CA-I BLD-MCNC: 9 MG/DL (ref 8.5–10.1)
CA-I BLD-MCNC: 9.2 MG/DL (ref 8.5–10.1)
CHLORIDE SERPL-SCNC: 113 MMOL/L (ref 97–108)
CO2 SERPL-SCNC: 15 MMOL/L (ref 21–32)
CREAT SERPL-MCNC: 1.12 MG/DL (ref 0.55–1.02)
DIFFERENTIAL METHOD BLD: ABNORMAL
EOSINOPHIL # BLD: 0 K/UL (ref 0–0.4)
EOSINOPHIL NFR BLD: 0 % (ref 0–7)
ERYTHROCYTE [DISTWIDTH] IN BLOOD BY AUTOMATED COUNT: 13.7 % (ref 11.5–14.5)
EST. AVERAGE GLUCOSE BLD GHB EST-MCNC: 160 MG/DL
GLOBULIN SER CALC-MCNC: 3.5 G/DL (ref 2–4)
GLUCOSE BLD STRIP.AUTO-MCNC: 155 MG/DL (ref 65–100)
GLUCOSE BLD STRIP.AUTO-MCNC: 179 MG/DL (ref 65–100)
GLUCOSE BLD STRIP.AUTO-MCNC: 201 MG/DL (ref 65–100)
GLUCOSE BLD STRIP.AUTO-MCNC: 204 MG/DL (ref 65–100)
GLUCOSE SERPL-MCNC: 163 MG/DL (ref 65–100)
HBA1C MFR BLD: 7.2 % (ref 4–5.6)
HCT VFR BLD AUTO: 32.7 % (ref 35–47)
HGB BLD-MCNC: 10.5 G/DL (ref 11.5–16)
IMM GRANULOCYTES # BLD AUTO: 0.2 K/UL (ref 0–0.04)
IMM GRANULOCYTES NFR BLD AUTO: 2 % (ref 0–0.5)
LYMPHOCYTES # BLD: 1.8 K/UL (ref 0.8–3.5)
LYMPHOCYTES NFR BLD: 23 % (ref 12–49)
MCH RBC QN AUTO: 29.4 PG (ref 26–34)
MCHC RBC AUTO-ENTMCNC: 32.1 G/DL (ref 30–36.5)
MCV RBC AUTO: 91.6 FL (ref 80–99)
MONOCYTES # BLD: 0.5 K/UL (ref 0–1)
MONOCYTES NFR BLD: 6 % (ref 5–13)
NEUTS SEG # BLD: 5.4 K/UL (ref 1.8–8)
NEUTS SEG NFR BLD: 69 % (ref 32–75)
NRBC # BLD: 0 K/UL (ref 0–0.01)
NRBC BLD-RTO: 0 PER 100 WBC
PERFORMED BY:: ABNORMAL
PLATELET # BLD AUTO: 246 K/UL (ref 150–400)
PMV BLD AUTO: 9.5 FL (ref 8.9–12.9)
POTASSIUM SERPL-SCNC: 4 MMOL/L (ref 3.5–5.1)
PROT SERPL-MCNC: 5.7 G/DL (ref 6.4–8.2)
PTH-INTACT SERPL-MCNC: 13.9 PG/ML (ref 18.4–88)
RBC # BLD AUTO: 3.57 M/UL (ref 3.8–5.2)
SODIUM SERPL-SCNC: 137 MMOL/L (ref 136–145)
WBC # BLD AUTO: 7.8 K/UL (ref 3.6–11)

## 2023-12-21 PROCEDURE — 2500000003 HC RX 250 WO HCPCS

## 2023-12-21 PROCEDURE — 2580000003 HC RX 258: Performed by: HOSPITALIST

## 2023-12-21 PROCEDURE — 97166 OT EVAL MOD COMPLEX 45 MIN: CPT

## 2023-12-21 PROCEDURE — 2580000003 HC RX 258: Performed by: STUDENT IN AN ORGANIZED HEALTH CARE EDUCATION/TRAINING PROGRAM

## 2023-12-21 PROCEDURE — 36415 COLL VENOUS BLD VENIPUNCTURE: CPT

## 2023-12-21 PROCEDURE — 97161 PT EVAL LOW COMPLEX 20 MIN: CPT

## 2023-12-21 PROCEDURE — 6370000000 HC RX 637 (ALT 250 FOR IP): Performed by: STUDENT IN AN ORGANIZED HEALTH CARE EDUCATION/TRAINING PROGRAM

## 2023-12-21 PROCEDURE — 2580000003 HC RX 258

## 2023-12-21 PROCEDURE — 1100000000 HC RM PRIVATE

## 2023-12-21 PROCEDURE — 82962 GLUCOSE BLOOD TEST: CPT

## 2023-12-21 PROCEDURE — 6360000002 HC RX W HCPCS: Performed by: STUDENT IN AN ORGANIZED HEALTH CARE EDUCATION/TRAINING PROGRAM

## 2023-12-21 PROCEDURE — 6370000000 HC RX 637 (ALT 250 FOR IP): Performed by: HOSPITALIST

## 2023-12-21 PROCEDURE — 2500000003 HC RX 250 WO HCPCS: Performed by: STUDENT IN AN ORGANIZED HEALTH CARE EDUCATION/TRAINING PROGRAM

## 2023-12-21 PROCEDURE — 80053 COMPREHEN METABOLIC PANEL: CPT

## 2023-12-21 PROCEDURE — 6370000000 HC RX 637 (ALT 250 FOR IP)

## 2023-12-21 PROCEDURE — 85025 COMPLETE CBC W/AUTO DIFF WBC: CPT

## 2023-12-21 PROCEDURE — 97530 THERAPEUTIC ACTIVITIES: CPT

## 2023-12-21 RX ORDER — SODIUM BICARBONATE 650 MG/1
1300 TABLET ORAL 4 TIMES DAILY
Status: COMPLETED | OUTPATIENT
Start: 2023-12-21 | End: 2023-12-21

## 2023-12-21 RX ADMIN — SODIUM BICARBONATE 1300 MG: 650 TABLET ORAL at 17:13

## 2023-12-21 RX ADMIN — PROPRANOLOL HYDROCHLORIDE 10 MG: 10 TABLET ORAL at 20:31

## 2023-12-21 RX ADMIN — ACETAMINOPHEN 650 MG: 325 TABLET ORAL at 00:14

## 2023-12-21 RX ADMIN — SODIUM CHLORIDE, PRESERVATIVE FREE 10 ML: 5 INJECTION INTRAVENOUS at 20:35

## 2023-12-21 RX ADMIN — PROPRANOLOL HYDROCHLORIDE 10 MG: 10 TABLET ORAL at 14:54

## 2023-12-21 RX ADMIN — SODIUM BICARBONATE 1300 MG: 650 TABLET ORAL at 20:32

## 2023-12-21 RX ADMIN — SODIUM BICARBONATE 650 MG: 650 TABLET ORAL at 09:28

## 2023-12-21 RX ADMIN — INSULIN LISPRO 2 UNITS: 100 INJECTION, SOLUTION INTRAVENOUS; SUBCUTANEOUS at 12:02

## 2023-12-21 RX ADMIN — CEFTRIAXONE SODIUM 1000 MG: 1 INJECTION, POWDER, FOR SOLUTION INTRAMUSCULAR; INTRAVENOUS at 00:04

## 2023-12-21 RX ADMIN — SODIUM BICARBONATE: 84 INJECTION, SOLUTION INTRAVENOUS at 11:16

## 2023-12-21 RX ADMIN — Medication 500 MG: at 09:28

## 2023-12-21 RX ADMIN — SODIUM BICARBONATE: 84 INJECTION, SOLUTION INTRAVENOUS at 20:47

## 2023-12-21 RX ADMIN — ACETAMINOPHEN 650 MG: 325 TABLET ORAL at 17:13

## 2023-12-21 RX ADMIN — CLOPIDOGREL BISULFATE 75 MG: 75 TABLET ORAL at 09:28

## 2023-12-21 RX ADMIN — ASPIRIN 81 MG: 81 TABLET, COATED ORAL at 09:28

## 2023-12-21 RX ADMIN — CYANOCOBALAMIN TAB 1000 MCG 1000 MCG: 1000 TAB at 09:28

## 2023-12-21 RX ADMIN — LEVOTHYROXINE SODIUM 100 MCG: 0.1 TABLET ORAL at 07:47

## 2023-12-21 RX ADMIN — PROPRANOLOL HYDROCHLORIDE 10 MG: 10 TABLET ORAL at 09:28

## 2023-12-21 RX ADMIN — NEPHROCAP 1 MG: 1 CAP ORAL at 09:28

## 2023-12-21 NOTE — WOUND CARE
IP WOUND CONSULT    Alcon Beckman  MEDICAL RECORD NUMBER:  [id number]  AGE: 68 y.o. GENDER: female  : 1950  TODAY'S DATE:  2023  [facility]; hospital units: [facility]     [] Follow-up   [x] New Consult          PAST MEDICAL HISTORY    Past Medical History:   Diagnosis Date    Arrhythmia     At risk for infection associated with Javier catheter     Bladder cancer ([address])     Breast cancer ([address])     right    Cerebral artery occlusion with cerebral infarction ([address])     no deficits    Dementia ([address])     DKA (diabetic ketoacidosis) ([address]) 2022    Essential hypertension 2020    Hyperlipidemia     MRSA (methicillin resistant staph aureus) culture positive     Non-alcoholic fatty liver disease 2020    Right groin ulcer ([address]) 2022    Sleep apnea     does not use CPAP    Thyroid disease         ALLERGIES    Allergies   Allergen Reactions    Adhesive Tape      Other reaction(s): Unknown (comments)    Penicillins Other (See Comments)     Other reaction(s): unknown childhood reaction    Sulfa Antibiotics      Other reaction(s): Unknown (comments)  Other reaction(s): unknown childhood reaction          Orientation: Confused     Continent: incontinent  Urinary Device:     Noman Herrera    Nutritionist Consulted:   Nutrition Status:    Support Surface: [place] low air loss bed    Contributing Factors: chronic pressure, decreased mobility, shear force, obesity, malnutrition, incontinence of stool, and incontinence of urine    Assessment     Patient resting in bed, comes from SNF. Patient noted to have urinated, incontinence care provided and underpad changed. Unstageable PI noted to coccyx, wound bed is covered with slough and moist eschar with non-blanchable erythema and excoriation noted to saleem-wound. Wound cleansed NS and a thin layer of therahoney applied to wound bed and covered with sacral foam dressing.  Area is very tender for patient, low air loss bed ordered for patient for

## 2023-12-21 NOTE — CARE COORDINATION
12/21/23 9703   Service Assessment   Patient Orientation Unable to Assess   Cognition Alert   History Provided By Medical Record   Primary Caregiver Other (Comment)  (SNF)   Accompanied By/Relationship Alone   Patient's Healthcare Decision Maker is: Legal Next of Kin   Prior Functional Level Assistance with the following:;Bathing;Dressing; Toileting;Feeding;Cooking;Housework; Shopping;Mobility   Current Functional Level Assistance with the following:;Bathing;Dressing; Toileting;Feeding;Cooking;Housework; Shopping;Mobility   Can patient return to prior living arrangement Yes   Ability to make needs known: Poor   Family able to assist with home care needs: Other (comment)  (SNF)   Would you like for me to discuss the discharge plan with any other family members/significant others, and if so, who? No   Financial Resources Medicare   Social/Functional History   Lives With Other (comment)  (SNF)   Type of Home   (SNF)   Receives Help From Other (comment)  (SNF)   Discharge Planning   Type of Residence [address] [place] [address] Prior To Admission [facility]   Patient expects to be discharged to: Skilled nursing facility     Patient is Skilled at [facility]. DCP: Return to [facility].

## 2023-12-21 NOTE — CONSULTS
losartan in the outpatient setting  -Baseline creatinine 1.1  -Creatinine on admission 1.4, improving to 1.1  -U/A bland, with proteinuria,   Will check CPK levels to rule out rhabdo  -Will hold off on renal imaging for now, imaging in October showed small, nonobstructing renal calculus  --Continue resuscitation with IV bicarb  -Will recheck renal functions tomorrow morning  No evidence of CKD( creat 0.87 in nov 2023)    2. Metabolic acidosis, NAG, ? Diarrhea or LUCIE  -CO2 17 on admission, decreasing in spite of IV resuscitation   Only on low IVF rate 50  Pt is clinically dry, ??diarrhea causing NAG acidosis   will increase rate of bicarb drip to 125 cc/hr  Hold po bicarb for now  -Potassium 4.0, will continue to monitor  -Will recheck renal panel tomorrow    3. Hypertension: Currently normotensive.   -Continue to hold antihypertensive medication and monitor blood pressures    4. Anemia: Normocytic, secondary to chronic inflammatory conditions  -Hb 10.5, stable  -Epogen not indicated  -Continue to monitor H&H    5 Diabetes mellitus: Stable.    Monitor accuchecks and adjust management    Signed: SVEN Gomez - CNP

## 2023-12-22 LAB
ANION GAP SERPL CALC-SCNC: 9 MMOL/L (ref 5–15)
BASOPHILS # BLD: 0 K/UL (ref 0–0.1)
BASOPHILS NFR BLD: 0 % (ref 0–1)
BUN SERPL-MCNC: 24 MG/DL (ref 6–20)
BUN/CREAT SERPL: 28 (ref 12–20)
CA-I BLD-MCNC: 8.5 MG/DL (ref 8.5–10.1)
CHLORIDE SERPL-SCNC: 107 MMOL/L (ref 97–108)
CK SERPL-CCNC: 94 U/L (ref 26–192)
CO2 SERPL-SCNC: 23 MMOL/L (ref 21–32)
CREAT SERPL-MCNC: 0.85 MG/DL (ref 0.55–1.02)
DIFFERENTIAL METHOD BLD: ABNORMAL
EOSINOPHIL # BLD: 0 K/UL (ref 0–0.4)
EOSINOPHIL NFR BLD: 0 % (ref 0–7)
ERYTHROCYTE [DISTWIDTH] IN BLOOD BY AUTOMATED COUNT: 13.4 % (ref 11.5–14.5)
GLUCOSE BLD STRIP.AUTO-MCNC: 166 MG/DL (ref 65–100)
GLUCOSE BLD STRIP.AUTO-MCNC: 280 MG/DL (ref 65–100)
GLUCOSE BLD STRIP.AUTO-MCNC: 305 MG/DL (ref 65–100)
GLUCOSE BLD STRIP.AUTO-MCNC: 344 MG/DL (ref 65–100)
GLUCOSE SERPL-MCNC: 190 MG/DL (ref 65–100)
HCT VFR BLD AUTO: 33.7 % (ref 35–47)
HGB BLD-MCNC: 10.9 G/DL (ref 11.5–16)
IMM GRANULOCYTES # BLD AUTO: 0.1 K/UL (ref 0–0.04)
IMM GRANULOCYTES NFR BLD AUTO: 2 % (ref 0–0.5)
LACTATE SERPL-SCNC: 1.4 MMOL/L (ref 0.4–2)
LYMPHOCYTES # BLD: 1.8 K/UL (ref 0.8–3.5)
LYMPHOCYTES NFR BLD: 27 % (ref 12–49)
MAGNESIUM SERPL-MCNC: NORMAL MG/DL (ref 1.6–2.4)
MCH RBC QN AUTO: 29.6 PG (ref 26–34)
MCHC RBC AUTO-ENTMCNC: 32.3 G/DL (ref 30–36.5)
MCV RBC AUTO: 91.6 FL (ref 80–99)
MONOCYTES # BLD: 0.5 K/UL (ref 0–1)
MONOCYTES NFR BLD: 7 % (ref 5–13)
NEUTS SEG # BLD: 4.2 K/UL (ref 1.8–8)
NEUTS SEG NFR BLD: 64 % (ref 32–75)
NRBC # BLD: 0 K/UL (ref 0–0.01)
NRBC BLD-RTO: 0 PER 100 WBC
PERFORMED BY:: ABNORMAL
PLATELET # BLD AUTO: 131 K/UL (ref 150–400)
POTASSIUM SERPL-SCNC: 3.6 MMOL/L (ref 3.5–5.1)
POTASSIUM SERPL-SCNC: ABNORMAL MMOL/L (ref 3.5–5.1)
POTASSIUM SERPL-SCNC: NORMAL MMOL/L (ref 3.5–5.1)
RBC # BLD AUTO: 3.68 M/UL (ref 3.8–5.2)
SODIUM SERPL-SCNC: 139 MMOL/L (ref 136–145)
WBC # BLD AUTO: 6.7 K/UL (ref 3.6–11)

## 2023-12-22 PROCEDURE — 36415 COLL VENOUS BLD VENIPUNCTURE: CPT

## 2023-12-22 PROCEDURE — 2500000003 HC RX 250 WO HCPCS

## 2023-12-22 PROCEDURE — 85025 COMPLETE CBC W/AUTO DIFF WBC: CPT

## 2023-12-22 PROCEDURE — 2580000003 HC RX 258

## 2023-12-22 PROCEDURE — 6370000000 HC RX 637 (ALT 250 FOR IP): Performed by: HOSPITALIST

## 2023-12-22 PROCEDURE — 1100000000 HC RM PRIVATE

## 2023-12-22 PROCEDURE — 82962 GLUCOSE BLOOD TEST: CPT

## 2023-12-22 PROCEDURE — 80048 BASIC METABOLIC PNL TOTAL CA: CPT

## 2023-12-22 PROCEDURE — 82550 ASSAY OF CK (CPK): CPT

## 2023-12-22 PROCEDURE — 83735 ASSAY OF MAGNESIUM: CPT

## 2023-12-22 PROCEDURE — 84132 ASSAY OF SERUM POTASSIUM: CPT

## 2023-12-22 PROCEDURE — 83605 ASSAY OF LACTIC ACID: CPT

## 2023-12-22 PROCEDURE — 6370000000 HC RX 637 (ALT 250 FOR IP): Performed by: INTERNAL MEDICINE

## 2023-12-22 PROCEDURE — 6360000002 HC RX W HCPCS: Performed by: STUDENT IN AN ORGANIZED HEALTH CARE EDUCATION/TRAINING PROGRAM

## 2023-12-22 PROCEDURE — 97530 THERAPEUTIC ACTIVITIES: CPT

## 2023-12-22 PROCEDURE — 2580000003 HC RX 258: Performed by: STUDENT IN AN ORGANIZED HEALTH CARE EDUCATION/TRAINING PROGRAM

## 2023-12-22 PROCEDURE — 2580000003 HC RX 258: Performed by: HOSPITALIST

## 2023-12-22 RX ADMIN — CEFTRIAXONE SODIUM 1000 MG: 1 INJECTION, POWDER, FOR SOLUTION INTRAMUSCULAR; INTRAVENOUS at 23:01

## 2023-12-22 RX ADMIN — INSULIN LISPRO 6 UNITS: 100 INJECTION, SOLUTION INTRAVENOUS; SUBCUTANEOUS at 12:08

## 2023-12-22 RX ADMIN — ASPIRIN 81 MG: 81 TABLET, COATED ORAL at 09:12

## 2023-12-22 RX ADMIN — PROPRANOLOL HYDROCHLORIDE 10 MG: 10 TABLET ORAL at 20:10

## 2023-12-22 RX ADMIN — SODIUM CHLORIDE, PRESERVATIVE FREE 10 ML: 5 INJECTION INTRAVENOUS at 20:12

## 2023-12-22 RX ADMIN — LEVOTHYROXINE SODIUM 100 MCG: 0.1 TABLET ORAL at 06:15

## 2023-12-22 RX ADMIN — ACETAMINOPHEN 650 MG: 325 TABLET ORAL at 09:16

## 2023-12-22 RX ADMIN — NEPHROCAP 1 MG: 1 CAP ORAL at 09:12

## 2023-12-22 RX ADMIN — CLOPIDOGREL BISULFATE 75 MG: 75 TABLET ORAL at 09:12

## 2023-12-22 RX ADMIN — Medication 500 MG: at 09:12

## 2023-12-22 RX ADMIN — PROPRANOLOL HYDROCHLORIDE 10 MG: 10 TABLET ORAL at 09:12

## 2023-12-22 RX ADMIN — SODIUM BICARBONATE: 84 INJECTION, SOLUTION INTRAVENOUS at 06:14

## 2023-12-22 RX ADMIN — COLLAGENASE SANTYL: 250 OINTMENT TOPICAL at 12:14

## 2023-12-22 RX ADMIN — INSULIN LISPRO 6 UNITS: 100 INJECTION, SOLUTION INTRAVENOUS; SUBCUTANEOUS at 17:06

## 2023-12-22 RX ADMIN — CEFTRIAXONE SODIUM 1000 MG: 1 INJECTION, POWDER, FOR SOLUTION INTRAMUSCULAR; INTRAVENOUS at 00:03

## 2023-12-22 RX ADMIN — ACETAMINOPHEN 650 MG: 325 TABLET ORAL at 00:02

## 2023-12-22 RX ADMIN — ACETAMINOPHEN 650 MG: 325 TABLET ORAL at 20:10

## 2023-12-22 RX ADMIN — SODIUM CHLORIDE, PRESERVATIVE FREE 10 ML: 5 INJECTION INTRAVENOUS at 09:12

## 2023-12-22 RX ADMIN — CYANOCOBALAMIN TAB 1000 MCG 1000 MCG: 1000 TAB at 09:12

## 2023-12-22 NOTE — CARE COORDINATION
MANJULA spoke with patient's daughter, Denver Guys ([phone]) regarding DCP. Kia is requesting CM send referral to another facility. She does not wish for her mother to return to [facility]. Preference given for [facility]. Referrals sent via [address]

## 2023-12-23 LAB
ALBUMIN SERPL-MCNC: 2.3 G/DL (ref 3.5–5)
ALBUMIN/GLOB SERPL: 0.8 (ref 1.1–2.2)
ALP SERPL-CCNC: 61 U/L (ref 45–117)
ALT SERPL-CCNC: 50 U/L (ref 12–78)
ANION GAP SERPL CALC-SCNC: 8 MMOL/L (ref 5–15)
AST SERPL W P-5'-P-CCNC: 13 U/L (ref 15–37)
BASOPHILS # BLD: 0 K/UL (ref 0–0.1)
BASOPHILS NFR BLD: 0 % (ref 0–1)
BILIRUB SERPL-MCNC: 0.3 MG/DL (ref 0.2–1)
BUN SERPL-MCNC: 29 MG/DL (ref 6–20)
BUN/CREAT SERPL: 38 (ref 12–20)
CA-I BLD-MCNC: 8.7 MG/DL (ref 8.5–10.1)
CHLORIDE SERPL-SCNC: 105 MMOL/L (ref 97–108)
CO2 SERPL-SCNC: 27 MMOL/L (ref 21–32)
CREAT SERPL-MCNC: 0.76 MG/DL (ref 0.55–1.02)
DIFFERENTIAL METHOD BLD: ABNORMAL
EOSINOPHIL # BLD: 0 K/UL (ref 0–0.4)
EOSINOPHIL NFR BLD: 0 % (ref 0–7)
ERYTHROCYTE [DISTWIDTH] IN BLOOD BY AUTOMATED COUNT: 13.2 % (ref 11.5–14.5)
GLOBULIN SER CALC-MCNC: 2.8 G/DL (ref 2–4)
GLUCOSE BLD STRIP.AUTO-MCNC: 214 MG/DL (ref 65–100)
GLUCOSE BLD STRIP.AUTO-MCNC: 221 MG/DL (ref 65–100)
GLUCOSE BLD STRIP.AUTO-MCNC: 223 MG/DL (ref 65–100)
GLUCOSE BLD STRIP.AUTO-MCNC: 327 MG/DL (ref 65–100)
GLUCOSE SERPL-MCNC: 217 MG/DL (ref 65–100)
HCT VFR BLD AUTO: 29.1 % (ref 35–47)
HGB BLD-MCNC: 10 G/DL (ref 11.5–16)
IMM GRANULOCYTES # BLD AUTO: 0.1 K/UL (ref 0–0.04)
IMM GRANULOCYTES NFR BLD AUTO: 2 % (ref 0–0.5)
LYMPHOCYTES # BLD: 1.7 K/UL (ref 0.8–3.5)
LYMPHOCYTES NFR BLD: 29 % (ref 12–49)
MCH RBC QN AUTO: 29.8 PG (ref 26–34)
MCHC RBC AUTO-ENTMCNC: 34.4 G/DL (ref 30–36.5)
MCV RBC AUTO: 86.6 FL (ref 80–99)
MONOCYTES # BLD: 0.4 K/UL (ref 0–1)
MONOCYTES NFR BLD: 6 % (ref 5–13)
NEUTS SEG # BLD: 3.7 K/UL (ref 1.8–8)
NEUTS SEG NFR BLD: 63 % (ref 32–75)
NRBC # BLD: 0 K/UL (ref 0–0.01)
NRBC BLD-RTO: 0 PER 100 WBC
PERFORMED BY:: ABNORMAL
PLATELET # BLD AUTO: 209 K/UL (ref 150–400)
PMV BLD AUTO: 9.5 FL (ref 8.9–12.9)
POTASSIUM SERPL-SCNC: 3.6 MMOL/L (ref 3.5–5.1)
PROT SERPL-MCNC: 5.1 G/DL (ref 6.4–8.2)
RBC # BLD AUTO: 3.36 M/UL (ref 3.8–5.2)
SODIUM SERPL-SCNC: 140 MMOL/L (ref 136–145)
WBC # BLD AUTO: 5.8 K/UL (ref 3.6–11)

## 2023-12-23 PROCEDURE — 6370000000 HC RX 637 (ALT 250 FOR IP): Performed by: PHYSICIAN ASSISTANT

## 2023-12-23 PROCEDURE — 6370000000 HC RX 637 (ALT 250 FOR IP): Performed by: HOSPITALIST

## 2023-12-23 PROCEDURE — 2580000003 HC RX 258: Performed by: STUDENT IN AN ORGANIZED HEALTH CARE EDUCATION/TRAINING PROGRAM

## 2023-12-23 PROCEDURE — 6360000002 HC RX W HCPCS: Performed by: STUDENT IN AN ORGANIZED HEALTH CARE EDUCATION/TRAINING PROGRAM

## 2023-12-23 PROCEDURE — 1100000000 HC RM PRIVATE

## 2023-12-23 PROCEDURE — 36415 COLL VENOUS BLD VENIPUNCTURE: CPT

## 2023-12-23 PROCEDURE — 85025 COMPLETE CBC W/AUTO DIFF WBC: CPT

## 2023-12-23 PROCEDURE — 2580000003 HC RX 258: Performed by: HOSPITALIST

## 2023-12-23 PROCEDURE — 82962 GLUCOSE BLOOD TEST: CPT

## 2023-12-23 PROCEDURE — 80053 COMPREHEN METABOLIC PANEL: CPT

## 2023-12-23 RX ORDER — INSULIN LISPRO 100 [IU]/ML
0-4 INJECTION, SOLUTION INTRAVENOUS; SUBCUTANEOUS NIGHTLY
Status: DISCONTINUED | OUTPATIENT
Start: 2023-12-23 | End: 2023-12-27 | Stop reason: HOSPADM

## 2023-12-23 RX ORDER — INSULIN LISPRO 100 [IU]/ML
0-16 INJECTION, SOLUTION INTRAVENOUS; SUBCUTANEOUS
Status: DISCONTINUED | OUTPATIENT
Start: 2023-12-23 | End: 2023-12-27 | Stop reason: HOSPADM

## 2023-12-23 RX ADMIN — SODIUM CHLORIDE, PRESERVATIVE FREE 10 ML: 5 INJECTION INTRAVENOUS at 21:55

## 2023-12-23 RX ADMIN — SODIUM CHLORIDE, PRESERVATIVE FREE 10 ML: 5 INJECTION INTRAVENOUS at 11:45

## 2023-12-23 RX ADMIN — PROPRANOLOL HYDROCHLORIDE 10 MG: 10 TABLET ORAL at 15:41

## 2023-12-23 RX ADMIN — COLLAGENASE SANTYL: 250 OINTMENT TOPICAL at 10:12

## 2023-12-23 RX ADMIN — NEPHROCAP 1 MG: 1 CAP ORAL at 10:11

## 2023-12-23 RX ADMIN — CEFTRIAXONE SODIUM 1000 MG: 1 INJECTION, POWDER, FOR SOLUTION INTRAMUSCULAR; INTRAVENOUS at 21:42

## 2023-12-23 RX ADMIN — INSULIN HUMAN 14 UNITS: 100 INJECTION, SUSPENSION SUBCUTANEOUS at 10:13

## 2023-12-23 RX ADMIN — Medication 500 MG: at 10:11

## 2023-12-23 RX ADMIN — CLOPIDOGREL BISULFATE 75 MG: 75 TABLET ORAL at 10:11

## 2023-12-23 RX ADMIN — ASPIRIN 81 MG: 81 TABLET, COATED ORAL at 10:11

## 2023-12-23 RX ADMIN — PROPRANOLOL HYDROCHLORIDE 10 MG: 10 TABLET ORAL at 10:11

## 2023-12-23 RX ADMIN — LEVOTHYROXINE SODIUM 100 MCG: 0.1 TABLET ORAL at 05:28

## 2023-12-23 RX ADMIN — INSULIN LISPRO 4 UNITS: 100 INJECTION, SOLUTION INTRAVENOUS; SUBCUTANEOUS at 17:18

## 2023-12-23 RX ADMIN — INSULIN LISPRO 6 UNITS: 100 INJECTION, SOLUTION INTRAVENOUS; SUBCUTANEOUS at 12:06

## 2023-12-23 RX ADMIN — PROPRANOLOL HYDROCHLORIDE 10 MG: 10 TABLET ORAL at 21:55

## 2023-12-23 RX ADMIN — CYANOCOBALAMIN TAB 1000 MCG 1000 MCG: 1000 TAB at 10:11

## 2023-12-23 RX ADMIN — INSULIN LISPRO 2 UNITS: 100 INJECTION, SOLUTION INTRAVENOUS; SUBCUTANEOUS at 10:12

## 2023-12-23 RX ADMIN — ACETAMINOPHEN 650 MG: 325 TABLET ORAL at 05:28

## 2023-12-24 PROBLEM — G93.40 ACUTE ENCEPHALOPATHY: Status: RESOLVED | Noted: 2023-11-15 | Resolved: 2023-12-24

## 2023-12-24 PROBLEM — N17.9 ACUTE KIDNEY INJURY (HCC): Status: RESOLVED | Noted: 2023-12-19 | Resolved: 2023-12-24

## 2023-12-24 PROBLEM — F03.90 DEMENTIA (HCC): Status: ACTIVE | Noted: 2023-12-24

## 2023-12-24 PROBLEM — R41.82 ALTERED MENTAL STATUS: Status: RESOLVED | Noted: 2022-04-22 | Resolved: 2023-12-24

## 2023-12-24 LAB
ANION GAP SERPL CALC-SCNC: 9 MMOL/L (ref 5–15)
BASOPHILS # BLD: 0 K/UL (ref 0–0.1)
BASOPHILS NFR BLD: 0 % (ref 0–1)
BUN SERPL-MCNC: 22 MG/DL (ref 6–20)
BUN/CREAT SERPL: 25 (ref 12–20)
CA-I BLD-MCNC: 9 MG/DL (ref 8.5–10.1)
CHLORIDE SERPL-SCNC: 103 MMOL/L (ref 97–108)
CO2 SERPL-SCNC: 25 MMOL/L (ref 21–32)
CREAT SERPL-MCNC: 0.89 MG/DL (ref 0.55–1.02)
DIFFERENTIAL METHOD BLD: ABNORMAL
EOSINOPHIL # BLD: 0 K/UL (ref 0–0.4)
EOSINOPHIL NFR BLD: 0 % (ref 0–7)
ERYTHROCYTE [DISTWIDTH] IN BLOOD BY AUTOMATED COUNT: 13.3 % (ref 11.5–14.5)
GLUCOSE BLD STRIP.AUTO-MCNC: 181 MG/DL (ref 65–100)
GLUCOSE BLD STRIP.AUTO-MCNC: 181 MG/DL (ref 65–100)
GLUCOSE BLD STRIP.AUTO-MCNC: 213 MG/DL (ref 65–100)
GLUCOSE BLD STRIP.AUTO-MCNC: 253 MG/DL (ref 65–100)
GLUCOSE SERPL-MCNC: 233 MG/DL (ref 65–100)
HCT VFR BLD AUTO: 30.5 % (ref 35–47)
HGB BLD-MCNC: 10.3 G/DL (ref 11.5–16)
IMM GRANULOCYTES # BLD AUTO: 0.1 K/UL (ref 0–0.04)
IMM GRANULOCYTES NFR BLD AUTO: 2 % (ref 0–0.5)
LYMPHOCYTES # BLD: 1.9 K/UL (ref 0.8–3.5)
LYMPHOCYTES NFR BLD: 26 % (ref 12–49)
MCH RBC QN AUTO: 29.3 PG (ref 26–34)
MCHC RBC AUTO-ENTMCNC: 33.8 G/DL (ref 30–36.5)
MCV RBC AUTO: 86.6 FL (ref 80–99)
MONOCYTES # BLD: 0.4 K/UL (ref 0–1)
MONOCYTES NFR BLD: 6 % (ref 5–13)
NEUTS SEG # BLD: 4.9 K/UL (ref 1.8–8)
NEUTS SEG NFR BLD: 66 % (ref 32–75)
NRBC # BLD: 0 K/UL (ref 0–0.01)
NRBC BLD-RTO: 0 PER 100 WBC
PERFORMED BY:: ABNORMAL
PLATELET # BLD AUTO: 243 K/UL (ref 150–400)
PMV BLD AUTO: 9.4 FL (ref 8.9–12.9)
POTASSIUM SERPL-SCNC: 3.6 MMOL/L (ref 3.5–5.1)
RBC # BLD AUTO: 3.52 M/UL (ref 3.8–5.2)
SODIUM SERPL-SCNC: 137 MMOL/L (ref 136–145)
WBC # BLD AUTO: 7.4 K/UL (ref 3.6–11)

## 2023-12-24 PROCEDURE — 1100000000 HC RM PRIVATE

## 2023-12-24 PROCEDURE — 85025 COMPLETE CBC W/AUTO DIFF WBC: CPT

## 2023-12-24 PROCEDURE — 6370000000 HC RX 637 (ALT 250 FOR IP): Performed by: HOSPITALIST

## 2023-12-24 PROCEDURE — 80048 BASIC METABOLIC PNL TOTAL CA: CPT

## 2023-12-24 PROCEDURE — 82962 GLUCOSE BLOOD TEST: CPT

## 2023-12-24 PROCEDURE — 2580000003 HC RX 258: Performed by: HOSPITALIST

## 2023-12-24 PROCEDURE — 6370000000 HC RX 637 (ALT 250 FOR IP): Performed by: PHYSICIAN ASSISTANT

## 2023-12-24 PROCEDURE — 36415 COLL VENOUS BLD VENIPUNCTURE: CPT

## 2023-12-24 RX ADMIN — COLLAGENASE SANTYL: 250 OINTMENT TOPICAL at 09:14

## 2023-12-24 RX ADMIN — CLOPIDOGREL BISULFATE 75 MG: 75 TABLET ORAL at 09:12

## 2023-12-24 RX ADMIN — SODIUM CHLORIDE, PRESERVATIVE FREE 10 ML: 5 INJECTION INTRAVENOUS at 20:34

## 2023-12-24 RX ADMIN — LEVOTHYROXINE SODIUM 100 MCG: 0.1 TABLET ORAL at 05:16

## 2023-12-24 RX ADMIN — SODIUM CHLORIDE, PRESERVATIVE FREE 10 ML: 5 INJECTION INTRAVENOUS at 09:14

## 2023-12-24 RX ADMIN — ASPIRIN 81 MG: 81 TABLET, COATED ORAL at 09:12

## 2023-12-24 RX ADMIN — PROPRANOLOL HYDROCHLORIDE 10 MG: 10 TABLET ORAL at 20:34

## 2023-12-24 RX ADMIN — ACETAMINOPHEN 650 MG: 325 TABLET ORAL at 05:37

## 2023-12-24 RX ADMIN — Medication 500 MG: at 09:12

## 2023-12-24 RX ADMIN — PROPRANOLOL HYDROCHLORIDE 10 MG: 10 TABLET ORAL at 09:12

## 2023-12-24 RX ADMIN — CYANOCOBALAMIN TAB 1000 MCG 1000 MCG: 1000 TAB at 09:12

## 2023-12-24 RX ADMIN — INSULIN LISPRO 8 UNITS: 100 INJECTION, SOLUTION INTRAVENOUS; SUBCUTANEOUS at 13:19

## 2023-12-24 RX ADMIN — ACETAMINOPHEN 650 MG: 325 TABLET ORAL at 20:31

## 2023-12-24 RX ADMIN — INSULIN LISPRO 4 UNITS: 100 INJECTION, SOLUTION INTRAVENOUS; SUBCUTANEOUS at 09:13

## 2023-12-24 RX ADMIN — NEPHROCAP 1 MG: 1 CAP ORAL at 09:12

## 2023-12-24 RX ADMIN — PROPRANOLOL HYDROCHLORIDE 10 MG: 10 TABLET ORAL at 15:17

## 2023-12-24 RX ADMIN — INSULIN HUMAN 14 UNITS: 100 INJECTION, SUSPENSION SUBCUTANEOUS at 09:13

## 2023-12-24 NOTE — CARE COORDINATION
Pt cannot D/C. Pt daughter requested another facility. [facility], they are wanting a [facility], and wondering if Medicaid application has been started. It will be Tuesday before everything can be straightened out.

## 2023-12-25 LAB
GLUCOSE BLD STRIP.AUTO-MCNC: 227 MG/DL (ref 65–100)
GLUCOSE BLD STRIP.AUTO-MCNC: 250 MG/DL (ref 65–100)
GLUCOSE BLD STRIP.AUTO-MCNC: 263 MG/DL (ref 65–100)
GLUCOSE BLD STRIP.AUTO-MCNC: 325 MG/DL (ref 65–100)
PERFORMED BY:: ABNORMAL

## 2023-12-25 PROCEDURE — 82962 GLUCOSE BLOOD TEST: CPT

## 2023-12-25 PROCEDURE — 2580000003 HC RX 258: Performed by: HOSPITALIST

## 2023-12-25 PROCEDURE — 6370000000 HC RX 637 (ALT 250 FOR IP): Performed by: HOSPITALIST

## 2023-12-25 PROCEDURE — 6370000000 HC RX 637 (ALT 250 FOR IP): Performed by: PHYSICIAN ASSISTANT

## 2023-12-25 PROCEDURE — 1100000000 HC RM PRIVATE

## 2023-12-25 RX ADMIN — INSULIN HUMAN 14 UNITS: 100 INJECTION, SUSPENSION SUBCUTANEOUS at 08:32

## 2023-12-25 RX ADMIN — COLLAGENASE SANTYL: 250 OINTMENT TOPICAL at 08:33

## 2023-12-25 RX ADMIN — LEVOTHYROXINE SODIUM 100 MCG: 0.1 TABLET ORAL at 06:12

## 2023-12-25 RX ADMIN — INSULIN LISPRO 4 UNITS: 100 INJECTION, SOLUTION INTRAVENOUS; SUBCUTANEOUS at 17:33

## 2023-12-25 RX ADMIN — INSULIN LISPRO 12 UNITS: 100 INJECTION, SOLUTION INTRAVENOUS; SUBCUTANEOUS at 13:07

## 2023-12-25 RX ADMIN — PROPRANOLOL HYDROCHLORIDE 10 MG: 10 TABLET ORAL at 13:07

## 2023-12-25 RX ADMIN — ASPIRIN 81 MG: 81 TABLET, COATED ORAL at 08:32

## 2023-12-25 RX ADMIN — PROPRANOLOL HYDROCHLORIDE 10 MG: 10 TABLET ORAL at 08:32

## 2023-12-25 RX ADMIN — CLOPIDOGREL BISULFATE 75 MG: 75 TABLET ORAL at 08:33

## 2023-12-25 RX ADMIN — SODIUM CHLORIDE, PRESERVATIVE FREE 10 ML: 5 INJECTION INTRAVENOUS at 22:20

## 2023-12-25 RX ADMIN — INSULIN LISPRO 8 UNITS: 100 INJECTION, SOLUTION INTRAVENOUS; SUBCUTANEOUS at 08:32

## 2023-12-25 RX ADMIN — CYANOCOBALAMIN TAB 1000 MCG 1000 MCG: 1000 TAB at 08:32

## 2023-12-25 RX ADMIN — Medication 500 MG: at 08:33

## 2023-12-25 RX ADMIN — NEPHROCAP 1 MG: 1 CAP ORAL at 08:32

## 2023-12-25 RX ADMIN — PROPRANOLOL HYDROCHLORIDE 10 MG: 10 TABLET ORAL at 22:20

## 2023-12-25 RX ADMIN — SODIUM CHLORIDE, PRESERVATIVE FREE 10 ML: 5 INJECTION INTRAVENOUS at 08:33

## 2023-12-26 LAB
GLUCOSE BLD STRIP.AUTO-MCNC: 296 MG/DL (ref 65–100)
GLUCOSE BLD STRIP.AUTO-MCNC: 436 MG/DL (ref 65–100)
GLUCOSE BLD STRIP.AUTO-MCNC: 448 MG/DL (ref 65–100)
PERFORMED BY:: ABNORMAL

## 2023-12-26 PROCEDURE — 97530 THERAPEUTIC ACTIVITIES: CPT

## 2023-12-26 PROCEDURE — 6370000000 HC RX 637 (ALT 250 FOR IP): Performed by: PHYSICIAN ASSISTANT

## 2023-12-26 PROCEDURE — 6370000000 HC RX 637 (ALT 250 FOR IP): Performed by: HOSPITALIST

## 2023-12-26 PROCEDURE — 1100000000 HC RM PRIVATE

## 2023-12-26 PROCEDURE — 2580000003 HC RX 258: Performed by: HOSPITALIST

## 2023-12-26 PROCEDURE — 82962 GLUCOSE BLOOD TEST: CPT

## 2023-12-26 RX ADMIN — METFORMIN HYDROCHLORIDE 500 MG: 500 TABLET ORAL at 18:40

## 2023-12-26 RX ADMIN — INSULIN LISPRO 8 UNITS: 100 INJECTION, SOLUTION INTRAVENOUS; SUBCUTANEOUS at 18:40

## 2023-12-26 RX ADMIN — COLLAGENASE SANTYL: 250 OINTMENT TOPICAL at 11:45

## 2023-12-26 RX ADMIN — ASPIRIN 81 MG: 81 TABLET, COATED ORAL at 11:43

## 2023-12-26 RX ADMIN — PROPRANOLOL HYDROCHLORIDE 10 MG: 10 TABLET ORAL at 21:21

## 2023-12-26 RX ADMIN — SODIUM CHLORIDE, PRESERVATIVE FREE 10 ML: 5 INJECTION INTRAVENOUS at 21:00

## 2023-12-26 RX ADMIN — LEVOTHYROXINE SODIUM 100 MCG: 0.1 TABLET ORAL at 05:04

## 2023-12-26 RX ADMIN — PROPRANOLOL HYDROCHLORIDE 10 MG: 10 TABLET ORAL at 11:43

## 2023-12-26 RX ADMIN — Medication 500 MG: at 11:44

## 2023-12-26 RX ADMIN — SODIUM CHLORIDE, PRESERVATIVE FREE 10 ML: 5 INJECTION INTRAVENOUS at 11:45

## 2023-12-26 RX ADMIN — CLOPIDOGREL BISULFATE 75 MG: 75 TABLET ORAL at 11:47

## 2023-12-26 RX ADMIN — ACETAMINOPHEN 650 MG: 325 TABLET ORAL at 11:43

## 2023-12-26 RX ADMIN — INSULIN LISPRO 16 UNITS: 100 INJECTION, SOLUTION INTRAVENOUS; SUBCUTANEOUS at 14:13

## 2023-12-26 RX ADMIN — CYANOCOBALAMIN TAB 1000 MCG 1000 MCG: 1000 TAB at 11:44

## 2023-12-26 RX ADMIN — INSULIN HUMAN 18 UNITS: 100 INJECTION, SUSPENSION SUBCUTANEOUS at 11:42

## 2023-12-26 RX ADMIN — NEPHROCAP 1 MG: 1 CAP ORAL at 11:43

## 2023-12-27 VITALS
OXYGEN SATURATION: 100 % | DIASTOLIC BLOOD PRESSURE: 61 MMHG | HEART RATE: 87 BPM | TEMPERATURE: 98.6 F | SYSTOLIC BLOOD PRESSURE: 102 MMHG | HEIGHT: 66 IN | BODY MASS INDEX: 26.69 KG/M2 | RESPIRATION RATE: 18 BRPM | WEIGHT: 166.1 LBS

## 2023-12-27 LAB
ANION GAP SERPL CALC-SCNC: 5 MMOL/L (ref 5–15)
BASOPHILS # BLD: 0 K/UL (ref 0–0.1)
BASOPHILS NFR BLD: 0 % (ref 0–1)
BUN SERPL-MCNC: 37 MG/DL (ref 6–20)
BUN/CREAT SERPL: 46 (ref 12–20)
CA-I BLD-MCNC: 9.2 MG/DL (ref 8.5–10.1)
CHLORIDE SERPL-SCNC: 102 MMOL/L (ref 97–108)
CO2 SERPL-SCNC: 26 MMOL/L (ref 21–32)
CREAT SERPL-MCNC: 0.81 MG/DL (ref 0.55–1.02)
DIFFERENTIAL METHOD BLD: ABNORMAL
EOSINOPHIL # BLD: 0 K/UL (ref 0–0.4)
EOSINOPHIL NFR BLD: 0 % (ref 0–7)
ERYTHROCYTE [DISTWIDTH] IN BLOOD BY AUTOMATED COUNT: 13.5 % (ref 11.5–14.5)
GLUCOSE BLD STRIP.AUTO-MCNC: 311 MG/DL (ref 65–100)
GLUCOSE BLD STRIP.AUTO-MCNC: 332 MG/DL (ref 65–100)
GLUCOSE SERPL-MCNC: 299 MG/DL (ref 65–100)
HCT VFR BLD AUTO: 30.7 % (ref 35–47)
HGB BLD-MCNC: 10.2 G/DL (ref 11.5–16)
IMM GRANULOCYTES # BLD AUTO: 0.1 K/UL (ref 0–0.04)
IMM GRANULOCYTES NFR BLD AUTO: 2 % (ref 0–0.5)
LYMPHOCYTES # BLD: 1.6 K/UL (ref 0.8–3.5)
LYMPHOCYTES NFR BLD: 25 % (ref 12–49)
MCH RBC QN AUTO: 29.3 PG (ref 26–34)
MCHC RBC AUTO-ENTMCNC: 33.2 G/DL (ref 30–36.5)
MCV RBC AUTO: 88.2 FL (ref 80–99)
MONOCYTES # BLD: 0.4 K/UL (ref 0–1)
MONOCYTES NFR BLD: 7 % (ref 5–13)
NEUTS SEG # BLD: 4.1 K/UL (ref 1.8–8)
NEUTS SEG NFR BLD: 66 % (ref 32–75)
NRBC # BLD: 0 K/UL (ref 0–0.01)
NRBC BLD-RTO: 0 PER 100 WBC
PERFORMED BY:: ABNORMAL
PERFORMED BY:: ABNORMAL
PLATELET # BLD AUTO: 184 K/UL (ref 150–400)
PMV BLD AUTO: 9.9 FL (ref 8.9–12.9)
POTASSIUM SERPL-SCNC: 4 MMOL/L (ref 3.5–5.1)
RBC # BLD AUTO: 3.48 M/UL (ref 3.8–5.2)
SODIUM SERPL-SCNC: 133 MMOL/L (ref 136–145)
WBC # BLD AUTO: 6.2 K/UL (ref 3.6–11)

## 2023-12-27 PROCEDURE — 36415 COLL VENOUS BLD VENIPUNCTURE: CPT

## 2023-12-27 PROCEDURE — 85025 COMPLETE CBC W/AUTO DIFF WBC: CPT

## 2023-12-27 PROCEDURE — 80048 BASIC METABOLIC PNL TOTAL CA: CPT

## 2023-12-27 PROCEDURE — 97530 THERAPEUTIC ACTIVITIES: CPT

## 2023-12-27 PROCEDURE — 6370000000 HC RX 637 (ALT 250 FOR IP): Performed by: HOSPITALIST

## 2023-12-27 PROCEDURE — 6370000000 HC RX 637 (ALT 250 FOR IP): Performed by: PHYSICIAN ASSISTANT

## 2023-12-27 PROCEDURE — 82962 GLUCOSE BLOOD TEST: CPT

## 2023-12-27 PROCEDURE — 2580000003 HC RX 258: Performed by: HOSPITALIST

## 2023-12-27 RX ORDER — METFORMIN HYDROCHLORIDE 750 MG/1
750 TABLET, EXTENDED RELEASE ORAL 2 TIMES DAILY WITH MEALS
Status: DISCONTINUED | OUTPATIENT
Start: 2023-12-27 | End: 2023-12-27 | Stop reason: HOSPADM

## 2023-12-27 RX ORDER — HYDROMORPHONE HYDROCHLORIDE 1 MG/ML
0.25 INJECTION, SOLUTION INTRAMUSCULAR; INTRAVENOUS; SUBCUTANEOUS EVERY 4 HOURS PRN
Status: DISCONTINUED | OUTPATIENT
Start: 2023-12-27 | End: 2023-12-27 | Stop reason: HOSPADM

## 2023-12-27 RX ADMIN — ACETAMINOPHEN 650 MG: 325 TABLET ORAL at 03:08

## 2023-12-27 RX ADMIN — LEVOTHYROXINE SODIUM 100 MCG: 0.1 TABLET ORAL at 06:32

## 2023-12-27 RX ADMIN — CYANOCOBALAMIN TAB 1000 MCG 1000 MCG: 1000 TAB at 09:19

## 2023-12-27 RX ADMIN — ACETAMINOPHEN 650 MG: 325 TABLET ORAL at 11:47

## 2023-12-27 RX ADMIN — INSULIN LISPRO 12 UNITS: 100 INJECTION, SOLUTION INTRAVENOUS; SUBCUTANEOUS at 11:48

## 2023-12-27 RX ADMIN — CLOPIDOGREL BISULFATE 75 MG: 75 TABLET ORAL at 09:19

## 2023-12-27 RX ADMIN — Medication 500 MG: at 09:19

## 2023-12-27 RX ADMIN — SODIUM CHLORIDE, PRESERVATIVE FREE 10 ML: 5 INJECTION INTRAVENOUS at 09:20

## 2023-12-27 RX ADMIN — INSULIN HUMAN 18 UNITS: 100 INJECTION, SUSPENSION SUBCUTANEOUS at 09:19

## 2023-12-27 RX ADMIN — METFORMIN HYDROCHLORIDE 500 MG: 500 TABLET ORAL at 09:19

## 2023-12-27 RX ADMIN — COLLAGENASE SANTYL: 250 OINTMENT TOPICAL at 09:25

## 2023-12-27 RX ADMIN — INSULIN LISPRO 12 UNITS: 100 INJECTION, SOLUTION INTRAVENOUS; SUBCUTANEOUS at 09:20

## 2023-12-27 RX ADMIN — ASPIRIN 81 MG: 81 TABLET, COATED ORAL at 09:19

## 2023-12-27 RX ADMIN — NEPHROCAP 1 MG: 1 CAP ORAL at 09:19

## 2023-12-27 NOTE — CARE COORDINATION
Transition of Care Plan:    RUR: 23%  Prior Level of Functioning: Dependent  Disposition: SNF  If SNF or IPR: Date FOC offered: 12/22/2023  Date [address] received: 12/22/2023  Accepting facility: [address]  Date authorization started with reference number:   Date authorization received and expires: Follow up appointments: Discharge summary  DME needed: Per facility  Transportation at discharge: Medical  IM/IMM Medicare/ letter given: Yes  Is patient a  and connected with VA? If yes, was [employer] transfer form completed and [facility] notified? Caregiver Contact:  x 2 for daughter  Discharge Caregiver contacted prior to discharge? Nurse report  Care Conference needed?  N/A  Barriers to discharge: None

## 2023-12-27 NOTE — CARE COORDINATION
Clinical chart reviewed. Discharge order noted. [facility] accepted patient. Awaiting room assignment and transportation set up. VM left for patient's daughter, Heather Holter @ [phone] advising of discharge today. 1130: CM left second VM for daughter. Provided room number and transportation time.

## 2023-12-27 NOTE — CARE COORDINATION
DCP: [facility]. Patient will go to # [id number]. Call nurse report to [phone]. [facility] will transport.   time of 1 pm.

## 2023-12-27 NOTE — DISCHARGE INSTR - COC
Wound Etiology Pressure Unstageable 12/21/23 1329   Dressing Status New dressing applied 12/27/23 0510   Wound Cleansed Cleansed with saline 12/27/23 0510   Dressing/Treatment Pharmaceutical agent (see MAR); Gauze dressing/dressing sponge; Foam 12/27/23 0510   Dressing Change Due 12/28/23 12/27/23 0510   Wound Assessment Slough; Devitalized tissue 12/21/23 1329   Drainage Amount Scant (moist but unmeasurable) 12/25/23 1626   Drainage Description Serous; Thin 12/25/23 1626   Odor Malodorous/putrid 12/27/23 0510   Kristina-wound Assessment Non-blanchable erythema 12/21/23 1329   Margins Defined edges; Attached edges 12/21/23 1329   Number of days: 83       Wound 12/20/23 Breast Lateral;Lower;Right excoriation under right breast (Active)   Wound Etiology Other 12/20/23 0740   Dressing Status Clean 12/25/23 2011   Wound Cleansed Cleansed with saline 12/24/23 0542   Dressing/Treatment Zinc paste 12/24/23 0915   Drainage Amount None (dry) 12/24/23 1920   Odor None 12/24/23 1920   Number of days: 7       Wound Groin Excoriation (Active)   Wound Etiology Other 12/20/23 0740   Wound Cleansed Other (Comment) 12/24/23 0915   Dressing/Treatment Zinc paste 12/27/23 0510   Number of days:        Wound 12/20/23 Buttocks Left;Right bilatera Buttock Skin redness, blister Left buttock (Active)   Wound Cleansed Cleansed with saline 12/24/23 0542   Dressing/Treatment Zinc paste 12/24/23 0915   Number of days: 7       Wound 12/19/23 Heel Posterior;Right Boggy with blister (Active)   Wound Etiology Pressure Stage 2 12/21/23 1330   Dressing Status Reinforced dressing 12/25/23 2011   Wound Cleansed Cleansed with saline 12/25/23 1626   Dressing/Treatment Foam 12/27/23 0510   Offloading for Diabetic Foot Ulcers Offloading ordered 12/21/23 1330   Dressing Change Due 12/28/23 12/27/23 0510   Wound Assessment Fluid filled blister 12/21/23 1330   Drainage Amount None (dry) 12/25/23 1626   Odor None 12/25/23 1626   Kristina-wound Assessment Non-blanchable

## (undated) DEVICE — BOWL MED M 16OZ PLAS CAP GRAD

## (undated) DEVICE — RUNTHROUGH NS EXTRA FLOPPY PTCA GUIDEWIRE: Brand: RUNTHROUGH

## (undated) DEVICE — CATHETER GUID 5FR DIA0.058IN SHFT NYL STD JL 3.5 CRV ENH

## (undated) DEVICE — DEVICE INFL 20ML BRL POLYCARB ANALG LUMINESCENT DISPLAY ANG

## (undated) DEVICE — 3M™ STERI-DRAPE™ SMALL DRAPE WITH ADHESIVE APERTURE 1092 25/BX,4/CS&#X20;: Brand: STERI-DRAPE™

## (undated) DEVICE — CATHETER DIAG 5FR L100CM LUMN ID0.047IN JL3.5 CRV 0 SIDE H

## (undated) DEVICE — PROTECTION STATION PRESSURIZED PG: Brand: NAMIC

## (undated) DEVICE — COPILOT BLEEDBACK CONTROL VALVE: Brand: COPILOT

## (undated) DEVICE — SYRINGE MED 10ML RED POLYCARB BRL FIX M LUER CONN FLAT GRP

## (undated) DEVICE — SURGICAL PROCEDURE TRAY CRD CATH 3 PRT

## (undated) DEVICE — 3M™ TEGADERM™ TRANSPARENT FILM DRESSING FRAME STYLE, 1626W, 4 IN X 4-3/4 IN (10 CM X 12 CM), 50/CT 4CT/CASE: Brand: 3M™ TEGADERM™

## (undated) DEVICE — SC 3W MP RA OFF PB - PG: Brand: NAMIC

## (undated) DEVICE — TOOL INSRT 0022IN S STL GWIRE

## (undated) DEVICE — BAND COMPR L24CM REG CLR PLAS HEMSTAT EXT HK AND LOOP RETEN

## (undated) DEVICE — GLIDESHEATH SLENDER STAINLESS STEEL KIT: Brand: GLIDESHEATH SLENDER

## (undated) DEVICE — Z INACTIVE UOM QTY CHANGE USE 2863475 DEVICE INFL 20ML BRL POLYCARB ANALG

## (undated) DEVICE — SYRINGE MED 10ML PUR GAM COMPATIBLE POLYCARB FIX M LUER CONN

## (undated) DEVICE — GUIDEWIRE VASC L260CM DIA0.035IN TIP L3MM STD EXCHG PTFE J

## (undated) DEVICE — PRESSURE TUBING: Brand: TRUWAVE

## (undated) DEVICE — CATHETER DIAG 5FR L100CM LUMN ID0.047IN JR4 CRV 0 SIDE H

## (undated) DEVICE — SYRINGE ANGIO 20ML WHT POLYCARB VAC PRSS CAP PLUNG FIX M